# Patient Record
Sex: MALE | Race: WHITE | NOT HISPANIC OR LATINO | URBAN - METROPOLITAN AREA
[De-identification: names, ages, dates, MRNs, and addresses within clinical notes are randomized per-mention and may not be internally consistent; named-entity substitution may affect disease eponyms.]

---

## 2017-12-10 ENCOUNTER — INPATIENT (INPATIENT)
Facility: HOSPITAL | Age: 72
LOS: 2 days | Discharge: HOME | DRG: 694 | End: 2017-12-13
Attending: UROLOGY | Admitting: UROLOGY
Payer: MEDICARE

## 2017-12-10 VITALS
HEART RATE: 100 BPM | SYSTOLIC BLOOD PRESSURE: 146 MMHG | RESPIRATION RATE: 18 BRPM | OXYGEN SATURATION: 98 % | TEMPERATURE: 98 F | DIASTOLIC BLOOD PRESSURE: 93 MMHG

## 2017-12-10 DIAGNOSIS — I63.9 CEREBRAL INFARCTION, UNSPECIFIED: ICD-10-CM

## 2017-12-10 DIAGNOSIS — E11.9 TYPE 2 DIABETES MELLITUS WITHOUT COMPLICATIONS: ICD-10-CM

## 2017-12-10 DIAGNOSIS — E78.00 PURE HYPERCHOLESTEROLEMIA, UNSPECIFIED: ICD-10-CM

## 2017-12-10 DIAGNOSIS — N20.0 CALCULUS OF KIDNEY: ICD-10-CM

## 2017-12-10 DIAGNOSIS — I10 ESSENTIAL (PRIMARY) HYPERTENSION: ICD-10-CM

## 2017-12-10 DIAGNOSIS — Z90.49 ACQUIRED ABSENCE OF OTHER SPECIFIED PARTS OF DIGESTIVE TRACT: Chronic | ICD-10-CM

## 2017-12-10 LAB
ALBUMIN SERPL ELPH-MCNC: 4.2 G/DL — SIGNIFICANT CHANGE UP (ref 3.3–5)
ALP SERPL-CCNC: 53 U/L — SIGNIFICANT CHANGE UP (ref 40–120)
ALT FLD-CCNC: 13 U/L RC — SIGNIFICANT CHANGE UP (ref 10–45)
ANION GAP SERPL CALC-SCNC: 20 MMOL/L — HIGH (ref 5–17)
APPEARANCE UR: CLEAR — SIGNIFICANT CHANGE UP
AST SERPL-CCNC: 12 U/L — SIGNIFICANT CHANGE UP (ref 10–40)
BASOPHILS # BLD AUTO: 0 K/UL — SIGNIFICANT CHANGE UP (ref 0–0.2)
BASOPHILS NFR BLD AUTO: 0.2 % — SIGNIFICANT CHANGE UP (ref 0–2)
BILIRUB SERPL-MCNC: 0.4 MG/DL — SIGNIFICANT CHANGE UP (ref 0.2–1.2)
BILIRUB UR-MCNC: NEGATIVE — SIGNIFICANT CHANGE UP
BUN SERPL-MCNC: 35 MG/DL — HIGH (ref 7–23)
CALCIUM SERPL-MCNC: 9.4 MG/DL — SIGNIFICANT CHANGE UP (ref 8.4–10.5)
CHLORIDE SERPL-SCNC: 104 MMOL/L — SIGNIFICANT CHANGE UP (ref 96–108)
CO2 SERPL-SCNC: 19 MMOL/L — LOW (ref 22–31)
COLOR SPEC: YELLOW — SIGNIFICANT CHANGE UP
CREAT SERPL-MCNC: 2.54 MG/DL — HIGH (ref 0.5–1.3)
DIFF PNL FLD: ABNORMAL
EOSINOPHIL # BLD AUTO: 0.1 K/UL — SIGNIFICANT CHANGE UP (ref 0–0.5)
EOSINOPHIL NFR BLD AUTO: 0.5 % — SIGNIFICANT CHANGE UP (ref 0–6)
GAS PNL BLDV: SIGNIFICANT CHANGE UP
GLUCOSE SERPL-MCNC: 205 MG/DL — HIGH (ref 70–99)
GLUCOSE UR QL: 50
HCT VFR BLD CALC: 37.4 % — LOW (ref 39–50)
HGB BLD-MCNC: 12.5 G/DL — LOW (ref 13–17)
KETONES UR-MCNC: ABNORMAL
LEUKOCYTE ESTERASE UR-ACNC: NEGATIVE — SIGNIFICANT CHANGE UP
LYMPHOCYTES # BLD AUTO: 1.3 K/UL — SIGNIFICANT CHANGE UP (ref 1–3.3)
LYMPHOCYTES # BLD AUTO: 10.5 % — LOW (ref 13–44)
MCHC RBC-ENTMCNC: 31.3 PG — SIGNIFICANT CHANGE UP (ref 27–34)
MCHC RBC-ENTMCNC: 33.5 GM/DL — SIGNIFICANT CHANGE UP (ref 32–36)
MCV RBC AUTO: 93.3 FL — SIGNIFICANT CHANGE UP (ref 80–100)
MONOCYTES # BLD AUTO: 1.2 K/UL — HIGH (ref 0–0.9)
MONOCYTES NFR BLD AUTO: 9.7 % — SIGNIFICANT CHANGE UP (ref 2–14)
NEUTROPHILS # BLD AUTO: 9.6 K/UL — HIGH (ref 1.8–7.4)
NEUTROPHILS NFR BLD AUTO: 79.2 % — HIGH (ref 43–77)
NITRITE UR-MCNC: NEGATIVE — SIGNIFICANT CHANGE UP
PH UR: 5.5 — SIGNIFICANT CHANGE UP (ref 5–8)
PLATELET # BLD AUTO: 296 K/UL — SIGNIFICANT CHANGE UP (ref 150–400)
POTASSIUM SERPL-MCNC: 4 MMOL/L — SIGNIFICANT CHANGE UP (ref 3.5–5.3)
POTASSIUM SERPL-SCNC: 4 MMOL/L — SIGNIFICANT CHANGE UP (ref 3.5–5.3)
PROT SERPL-MCNC: 8.5 G/DL — HIGH (ref 6–8.3)
PROT UR-MCNC: 100 MG/DL
RBC # BLD: 4.01 M/UL — LOW (ref 4.2–5.8)
RBC # FLD: 12.1 % — SIGNIFICANT CHANGE UP (ref 10.3–14.5)
SODIUM SERPL-SCNC: 143 MMOL/L — SIGNIFICANT CHANGE UP (ref 135–145)
SP GR SPEC: 1.03 — HIGH (ref 1.01–1.02)
UROBILINOGEN FLD QL: NEGATIVE — SIGNIFICANT CHANGE UP
WBC # BLD: 12.2 K/UL — HIGH (ref 3.8–10.5)
WBC # FLD AUTO: 12.2 K/UL — HIGH (ref 3.8–10.5)

## 2017-12-10 PROCEDURE — 99285 EMERGENCY DEPT VISIT HI MDM: CPT

## 2017-12-10 PROCEDURE — 74176 CT ABD & PELVIS W/O CONTRAST: CPT | Mod: 26

## 2017-12-10 PROCEDURE — 71010: CPT | Mod: 26

## 2017-12-10 RX ORDER — SODIUM CHLORIDE 9 MG/ML
1000 INJECTION INTRAMUSCULAR; INTRAVENOUS; SUBCUTANEOUS ONCE
Qty: 0 | Refills: 0 | Status: COMPLETED | OUTPATIENT
Start: 2017-12-10 | End: 2017-12-10

## 2017-12-10 RX ORDER — TAMSULOSIN HYDROCHLORIDE 0.4 MG/1
0.4 CAPSULE ORAL DAILY
Qty: 0 | Refills: 0 | Status: DISCONTINUED | OUTPATIENT
Start: 2017-12-10 | End: 2017-12-12

## 2017-12-10 RX ORDER — AMLODIPINE BESYLATE 2.5 MG/1
5 TABLET ORAL ONCE
Qty: 0 | Refills: 0 | Status: COMPLETED | OUTPATIENT
Start: 2017-12-10 | End: 2017-12-10

## 2017-12-10 RX ORDER — ROSUVASTATIN CALCIUM 5 MG/1
0 TABLET ORAL
Qty: 0 | Refills: 0 | COMMUNITY

## 2017-12-10 RX ORDER — CEFTRIAXONE 500 MG/1
1 INJECTION, POWDER, FOR SOLUTION INTRAMUSCULAR; INTRAVENOUS ONCE
Qty: 0 | Refills: 0 | Status: COMPLETED | OUTPATIENT
Start: 2017-12-10 | End: 2017-12-10

## 2017-12-10 RX ORDER — ACETAMINOPHEN 500 MG
1000 TABLET ORAL ONCE
Qty: 0 | Refills: 0 | Status: COMPLETED | OUTPATIENT
Start: 2017-12-10 | End: 2017-12-10

## 2017-12-10 RX ORDER — SODIUM CHLORIDE 9 MG/ML
1000 INJECTION INTRAMUSCULAR; INTRAVENOUS; SUBCUTANEOUS
Qty: 0 | Refills: 0 | Status: DISCONTINUED | OUTPATIENT
Start: 2017-12-10 | End: 2017-12-12

## 2017-12-10 RX ORDER — AMLODIPINE BESYLATE 2.5 MG/1
0 TABLET ORAL
Qty: 0 | Refills: 0 | COMMUNITY

## 2017-12-10 RX ORDER — ATORVASTATIN CALCIUM 80 MG/1
80 TABLET, FILM COATED ORAL AT BEDTIME
Qty: 0 | Refills: 0 | Status: DISCONTINUED | OUTPATIENT
Start: 2017-12-10 | End: 2017-12-12

## 2017-12-10 RX ORDER — ONDANSETRON 8 MG/1
4 TABLET, FILM COATED ORAL ONCE
Qty: 0 | Refills: 0 | Status: COMPLETED | OUTPATIENT
Start: 2017-12-10 | End: 2017-12-10

## 2017-12-10 RX ORDER — LIRAGLUTIDE 6 MG/ML
0 INJECTION SUBCUTANEOUS
Qty: 0 | Refills: 0 | COMMUNITY

## 2017-12-10 RX ORDER — CEFTRIAXONE 500 MG/1
1 INJECTION, POWDER, FOR SOLUTION INTRAMUSCULAR; INTRAVENOUS EVERY 24 HOURS
Qty: 0 | Refills: 0 | Status: DISCONTINUED | OUTPATIENT
Start: 2017-12-11 | End: 2017-12-12

## 2017-12-10 RX ORDER — CARVEDILOL PHOSPHATE 80 MG/1
1 CAPSULE, EXTENDED RELEASE ORAL
Qty: 0 | Refills: 0 | COMMUNITY

## 2017-12-10 RX ORDER — CEFTRIAXONE 500 MG/1
INJECTION, POWDER, FOR SOLUTION INTRAMUSCULAR; INTRAVENOUS
Qty: 0 | Refills: 0 | Status: DISCONTINUED | OUTPATIENT
Start: 2017-12-10 | End: 2017-12-12

## 2017-12-10 RX ORDER — FENOFIBRATE,MICRONIZED 130 MG
0 CAPSULE ORAL
Qty: 0 | Refills: 0 | COMMUNITY

## 2017-12-10 RX ORDER — ONDANSETRON 8 MG/1
4 TABLET, FILM COATED ORAL ONCE
Qty: 0 | Refills: 0 | Status: DISCONTINUED | OUTPATIENT
Start: 2017-12-10 | End: 2017-12-12

## 2017-12-10 RX ORDER — LOSARTAN POTASSIUM 100 MG/1
50 TABLET, FILM COATED ORAL DAILY
Qty: 0 | Refills: 0 | Status: DISCONTINUED | OUTPATIENT
Start: 2017-12-10 | End: 2017-12-12

## 2017-12-10 RX ORDER — ASPIRIN/CALCIUM CARB/MAGNESIUM 324 MG
81 TABLET ORAL DAILY
Qty: 0 | Refills: 0 | Status: DISCONTINUED | OUTPATIENT
Start: 2017-12-10 | End: 2017-12-12

## 2017-12-10 RX ORDER — LOSARTAN POTASSIUM 100 MG/1
0 TABLET, FILM COATED ORAL
Qty: 0 | Refills: 0 | COMMUNITY

## 2017-12-10 RX ORDER — GLIMEPIRIDE 1 MG
0 TABLET ORAL
Qty: 0 | Refills: 0 | COMMUNITY

## 2017-12-10 RX ORDER — MORPHINE SULFATE 50 MG/1
1 CAPSULE, EXTENDED RELEASE ORAL ONCE
Qty: 0 | Refills: 0 | Status: DISCONTINUED | OUTPATIENT
Start: 2017-12-10 | End: 2017-12-10

## 2017-12-10 RX ORDER — HEPARIN SODIUM 5000 [USP'U]/ML
5000 INJECTION INTRAVENOUS; SUBCUTANEOUS EVERY 8 HOURS
Qty: 0 | Refills: 0 | Status: DISCONTINUED | OUTPATIENT
Start: 2017-12-10 | End: 2017-12-12

## 2017-12-10 RX ORDER — ASPIRIN/CALCIUM CARB/MAGNESIUM 324 MG
0 TABLET ORAL
Qty: 0 | Refills: 0 | COMMUNITY

## 2017-12-10 RX ORDER — METFORMIN HYDROCHLORIDE 850 MG/1
0 TABLET ORAL
Qty: 0 | Refills: 0 | COMMUNITY

## 2017-12-10 RX ORDER — CARVEDILOL PHOSPHATE 80 MG/1
0 CAPSULE, EXTENDED RELEASE ORAL
Qty: 0 | Refills: 0 | COMMUNITY

## 2017-12-10 RX ORDER — CARVEDILOL PHOSPHATE 80 MG/1
6.25 CAPSULE, EXTENDED RELEASE ORAL
Qty: 0 | Refills: 0 | Status: DISCONTINUED | OUTPATIENT
Start: 2017-12-10 | End: 2017-12-12

## 2017-12-10 RX ADMIN — SODIUM CHLORIDE 500 MILLILITER(S): 9 INJECTION INTRAMUSCULAR; INTRAVENOUS; SUBCUTANEOUS at 20:25

## 2017-12-10 RX ADMIN — TAMSULOSIN HYDROCHLORIDE 0.4 MILLIGRAM(S): 0.4 CAPSULE ORAL at 17:22

## 2017-12-10 RX ADMIN — MORPHINE SULFATE 1 MILLIGRAM(S): 50 CAPSULE, EXTENDED RELEASE ORAL at 17:44

## 2017-12-10 RX ADMIN — SODIUM CHLORIDE 75 MILLILITER(S): 9 INJECTION INTRAMUSCULAR; INTRAVENOUS; SUBCUTANEOUS at 23:21

## 2017-12-10 RX ADMIN — AMLODIPINE BESYLATE 5 MILLIGRAM(S): 2.5 TABLET ORAL at 22:29

## 2017-12-10 RX ADMIN — MORPHINE SULFATE 1 MILLIGRAM(S): 50 CAPSULE, EXTENDED RELEASE ORAL at 17:22

## 2017-12-10 RX ADMIN — Medication 1000 MILLIGRAM(S): at 15:12

## 2017-12-10 RX ADMIN — ATORVASTATIN CALCIUM 80 MILLIGRAM(S): 80 TABLET, FILM COATED ORAL at 23:33

## 2017-12-10 RX ADMIN — MORPHINE SULFATE 1 MILLIGRAM(S): 50 CAPSULE, EXTENDED RELEASE ORAL at 20:49

## 2017-12-10 RX ADMIN — Medication 400 MILLIGRAM(S): at 14:58

## 2017-12-10 RX ADMIN — MORPHINE SULFATE 1 MILLIGRAM(S): 50 CAPSULE, EXTENDED RELEASE ORAL at 20:25

## 2017-12-10 RX ADMIN — Medication 81 MILLIGRAM(S): at 22:28

## 2017-12-10 RX ADMIN — LOSARTAN POTASSIUM 50 MILLIGRAM(S): 100 TABLET, FILM COATED ORAL at 22:28

## 2017-12-10 RX ADMIN — HEPARIN SODIUM 5000 UNIT(S): 5000 INJECTION INTRAVENOUS; SUBCUTANEOUS at 23:33

## 2017-12-10 RX ADMIN — SODIUM CHLORIDE 1000 MILLILITER(S): 9 INJECTION INTRAMUSCULAR; INTRAVENOUS; SUBCUTANEOUS at 14:58

## 2017-12-10 RX ADMIN — ONDANSETRON 4 MILLIGRAM(S): 8 TABLET, FILM COATED ORAL at 14:58

## 2017-12-10 RX ADMIN — SODIUM CHLORIDE 1000 MILLILITER(S): 9 INJECTION INTRAMUSCULAR; INTRAVENOUS; SUBCUTANEOUS at 17:22

## 2017-12-10 RX ADMIN — CEFTRIAXONE 100 GRAM(S): 500 INJECTION, POWDER, FOR SOLUTION INTRAMUSCULAR; INTRAVENOUS at 22:29

## 2017-12-10 NOTE — H&P ADULT - PROBLEM SELECTOR PLAN 1
-NPO after midnight for possible surgical intervention  -IV fluid hydration  -Analgesia and antiemetics prn  -Flomax 0.4mg   -Strain urine  - F/u cx   -AM labs, trend cr

## 2017-12-10 NOTE — ED ADULT NURSE REASSESSMENT NOTE - NS ED NURSE REASSESS COMMENT FT1
Pt currently resting comfortably in bed waiting for Urology consult. VSS. Will continue to monitor and assess while offering support and reassurance.

## 2017-12-10 NOTE — H&P ADULT - NSHPPHYSICALEXAM_GEN_ALL_CORE
Gen: NAD  Pulm: No respiratory distress, no subcostal retractions  CV: RRR, no JVD  Abd: Soft, ND, suprapubic tenderness  Back: No CVAT  :  No discharge or blood at urethral meatus.  Testes descended bilaterally.  Testes and epididymis nontender bilaterally.    MSK: No edema present

## 2017-12-10 NOTE — ED PROVIDER NOTE - SHIFT CHANGE DETAILS
Attending MD Cool: 72M presents to the ED with abdominal pain/n/v/no BM for 4d.  Prior appy in childhood.  CT without obstruction, but impacted 4mm R renal stone, Cr 2.34, no prior.  Uro consult pending.  PO challenge.  If not tolerating po, pain control and IV hydration, emesis, admission.  However, will await uro input about impacted stone.

## 2017-12-10 NOTE — ED PROVIDER NOTE - MEDICAL DECISION MAKING DETAILS
72yoM with PMH of stroke, T2DM, renal stone, presenting with epigastric pain for 5 days, decreased PO intake and nausea.  - Check labs, CT abd/pelvis,   - IVNS, acetaminophen, maalox

## 2017-12-10 NOTE — H&P ADULT - HISTORY OF PRESENT ILLNESS
71y/o m with PMH of T2DM, CVA, HTN, HLD, renal stones 12 years ago with spontaneous passage presented to ED c/o abdominal pain and nausea x 4 days. Started having left flank pain and vomiting this morning. Pain radiates to LLQ, one episode of hematuria 3 days ago that has since resolved. He denies F/C, dysuria, urgency, frequency.

## 2017-12-10 NOTE — ED ADULT NURSE NOTE - OBJECTIVE STATEMENT
72 year old male presented to ED with c/o of abdominal pain x3 days. Pt has not had BM in 3 days, states pain is epigastric radiating to lower back. Pt denies CP, SOB, nausea/vomiting, numbness/tingling, fever, cough, chills, dizziness, headache. Pt a&ox3, lung sounds clear, heart rate regular, abdomen soft nontender nondistended to palp. +BS in all four quadrants. Skin intact. IV in left forearm 20G and patent. Pt currently resting in bed with family at bedside, side rails up for safety. Will continue to monitor and assess while offering support and reassurance.

## 2017-12-10 NOTE — ED PROVIDER NOTE - OBJECTIVE STATEMENT
Pt is a 71yo M with PMH of stroke, T2DM, renal stones, who presents with complaint of 5-6 days of abdominal pain.  Pt also endorses nausea and constipation for 4 days. At onset, pt also experienced L lumbar pain which has since resolved. He denies vomiting, melena, hematochezia. No sick contacts or recent Pt is a 71yo M with PMH of stroke, T2DM, renal stones, who presents with complaint of 5-6 days of abdominal pain.  Pt also endorses nausea and constipation for 4 days. He denies vomiting, melena, hematochezia.  At onset, pt also experienced L lumbar pain which has since resolved. Lumbar back pain was not provoked by any recent exercises or direct trauma. Pt states that pain was similar to previous renal stone, and pt had 1 day of hematuria without dysuria which has since resolved. He did not try any OTC medications to help treat pain.     No sick contacts or recent travel.

## 2017-12-10 NOTE — ED ADULT NURSE NOTE - CHPI ED SYMPTOMS NEG
no nausea/no dysuria/no burning urination/no chills/no diarrhea/no hematuria/no blood in stool/no abdominal distension/no fever/no vomiting

## 2017-12-10 NOTE — ED PROVIDER NOTE - ATTENDING CONTRIBUTION TO CARE
Patient presenting c/o abdominal pains, nausea, vomiting, decreased PO intake and no bowel movement or passing gas for last 4 days.  Prior appendectomy as child.  No sick contacts or fevers, reporting feels dehydrated.    On exam patient vital signs within normal limits, non toxic appearing, RRR S1/S2, lungs clear to ascultation bilaterally, abdomen soft but with no ascultated bowel sounds.    DDx ileus versus SBO vs LBO, plan for labs, CT A/P, IVF, pain control, possible surgical consultation

## 2017-12-10 NOTE — ED PROVIDER NOTE - PHYSICAL EXAMINATION
Gen: NAD, A&O x 3, lying in bed conversant  HEENT: PERRL, EOMI, mucous membranes dry, no oropharyngeal exudates  Neck: no cervical lyphadenopathy  Pulmonary: CTAB, no rhonci, wheezes or rales  Cardiac: regular rate and rhythm, normal S1S2, no r/m/g  GI:  soft, moderate tenderness to palpation at epigastric area, nondistended, no guarding or rebound tenderness  Extremities: warm, well perfused, no peripheral edema  Skin: skin intact, no skin tears or rashes or other lesions  Neuro: A&O x3, CN II-XII intact, 5/5 BUE and BLE strength, decreased sensation to light touch at L plantar surface

## 2017-12-10 NOTE — H&P ADULT - NSHPLABSRESULTS_GEN_ALL_CORE
12.5   12.2  )-----------( 296      ( 10 Dec 2017 14:38 )             37.4   12-10    143  |  104  |  35<H>  ----------------------------<  205<H>  4.0   |  19<L>  |  2.54<H>    Ca    9.4      10 Dec 2017 14:38    TPro  8.5<H>  /  Alb  4.2  /  TBili  0.4  /  DBili  x   /  AST  12  /  ALT  13  /  AlkPhos  53  12-10  Urinalysis Basic - ( 10 Dec 2017 15:06 )    Color: Yellow / Appearance: Clear / S.028 / pH: x  Gluc: x / Ketone: Trace  / Bili: Negative / Urobili: Negative   Blood: x / Protein: 100 mg/dL / Nitrite: Negative   Leuk Esterase: Negative / RBC: 3-5 /HPF / WBC 3-5 /HPF   Sq Epi: x / Non Sq Epi: OCC /HPF / Bacteria: Few /HPF    < from: CT Abdomen and Pelvis w/ Oral Cont (12.10.17 @ 16:14) >    IMPRESSION:  1.  Mild left hydronephrosis secondary to 4 mm stone impacted in the   proximal left ureter.  2.  Indeterminate cystic lesion arising from the tail of pancreas, which   would be better evaluated with nonurgent MRI.      < end of copied text >

## 2017-12-10 NOTE — ED PROVIDER NOTE - PROGRESS NOTE DETAILS
Discussed with pt findings of renal stone with mild hydronephrosis. Found to have SCr of ~2.5. Pt does not have urologist. Will d/w house urology team. Pt has persistent L sided abdominal pain with back radiation. Will provide morphine. Awaiting final recommendations from urology team. Attending MD Cool: Called urology, awaiting to speak to attending, will await recommendations Attending MD Cool: H&P noted, uro paged, no final recommendations as of yet, will await Pt to be admitted to urology team under Dr. Rae

## 2017-12-10 NOTE — ED PROVIDER NOTE - NS ED ROS FT
ROS  Gen:  no fevers, + chills, + weight loss 7 lbs over the last week pt attributes to not eating because feeling unwell  HEENT: no vision changes, no hearing loss, no pharygnitis, no oral lesions  Pulm: no cough, no SOB, no sputum production, no wheezing  Cardiac: no chest pain, no SOB   GI: + abdominal pain, + N, no V, no diarrhea, + constipation  : no dysuria, + one day of hematuria which resolved  Skin: no skin lesions  Neuro:  no headache, no focal weakness, + numbness at L plantar surface 2/2 T2DM

## 2017-12-10 NOTE — H&P ADULT - PMH
Cerebrovascular accident (CVA), unspecified mechanism    Diabetes mellitus    High cholesterol    HTN (hypertension)

## 2017-12-11 DIAGNOSIS — I10 ESSENTIAL (PRIMARY) HYPERTENSION: ICD-10-CM

## 2017-12-11 DIAGNOSIS — E11.9 TYPE 2 DIABETES MELLITUS WITHOUT COMPLICATIONS: ICD-10-CM

## 2017-12-11 LAB
ANION GAP SERPL CALC-SCNC: 15 MMOL/L — SIGNIFICANT CHANGE UP (ref 5–17)
APTT BLD: 29.3 SEC — SIGNIFICANT CHANGE UP (ref 27.5–37.4)
BLD GP AB SCN SERPL QL: NEGATIVE — SIGNIFICANT CHANGE UP
BUN SERPL-MCNC: 26 MG/DL — HIGH (ref 7–23)
CALCIUM SERPL-MCNC: 8.4 MG/DL — SIGNIFICANT CHANGE UP (ref 8.4–10.5)
CHLORIDE SERPL-SCNC: 105 MMOL/L — SIGNIFICANT CHANGE UP (ref 96–108)
CO2 SERPL-SCNC: 19 MMOL/L — LOW (ref 22–31)
CREAT SERPL-MCNC: 2.28 MG/DL — HIGH (ref 0.5–1.3)
CULTURE RESULTS: NO GROWTH — SIGNIFICANT CHANGE UP
GLUCOSE BLDC GLUCOMTR-MCNC: 146 MG/DL — HIGH (ref 70–99)
GLUCOSE BLDC GLUCOMTR-MCNC: 165 MG/DL — HIGH (ref 70–99)
GLUCOSE BLDC GLUCOMTR-MCNC: 165 MG/DL — HIGH (ref 70–99)
GLUCOSE BLDC GLUCOMTR-MCNC: 216 MG/DL — HIGH (ref 70–99)
GLUCOSE SERPL-MCNC: 155 MG/DL — HIGH (ref 70–99)
HCT VFR BLD CALC: 32 % — LOW (ref 39–50)
HGB BLD-MCNC: 10.4 G/DL — LOW (ref 13–17)
INR BLD: 1.05 RATIO — SIGNIFICANT CHANGE UP (ref 0.88–1.16)
MCHC RBC-ENTMCNC: 29.5 PG — SIGNIFICANT CHANGE UP (ref 27–34)
MCHC RBC-ENTMCNC: 32.5 GM/DL — SIGNIFICANT CHANGE UP (ref 32–36)
MCV RBC AUTO: 90.9 FL — SIGNIFICANT CHANGE UP (ref 80–100)
PLATELET # BLD AUTO: 299 K/UL — SIGNIFICANT CHANGE UP (ref 150–400)
POTASSIUM SERPL-MCNC: 3.8 MMOL/L — SIGNIFICANT CHANGE UP (ref 3.5–5.3)
POTASSIUM SERPL-SCNC: 3.8 MMOL/L — SIGNIFICANT CHANGE UP (ref 3.5–5.3)
PROTHROM AB SERPL-ACNC: 11.9 SEC — SIGNIFICANT CHANGE UP (ref 10–13.1)
RBC # BLD: 3.52 M/UL — LOW (ref 4.2–5.8)
RBC # FLD: 13.5 % — SIGNIFICANT CHANGE UP (ref 10.3–14.5)
RH IG SCN BLD-IMP: POSITIVE — SIGNIFICANT CHANGE UP
SODIUM SERPL-SCNC: 139 MMOL/L — SIGNIFICANT CHANGE UP (ref 135–145)
SPECIMEN SOURCE: SIGNIFICANT CHANGE UP
WBC # BLD: 10.28 K/UL — SIGNIFICANT CHANGE UP (ref 3.8–10.5)
WBC # FLD AUTO: 10.28 K/UL — SIGNIFICANT CHANGE UP (ref 3.8–10.5)

## 2017-12-11 RX ORDER — PANTOPRAZOLE SODIUM 20 MG/1
40 TABLET, DELAYED RELEASE ORAL ONCE
Qty: 0 | Refills: 0 | Status: DISCONTINUED | OUTPATIENT
Start: 2017-12-11 | End: 2017-12-12

## 2017-12-11 RX ORDER — PANTOPRAZOLE SODIUM 20 MG/1
40 TABLET, DELAYED RELEASE ORAL ONCE
Qty: 0 | Refills: 0 | Status: COMPLETED | OUTPATIENT
Start: 2017-12-11 | End: 2017-12-11

## 2017-12-11 RX ORDER — SODIUM CHLORIDE 9 MG/ML
1000 INJECTION INTRAMUSCULAR; INTRAVENOUS; SUBCUTANEOUS ONCE
Qty: 0 | Refills: 0 | Status: DISCONTINUED | OUTPATIENT
Start: 2017-12-11 | End: 2017-12-11

## 2017-12-11 RX ORDER — MORPHINE SULFATE 50 MG/1
2 CAPSULE, EXTENDED RELEASE ORAL ONCE
Qty: 0 | Refills: 0 | Status: DISCONTINUED | OUTPATIENT
Start: 2017-12-11 | End: 2017-12-11

## 2017-12-11 RX ORDER — DEXTROSE 50 % IN WATER 50 %
12.5 SYRINGE (ML) INTRAVENOUS ONCE
Qty: 0 | Refills: 0 | Status: DISCONTINUED | OUTPATIENT
Start: 2017-12-11 | End: 2017-12-12

## 2017-12-11 RX ORDER — INSULIN LISPRO 100/ML
VIAL (ML) SUBCUTANEOUS
Qty: 0 | Refills: 0 | Status: DISCONTINUED | OUTPATIENT
Start: 2017-12-11 | End: 2017-12-11

## 2017-12-11 RX ORDER — GLUCAGON INJECTION, SOLUTION 0.5 MG/.1ML
1 INJECTION, SOLUTION SUBCUTANEOUS ONCE
Qty: 0 | Refills: 0 | Status: DISCONTINUED | OUTPATIENT
Start: 2017-12-11 | End: 2017-12-12

## 2017-12-11 RX ORDER — SODIUM CHLORIDE 9 MG/ML
1000 INJECTION, SOLUTION INTRAVENOUS
Qty: 0 | Refills: 0 | Status: DISCONTINUED | OUTPATIENT
Start: 2017-12-11 | End: 2017-12-12

## 2017-12-11 RX ORDER — ACETAMINOPHEN 500 MG
1000 TABLET ORAL ONCE
Qty: 0 | Refills: 0 | Status: COMPLETED | OUTPATIENT
Start: 2017-12-11 | End: 2017-12-12

## 2017-12-11 RX ORDER — DEXTROSE 50 % IN WATER 50 %
25 SYRINGE (ML) INTRAVENOUS ONCE
Qty: 0 | Refills: 0 | Status: DISCONTINUED | OUTPATIENT
Start: 2017-12-11 | End: 2017-12-12

## 2017-12-11 RX ORDER — INSULIN LISPRO 100/ML
VIAL (ML) SUBCUTANEOUS EVERY 6 HOURS
Qty: 0 | Refills: 0 | Status: DISCONTINUED | OUTPATIENT
Start: 2017-12-11 | End: 2017-12-12

## 2017-12-11 RX ORDER — DEXTROSE 50 % IN WATER 50 %
1 SYRINGE (ML) INTRAVENOUS ONCE
Qty: 0 | Refills: 0 | Status: DISCONTINUED | OUTPATIENT
Start: 2017-12-11 | End: 2017-12-12

## 2017-12-11 RX ADMIN — HEPARIN SODIUM 5000 UNIT(S): 5000 INJECTION INTRAVENOUS; SUBCUTANEOUS at 22:09

## 2017-12-11 RX ADMIN — ATORVASTATIN CALCIUM 80 MILLIGRAM(S): 80 TABLET, FILM COATED ORAL at 22:09

## 2017-12-11 RX ADMIN — CARVEDILOL PHOSPHATE 6.25 MILLIGRAM(S): 80 CAPSULE, EXTENDED RELEASE ORAL at 18:29

## 2017-12-11 RX ADMIN — MORPHINE SULFATE 2 MILLIGRAM(S): 50 CAPSULE, EXTENDED RELEASE ORAL at 13:35

## 2017-12-11 RX ADMIN — HEPARIN SODIUM 5000 UNIT(S): 5000 INJECTION INTRAVENOUS; SUBCUTANEOUS at 06:08

## 2017-12-11 RX ADMIN — HEPARIN SODIUM 5000 UNIT(S): 5000 INJECTION INTRAVENOUS; SUBCUTANEOUS at 13:38

## 2017-12-11 RX ADMIN — CEFTRIAXONE 100 GRAM(S): 500 INJECTION, POWDER, FOR SOLUTION INTRAMUSCULAR; INTRAVENOUS at 22:09

## 2017-12-11 RX ADMIN — Medication 1: at 13:35

## 2017-12-11 RX ADMIN — PANTOPRAZOLE SODIUM 40 MILLIGRAM(S): 20 TABLET, DELAYED RELEASE ORAL at 08:10

## 2017-12-11 RX ADMIN — TAMSULOSIN HYDROCHLORIDE 0.4 MILLIGRAM(S): 0.4 CAPSULE ORAL at 13:34

## 2017-12-11 RX ADMIN — Medication 0.5 MILLIGRAM(S): at 01:21

## 2017-12-11 RX ADMIN — Medication 81 MILLIGRAM(S): at 13:34

## 2017-12-11 RX ADMIN — CARVEDILOL PHOSPHATE 6.25 MILLIGRAM(S): 80 CAPSULE, EXTENDED RELEASE ORAL at 06:08

## 2017-12-11 NOTE — PROGRESS NOTE ADULT - SUBJECTIVE AND OBJECTIVE BOX
UROLOGY DAILY PROGRESS NOTE:     Subjective: Patient seen and examined at bedside. No acute events overnight       Objective:  Vital signs  T(F): , Max: 98.9 (12-10-17 @ 23:26)  HR: 94 (12-11-17 @ 06:07)  BP: 172/82 (12-11-17 @ 06:07)  SpO2: 96% (12-11-17 @ 06:07)  Wt(kg): --    Output     I&O's Detail      Physical Exam:  Gen: NAD  Abd:soft NTND   Back: mild L CVAT  : voiding     Labs:  12-10  12.2  / 37.4  /2.54                           12.5   12.2  )-----------( 296      ( 10 Dec 2017 14:38 )             37.4     12-10    143  |  104  |  35<H>  ----------------------------<  205<H>  4.0   |  19<L>  |  2.54<H>    Ca    9.4      10 Dec 2017 14:38    TPro  8.5<H>  /  Alb  4.2  /  TBili  0.4  /  DBili  x   /  AST  12  /  ALT  13  /  AlkPhos  53  12-10          Urine Cx: UROLOGY DAILY PROGRESS NOTE:     Subjective: Patient seen and examined at bedside. No acute events overnight pain nausea remains      Objective:  Vital signs  T(F): , Max: 98.9 (12-10-17 @ 23:26)  HR: 94 (12-11-17 @ 06:07)  BP: 172/82 (12-11-17 @ 06:07)  SpO2: 96% (12-11-17 @ 06:07)  Wt(kg): --    Output     I&O's Detail      Physical Exam:  Gen: NAD  Abd:soft NTND   Back: mild L CVAT  : voiding     Labs:  12-10  12.2  / 37.4  /2.54                           12.5   12.2  )-----------( 296      ( 10 Dec 2017 14:38 )             37.4     12-10    143  |  104  |  35<H>  ----------------------------<  205<H>  4.0   |  19<L>  |  2.54<H>    Ca    9.4      10 Dec 2017 14:38    TPro  8.5<H>  /  Alb  4.2  /  TBili  0.4  /  DBili  x   /  AST  12  /  ALT  13  /  AlkPhos  53  12-10          Urine Cx:

## 2017-12-11 NOTE — PROGRESS NOTE ADULT - ASSESSMENT
L renal colic 4mm mid ureteral calculi   bladder calculi  IV antibiotics  f/u cx  strain urine   pain management   OR today L ureteral stent  DVT prophylaxis L renal colic 4mm mid ureteral calculi with ROMIE  bladder calculi  IV antibiotics  f/u cx  f/u labs  strain urine   pain management   OR today L ureteral stent  DVT prophylaxis

## 2017-12-12 ENCOUNTER — TRANSCRIPTION ENCOUNTER (OUTPATIENT)
Age: 72
End: 2017-12-12

## 2017-12-12 LAB
ANION GAP SERPL CALC-SCNC: 13 MMOL/L — SIGNIFICANT CHANGE UP (ref 5–17)
BUN SERPL-MCNC: 33 MG/DL — HIGH (ref 7–23)
CALCIUM SERPL-MCNC: 8.5 MG/DL — SIGNIFICANT CHANGE UP (ref 8.4–10.5)
CHLORIDE SERPL-SCNC: 107 MMOL/L — SIGNIFICANT CHANGE UP (ref 96–108)
CO2 SERPL-SCNC: 21 MMOL/L — LOW (ref 22–31)
CREAT SERPL-MCNC: 2.31 MG/DL — HIGH (ref 0.5–1.3)
GLUCOSE BLDC GLUCOMTR-MCNC: 133 MG/DL — HIGH (ref 70–99)
GLUCOSE BLDC GLUCOMTR-MCNC: 148 MG/DL — HIGH (ref 70–99)
GLUCOSE BLDC GLUCOMTR-MCNC: 159 MG/DL — HIGH (ref 70–99)
GLUCOSE BLDC GLUCOMTR-MCNC: 208 MG/DL — HIGH (ref 70–99)
GLUCOSE BLDC GLUCOMTR-MCNC: 218 MG/DL — HIGH (ref 70–99)
GLUCOSE SERPL-MCNC: 157 MG/DL — HIGH (ref 70–99)
HBA1C BLD-MCNC: 8.1 % — HIGH (ref 4–5.6)
POTASSIUM SERPL-MCNC: 4.2 MMOL/L — SIGNIFICANT CHANGE UP (ref 3.5–5.3)
POTASSIUM SERPL-SCNC: 4.2 MMOL/L — SIGNIFICANT CHANGE UP (ref 3.5–5.3)
SODIUM SERPL-SCNC: 141 MMOL/L — SIGNIFICANT CHANGE UP (ref 135–145)

## 2017-12-12 PROCEDURE — 52332 CYSTOSCOPY AND TREATMENT: CPT | Mod: LT

## 2017-12-12 RX ORDER — PANTOPRAZOLE SODIUM 20 MG/1
40 TABLET, DELAYED RELEASE ORAL ONCE
Qty: 0 | Refills: 0 | Status: COMPLETED | OUTPATIENT
Start: 2017-12-12 | End: 2017-12-12

## 2017-12-12 RX ORDER — ATORVASTATIN CALCIUM 80 MG/1
80 TABLET, FILM COATED ORAL AT BEDTIME
Qty: 0 | Refills: 0 | Status: DISCONTINUED | OUTPATIENT
Start: 2017-12-12 | End: 2017-12-13

## 2017-12-12 RX ORDER — ONDANSETRON 8 MG/1
4 TABLET, FILM COATED ORAL ONCE
Qty: 0 | Refills: 0 | Status: DISCONTINUED | OUTPATIENT
Start: 2017-12-12 | End: 2017-12-13

## 2017-12-12 RX ORDER — DEXTROSE 50 % IN WATER 50 %
25 SYRINGE (ML) INTRAVENOUS ONCE
Qty: 0 | Refills: 0 | Status: DISCONTINUED | OUTPATIENT
Start: 2017-12-12 | End: 2017-12-13

## 2017-12-12 RX ORDER — ASPIRIN/CALCIUM CARB/MAGNESIUM 324 MG
81 TABLET ORAL DAILY
Qty: 0 | Refills: 0 | Status: DISCONTINUED | OUTPATIENT
Start: 2017-12-12 | End: 2017-12-13

## 2017-12-12 RX ORDER — MORPHINE SULFATE 50 MG/1
2 CAPSULE, EXTENDED RELEASE ORAL ONCE
Qty: 0 | Refills: 0 | Status: DISCONTINUED | OUTPATIENT
Start: 2017-12-12 | End: 2017-12-12

## 2017-12-12 RX ORDER — INSULIN LISPRO 100/ML
VIAL (ML) SUBCUTANEOUS AT BEDTIME
Qty: 0 | Refills: 0 | Status: DISCONTINUED | OUTPATIENT
Start: 2017-12-12 | End: 2017-12-13

## 2017-12-12 RX ORDER — GLUCAGON INJECTION, SOLUTION 0.5 MG/.1ML
1 INJECTION, SOLUTION SUBCUTANEOUS ONCE
Qty: 0 | Refills: 0 | Status: DISCONTINUED | OUTPATIENT
Start: 2017-12-12 | End: 2017-12-13

## 2017-12-12 RX ORDER — ATROPA BELLADONNA AND OPIUM 16.2; 6 MG/1; MG/1
1 SUPPOSITORY RECTAL ONCE
Qty: 0 | Refills: 0 | Status: DISCONTINUED | OUTPATIENT
Start: 2017-12-12 | End: 2017-12-12

## 2017-12-12 RX ORDER — TAMSULOSIN HYDROCHLORIDE 0.4 MG/1
0.4 CAPSULE ORAL AT BEDTIME
Qty: 0 | Refills: 0 | Status: DISCONTINUED | OUTPATIENT
Start: 2017-12-12 | End: 2017-12-12

## 2017-12-12 RX ORDER — ONDANSETRON 8 MG/1
4 TABLET, FILM COATED ORAL ONCE
Qty: 0 | Refills: 0 | Status: COMPLETED | OUTPATIENT
Start: 2017-12-12 | End: 2017-12-12

## 2017-12-12 RX ORDER — INSULIN LISPRO 100/ML
VIAL (ML) SUBCUTANEOUS
Qty: 0 | Refills: 0 | Status: DISCONTINUED | OUTPATIENT
Start: 2017-12-12 | End: 2017-12-13

## 2017-12-12 RX ORDER — CEFTRIAXONE 500 MG/1
1 INJECTION, POWDER, FOR SOLUTION INTRAMUSCULAR; INTRAVENOUS EVERY 24 HOURS
Qty: 0 | Refills: 0 | Status: DISCONTINUED | OUTPATIENT
Start: 2017-12-12 | End: 2017-12-13

## 2017-12-12 RX ORDER — CARVEDILOL PHOSPHATE 80 MG/1
6.25 CAPSULE, EXTENDED RELEASE ORAL
Qty: 0 | Refills: 0 | Status: DISCONTINUED | OUTPATIENT
Start: 2017-12-12 | End: 2017-12-12

## 2017-12-12 RX ORDER — LOSARTAN POTASSIUM 100 MG/1
50 TABLET, FILM COATED ORAL DAILY
Qty: 0 | Refills: 0 | Status: DISCONTINUED | OUTPATIENT
Start: 2017-12-12 | End: 2017-12-12

## 2017-12-12 RX ORDER — SODIUM CHLORIDE 9 MG/ML
1000 INJECTION, SOLUTION INTRAVENOUS
Qty: 0 | Refills: 0 | Status: DISCONTINUED | OUTPATIENT
Start: 2017-12-12 | End: 2017-12-13

## 2017-12-12 RX ORDER — DEXTROSE 50 % IN WATER 50 %
12.5 SYRINGE (ML) INTRAVENOUS ONCE
Qty: 0 | Refills: 0 | Status: DISCONTINUED | OUTPATIENT
Start: 2017-12-12 | End: 2017-12-13

## 2017-12-12 RX ORDER — CARVEDILOL PHOSPHATE 80 MG/1
6.25 CAPSULE, EXTENDED RELEASE ORAL EVERY 12 HOURS
Qty: 0 | Refills: 0 | Status: DISCONTINUED | OUTPATIENT
Start: 2017-12-12 | End: 2017-12-13

## 2017-12-12 RX ORDER — TAMSULOSIN HYDROCHLORIDE 0.4 MG/1
0.4 CAPSULE ORAL DAILY
Qty: 0 | Refills: 0 | Status: DISCONTINUED | OUTPATIENT
Start: 2017-12-12 | End: 2017-12-12

## 2017-12-12 RX ORDER — TAMSULOSIN HYDROCHLORIDE 0.4 MG/1
0.4 CAPSULE ORAL AT BEDTIME
Qty: 0 | Refills: 0 | Status: DISCONTINUED | OUTPATIENT
Start: 2017-12-13 | End: 2017-12-13

## 2017-12-12 RX ORDER — DEXTROSE 50 % IN WATER 50 %
1 SYRINGE (ML) INTRAVENOUS ONCE
Qty: 0 | Refills: 0 | Status: DISCONTINUED | OUTPATIENT
Start: 2017-12-12 | End: 2017-12-13

## 2017-12-12 RX ORDER — HEPARIN SODIUM 5000 [USP'U]/ML
5000 INJECTION INTRAVENOUS; SUBCUTANEOUS EVERY 8 HOURS
Qty: 0 | Refills: 0 | Status: DISCONTINUED | OUTPATIENT
Start: 2017-12-12 | End: 2017-12-13

## 2017-12-12 RX ORDER — HYDROMORPHONE HYDROCHLORIDE 2 MG/ML
0.25 INJECTION INTRAMUSCULAR; INTRAVENOUS; SUBCUTANEOUS ONCE
Qty: 0 | Refills: 0 | Status: DISCONTINUED | OUTPATIENT
Start: 2017-12-12 | End: 2017-12-13

## 2017-12-12 RX ORDER — HYDRALAZINE HCL 50 MG
10 TABLET ORAL ONCE
Qty: 0 | Refills: 0 | Status: COMPLETED | OUTPATIENT
Start: 2017-12-12 | End: 2017-12-12

## 2017-12-12 RX ORDER — SODIUM CHLORIDE 9 MG/ML
1000 INJECTION INTRAMUSCULAR; INTRAVENOUS; SUBCUTANEOUS
Qty: 0 | Refills: 0 | Status: DISCONTINUED | OUTPATIENT
Start: 2017-12-12 | End: 2017-12-13

## 2017-12-12 RX ORDER — LOSARTAN POTASSIUM 100 MG/1
50 TABLET, FILM COATED ORAL DAILY
Qty: 0 | Refills: 0 | Status: DISCONTINUED | OUTPATIENT
Start: 2017-12-13 | End: 2017-12-13

## 2017-12-12 RX ADMIN — Medication 1000 MILLIGRAM(S): at 01:08

## 2017-12-12 RX ADMIN — Medication 1: at 12:36

## 2017-12-12 RX ADMIN — HEPARIN SODIUM 5000 UNIT(S): 5000 INJECTION INTRAVENOUS; SUBCUTANEOUS at 06:00

## 2017-12-12 RX ADMIN — TAMSULOSIN HYDROCHLORIDE 0.4 MILLIGRAM(S): 0.4 CAPSULE ORAL at 12:36

## 2017-12-12 RX ADMIN — ATORVASTATIN CALCIUM 80 MILLIGRAM(S): 80 TABLET, FILM COATED ORAL at 22:23

## 2017-12-12 RX ADMIN — Medication 81 MILLIGRAM(S): at 12:35

## 2017-12-12 RX ADMIN — ATROPA BELLADONNA AND OPIUM 1 SUPPOSITORY(S): 16.2; 6 SUPPOSITORY RECTAL at 16:16

## 2017-12-12 RX ADMIN — CARVEDILOL PHOSPHATE 6.25 MILLIGRAM(S): 80 CAPSULE, EXTENDED RELEASE ORAL at 06:00

## 2017-12-12 RX ADMIN — Medication 10 MILLIGRAM(S): at 14:15

## 2017-12-12 RX ADMIN — ATROPA BELLADONNA AND OPIUM 1 SUPPOSITORY(S): 16.2; 6 SUPPOSITORY RECTAL at 17:00

## 2017-12-12 RX ADMIN — MORPHINE SULFATE 2 MILLIGRAM(S): 50 CAPSULE, EXTENDED RELEASE ORAL at 07:54

## 2017-12-12 RX ADMIN — Medication 81 MILLIGRAM(S): at 17:56

## 2017-12-12 RX ADMIN — MORPHINE SULFATE 2 MILLIGRAM(S): 50 CAPSULE, EXTENDED RELEASE ORAL at 08:24

## 2017-12-12 RX ADMIN — Medication 2: at 00:53

## 2017-12-12 RX ADMIN — SODIUM CHLORIDE 75 MILLILITER(S): 9 INJECTION INTRAMUSCULAR; INTRAVENOUS; SUBCUTANEOUS at 16:39

## 2017-12-12 RX ADMIN — CARVEDILOL PHOSPHATE 6.25 MILLIGRAM(S): 80 CAPSULE, EXTENDED RELEASE ORAL at 17:56

## 2017-12-12 RX ADMIN — ONDANSETRON 4 MILLIGRAM(S): 8 TABLET, FILM COATED ORAL at 17:56

## 2017-12-12 RX ADMIN — PANTOPRAZOLE SODIUM 40 MILLIGRAM(S): 20 TABLET, DELAYED RELEASE ORAL at 17:56

## 2017-12-12 RX ADMIN — PANTOPRAZOLE SODIUM 40 MILLIGRAM(S): 20 TABLET, DELAYED RELEASE ORAL at 06:00

## 2017-12-12 RX ADMIN — LOSARTAN POTASSIUM 50 MILLIGRAM(S): 100 TABLET, FILM COATED ORAL at 06:00

## 2017-12-12 RX ADMIN — Medication 400 MILLIGRAM(S): at 00:38

## 2017-12-12 RX ADMIN — HEPARIN SODIUM 5000 UNIT(S): 5000 INJECTION INTRAVENOUS; SUBCUTANEOUS at 12:35

## 2017-12-12 RX ADMIN — CEFTRIAXONE 100 GRAM(S): 500 INJECTION, POWDER, FOR SOLUTION INTRAMUSCULAR; INTRAVENOUS at 17:56

## 2017-12-12 RX ADMIN — HEPARIN SODIUM 5000 UNIT(S): 5000 INJECTION INTRAVENOUS; SUBCUTANEOUS at 22:22

## 2017-12-12 NOTE — PROGRESS NOTE ADULT - SUBJECTIVE AND OBJECTIVE BOX
UROLOGY PA PROGRESS NOTE:     Subjective:  no acute events overnight. no complaints     Objective:  Vital signs  T(F): , Max: 99 (17 @ 16:59)  HR: 68 (17 @ 05:53)  BP: 178/89 (17 @ 05:53)  SpO2: 93% (17 @ 05:53)  Wt(kg): --    Output     void- 500cc        Physical Exam:  Gen: NAD  Abd: soft, NT/ND  : voiding     Labs:    x     / x     /2.28     10.28 / 32.0  /x                              10.4   10.28 )-----------( 299      ( 11 Dec 2017 09:15 )             32.0         139  |  105  |  26<H>  ----------------------------<  155<H>  3.8   |  19<L>  |  2.28<H>    Ca    8.4      11 Dec 2017 09:17    TPro  8.5<H>  /  Alb  4.2  /  TBili  0.4  /  DBili  x   /  AST  12  /  ALT  13  /  AlkPhos  53  12-10    PT/INR - ( 11 Dec 2017 09:15 )   PT: 11.9 sec;   INR: 1.05 ratio         PTT - ( 11 Dec 2017 09:15 )  PTT:29.3 sec  Urinalysis Basic - ( 10 Dec 2017 15:06 )    Color: Yellow / Appearance: Clear / S.028 / pH: x  Gluc: x / Ketone: Trace  / Bili: Negative / Urobili: Negative   Blood: x / Protein: 100 mg/dL / Nitrite: Negative   Leuk Esterase: Negative / RBC: 3-5 /HPF / WBC 3-5 /HPF   Sq Epi: x / Non Sq Epi: OCC /HPF / Bacteria: Few /HPF

## 2017-12-12 NOTE — PROGRESS NOTE ADULT - SUBJECTIVE AND OBJECTIVE BOX
The patient was seen and examined at bedside.  Denies complaints of chest pain, shortness of breath, nausea, acute pain.    T(C): 36.7 (12-12-17 @ 20:37), Max: 36.9 (12-11-17 @ 21:29)  HR: 71 (12-12-17 @ 20:37) (61 - 83)  BP: 142/77 (12-12-17 @ 20:37) (132/72 - 192/97)  RR: 17 (12-12-17 @ 20:37) (15 - 18)  SpO2: 95% (12-12-17 @ 20:37) (92% - 98%)  Wt(kg): --    Physical Exam:    General: NAD, A+Ox3  Abdomen: soft, non-tender, non-distended      12-11 @ 07:01  -  12-12 @ 07:00  --------------------------------------------------------  IN: 1920 mL / OUT: 1100 mL / NET: 820 mL    12-12 @ 07:01  -  12-12 @ 21:16  --------------------------------------------------------  IN: 225 mL / OUT: 330 mL / NET: -105 mL    teague- light red                          10.4   10.28 )-----------( 299      ( 11 Dec 2017 09:15 )             32.0       12-12    141  |  107  |  33<H>  ----------------------------<  157<H>  4.2   |  21<L>  |  2.31<H>    Ca    8.5      12 Dec 2017 07:11

## 2017-12-12 NOTE — PROGRESS NOTE ADULT - ASSESSMENT
72 year old male s/p 72 year old male s/p right ureteral stent placement    -monitor I's and O's  -analgesia prn  -OOB, ambulate, DVT prophylaxis

## 2017-12-12 NOTE — PROGRESS NOTE ADULT - ASSESSMENT
72M with L renal colic 4mm mid ureteral calculi with ROMIE  OR today for left stent   f/u culture  am BMP  pain control

## 2017-12-12 NOTE — BRIEF OPERATIVE NOTE - PROCEDURE
<<-----Click on this checkbox to enter Procedure Cystoscopic insertion of ureteral stent  12/12/2017    Active  CTABIB

## 2017-12-13 ENCOUNTER — TRANSCRIPTION ENCOUNTER (OUTPATIENT)
Age: 72
End: 2017-12-13

## 2017-12-13 VITALS
TEMPERATURE: 98 F | HEART RATE: 62 BPM | RESPIRATION RATE: 18 BRPM | SYSTOLIC BLOOD PRESSURE: 154 MMHG | OXYGEN SATURATION: 97 % | DIASTOLIC BLOOD PRESSURE: 66 MMHG

## 2017-12-13 LAB
ANION GAP SERPL CALC-SCNC: 16 MMOL/L — SIGNIFICANT CHANGE UP (ref 5–17)
BUN SERPL-MCNC: 30 MG/DL — HIGH (ref 7–23)
CALCIUM SERPL-MCNC: 8.4 MG/DL — SIGNIFICANT CHANGE UP (ref 8.4–10.5)
CHLORIDE SERPL-SCNC: 107 MMOL/L — SIGNIFICANT CHANGE UP (ref 96–108)
CO2 SERPL-SCNC: 19 MMOL/L — LOW (ref 22–31)
CREAT SERPL-MCNC: 1.84 MG/DL — HIGH (ref 0.5–1.3)
GLUCOSE BLDC GLUCOMTR-MCNC: 179 MG/DL — HIGH (ref 70–99)
GLUCOSE BLDC GLUCOMTR-MCNC: 200 MG/DL — HIGH (ref 70–99)
GLUCOSE BLDC GLUCOMTR-MCNC: 259 MG/DL — HIGH (ref 70–99)
GLUCOSE SERPL-MCNC: 184 MG/DL — HIGH (ref 70–99)
POTASSIUM SERPL-MCNC: 3.9 MMOL/L — SIGNIFICANT CHANGE UP (ref 3.5–5.3)
POTASSIUM SERPL-SCNC: 3.9 MMOL/L — SIGNIFICANT CHANGE UP (ref 3.5–5.3)
SODIUM SERPL-SCNC: 142 MMOL/L — SIGNIFICANT CHANGE UP (ref 135–145)

## 2017-12-13 PROCEDURE — 96374 THER/PROPH/DIAG INJ IV PUSH: CPT

## 2017-12-13 PROCEDURE — 85014 HEMATOCRIT: CPT

## 2017-12-13 PROCEDURE — 83605 ASSAY OF LACTIC ACID: CPT

## 2017-12-13 PROCEDURE — 81001 URINALYSIS AUTO W/SCOPE: CPT

## 2017-12-13 PROCEDURE — 99285 EMERGENCY DEPT VISIT HI MDM: CPT | Mod: 25

## 2017-12-13 PROCEDURE — 86901 BLOOD TYPING SEROLOGIC RH(D): CPT

## 2017-12-13 PROCEDURE — 85027 COMPLETE CBC AUTOMATED: CPT

## 2017-12-13 PROCEDURE — 82947 ASSAY GLUCOSE BLOOD QUANT: CPT

## 2017-12-13 PROCEDURE — 85610 PROTHROMBIN TIME: CPT

## 2017-12-13 PROCEDURE — 82962 GLUCOSE BLOOD TEST: CPT

## 2017-12-13 PROCEDURE — 82330 ASSAY OF CALCIUM: CPT

## 2017-12-13 PROCEDURE — 96376 TX/PRO/DX INJ SAME DRUG ADON: CPT

## 2017-12-13 PROCEDURE — 80053 COMPREHEN METABOLIC PANEL: CPT

## 2017-12-13 PROCEDURE — 71045 X-RAY EXAM CHEST 1 VIEW: CPT

## 2017-12-13 PROCEDURE — 86900 BLOOD TYPING SEROLOGIC ABO: CPT

## 2017-12-13 PROCEDURE — 85730 THROMBOPLASTIN TIME PARTIAL: CPT

## 2017-12-13 PROCEDURE — C1769: CPT

## 2017-12-13 PROCEDURE — 84295 ASSAY OF SERUM SODIUM: CPT

## 2017-12-13 PROCEDURE — 86850 RBC ANTIBODY SCREEN: CPT

## 2017-12-13 PROCEDURE — 82803 BLOOD GASES ANY COMBINATION: CPT

## 2017-12-13 PROCEDURE — 80048 BASIC METABOLIC PNL TOTAL CA: CPT

## 2017-12-13 PROCEDURE — 84132 ASSAY OF SERUM POTASSIUM: CPT

## 2017-12-13 PROCEDURE — 76000 FLUOROSCOPY <1 HR PHYS/QHP: CPT

## 2017-12-13 PROCEDURE — 87086 URINE CULTURE/COLONY COUNT: CPT

## 2017-12-13 PROCEDURE — 74176 CT ABD & PELVIS W/O CONTRAST: CPT

## 2017-12-13 PROCEDURE — C2617: CPT

## 2017-12-13 PROCEDURE — 96375 TX/PRO/DX INJ NEW DRUG ADDON: CPT

## 2017-12-13 PROCEDURE — 82435 ASSAY OF BLOOD CHLORIDE: CPT

## 2017-12-13 PROCEDURE — 83036 HEMOGLOBIN GLYCOSYLATED A1C: CPT

## 2017-12-13 PROCEDURE — 82565 ASSAY OF CREATININE: CPT

## 2017-12-13 RX ORDER — LABETALOL HCL 100 MG
10 TABLET ORAL ONCE
Qty: 0 | Refills: 0 | Status: COMPLETED | OUTPATIENT
Start: 2017-12-13 | End: 2017-12-13

## 2017-12-13 RX ORDER — TAMSULOSIN HYDROCHLORIDE 0.4 MG/1
1 CAPSULE ORAL
Qty: 14 | Refills: 0 | OUTPATIENT
Start: 2017-12-13 | End: 2017-12-26

## 2017-12-13 RX ORDER — CEPHALEXIN 500 MG
1 CAPSULE ORAL
Qty: 6 | Refills: 0 | OUTPATIENT
Start: 2017-12-13 | End: 2017-12-15

## 2017-12-13 RX ADMIN — Medication 10 MILLIGRAM(S): at 08:02

## 2017-12-13 RX ADMIN — CARVEDILOL PHOSPHATE 6.25 MILLIGRAM(S): 80 CAPSULE, EXTENDED RELEASE ORAL at 03:01

## 2017-12-13 RX ADMIN — Medication 1: at 17:08

## 2017-12-13 RX ADMIN — Medication 81 MILLIGRAM(S): at 12:02

## 2017-12-13 RX ADMIN — CEFTRIAXONE 100 GRAM(S): 500 INJECTION, POWDER, FOR SOLUTION INTRAMUSCULAR; INTRAVENOUS at 17:09

## 2017-12-13 RX ADMIN — LOSARTAN POTASSIUM 50 MILLIGRAM(S): 100 TABLET, FILM COATED ORAL at 02:59

## 2017-12-13 RX ADMIN — CARVEDILOL PHOSPHATE 6.25 MILLIGRAM(S): 80 CAPSULE, EXTENDED RELEASE ORAL at 17:09

## 2017-12-13 RX ADMIN — HEPARIN SODIUM 5000 UNIT(S): 5000 INJECTION INTRAVENOUS; SUBCUTANEOUS at 05:52

## 2017-12-13 RX ADMIN — SODIUM CHLORIDE 75 MILLILITER(S): 9 INJECTION INTRAMUSCULAR; INTRAVENOUS; SUBCUTANEOUS at 05:52

## 2017-12-13 RX ADMIN — Medication 3: at 12:02

## 2017-12-13 RX ADMIN — HEPARIN SODIUM 5000 UNIT(S): 5000 INJECTION INTRAVENOUS; SUBCUTANEOUS at 13:42

## 2017-12-13 RX ADMIN — Medication 1: at 08:09

## 2017-12-13 NOTE — PROGRESS NOTE ADULT - ATTENDING COMMENTS
Patient seen and examined.  Elliott d/c'd.  Trial of void  Repeat BMP this am.  D/C home with PO abx
OR today for cystoscopy, left ureteral stent, possible ureteroscopy, possible bladder stone removal.

## 2017-12-13 NOTE — DISCHARGE NOTE ADULT - INSTRUCTIONS
Resume regular diet call your dr if you have pain unrelieved by medication,  if you have fever greater than 100 degrees.    call if you have difficulty urinating or are unable to urinated    follow up with your md as instructed

## 2017-12-13 NOTE — DISCHARGE NOTE ADULT - PLAN OF CARE
Surgery -S/p 4 mm L mid stone, bladder stone s/p L stent   -Will be discharged on Keflex for 3 days   -Continue with Flomax   -Continue with pain medications as needed   -  to promote wound healing do not take a bath, continue to walk frequently, return to daily living activities slowly, follow up Dr. Rae 1 week.

## 2017-12-13 NOTE — DISCHARGE NOTE ADULT - PATIENT PORTAL LINK FT
“You can access the [facility] Patient Portal, offered by [facility], by registering with the following website: [email or web address]”

## 2017-12-13 NOTE — DISCHARGE NOTE ADULT - CARE PLAN
Principal Discharge DX:	Kidney stone on left side  Goal:	Surgery  Instructions for follow-up, activity and diet:	-S/p 4 mm L mid stone, bladder stone s/p L stent   -Will be discharged on Keflex for 3 days   -Continue with Flomax   -Continue with pain medications as needed   -  to promote wound healing do not take a bath, continue to walk frequently, return to daily living activities slowly, follow up Dr. Rae 1 week.

## 2017-12-13 NOTE — DISCHARGE NOTE ADULT - CONDITIONS AT DISCHARGE
pt A&O x's 4,   vitals stable  ambulating in room with walker and assist , tolerating diet, no c/o pain and voiding adequate urine  after teague dc'd    ivl dc'd  no s/s of infection or infiltration   skin intact

## 2017-12-13 NOTE — DISCHARGE NOTE ADULT - NS AS ACTIVITY OBS
Do not drive on Pain Medications/No Heavy lifting/straining/Do not make important decisions/Do not drive or operate machinery

## 2017-12-13 NOTE — PROGRESS NOTE ADULT - ASSESSMENT
71 YO M s/p 4mm L mid stone, bladder stone s/p L stent.   -F/U AM labs   -TOV teague, PVR   -Discharge home

## 2017-12-13 NOTE — DISCHARGE NOTE ADULT - MEDICATION SUMMARY - MEDICATIONS TO TAKE
I will START or STAY ON the medications listed below when I get home from the hospital:    Aspirin Enteric Coated 81 mg oral delayed release tablet  -- 1 tab(s) by mouth once a day  -- Indication: For Home medication    Percocet 5/325 oral tablet  -- 1 tab(s) by mouth every 4 hours, As Needed -for moderate pain  -for moderate pain MDD:6   -- Caution federal law prohibits the transfer of this drug to any person other  than the person for whom it was prescribed.  May cause drowsiness.  Alcohol may intensify this effect.  Use care when operating dangerous machinery.  This prescription cannot be refilled.  This product contains acetaminophen.  Do not use  with any other product containing acetaminophen to prevent possible liver damage.  Using more of this medication than prescribed may cause serious breathing problems.    -- Indication: For pain medication     losartan 50 mg oral tablet  -- 1 tab(s) by mouth once a day  -- Indication: For Home medication    tamsulosin 0.4 mg oral capsule  -- 1 cap(s) by mouth once a day (at bedtime)  -- Indication: For Calculus of kidney    LORazepam  -- 0.5 milligram(s) by mouth 2 times a day, As Needed  -- Indication: For Home medication    Victoza  -- Indication: For Diabetes mellitus    glimepiride 1 mg oral tablet  -- 3 tab(s) by mouth once a day (at bedtime)  -- Indication: For Diabetes mellitus    metFORMIN 500 mg oral tablet  -- 4 tab(s) by mouth once a day (at bedtime)  -- Indication: For Diabetes mellitus    Crestor 40 mg oral tablet  -- 1 tab(s) by mouth once a day (at bedtime)  -- Indication: For Home medication    fenofibrate 160 mg oral tablet  -- 1 tab(s) by mouth once a day  -- Indication: For Home medication    carvedilol 6.25 mg oral tablet  -- 1 tab(s) by mouth 2 times a day  -- Indication: For Home medication    amLODIPine 5 mg oral tablet  -- 1 tab(s) by mouth 2 times a day  -- Indication: For Home medication    cephalexin 500 mg oral tablet  -- 1 tab(s) by mouth 2 times a day   -- Finish all this medication unless otherwise directed by prescriber.    -- Indication: For antibiotics

## 2017-12-13 NOTE — DISCHARGE NOTE ADULT - HOSPITAL COURSE
73y/o m with PMH of T2DM, CVA, HTN, HLD, renal stones 12 years ago with spontaneous passage presented to ED c/o abdominal pain and nausea x 4 days. Started having left flank pain and vomiting this morning. Pain radiates to LLQ, one episode of hematuria 3 days ago that has since resolved. Patient s/p Cystoscopic insertion of ureteral stent  12/12/2017. Post op he did well, his vitals were stable, he tolerated diet, his pain was controlled, he ambulated  and his labs were stable. Patient's Elliott catheter was removed. Patient will be discharged on Keflex for 3 days and Flomax.  To promote wound healing do not take a bath, continue to walk frequently, return to daily living activities slowly, follow up Dr. Rae 1 week.

## 2017-12-13 NOTE — PROGRESS NOTE ADULT - SUBJECTIVE AND OBJECTIVE BOX
UROLOGY DAILY PROGRESS NOTE:     Subjective: Patient seen and examined at bedside. No overnight events.       Objective:  Vital signs  T(F): , Max: 98.5 (12-12-17 @ 18:36)  HR: 69 (12-13-17 @ 05:21)  BP: 181/91 (12-13-17 @ 05:21)  SpO2: 96% (12-13-17 @ 05:21)  Wt(kg): --    I&O's Summary    11 Dec 2017 07:01  -  12 Dec 2017 07:00  --------------------------------------------------------  IN: 1920 mL / OUT: 1100 mL / NET: 820 mL    12 Dec 2017 07:01  -  13 Dec 2017 06:32  --------------------------------------------------------  IN: 225 mL / OUT: 1280 mL / NET: -1055 mL        Gen: NAD  Pulm: No respiratory distress, no subcostal retractions  CV: RRR, no JVD  Abd: Soft, NT, ND  : Elliott clear urine     Labs:  12-12  x     / x     /2.31   12-11  x     / x     /2.28                           10.4   10.28 )-----------( 299      ( 11 Dec 2017 09:15 )             32.0     12-12    141  |  107  |  33<H>  ----------------------------<  157<H>  4.2   |  21<L>  |  2.31<H>    Ca    8.5      12 Dec 2017 07:11      PT/INR - ( 11 Dec 2017 09:15 )   PT: 11.9 sec;   INR: 1.05 ratio         PTT - ( 11 Dec 2017 09:15 )  PTT:29.3 sec

## 2017-12-14 LAB
CULTURE RESULTS: SIGNIFICANT CHANGE UP
SPECIMEN SOURCE: SIGNIFICANT CHANGE UP

## 2017-12-19 PROBLEM — Z00.00 ENCOUNTER FOR PREVENTIVE HEALTH EXAMINATION: Status: ACTIVE | Noted: 2017-12-19

## 2017-12-19 PROBLEM — E78.00 PURE HYPERCHOLESTEROLEMIA, UNSPECIFIED: Chronic | Status: ACTIVE | Noted: 2017-12-10

## 2017-12-19 PROBLEM — I10 ESSENTIAL (PRIMARY) HYPERTENSION: Chronic | Status: ACTIVE | Noted: 2017-12-10

## 2018-01-02 ENCOUNTER — APPOINTMENT (OUTPATIENT)
Dept: UROLOGY | Facility: CLINIC | Age: 73
End: 2018-01-02
Payer: MEDICARE

## 2018-01-02 VITALS
TEMPERATURE: 98.3 F | SYSTOLIC BLOOD PRESSURE: 111 MMHG | WEIGHT: 175 LBS | DIASTOLIC BLOOD PRESSURE: 71 MMHG | HEIGHT: 68 IN | HEART RATE: 76 BPM | RESPIRATION RATE: 16 BRPM | BODY MASS INDEX: 26.52 KG/M2

## 2018-01-02 DIAGNOSIS — Z83.3 FAMILY HISTORY OF DIABETES MELLITUS: ICD-10-CM

## 2018-01-02 DIAGNOSIS — Z87.891 PERSONAL HISTORY OF NICOTINE DEPENDENCE: ICD-10-CM

## 2018-01-02 DIAGNOSIS — Z86.39 PERSONAL HISTORY OF OTHER ENDOCRINE, NUTRITIONAL AND METABOLIC DISEASE: ICD-10-CM

## 2018-01-02 DIAGNOSIS — Z86.73 PERSONAL HISTORY OF TRANSIENT ISCHEMIC ATTACK (TIA), AND CEREBRAL INFARCTION W/OUT RESIDUAL DEFICITS: ICD-10-CM

## 2018-01-02 DIAGNOSIS — L02.91 CUTANEOUS ABSCESS, UNSPECIFIED: ICD-10-CM

## 2018-01-02 DIAGNOSIS — Z80.0 FAMILY HISTORY OF MALIGNANT NEOPLASM OF DIGESTIVE ORGANS: ICD-10-CM

## 2018-01-02 PROCEDURE — 99214 OFFICE O/P EST MOD 30 MIN: CPT

## 2018-01-02 RX ORDER — ASPIRIN 81 MG
81 TABLET, DELAYED RELEASE (ENTERIC COATED) ORAL
Refills: 0 | Status: ACTIVE | COMMUNITY

## 2018-01-02 RX ORDER — CARVEDILOL 6.25 MG/1
6.25 TABLET, FILM COATED ORAL
Refills: 0 | Status: ACTIVE | COMMUNITY

## 2018-01-02 RX ORDER — MULTIVIT WITH IRON,MINERALS
TABLET ORAL
Refills: 0 | Status: ACTIVE | COMMUNITY

## 2018-01-02 RX ORDER — ROSUVASTATIN CALCIUM 40 MG/1
40 TABLET, FILM COATED ORAL
Refills: 0 | Status: ACTIVE | COMMUNITY

## 2018-01-02 RX ORDER — LIRAGLUTIDE 6 MG/ML
INJECTION SUBCUTANEOUS
Refills: 0 | Status: ACTIVE | COMMUNITY

## 2018-01-02 RX ORDER — METFORMIN HYDROCHLORIDE 500 MG/1
500 TABLET, COATED ORAL
Refills: 0 | Status: ACTIVE | COMMUNITY

## 2018-01-02 RX ORDER — CEPHALEXIN 500 MG/1
500 CAPSULE ORAL
Refills: 0 | Status: COMPLETED | COMMUNITY
End: 2018-01-02

## 2018-01-24 ENCOUNTER — OUTPATIENT (OUTPATIENT)
Dept: OUTPATIENT SERVICES | Facility: HOSPITAL | Age: 73
LOS: 1 days | End: 2018-01-24
Payer: MEDICARE

## 2018-01-24 VITALS
DIASTOLIC BLOOD PRESSURE: 62 MMHG | HEIGHT: 67.75 IN | WEIGHT: 181 LBS | RESPIRATION RATE: 16 BRPM | SYSTOLIC BLOOD PRESSURE: 120 MMHG | HEART RATE: 65 BPM | TEMPERATURE: 97 F

## 2018-01-24 DIAGNOSIS — N21.0 CALCULUS IN BLADDER: ICD-10-CM

## 2018-01-24 DIAGNOSIS — I10 ESSENTIAL (PRIMARY) HYPERTENSION: ICD-10-CM

## 2018-01-24 DIAGNOSIS — N20.1 CALCULUS OF URETER: ICD-10-CM

## 2018-01-24 DIAGNOSIS — Z98.890 OTHER SPECIFIED POSTPROCEDURAL STATES: Chronic | ICD-10-CM

## 2018-01-24 DIAGNOSIS — Z90.49 ACQUIRED ABSENCE OF OTHER SPECIFIED PARTS OF DIGESTIVE TRACT: Chronic | ICD-10-CM

## 2018-01-24 DIAGNOSIS — R94.31 ABNORMAL ELECTROCARDIOGRAM [ECG] [EKG]: ICD-10-CM

## 2018-01-24 DIAGNOSIS — E11.9 TYPE 2 DIABETES MELLITUS WITHOUT COMPLICATIONS: ICD-10-CM

## 2018-01-24 DIAGNOSIS — R42 DIZZINESS AND GIDDINESS: ICD-10-CM

## 2018-01-24 LAB
APPEARANCE UR: SIGNIFICANT CHANGE UP
BACTERIA # UR AUTO: SIGNIFICANT CHANGE UP
BILIRUB UR-MCNC: NEGATIVE — SIGNIFICANT CHANGE UP
BLOOD UR QL VISUAL: HIGH
BUN SERPL-MCNC: 34 MG/DL — HIGH (ref 7–23)
CALCIUM SERPL-MCNC: 9 MG/DL — SIGNIFICANT CHANGE UP (ref 8.4–10.5)
CHLORIDE SERPL-SCNC: 109 MMOL/L — HIGH (ref 98–107)
CO2 SERPL-SCNC: 21 MMOL/L — LOW (ref 22–31)
COLOR SPEC: HIGH
CREAT SERPL-MCNC: 1.59 MG/DL — HIGH (ref 0.5–1.3)
GLUCOSE SERPL-MCNC: 207 MG/DL — HIGH (ref 70–99)
GLUCOSE UR-MCNC: 50 — HIGH
HBA1C BLD-MCNC: 7.4 % — HIGH (ref 4–5.6)
HCT VFR BLD CALC: 29.8 % — LOW (ref 39–50)
HGB BLD-MCNC: 9.6 G/DL — LOW (ref 13–17)
KETONES UR-MCNC: NEGATIVE — SIGNIFICANT CHANGE UP
LEUKOCYTE ESTERASE UR-ACNC: HIGH
MCHC RBC-ENTMCNC: 29.5 PG — SIGNIFICANT CHANGE UP (ref 27–34)
MCHC RBC-ENTMCNC: 32.2 % — SIGNIFICANT CHANGE UP (ref 32–36)
MCV RBC AUTO: 91.7 FL — SIGNIFICANT CHANGE UP (ref 80–100)
NITRITE UR-MCNC: NEGATIVE — SIGNIFICANT CHANGE UP
NRBC # FLD: 0 — SIGNIFICANT CHANGE UP
PH UR: 6 — SIGNIFICANT CHANGE UP (ref 4.6–8)
PLATELET # BLD AUTO: 321 K/UL — SIGNIFICANT CHANGE UP (ref 150–400)
PMV BLD: 11.3 FL — SIGNIFICANT CHANGE UP (ref 7–13)
POTASSIUM SERPL-MCNC: 3.5 MMOL/L — SIGNIFICANT CHANGE UP (ref 3.5–5.3)
POTASSIUM SERPL-SCNC: 3.5 MMOL/L — SIGNIFICANT CHANGE UP (ref 3.5–5.3)
PROT UR-MCNC: 150 MG/DL — HIGH
RBC # BLD: 3.25 M/UL — LOW (ref 4.2–5.8)
RBC # FLD: 13.9 % — SIGNIFICANT CHANGE UP (ref 10.3–14.5)
RBC CASTS # UR COMP ASSIST: >50 — HIGH (ref 0–?)
SODIUM SERPL-SCNC: 144 MMOL/L — SIGNIFICANT CHANGE UP (ref 135–145)
SP GR SPEC: 1.02 — SIGNIFICANT CHANGE UP (ref 1–1.04)
SQUAMOUS # UR AUTO: SIGNIFICANT CHANGE UP
UROBILINOGEN FLD QL: NORMAL MG/DL — SIGNIFICANT CHANGE UP
WBC # BLD: 5.01 K/UL — SIGNIFICANT CHANGE UP (ref 3.8–10.5)
WBC # FLD AUTO: 5.01 K/UL — SIGNIFICANT CHANGE UP (ref 3.8–10.5)
WBC CLUMPS #/AREA URNS HPF: PRESENT — HIGH (ref 0–?)
WBC UR QL: >50 — HIGH (ref 0–?)

## 2018-01-24 PROCEDURE — 93010 ELECTROCARDIOGRAM REPORT: CPT

## 2018-01-24 RX ORDER — GLIMEPIRIDE 1 MG
3 TABLET ORAL
Qty: 0 | Refills: 0 | COMMUNITY

## 2018-01-24 RX ORDER — LOSARTAN POTASSIUM 100 MG/1
1 TABLET, FILM COATED ORAL
Qty: 0 | Refills: 0 | COMMUNITY

## 2018-01-24 RX ORDER — FENOFIBRATE,MICRONIZED 130 MG
1 CAPSULE ORAL
Qty: 0 | Refills: 0 | COMMUNITY

## 2018-01-24 RX ORDER — ASPIRIN/CALCIUM CARB/MAGNESIUM 324 MG
1 TABLET ORAL
Qty: 0 | Refills: 0 | COMMUNITY

## 2018-01-24 RX ORDER — METFORMIN HYDROCHLORIDE 850 MG/1
4 TABLET ORAL
Qty: 0 | Refills: 0 | COMMUNITY

## 2018-01-24 RX ORDER — AMLODIPINE BESYLATE 2.5 MG/1
1 TABLET ORAL
Qty: 0 | Refills: 0 | COMMUNITY

## 2018-01-24 RX ORDER — CARVEDILOL PHOSPHATE 80 MG/1
1 CAPSULE, EXTENDED RELEASE ORAL
Qty: 0 | Refills: 0 | COMMUNITY

## 2018-01-24 RX ORDER — SODIUM CHLORIDE 9 MG/ML
3 INJECTION INTRAMUSCULAR; INTRAVENOUS; SUBCUTANEOUS EVERY 8 HOURS
Qty: 0 | Refills: 0 | Status: DISCONTINUED | OUTPATIENT
Start: 2018-01-26 | End: 2018-02-10

## 2018-01-24 RX ORDER — ROSUVASTATIN CALCIUM 5 MG/1
1 TABLET ORAL
Qty: 0 | Refills: 0 | COMMUNITY

## 2018-01-24 RX ORDER — LIRAGLUTIDE 6 MG/ML
0 INJECTION SUBCUTANEOUS
Qty: 0 | Refills: 0 | COMMUNITY

## 2018-01-24 RX ORDER — SODIUM CHLORIDE 9 MG/ML
1000 INJECTION, SOLUTION INTRAVENOUS
Qty: 0 | Refills: 0 | Status: DISCONTINUED | OUTPATIENT
Start: 2018-01-26 | End: 2018-02-10

## 2018-01-24 NOTE — H&P PST ADULT - ENDOCRINE COMMENTS
Type 2 DM on oral meds. Avg fasting BS 90-120mg/dl Type 2 DM on oral meds. Avg fasting BS 90-120mg/dl. FS today was 191mg/dl

## 2018-01-24 NOTE — H&P PST ADULT - HISTORY OF PRESENT ILLNESS
71 y/o male with PMH of HTN, HLD, Type 2 DM and CVA 2 years ago presents to for preoperative evaluation with dx of calculus of bladder and ureter. Pt reports 1 month ago he had left flank pain. s/p CT scan which showed left ureteral stone and a bladder stone. s/p Cystoscopy with placement of left urethral stent.  Scheduled for Cystoscopy, Left Ureteroscopy, Laser Lithotripsy, Stone Extraction, Left Ureteral Stent, Laser Litholapaxy on 1/16/2018. At present he reports hematuria, urgency, and mild flank pain. 71 y/o male with PMH of HTN, HLD, Type 2 DM and CVA 2 years ago presents to [facility] for preoperative evaluation with dx of calculus of bladder and ureter. H/o left flank pain 1 month ago. s/p CT scan of abdomen and pelvis which showed left ureteral stone and a bladder stone. s/p Cystoscopy with placement of left urethral stent.  Scheduled for Cystoscopy, Left Ureteroscopy, Laser Lithotripsy, Stone Extraction, Left Ureteral Stent, Laser Litholapaxy on 1/26/2018. At present he reports hematuria, urgency, and mild left flank pain.

## 2018-01-24 NOTE — H&P PST ADULT - NEUROLOGICAL COMMENTS
h/o CVA 2 years ago. Pt denies residuals post CVA. Does not follow up with Neuro h/o CVA 2 years ago. Pt denies residuals post CVA. Does not follow up with Neuro.

## 2018-01-24 NOTE — H&P PST ADULT - NEGATIVE CARDIOVASCULAR SYMPTOMS
no chest pain/no dyspnea on exertion/no paroxysmal nocturnal dyspnea/no peripheral edema/no palpitations

## 2018-01-24 NOTE — H&P PST ADULT - PSH
H/O cystoscopy  and left  stent placement  H/O myringotomy  right ear in 2017  History of appendectomy

## 2018-01-24 NOTE — H&P PST ADULT - PROBLEM SELECTOR PLAN 1
Scheduled for Cystoscopy, Left Ureteroscopy, Laser Lithotripsy, Stone Extraction, Left Ureteral Stent, Laser Litholapaxy on 1/26/2018.  Pre op instructions given, pt verbalized understanding   GI prophylaxis provided

## 2018-01-24 NOTE — H&P PST ADULT - NEGATIVE NEUROLOGICAL SYMPTOMS
no focal seizures/no paresthesias/no generalized seizures/no vertigo/no headache/no tremors/no difficulty walking/no loss of consciousness

## 2018-01-24 NOTE — H&P PST ADULT - PMH
Bladder calculus    BPH with obstruction/lower urinary tract symptoms    Cerebrovascular accident (CVA), unspecified mechanism  2 years ago  Diabetes mellitus  Type 2  High cholesterol    High triglycerides    HTN (hypertension)    Ureteral calculus, left

## 2018-01-24 NOTE — H&P PST ADULT - PROBLEM SELECTOR PLAN 3
Pt to hold the PM dose of Metformin and Glimepiride on 1/25/18  He will also hold Victoza on the morning of surgery   Accu check to be assessed on admission  OR booking notified of DM status

## 2018-01-24 NOTE — H&P PST ADULT - LYMPHATIC
anterior cervical L/supraclavicular L/posterior cervical R/posterior cervical L/anterior cervical R/supraclavicular R

## 2018-01-24 NOTE — H&P PST ADULT - NEGATIVE ENMT SYMPTOMS
no sinus symptoms/no nose bleeds/no dysphagia/no hearing difficulty/no vertigo/no nasal congestion/no gum bleeding/no ear pain/no tinnitus

## 2018-01-24 NOTE — H&P PST ADULT - PROBLEM SELECTOR PLAN 4
At the end of the PST visit, pt reported feeling lightheaded. BP assessed and 90/60. Stat fingerstick was performed and BS was 205mg/dl.   He was given 2 cups of water and BP reassessed and 100/70  Pt stated he felt better after a few minutes. Informed PST Anesthesiologist Dr. Sin of matter  Pt instructed to see his PCP today for evaluation and for preop clearance  Medical clearance note requested

## 2018-01-24 NOTE — H&P PST ADULT - VISION (WITH CORRECTIVE LENSES IF THE PATIENT USUALLY WEARS THEM):
wears corrective lenses/Normal vision: sees adequately in most situations; can see medication labels, newsprint

## 2018-01-25 PROBLEM — I63.9 CEREBRAL INFARCTION, UNSPECIFIED: Chronic | Status: ACTIVE | Noted: 2017-12-10

## 2018-01-25 PROBLEM — E11.9 TYPE 2 DIABETES MELLITUS WITHOUT COMPLICATIONS: Chronic | Status: ACTIVE | Noted: 2017-12-10

## 2018-01-25 LAB
BACTERIA UR CULT: SIGNIFICANT CHANGE UP
SPECIMEN SOURCE: SIGNIFICANT CHANGE UP

## 2018-01-26 ENCOUNTER — TRANSCRIPTION ENCOUNTER (OUTPATIENT)
Age: 73
End: 2018-01-26

## 2018-01-26 ENCOUNTER — OUTPATIENT (OUTPATIENT)
Dept: OUTPATIENT SERVICES | Facility: HOSPITAL | Age: 73
LOS: 1 days | Discharge: HOME | End: 2018-01-26
Payer: MEDICARE

## 2018-01-26 ENCOUNTER — APPOINTMENT (OUTPATIENT)
Dept: UROLOGY | Facility: HOSPITAL | Age: 73
End: 2018-01-26

## 2018-01-26 VITALS
RESPIRATION RATE: 18 BRPM | OXYGEN SATURATION: 99 % | DIASTOLIC BLOOD PRESSURE: 87 MMHG | SYSTOLIC BLOOD PRESSURE: 162 MMHG | HEART RATE: 83 BPM

## 2018-01-26 VITALS
TEMPERATURE: 98 F | HEIGHT: 67.75 IN | RESPIRATION RATE: 16 BRPM | HEART RATE: 69 BPM | WEIGHT: 181 LBS | OXYGEN SATURATION: 99 % | SYSTOLIC BLOOD PRESSURE: 133 MMHG | DIASTOLIC BLOOD PRESSURE: 75 MMHG

## 2018-01-26 DIAGNOSIS — N21.0 CALCULUS IN BLADDER: ICD-10-CM

## 2018-01-26 DIAGNOSIS — Z98.890 OTHER SPECIFIED POSTPROCEDURAL STATES: Chronic | ICD-10-CM

## 2018-01-26 DIAGNOSIS — Z90.49 ACQUIRED ABSENCE OF OTHER SPECIFIED PARTS OF DIGESTIVE TRACT: Chronic | ICD-10-CM

## 2018-01-26 LAB — GLUCOSE BLDC GLUCOMTR-MCNC: 160 MG/DL — HIGH (ref 70–99)

## 2018-01-26 PROCEDURE — 52332 CYSTOSCOPY AND TREATMENT: CPT | Mod: LT

## 2018-01-26 PROCEDURE — 52317 REMOVE BLADDER STONE: CPT

## 2018-01-26 PROCEDURE — 52351 CYSTOURETERO & OR PYELOSCOPE: CPT

## 2018-01-26 RX ORDER — LIDOCAINE HCL 20 MG/ML
20 VIAL (ML) INJECTION ONCE
Qty: 0 | Refills: 0 | Status: DISCONTINUED | OUTPATIENT
Start: 2018-01-26 | End: 2018-02-10

## 2018-01-26 NOTE — ASU DISCHARGE PLAN (ADULT/PEDIATRIC). - ITEMS TO FOLLOWUP WITH YOUR PHYSICIAN'S
Follow up with Dr. Rae next week on Thursday 2/1 for stent removal.  Remember your stent is a temporizing measure.  It is not permanent.  You must follow up with your Urologist for removal of your stent.  Call to confirm your appointment for next week  [phone] Follow up with Dr. Rae next week on Tuesday 1/30 for teague and stent removal.  Remember your stent is a temporizing measure.  It is not permanent.  You must follow up with your Urologist for removal of your stent.  Call to confirm your appointment for next week  [phone]

## 2018-01-26 NOTE — ASU DISCHARGE PLAN (ADULT/PEDIATRIC). - NURSING INSTRUCTIONS
pt unable to void post op and teague was placed with 500 cc residulal pt unable to void post op and teague was placed with 500 cc residulal. If at any time you notice that no urine has drained for more than one hour, make sure that you are drinking adequate liquids. If this persists despite increases in your liquid intake, call your urologist. Do not allow the catheter to restrict your activity. Moving about and walking are important.

## 2018-01-26 NOTE — ASU DISCHARGE PLAN (ADULT/PEDIATRIC). - MEDICATION SUMMARY - MEDICATIONS TO TAKE
I will START or STAY ON the medications listed below when I get home from the hospital:    Low Dose ASA 81 mg oral tablet  -- 1 tab(s) by mouth once a day  -- Indication: For home    oxycodone-acetaminophen 5 mg-325 mg/5 mL oral solution  -- every 4 hours  -- Indication: For home and post op pain    losartan 50 mg oral tablet  -- 1 tab(s) by mouth once a day  -- Indication: For home    tamsulosin 0.4 mg oral capsule  -- 1 cap(s) by mouth once a day  -- Indication: For home and stent colic    LORazepam 0.5 mg oral tablet  -- orally 2 times a day, As Needed  -- Indication: For home    Victoza 18 mg/3 mL subcutaneous solution  -- subcutaneous once a day  -- Indication: For home    glimepiride 1 mg oral tablet  -- 3  by mouth once a day (at bedtime)  -- Indication: For home    metFORMIN 500 mg oral tablet  -- 4 tab(s) by mouth with dinner  -- Indication: For home    rosuvastatin 40 mg oral tablet  -- 1 tab(s) by mouth once a day (at bedtime)  -- Indication: For home    fenofibrate 160 mg oral tablet  -- 1 tab(s) by mouth once a day  -- Indication: For home    carvedilol 6.25 mg oral tablet  -- 1 tab(s) by mouth 2 times a day  -- Indication: For home    amLODIPine 5 mg oral tablet  -- orally 2 times a day  -- Indication: For home    Vitamin D3  -- 1 tab(s) by mouth once a day  -- Indication: For home

## 2018-01-26 NOTE — ASU DISCHARGE PLAN (ADULT/PEDIATRIC). - YOU WERE IN THE HOSPITAL FOR:
L ureteral stone  and underwent cystoscopy L retrograde pyelogram L stent placement and removal of bladder stone

## 2018-01-26 NOTE — ASU DISCHARGE PLAN (ADULT/PEDIATRIC). - NOTIFY
Pain not relieved by Medications/Numbness, color, or temperature change to extremity/Persistent Nausea and Vomiting/Fever greater than 101/Swelling that continues/Bleeding that does not stop/Inability to Tolerate Liquids or Foods/Unable to Urinate/Increased Irritability or Sluggishness/Excessive Diarrhea

## 2018-01-26 NOTE — ASU DISCHARGE PLAN (ADULT/PEDIATRIC). - POST OP PHONE #
[phone] or [phone] pt. granted permission to leave message /and or speak with whoever answers the phone.

## 2018-01-31 LAB — STONE ANALYSIS-IMP: SIGNIFICANT CHANGE UP

## 2018-02-01 ENCOUNTER — INPATIENT (INPATIENT)
Facility: HOSPITAL | Age: 73
LOS: 1 days | Discharge: HOME | End: 2018-02-03
Attending: UROLOGY | Admitting: UROLOGY
Payer: MEDICARE

## 2018-02-01 ENCOUNTER — APPOINTMENT (OUTPATIENT)
Dept: UROLOGY | Facility: CLINIC | Age: 73
End: 2018-02-01

## 2018-02-01 VITALS
SYSTOLIC BLOOD PRESSURE: 118 MMHG | HEART RATE: 74 BPM | OXYGEN SATURATION: 99 % | TEMPERATURE: 99 F | DIASTOLIC BLOOD PRESSURE: 60 MMHG | RESPIRATION RATE: 18 BRPM

## 2018-02-01 DIAGNOSIS — Z90.49 ACQUIRED ABSENCE OF OTHER SPECIFIED PARTS OF DIGESTIVE TRACT: Chronic | ICD-10-CM

## 2018-02-01 DIAGNOSIS — Z98.890 OTHER SPECIFIED POSTPROCEDURAL STATES: Chronic | ICD-10-CM

## 2018-02-01 DIAGNOSIS — R50.9 FEVER, UNSPECIFIED: ICD-10-CM

## 2018-02-01 LAB
ALBUMIN SERPL ELPH-MCNC: 4 G/DL — SIGNIFICANT CHANGE UP (ref 3.3–5)
ALP SERPL-CCNC: 52 U/L — SIGNIFICANT CHANGE UP (ref 40–120)
ALT FLD-CCNC: 9 U/L — SIGNIFICANT CHANGE UP (ref 4–41)
APPEARANCE UR: SIGNIFICANT CHANGE UP
AST SERPL-CCNC: 15 U/L — SIGNIFICANT CHANGE UP (ref 4–40)
BASE EXCESS BLDV CALC-SCNC: 2.1 MMOL/L — SIGNIFICANT CHANGE UP
BASOPHILS # BLD AUTO: 0.05 K/UL — SIGNIFICANT CHANGE UP (ref 0–0.2)
BASOPHILS NFR BLD AUTO: 0.5 % — SIGNIFICANT CHANGE UP (ref 0–2)
BILIRUB SERPL-MCNC: 0.5 MG/DL — SIGNIFICANT CHANGE UP (ref 0.2–1.2)
BILIRUB UR-MCNC: NEGATIVE — SIGNIFICANT CHANGE UP
BLOOD GAS VENOUS - CREATININE: 1.71 MG/DL — HIGH (ref 0.5–1.3)
BLOOD UR QL VISUAL: HIGH
BUN SERPL-MCNC: 23 MG/DL — SIGNIFICANT CHANGE UP (ref 7–23)
CALCIUM SERPL-MCNC: 9 MG/DL — SIGNIFICANT CHANGE UP (ref 8.4–10.5)
CHLORIDE BLDV-SCNC: 106 MMOL/L — SIGNIFICANT CHANGE UP (ref 96–108)
CHLORIDE SERPL-SCNC: 101 MMOL/L — SIGNIFICANT CHANGE UP (ref 98–107)
CK MB BLD-MCNC: 1 NG/ML — SIGNIFICANT CHANGE UP (ref 1–6.6)
CK SERPL-CCNC: 27 U/L — LOW (ref 30–200)
CO2 SERPL-SCNC: 26 MMOL/L — SIGNIFICANT CHANGE UP (ref 22–31)
COLOR SPEC: YELLOW — SIGNIFICANT CHANGE UP
CREAT SERPL-MCNC: 1.72 MG/DL — HIGH (ref 0.5–1.3)
EOSINOPHIL # BLD AUTO: 0.08 K/UL — SIGNIFICANT CHANGE UP (ref 0–0.5)
EOSINOPHIL NFR BLD AUTO: 0.7 % — SIGNIFICANT CHANGE UP (ref 0–6)
GAS PNL BLDV: 140 MMOL/L — SIGNIFICANT CHANGE UP (ref 136–146)
GLUCOSE BLDC GLUCOMTR-MCNC: 93 MG/DL — SIGNIFICANT CHANGE UP (ref 70–99)
GLUCOSE BLDV-MCNC: 153 — HIGH (ref 70–99)
GLUCOSE SERPL-MCNC: 149 MG/DL — HIGH (ref 70–99)
GLUCOSE UR-MCNC: NEGATIVE — SIGNIFICANT CHANGE UP
HCO3 BLDV-SCNC: 25 MMOL/L — SIGNIFICANT CHANGE UP (ref 20–27)
HCT VFR BLD CALC: 33.3 % — LOW (ref 39–50)
HCT VFR BLDV CALC: 33.7 % — LOW (ref 39–51)
HGB BLD-MCNC: 10.5 G/DL — LOW (ref 13–17)
HGB BLDV-MCNC: 10.9 G/DL — LOW (ref 13–17)
HYALINE CASTS # UR AUTO: SIGNIFICANT CHANGE UP (ref 0–?)
IMM GRANULOCYTES # BLD AUTO: 0.05 # — SIGNIFICANT CHANGE UP
IMM GRANULOCYTES NFR BLD AUTO: 0.5 % — SIGNIFICANT CHANGE UP (ref 0–1.5)
KETONES UR-MCNC: NEGATIVE — SIGNIFICANT CHANGE UP
LACTATE BLDV-MCNC: 1.9 MMOL/L — SIGNIFICANT CHANGE UP (ref 0.5–2)
LEUKOCYTE ESTERASE UR-ACNC: HIGH
LYMPHOCYTES # BLD AUTO: 0.78 K/UL — LOW (ref 1–3.3)
LYMPHOCYTES # BLD AUTO: 7.3 % — LOW (ref 13–44)
MCHC RBC-ENTMCNC: 29.1 PG — SIGNIFICANT CHANGE UP (ref 27–34)
MCHC RBC-ENTMCNC: 31.5 % — LOW (ref 32–36)
MCV RBC AUTO: 92.2 FL — SIGNIFICANT CHANGE UP (ref 80–100)
MONOCYTES # BLD AUTO: 0.86 K/UL — SIGNIFICANT CHANGE UP (ref 0–0.9)
MONOCYTES NFR BLD AUTO: 8 % — SIGNIFICANT CHANGE UP (ref 2–14)
MUCOUS THREADS # UR AUTO: SIGNIFICANT CHANGE UP
NEUTROPHILS # BLD AUTO: 8.93 K/UL — HIGH (ref 1.8–7.4)
NEUTROPHILS NFR BLD AUTO: 83 % — HIGH (ref 43–77)
NITRITE UR-MCNC: NEGATIVE — SIGNIFICANT CHANGE UP
NRBC # FLD: 0 — SIGNIFICANT CHANGE UP
PCO2 BLDV: 42 MMHG — SIGNIFICANT CHANGE UP (ref 41–51)
PH BLDV: 7.42 PH — SIGNIFICANT CHANGE UP (ref 7.32–7.43)
PH UR: 6 — SIGNIFICANT CHANGE UP (ref 4.6–8)
PLATELET # BLD AUTO: 322 K/UL — SIGNIFICANT CHANGE UP (ref 150–400)
PMV BLD: 10.7 FL — SIGNIFICANT CHANGE UP (ref 7–13)
PO2 BLDV: 26 MMHG — LOW (ref 35–40)
POTASSIUM BLDV-SCNC: 4 MMOL/L — SIGNIFICANT CHANGE UP (ref 3.4–4.5)
POTASSIUM SERPL-MCNC: 4.4 MMOL/L — SIGNIFICANT CHANGE UP (ref 3.5–5.3)
POTASSIUM SERPL-SCNC: 4.4 MMOL/L — SIGNIFICANT CHANGE UP (ref 3.5–5.3)
PROT SERPL-MCNC: 7.8 G/DL — SIGNIFICANT CHANGE UP (ref 6–8.3)
PROT UR-MCNC: 300 MG/DL — SIGNIFICANT CHANGE UP
RBC # BLD: 3.61 M/UL — LOW (ref 4.2–5.8)
RBC # FLD: 14 % — SIGNIFICANT CHANGE UP (ref 10.3–14.5)
RBC CASTS # UR COMP ASSIST: >50 — HIGH (ref 0–?)
SAO2 % BLDV: 36.9 % — LOW (ref 60–85)
SODIUM SERPL-SCNC: 142 MMOL/L — SIGNIFICANT CHANGE UP (ref 135–145)
SP GR SPEC: 1.03 — SIGNIFICANT CHANGE UP (ref 1–1.04)
SQUAMOUS # UR AUTO: SIGNIFICANT CHANGE UP
STONE ANALYSIS-IMP: SIGNIFICANT CHANGE UP
TROPONIN T SERPL-MCNC: < 0.06 NG/ML — SIGNIFICANT CHANGE UP (ref 0–0.06)
UROBILINOGEN FLD QL: NORMAL MG/DL — SIGNIFICANT CHANGE UP
WBC # BLD: 10.75 K/UL — HIGH (ref 3.8–10.5)
WBC # FLD AUTO: 10.75 K/UL — HIGH (ref 3.8–10.5)
WBC UR QL: >50 — HIGH (ref 0–?)

## 2018-02-01 PROCEDURE — 74176 CT ABD & PELVIS W/O CONTRAST: CPT | Mod: 26

## 2018-02-01 PROCEDURE — 71045 X-RAY EXAM CHEST 1 VIEW: CPT | Mod: 26

## 2018-02-01 RX ORDER — DEXTROSE 50 % IN WATER 50 %
25 SYRINGE (ML) INTRAVENOUS ONCE
Qty: 0 | Refills: 0 | Status: DISCONTINUED | OUTPATIENT
Start: 2018-02-01 | End: 2018-02-03

## 2018-02-01 RX ORDER — LOSARTAN POTASSIUM 100 MG/1
50 TABLET, FILM COATED ORAL DAILY
Qty: 0 | Refills: 0 | Status: DISCONTINUED | OUTPATIENT
Start: 2018-02-01 | End: 2018-02-03

## 2018-02-01 RX ORDER — ACETAMINOPHEN 500 MG
650 TABLET ORAL EVERY 6 HOURS
Qty: 0 | Refills: 0 | Status: DISCONTINUED | OUTPATIENT
Start: 2018-02-01 | End: 2018-02-03

## 2018-02-01 RX ORDER — CARVEDILOL PHOSPHATE 80 MG/1
6.25 CAPSULE, EXTENDED RELEASE ORAL EVERY 12 HOURS
Qty: 0 | Refills: 0 | Status: DISCONTINUED | OUTPATIENT
Start: 2018-02-01 | End: 2018-02-03

## 2018-02-01 RX ORDER — ACETAMINOPHEN 500 MG
1000 TABLET ORAL ONCE
Qty: 0 | Refills: 0 | Status: COMPLETED | OUTPATIENT
Start: 2018-02-01 | End: 2018-02-01

## 2018-02-01 RX ORDER — SODIUM CHLORIDE 9 MG/ML
1000 INJECTION, SOLUTION INTRAVENOUS
Qty: 0 | Refills: 0 | Status: DISCONTINUED | OUTPATIENT
Start: 2018-02-01 | End: 2018-02-02

## 2018-02-01 RX ORDER — OXYCODONE AND ACETAMINOPHEN 5; 325 MG/1; MG/1
1 TABLET ORAL EVERY 4 HOURS
Qty: 0 | Refills: 0 | Status: DISCONTINUED | OUTPATIENT
Start: 2018-02-01 | End: 2018-02-03

## 2018-02-01 RX ORDER — DEXTROSE 50 % IN WATER 50 %
1 SYRINGE (ML) INTRAVENOUS ONCE
Qty: 0 | Refills: 0 | Status: DISCONTINUED | OUTPATIENT
Start: 2018-02-01 | End: 2018-02-03

## 2018-02-01 RX ORDER — ATORVASTATIN CALCIUM 80 MG/1
80 TABLET, FILM COATED ORAL AT BEDTIME
Qty: 0 | Refills: 0 | Status: DISCONTINUED | OUTPATIENT
Start: 2018-02-01 | End: 2018-02-03

## 2018-02-01 RX ORDER — INSULIN LISPRO 100/ML
VIAL (ML) SUBCUTANEOUS AT BEDTIME
Qty: 0 | Refills: 0 | Status: DISCONTINUED | OUTPATIENT
Start: 2018-02-01 | End: 2018-02-03

## 2018-02-01 RX ORDER — MORPHINE SULFATE 50 MG/1
4 CAPSULE, EXTENDED RELEASE ORAL ONCE
Qty: 0 | Refills: 0 | Status: DISCONTINUED | OUTPATIENT
Start: 2018-02-01 | End: 2018-02-01

## 2018-02-01 RX ORDER — HEPARIN SODIUM 5000 [USP'U]/ML
5000 INJECTION INTRAVENOUS; SUBCUTANEOUS EVERY 12 HOURS
Qty: 0 | Refills: 0 | Status: DISCONTINUED | OUTPATIENT
Start: 2018-02-01 | End: 2018-02-03

## 2018-02-01 RX ORDER — SODIUM CHLORIDE 9 MG/ML
1000 INJECTION INTRAMUSCULAR; INTRAVENOUS; SUBCUTANEOUS ONCE
Qty: 0 | Refills: 0 | Status: COMPLETED | OUTPATIENT
Start: 2018-02-01 | End: 2018-02-01

## 2018-02-01 RX ORDER — ASPIRIN/CALCIUM CARB/MAGNESIUM 324 MG
81 TABLET ORAL DAILY
Qty: 0 | Refills: 0 | Status: DISCONTINUED | OUTPATIENT
Start: 2018-02-01 | End: 2018-02-03

## 2018-02-01 RX ORDER — SODIUM CHLORIDE 9 MG/ML
1000 INJECTION, SOLUTION INTRAVENOUS
Qty: 0 | Refills: 0 | Status: DISCONTINUED | OUTPATIENT
Start: 2018-02-01 | End: 2018-02-03

## 2018-02-01 RX ORDER — AMLODIPINE BESYLATE 2.5 MG/1
5 TABLET ORAL
Qty: 0 | Refills: 0 | Status: DISCONTINUED | OUTPATIENT
Start: 2018-02-01 | End: 2018-02-03

## 2018-02-01 RX ORDER — OXYCODONE AND ACETAMINOPHEN 5; 325 MG/1; MG/1
2 TABLET ORAL EVERY 6 HOURS
Qty: 0 | Refills: 0 | Status: DISCONTINUED | OUTPATIENT
Start: 2018-02-01 | End: 2018-02-03

## 2018-02-01 RX ORDER — CEFTRIAXONE 500 MG/1
1 INJECTION, POWDER, FOR SOLUTION INTRAMUSCULAR; INTRAVENOUS EVERY 24 HOURS
Qty: 0 | Refills: 0 | Status: DISCONTINUED | OUTPATIENT
Start: 2018-02-01 | End: 2018-02-03

## 2018-02-01 RX ORDER — TAMSULOSIN HYDROCHLORIDE 0.4 MG/1
0.4 CAPSULE ORAL AT BEDTIME
Qty: 0 | Refills: 0 | Status: DISCONTINUED | OUTPATIENT
Start: 2018-02-01 | End: 2018-02-03

## 2018-02-01 RX ORDER — CEFTRIAXONE 500 MG/1
1 INJECTION, POWDER, FOR SOLUTION INTRAMUSCULAR; INTRAVENOUS ONCE
Qty: 0 | Refills: 0 | Status: COMPLETED | OUTPATIENT
Start: 2018-02-01 | End: 2018-02-01

## 2018-02-01 RX ORDER — DEXTROSE 50 % IN WATER 50 %
12.5 SYRINGE (ML) INTRAVENOUS ONCE
Qty: 0 | Refills: 0 | Status: DISCONTINUED | OUTPATIENT
Start: 2018-02-01 | End: 2018-02-03

## 2018-02-01 RX ORDER — FENOFIBRATE,MICRONIZED 130 MG
48 CAPSULE ORAL DAILY
Qty: 0 | Refills: 0 | Status: DISCONTINUED | OUTPATIENT
Start: 2018-02-01 | End: 2018-02-03

## 2018-02-01 RX ORDER — INSULIN LISPRO 100/ML
VIAL (ML) SUBCUTANEOUS
Qty: 0 | Refills: 0 | Status: DISCONTINUED | OUTPATIENT
Start: 2018-02-01 | End: 2018-02-03

## 2018-02-01 RX ORDER — GLUCAGON INJECTION, SOLUTION 0.5 MG/.1ML
1 INJECTION, SOLUTION SUBCUTANEOUS ONCE
Qty: 0 | Refills: 0 | Status: DISCONTINUED | OUTPATIENT
Start: 2018-02-01 | End: 2018-02-03

## 2018-02-01 RX ORDER — CHOLECALCIFEROL (VITAMIN D3) 125 MCG
400 CAPSULE ORAL DAILY
Qty: 0 | Refills: 0 | Status: DISCONTINUED | OUTPATIENT
Start: 2018-02-01 | End: 2018-02-03

## 2018-02-01 RX ADMIN — MORPHINE SULFATE 4 MILLIGRAM(S): 50 CAPSULE, EXTENDED RELEASE ORAL at 18:18

## 2018-02-01 RX ADMIN — SODIUM CHLORIDE 75 MILLILITER(S): 9 INJECTION, SOLUTION INTRAVENOUS at 21:51

## 2018-02-01 RX ADMIN — OXYCODONE AND ACETAMINOPHEN 2 TABLET(S): 5; 325 TABLET ORAL at 23:20

## 2018-02-01 RX ADMIN — MORPHINE SULFATE 4 MILLIGRAM(S): 50 CAPSULE, EXTENDED RELEASE ORAL at 18:32

## 2018-02-01 RX ADMIN — OXYCODONE AND ACETAMINOPHEN 2 TABLET(S): 5; 325 TABLET ORAL at 22:49

## 2018-02-01 RX ADMIN — ATORVASTATIN CALCIUM 80 MILLIGRAM(S): 80 TABLET, FILM COATED ORAL at 22:01

## 2018-02-01 RX ADMIN — Medication 400 MILLIGRAM(S): at 17:23

## 2018-02-01 RX ADMIN — TAMSULOSIN HYDROCHLORIDE 0.4 MILLIGRAM(S): 0.4 CAPSULE ORAL at 22:01

## 2018-02-01 RX ADMIN — Medication 1000 MILLIGRAM(S): at 20:21

## 2018-02-01 RX ADMIN — CEFTRIAXONE 100 GRAM(S): 500 INJECTION, POWDER, FOR SOLUTION INTRAMUSCULAR; INTRAVENOUS at 21:17

## 2018-02-01 RX ADMIN — SODIUM CHLORIDE 1000 MILLILITER(S): 9 INJECTION INTRAMUSCULAR; INTRAVENOUS; SUBCUTANEOUS at 17:23

## 2018-02-01 NOTE — H&P ADULT - ASSESSMENT
71yo M with PMHx of DM, HTN, HLD, BPH, Kidney stones POD #6 from cysto, Left URS, stone extraction from ureter and bladder, Left stent placement, now with Fever of 101.5, lower back pain most likely secondary to musculoskeletal in nature, not concerning for pyelonephritis and near syncope.

## 2018-02-01 NOTE — ED ADULT NURSE NOTE - ED STAT RN HANDOFF DETAILS
handoff report given to KELSEY Rascon, pt in NAD, awaiting transportation.  pts belongings on back of stretcher- fluids running through 20g IV in right AC, no infiltration noted

## 2018-02-01 NOTE — H&P ADULT - NSHPPHYSICALEXAM_GEN_ALL_CORE
Vital Signs Last 24 Hrs  T(C): 36.7 (01 Feb 2018 20:24), Max: 38.6 (01 Feb 2018 17:24)  T(F): 98.1 (01 Feb 2018 20:24), Max: 101.5 (01 Feb 2018 17:24)  HR: 63 (01 Feb 2018 20:24) (63 - 82)  BP: 141/82 (01 Feb 2018 20:24) (110/56 - 152/72)  BP(mean): --  RR: 18 (01 Feb 2018 20:24) (18 - 18)  SpO2: 97% (01 Feb 2018 20:24) (97% - 100%)    Gen: AAOx3,   Lungs: CTA b/l. No w/r/r  CV: S1, S2, RRR  Abd: soft, +BS, NT/ND. No CVAT b/l  : indwelling Elliott in place- draining yellow, clear urine. Nl circ phallus, b/l descended testes- NT. No palpable masses.  Ext: no edema b/l.

## 2018-02-01 NOTE — ED ADULT NURSE REASSESSMENT NOTE - NS ED NURSE REASSESS COMMENT FT1
Pt awake and alert x 3, pt with rectal temp 101.5 tylenol given for fever and co pain to right side of abdomen. Pt denies sob or chest pain at this time. awaiting xray and dispo.

## 2018-02-01 NOTE — H&P ADULT - PROBLEM SELECTOR PLAN 1
- F/U Blood and Urine Cx’s  - Pain control	  - Broad spectrum IV abx  - IV fluid hydration  - Discussed with Dr. Rae, Urology Attending

## 2018-02-01 NOTE — ED ADULT NURSE NOTE - CHIEF COMPLAINT QUOTE
Patient brought to ER from [facility] by EMS and was having a catheter taken out, He kind of slumped over in waiting area and denies LOC. . Pt noted with blood in catheter.

## 2018-02-01 NOTE — ED PROVIDER NOTE - OBJECTIVE STATEMENT
72M pmh CVA, HTN, DM2, HLD, renal stones with recent lithotripsy for left renal stone and placement of teague catheter.  Pt was at urologist office (Dr. Rae) today waiting for catheter removal when he felt dizzy, weak and seemed to "slump over" in his chair for a moment according to brother.  No apparent LOC.  Pt was given juice and crackers and improved.  FS here 180.  Pt states chills, nausea and low right back pain beginning yesterday.  Has not taken temperature.  No appetite.  Denies chest pain, shortness of breath, vomiting, diarrhea.  - Rosemarie Cox, DO

## 2018-02-01 NOTE — ED PROVIDER NOTE - ATTENDING CONTRIBUTION TO CARE
Dr. Hare:  I have personally performed a face to face bedside history and physical examination of this patient. I have discussed the history, examination, review of systems, assessment and plan of management with the resident. I have reviewed the electronic medical record and amended it to reflect my history, review of systems, physical exam, assessment and plan.    72M s/p recent lithotripsy and stent for kidney stone, was at urology office, and then "slumped over", near syncope episode.  Endorses chills Dr. Hare:  I have personally performed a face to face bedside history and physical examination of this patient. I have discussed the history, examination, review of systems, assessment and plan of management with the resident. I have reviewed the electronic medical record and amended it to reflect my history, review of systems, physical exam, assessment and plan.    72M s/p recent lithotripsy and stent for kidney stone, was at urology office, and then "slumped over", near syncope episode.  Endorses chills and nausea since last night, +malaise and fatigue.  Also with right lower back pain.  Denies cp, sob, vomiting, diarrhea.    Exam:  - seems fatigued  - rrr  - ctab  - abd soft ntnd, no cvat    A/P  - consider viral illness vs uti/pyelo, r/o cardiac etiology   - cbc, cmp, ua, urine culture  - CXR

## 2018-02-01 NOTE — ED PROVIDER NOTE - MEDICAL DECISION MAKING DETAILS
72M multiple medical problems with recent lithotripsy and teague catheter placed 6 days ago.  Febrile.  Will obtain labs, urine, cultures, give tylenol, fluids, and reassess.  - Rosemarie Cox, DO

## 2018-02-01 NOTE — H&P ADULT - NSHPLABSRESULTS_GEN_ALL_CORE
CBC Full  -  ( 2018 16:36 )  WBC Count : 10.75 K/uL  Hemoglobin : 10.5 g/dL  Hematocrit : 33.3 %  Platelet Count - Automated : 322 K/uL  Mean Cell Volume : 92.2 fL  Mean Cell Hemoglobin : 29.1 pg  Mean Cell Hemoglobin Concentration : 31.5 %  Auto Neutrophil # : 8.93 K/uL  Auto Lymphocyte # : 0.78 K/uL  Auto Monocyte # : 0.86 K/uL  Auto Eosinophil # : 0.08 K/uL  Auto Basophil # : 0.05 K/uL  Auto Neutrophil % : 83.0 %  Auto Lymphocyte % : 7.3 %  Auto Monocyte % : 8.0 %  Auto Eosinophil % : 0.7 %  Auto Basophil % : 0.5 %    2018 16:36    142    |  101    |  23     ----------------------------<  149    4.4     |  26     |  1.72     Ca    9.0        2018 16:36    Urinalysis Basic - ( 2018 18:04 )    Color: YELLOW / Appearance: HAZY / S.027 / pH: 6.0  Gluc: NEGATIVE / Ketone: NEGATIVE  / Bili: NEGATIVE / Urobili: NORMAL mg/dL   Blood: MODERATE / Protein: 300 mg/dL / Nitrite: NEGATIVE   Leuk Esterase: LARGE / RBC: >50 / WBC >50   Sq Epi: OCC / Non Sq Epi: x / Bacteria: x    18 Urine Cx: negative  18 Urine Cx: pending  18 Blood Cx: pending

## 2018-02-01 NOTE — H&P ADULT - PMH
Bladder calculus    BPH with obstruction/lower urinary tract symptoms    Cerebrovascular accident (CVA), unspecified mechanism  2 years ago  Diabetes mellitus  Type 2  High cholesterol    High triglycerides    HTN (hypertension)    Ureteral calculus, left
None known

## 2018-02-01 NOTE — ED ADULT TRIAGE NOTE - CHIEF COMPLAINT QUOTE
Patient brought to ER from [facility] Neurology by EMS and was having a catheter taken out, He kind of slumped over in waiting area and denies LOC. . Pt noted with blood in catheter.

## 2018-02-01 NOTE — H&P ADULT - HISTORY OF PRESENT ILLNESS
71yo M with hx Type II DM, BPH on Flomax, HTN, HLD, kidney stones presents to ED c/o questionable syncopal episode. He was waiting to be seen in Dr. Rae’s office for a Elliott catheter removal when he felt "dizzy" and slumped over in his chair as per his brother at bedside. Of note, he recently underwent a cystoscopy, left URS, stone extraction, left ureteral stent placement for noted 4mm Left ureteral stone in the proximal ureter and bladder stone removal on 1/26/18. Resolution of symptoms resolved shortly after the procedure. Currently he reports 1 day of chills, nausea, and right lower back pain. Right back pain is minimal, occasionally has hematuria but generally the urine is clear with minimal urinary symptoms.   CT scan today reveals interval decrease in left hydro, nonspecific left periureteral stranding, most likely 2/2 recent instrumentation. No abscess visualized. All previous Urine Cultures were negative.  In ED: vitals: Tm/c: 101.5, BP: 152/72, HR: 74-82, 02: 100%RA. Given 1L NS bolus, 1gm of Tylenol IVSS, Morphine 4mg, and Ceftriaxone 1gm.

## 2018-02-01 NOTE — ED ADULT NURSE NOTE - OBJECTIVE STATEMENT
pt awake and alert x 3 pt with rectal temp of 101.5 . Pts skin intact. pt arrives with indwelling cathera in place, teague placed to BSD.

## 2018-02-02 LAB
GLUCOSE BLDC GLUCOMTR-MCNC: 132 MG/DL — HIGH (ref 70–99)
GLUCOSE BLDC GLUCOMTR-MCNC: 153 MG/DL — HIGH (ref 70–99)
GLUCOSE BLDC GLUCOMTR-MCNC: 162 MG/DL — HIGH (ref 70–99)
GLUCOSE BLDC GLUCOMTR-MCNC: 194 MG/DL — HIGH (ref 70–99)
SPECIMEN SOURCE: SIGNIFICANT CHANGE UP
SPECIMEN SOURCE: SIGNIFICANT CHANGE UP

## 2018-02-02 RX ADMIN — LOSARTAN POTASSIUM 50 MILLIGRAM(S): 100 TABLET, FILM COATED ORAL at 06:37

## 2018-02-02 RX ADMIN — ATORVASTATIN CALCIUM 80 MILLIGRAM(S): 80 TABLET, FILM COATED ORAL at 21:26

## 2018-02-02 RX ADMIN — AMLODIPINE BESYLATE 5 MILLIGRAM(S): 2.5 TABLET ORAL at 06:37

## 2018-02-02 RX ADMIN — CARVEDILOL PHOSPHATE 6.25 MILLIGRAM(S): 80 CAPSULE, EXTENDED RELEASE ORAL at 17:40

## 2018-02-02 RX ADMIN — OXYCODONE AND ACETAMINOPHEN 2 TABLET(S): 5; 325 TABLET ORAL at 14:55

## 2018-02-02 RX ADMIN — Medication 1: at 17:40

## 2018-02-02 RX ADMIN — TAMSULOSIN HYDROCHLORIDE 0.4 MILLIGRAM(S): 0.4 CAPSULE ORAL at 21:26

## 2018-02-02 RX ADMIN — AMLODIPINE BESYLATE 5 MILLIGRAM(S): 2.5 TABLET ORAL at 17:40

## 2018-02-02 RX ADMIN — OXYCODONE AND ACETAMINOPHEN 2 TABLET(S): 5; 325 TABLET ORAL at 15:21

## 2018-02-02 RX ADMIN — CEFTRIAXONE 100 GRAM(S): 500 INJECTION, POWDER, FOR SOLUTION INTRAMUSCULAR; INTRAVENOUS at 21:26

## 2018-02-02 RX ADMIN — OXYCODONE AND ACETAMINOPHEN 2 TABLET(S): 5; 325 TABLET ORAL at 06:37

## 2018-02-02 RX ADMIN — HEPARIN SODIUM 5000 UNIT(S): 5000 INJECTION INTRAVENOUS; SUBCUTANEOUS at 17:40

## 2018-02-02 RX ADMIN — Medication 81 MILLIGRAM(S): at 11:40

## 2018-02-02 RX ADMIN — Medication 1: at 12:24

## 2018-02-02 RX ADMIN — Medication 400 UNIT(S): at 11:40

## 2018-02-02 RX ADMIN — Medication 48 MILLIGRAM(S): at 11:40

## 2018-02-02 RX ADMIN — HEPARIN SODIUM 5000 UNIT(S): 5000 INJECTION INTRAVENOUS; SUBCUTANEOUS at 06:38

## 2018-02-02 RX ADMIN — OXYCODONE AND ACETAMINOPHEN 2 TABLET(S): 5; 325 TABLET ORAL at 07:07

## 2018-02-02 RX ADMIN — CARVEDILOL PHOSPHATE 6.25 MILLIGRAM(S): 80 CAPSULE, EXTENDED RELEASE ORAL at 06:37

## 2018-02-02 NOTE — CHART NOTE - NSCHARTNOTEFT_GEN_A_CORE
Pt voided 100cc in urinal plus in toilet and on floor in bathroom, , not uncomfortable, will continue to monitor and repeat PVR.

## 2018-02-02 NOTE — PROGRESS NOTE ADULT - SUBJECTIVE AND OBJECTIVE BOX
Overnight events:  None, remained afebrile    Subjective:  Pt offers no complaints except for chronic back pain    Objective:    Vital signs  T(C): , Max: 38.6 (02-01-18 @ 17:24)  HR: 75 (02-02-18 @ 06:33)  BP: 158/75 (02-02-18 @ 06:33)  SpO2: 100% (02-02-18 @ 06:33)  Wt(kg): --    Output   Teague: 600        Gen: NAD  Abd: soft, nontender, no CVAT  : teague removed on rounds    Labs: none today                     Urine Cx: P  Blood Cx: P    Imaging:  CT Abdomen and Pelvis No Cont (02.01.18 @ 19:04) >  IMPRESSION:     Interval decrease in left hydronephrosis status post left nephroureteral   J stent placement. No abscess.    Nonspecific left periureteral stranding, can be secondary to recent   instrumentation.     Indeterminate 1.0 cm pancreatic tail cystic lesion. Further evaluation   nonemergent MRI is suggested.

## 2018-02-02 NOTE — PROGRESS NOTE ADULT - ASSESSMENT
73 yo M s/p L URS, stone extraction, stent placement, UR/teague placement on 1/26, admitted from office with near syncope, fever.  CE neg, CXR neg, CT stent in place, decreased hydro, no abscess.  Pt has remained afebrile since admission.

## 2018-02-02 NOTE — PROGRESS NOTE ADULT - PROBLEM SELECTOR PLAN 1
TOV, check PVR  Continue ceftriaxone, monitor VS  IVL once voids  F/U Bcx, Ucx  Hospitalist co-management  DVT prophy  OOB, ambulate

## 2018-02-03 ENCOUNTER — TRANSCRIPTION ENCOUNTER (OUTPATIENT)
Age: 73
End: 2018-02-03

## 2018-02-03 VITALS
SYSTOLIC BLOOD PRESSURE: 114 MMHG | RESPIRATION RATE: 17 BRPM | HEART RATE: 68 BPM | DIASTOLIC BLOOD PRESSURE: 48 MMHG | TEMPERATURE: 98 F | OXYGEN SATURATION: 96 %

## 2018-02-03 DIAGNOSIS — A41.9 SEPSIS, UNSPECIFIED ORGANISM: ICD-10-CM

## 2018-02-03 DIAGNOSIS — N39.0 URINARY TRACT INFECTION, SITE NOT SPECIFIED: ICD-10-CM

## 2018-02-03 DIAGNOSIS — I10 ESSENTIAL (PRIMARY) HYPERTENSION: ICD-10-CM

## 2018-02-03 DIAGNOSIS — D64.9 ANEMIA, UNSPECIFIED: ICD-10-CM

## 2018-02-03 DIAGNOSIS — E78.5 HYPERLIPIDEMIA, UNSPECIFIED: ICD-10-CM

## 2018-02-03 DIAGNOSIS — F41.1 GENERALIZED ANXIETY DISORDER: ICD-10-CM

## 2018-02-03 DIAGNOSIS — N18.9 CHRONIC KIDNEY DISEASE, UNSPECIFIED: ICD-10-CM

## 2018-02-03 DIAGNOSIS — E11.9 TYPE 2 DIABETES MELLITUS WITHOUT COMPLICATIONS: ICD-10-CM

## 2018-02-03 DIAGNOSIS — R55 SYNCOPE AND COLLAPSE: ICD-10-CM

## 2018-02-03 LAB
GLUCOSE BLDC GLUCOMTR-MCNC: 164 MG/DL — HIGH (ref 70–99)
GLUCOSE BLDC GLUCOMTR-MCNC: 177 MG/DL — HIGH (ref 70–99)

## 2018-02-03 PROCEDURE — 99231 SBSQ HOSP IP/OBS SF/LOW 25: CPT

## 2018-02-03 RX ORDER — ALBUTEROL 90 UG/1
2.5 AEROSOL, METERED ORAL ONCE
Qty: 0 | Refills: 0 | Status: COMPLETED | OUTPATIENT
Start: 2018-02-03 | End: 2018-02-03

## 2018-02-03 RX ORDER — LIDOCAINE HCL 20 MG/ML
20 VIAL (ML) INJECTION ONCE
Qty: 0 | Refills: 0 | Status: DISCONTINUED | OUTPATIENT
Start: 2018-02-03 | End: 2018-02-03

## 2018-02-03 RX ORDER — ONDANSETRON 8 MG/1
4 TABLET, FILM COATED ORAL EVERY 6 HOURS
Qty: 0 | Refills: 0 | Status: DISCONTINUED | OUTPATIENT
Start: 2018-02-03 | End: 2018-02-03

## 2018-02-03 RX ADMIN — ONDANSETRON 4 MILLIGRAM(S): 8 TABLET, FILM COATED ORAL at 00:21

## 2018-02-03 RX ADMIN — Medication 1: at 12:46

## 2018-02-03 RX ADMIN — Medication 1: at 08:54

## 2018-02-03 RX ADMIN — OXYCODONE AND ACETAMINOPHEN 2 TABLET(S): 5; 325 TABLET ORAL at 07:12

## 2018-02-03 RX ADMIN — OXYCODONE AND ACETAMINOPHEN 2 TABLET(S): 5; 325 TABLET ORAL at 06:42

## 2018-02-03 RX ADMIN — Medication 81 MILLIGRAM(S): at 11:52

## 2018-02-03 RX ADMIN — HEPARIN SODIUM 5000 UNIT(S): 5000 INJECTION INTRAVENOUS; SUBCUTANEOUS at 06:42

## 2018-02-03 RX ADMIN — Medication 400 UNIT(S): at 11:52

## 2018-02-03 RX ADMIN — Medication 48 MILLIGRAM(S): at 11:52

## 2018-02-03 RX ADMIN — AMLODIPINE BESYLATE 5 MILLIGRAM(S): 2.5 TABLET ORAL at 06:42

## 2018-02-03 RX ADMIN — ALBUTEROL 2.5 MILLIGRAM(S): 90 AEROSOL, METERED ORAL at 02:01

## 2018-02-03 RX ADMIN — CARVEDILOL PHOSPHATE 6.25 MILLIGRAM(S): 80 CAPSULE, EXTENDED RELEASE ORAL at 06:42

## 2018-02-03 RX ADMIN — LOSARTAN POTASSIUM 50 MILLIGRAM(S): 100 TABLET, FILM COATED ORAL at 11:54

## 2018-02-03 NOTE — PROVIDER CONTACT NOTE (OTHER) - ASSESSMENT
Pt. c/o chest congestion, no chest pain, no SOB, no labored breathing. When placed on 2LO2 NC, O2 increase to 92%. HR 75 bpm, /65, RR 16.

## 2018-02-03 NOTE — CONSULT NOTE ADULT - NSHPATTENDINGPLANDISCUSS_GEN_ALL_CORE
Dr HAMPTON, pt, Nsg, Uro PA calos, called Brother Dr HAMPTON, pt, Nsg, Uro Res dr Allen, called Brother

## 2018-02-03 NOTE — CONSULT NOTE ADULT - CONSULT REASON
medicine. outpt provider  Dr Angelica Concepcion called me yesterday and this am foe medicine consult on behalf of dr Rae Uro  syncope/near syncope in Dr Rae's office on day of adm , ptstates it was an anxiety attack, but possibly   sepsis 2/2 uti p uro proccedure ston e extraction  on abx, no recurrence , feels ok says he is being dc home today tho brother is not present. pt had lo grade temp 100 this am on iv abx, teague is out

## 2018-02-03 NOTE — DISCHARGE NOTE ADULT - NS AS ACTIVITY OBS
Showering allowed/Walking-Indoors allowed/Driving allowed/Walking-Outdoors allowed/No Heavy lifting/straining

## 2018-02-03 NOTE — DISCHARGE NOTE ADULT - HOSPITAL COURSE
73yo M s/p L URS, stone extraction, stent placement sent to ED from office (he was to have teague/stent removed) where he felt "dizzy" and slumped over in his chair as per his brother at bedside. Currently he reports 1 day of chills, nausea, and chronic right lower back pain. CT scan today revealed interval decrease in left hydro, nonspecific left periureteral stranding, most likely 2/2 recent instrumentation. No abscess visualized. All previous Urine Cultures were negative. In ED: vitals: Tm/c: 101.5, BP: 152/72, HR: 74-82, 02: 100%RA. Given 1L NS bolus, 1gm of Tylenol IVSS, Morphine 4mg Ceftriaxone 1gm and admitted for observation.  Pt remained afebrile and hemodynamically stable during his hospitalization, teague removed on 2/2 with acceptable PVR.  Bcx NGTD, UCx P, pending d/c on Augmentin to f/u with Dr. Rae. 73yo M s/p L URS, stone extraction, stent placement sent to ED from office (he was to have teague/stent removed) where he felt "dizzy" and slumped over in his chair as per his brother at bedside. Currently he reports 1 day of chills, nausea, and chronic right lower back pain. CT scan today revealed interval decrease in left hydro, nonspecific left periureteral stranding, most likely 2/2 recent instrumentation. No abscess visualized. All previous Urine Cultures were negative. In ED: vitals: Tm/c: 101.5, BP: 152/72, HR: 74-82, 02: 100%RA. Given 1L NS bolus, 1gm of Tylenol IVSS, Morphine 4mg Ceftriaxone 1gm and admitted for observation.  Pt remained afebrile and hemodynamically stable during his hospitalization, teague removed on 2/2 with acceptable PVR.  Bcx NGTD, UCx P, pending.  Seen by PMD, Dr. Sun, no further syncope workup needed.  Pt d/c on  d/c on Augmentin to f/u with Dr. Rae and Dr. Sun next week.

## 2018-02-03 NOTE — DISCHARGE NOTE ADULT - PATIENT PORTAL LINK FT
You can access the [facility], offered by [facility], by registering with the following website: [email or web address]

## 2018-02-03 NOTE — DISCHARGE NOTE ADULT - INSTRUCTIONS
Drink plenty of fluids Notify Dr Rae if you experience any increase in pain not relieved with pain medication, any fever >100.5 or shaking chills.  Any nausea or vomiting or diarrhea.  Drink plenty of fluids.

## 2018-02-03 NOTE — DISCHARGE NOTE ADULT - PLAN OF CARE
Good health Take antibiotic as prescribed until finished Continue current home medications and follow up with your primary care provider Call Dr. Rae's office next week to schedule a follow appointment for stent removal and further management

## 2018-02-03 NOTE — CONSULT NOTE ADULT - ASSESSMENT
had wbc elev c shift, syncope likely 2/2 sepsis-uti p stone extraction improving   asymptomatic s recurrence  Hx DM, HTN , HLD, can be fu outpt or can have echo in hospital if he remains in hospital  on iv abx, temp 100 this am  ANNALISE also possible had anxiety rel attack in DRs office  see meds  dc plan per urology, but had tem0p this am so probably should stay at least another day  see orders had wbc elev c shift, syncope likely 2/2 sepsis-uti p stone extraction improving   asymptomatic s recurrence  Hx DM, HTN , HLD, can be fu outpt or can have echo in hospital if he remains in hospital  on iv abx, temp 100 this am  ANNALISE also possible had anxiety rel attack in DRs office  see meds  dc plan per urology, but had tem0p this am so probably should stay at least another day  see orders   can be dc home and fu office

## 2018-02-03 NOTE — CONSULT NOTE ADULT - SUBJECTIVE AND OBJECTIVE BOX
Patient is a 72y old  Male who presents with a chief complaint of Fever, chills, Right lower back pain (03 Feb 2018 10:47)      HPI:from adm H+P:  71yo M with hx Type II DM, BPH on Flomax, HTN, HLD, kidney stones presents to ED c/o questionable syncopal episode. He was waiting to be seen in Dr. Rae’s office for a Teague catheter removal when he felt "dizzy" and slumped over in his chair as per his brother at bedside. Of note, he recently underwent a cystoscopy, left URS, stone extraction, left ureteral stent placement for noted 4mm Left ureteral stone in the proximal ureter and bladder stone removal on 1/26/18. Resolution of symptoms resolved shortly after the procedure. Currently he reports 1 day of chills, nausea, and right lower back pain. Right back pain is minimal, occasionally has hematuria but generally the urine is clear with minimal urinary symptoms.   CT scan today reveals interval decrease in left hydro, nonspecific left periureteral stranding, most likely 2/2 recent instrumentation. No abscess visualized. All previous Urine Cultures were negative.  In ED: vitals: Tm/c: 101.5, BP: 152/72, HR: 74-82, 02: 100%RA. Given 1L NS bolus, 1gm of Tylenol IVSS, Morphine 4mg, and Ceftriaxone 1gm. (01 Feb 2018 20:39)        PAST MEDICAL & SURGICAL HISTORY:  Ureteral calculus, left  BPH with obstruction/lower urinary tract symptoms  Bladder calculus  High triglycerides  Cerebrovascular accident (CVA), unspecified mechanism: 2 years ago  HTN (hypertension)  High cholesterol  Diabetes mellitus: Type 2  H/O myringotomy: right ear in 2017  H/O cystoscopy: and left  stent placement  History of appendectomy    also hx ANNALISE, EPS 2/2 mult psych meds      Medications:  acetaminophen   Tablet 650 milliGRAM(s) Oral every 6 hours PRN  acetaminophen   Tablet. 650 milliGRAM(s) Oral every 6 hours PRN  amLODIPine   Tablet 5 milliGRAM(s) Oral two times a day  aspirin enteric coated 81 milliGRAM(s) Oral daily  atorvastatin 80 milliGRAM(s) Oral at bedtime  carvedilol 6.25 milliGRAM(s) Oral every 12 hours  cefTRIAXone   IVPB 1 Gram(s) IV Intermittent every 24 hours  cholecalciferol 400 Unit(s) Oral daily  dextrose 5%. 1000 milliLiter(s) IV Continuous <Continuous>  dextrose 50% Injectable 12.5 Gram(s) IV Push once  dextrose 50% Injectable 25 Gram(s) IV Push once  dextrose 50% Injectable 25 Gram(s) IV Push once  dextrose Gel 1 Dose(s) Oral once PRN  fenofibrate Tablet 48 milliGRAM(s) Oral daily  glucagon  Injectable 1 milliGRAM(s) IntraMuscular once PRN  heparin  Injectable 5000 Unit(s) SubCutaneous every 12 hours  insulin lispro (HumaLOG) corrective regimen sliding scale   SubCutaneous three times a day before meals  insulin lispro (HumaLOG) corrective regimen sliding scale   SubCutaneous at bedtime  lidocaine 2% Jelly 20 milliLiter(s) IntraUrethral once  LORazepam     Tablet 0.5 milliGRAM(s) Oral two times a day PRN  losartan 50 milliGRAM(s) Oral daily  ondansetron Injectable 4 milliGRAM(s) IV Push every 6 hours PRN  oxyCODONE    5 mG/acetaminophen 325 mG 1 Tablet(s) Oral every 4 hours PRN  oxyCODONE    5 mG/acetaminophen 325 mG 2 Tablet(s) Oral every 6 hours PRN  tamsulosin 0.4 milliGRAM(s) Oral at bedtime        FAMILY HISTORY:  Family history of hypertension        Social History  non smoker , no etoh, lives c brother, not  no kids    REVIEW OF SYSTEMS      General:feels ok, no co, nad, denies problem	    Skin/Breast:  	  Ophthalmologic:no ch v/h  	  ENMT:	h ok, om nl, eating lunch, no duysphagia    Respiratory and Thorax:no cough no sp no sob  	  Cardiovascular:	no cp palp, no cardiac Hx other than HTN, HLd    Gastrointestinal:	no nvcd, had bm    Genitourinary:	no fdi, teague is outy, urine ok no    Musculoskeletal:	no pain    Neurological:	no lat co ros -    Psychiatric:	aware of stutter, no co now    Hematology/Lymphatics:	    Endocrine:	no poly udd    Allergic/Immunologic:	    Vital Signs Last 24 Hrs  T(C): 36.9 (03 Feb 2018 10:35), Max: 37.8 (03 Feb 2018 06:25)  T(F): 98.5 (03 Feb 2018 10:35), Max: 100.1 (03 Feb 2018 06:25)  HR: 68 (03 Feb 2018 10:35) (64 - 97)  BP: 114/48 (03 Feb 2018 10:35) (114/48 - 147/69)  BP(mean): --  RR: 17 (03 Feb 2018 10:35) (16 - 18)  SpO2: 96% (03 Feb 2018 10:35) (92% - 100%)    Physical Exam:   H&N:ncat, lyudmila cwnl, om hy, no cb no tm   vssa now, temp 100 this am  CV:rrr no m  Pulm:ctab no rrw  GI:bs+ soft nt nog  :teague out  Extrem:no cce  Skin:cdi  Vasc:h/m- no vv  Neuro:Speech stuters               Affect:calm coop               Memory:ok, unsure of any prior cardi kyle (no prior Hx)               Judgment: ok now               Orientation:x3               Cognition:intact, but relies on brother               Sensory:gr nl               Motor:nfatmae , in bed               Gait:say he has ambulated s problem               CN:gr nl  Psych:calm now, nad Hx ANNALISE  Other:    Labs:  CBC Full  -  ( 01 Feb 2018 16:36 )  WBC Count : 10.75 K/uL  Hemoglobin : 10.5 g/dL  Hematocrit : 33.3 %  Platelet Count - Automated : 322 K/uL  Mean Cell Volume : 92.2 fL  Mean Cell Hemoglobin : 29.1 pg  Mean Cell Hemoglobin Concentration : 31.5 %  Auto Neutrophil # : 8.93 K/uL  Auto Lymphocyte # : 0.78 K/uL  Auto Monocyte # : 0.86 K/uL  Auto Eosinophil # : 0.08 K/uL  Auto Basophil # : 0.05 K/uL  Auto Neutrophil % : 83.0 %  Auto Lymphocyte % : 7.3 %  Auto Monocyte % : 8.0 %  Auto Eosinophil % : 0.7 %  Auto Basophil % : 0.5 %      02-01    142  |  101  |  23  ----------------------------<  149<H>  4.4   |  26  |  1.72<H>    Ca    9.0      01 Feb 2018 16:36    TPro  7.8  /  Alb  4.0  /  TBili  0.5  /  DBili  x   /  AST  15  /  ALT  9   /  AlkPhos  52  02-01      LIVER FUNCTIONS - ( 01 Feb 2018 16:36 )  Alb: 4.0 g/dL / Pro: 7.8 g/dL / ALK PHOS: 52 u/L / ALT: 9 u/L / AST: 15 u/L / GGT: x                 HEALTH ISSUES - PROBLEM Dx:  Fever: Fever

## 2018-02-03 NOTE — DISCHARGE NOTE ADULT - MEDICATION SUMMARY - MEDICATIONS TO TAKE
I will START or STAY ON the medications listed below when I get home from the hospital:    Low Dose ASA 81 mg oral tablet  -- 1 tab(s) by mouth once a day  -- Indication: For Home med    oxycodone-acetaminophen 5 mg-325 mg/5 mL oral solution  -- every 4 hours as needed for pain  -- Indication: For Home med    losartan 50 mg oral tablet  -- 1 tab(s) by mouth once a day  -- Indication: For Home med    tamsulosin 0.4 mg oral capsule  -- 1 cap(s) by mouth once a day  -- Indication: For Home med    LORazepam 0.5 mg oral tablet  -- orally 2 times a day, As Needed  -- Indication: For Home med    Victoza 18 mg/3 mL subcutaneous solution  -- subcutaneous once a day  -- Indication: For Home med    glimepiride 1 mg oral tablet  -- 3  by mouth once a day (at bedtime)  -- Indication: For Home med    metFORMIN 500 mg oral tablet  -- 4 tab(s) by mouth with dinner  -- Indication: For Home med    rosuvastatin 40 mg oral tablet  -- 1 tab(s) by mouth once a day (at bedtime)  -- Indication: For Home med    fenofibrate 160 mg oral tablet  -- 1 tab(s) by mouth once a day  -- Indication: For Home med    carvedilol 6.25 mg oral tablet  -- 1 tab(s) by mouth 2 times a day  -- Indication: For Home med    amLODIPine 5 mg oral tablet  -- orally 2 times a day  -- Indication: For Home med    amoxicillin-clavulanate 875 mg-125 mg oral tablet  -- 875 milligram(s) by mouth every 12 hours   -- Finish all this medication unless otherwise directed by prescriber.  Take with food or milk.    -- Indication: For Antibiotic    Vitamin D3  -- 1 tab(s) by mouth once a day  -- Indication: For Home med

## 2018-02-03 NOTE — PROGRESS NOTE ADULT - SUBJECTIVE AND OBJECTIVE BOX
Overnight events:  None, remained afebrile.  Yesterday, pat reports emesis x1.      Subjective:  Pt offers no complaints except for chronic back pain.  His congestion has resolved.  No respiratory complaints    Objective:    Vital Signs Last 24 Hrs  T(C): 37.8 (03 Feb 2018 06:25), Max: 37.8 (03 Feb 2018 06:25)  T(F): 100.1 (03 Feb 2018 06:25), Max: 100.1 (03 Feb 2018 06:25)  HR: 97 (03 Feb 2018 06:25) (64 - 97)  BP: 147/69 (03 Feb 2018 06:25) (114/65 - 147/69)  BP(mean): --  RR: 16 (03 Feb 2018 06:25) (16 - 18)  SpO2: 94% (03 Feb 2018 06:25) (92% - 100%)-    I&O's Detail    02 Feb 2018 07:01  -  03 Feb 2018 07:00  --------------------------------------------------------  IN:  Total IN: 0 mL    OUT:    Voided: 526 mL  Total OUT: 526 mL    Total NET: -526 mL      03 Feb 2018 07:01  -  03 Feb 2018 08:59  --------------------------------------------------------  IN:  Total IN: 0 mL    OUT:    Voided: 100 mL  Total OUT: 100 mL    Total NET: -100 mL      Gen: NAD  Abd: soft, nontender, no CVAT  : pt with high PVR no rounds >315      Labs: none today                     Urine Cx: P  Blood Cx: P  Culture - Blood (02.01.18 @ 17:55)    Culture - Blood:   NO ORGANISMS ISOLATED  NO ORGANISMS ISOLATED AT 24 HOURS    Specimen Source: BLOOD        Imaging:  CT Abdomen and Pelvis No Cont (02.01.18 @ 19:04) >  IMPRESSION:     Interval decrease in left hydronephrosis status post left nephroureteral   J stent placement. No abscess.    Nonspecific left periureteral stranding, can be secondary to recent   instrumentation.     Indeterminate 1.0 cm pancreatic tail cystic lesion. Further evaluation   nonemergent MRI is suggested.

## 2018-02-03 NOTE — DISCHARGE NOTE ADULT - SECONDARY DIAGNOSIS.
BPH with obstruction/lower urinary tract symptoms High cholesterol HTN (hypertension) Ureteral calculus, left

## 2018-02-03 NOTE — PROGRESS NOTE ADULT - PROBLEM SELECTOR PLAN 1
Encourage double voiding, check PVR  Continue ceftriaxone, monitor VS  F/u cultures  F/u medicine regarding syncopal workup  F/U Bcx, Ucx  Hospitalist co-management  DVT prophy  OOB, ambulate

## 2018-02-03 NOTE — CONSULT NOTE ADULT - PROBLEM SELECTOR RECOMMENDATION 3
possibly 2/2 sepsis, or anxiety as pt states, or DM  (lo bld sugar?) no cardiac Hx other than HTN,HLD  should have echo, can be done inpt or out, need not delay dc as ho Hx arrythmia nor cad

## 2018-02-03 NOTE — PROGRESS NOTE ADULT - ASSESSMENT
71 yo M s/p L URS, stone extraction, stent placement, UR/teague placement on 1/26, admitted from office with near syncope, fever.  CE neg, CXR neg, CT stent in place, decreased hydro, no abscess.  Pt has remained afebrile since admission.

## 2018-02-04 LAB
-  AMPICILLIN: SIGNIFICANT CHANGE UP
-  CIPROFLOXACIN: SIGNIFICANT CHANGE UP
-  NITROFURANTOIN: SIGNIFICANT CHANGE UP
-  TETRACYCLINE: SIGNIFICANT CHANGE UP
-  VANCOMYCIN: SIGNIFICANT CHANGE UP
BACTERIA UR CULT: SIGNIFICANT CHANGE UP
METHOD TYPE: SIGNIFICANT CHANGE UP
ORGANISM # SPEC MICROSCOPIC CNT: SIGNIFICANT CHANGE UP
ORGANISM # SPEC MICROSCOPIC CNT: SIGNIFICANT CHANGE UP
SPECIMEN SOURCE: SIGNIFICANT CHANGE UP

## 2018-02-06 LAB
BACTERIA BLD CULT: SIGNIFICANT CHANGE UP
BACTERIA BLD CULT: SIGNIFICANT CHANGE UP

## 2018-02-14 ENCOUNTER — APPOINTMENT (OUTPATIENT)
Dept: UROLOGY | Facility: CLINIC | Age: 73
End: 2018-02-14
Payer: MEDICARE

## 2018-02-14 PROCEDURE — 99213 OFFICE O/P EST LOW 20 MIN: CPT

## 2018-02-18 ENCOUNTER — INPATIENT (INPATIENT)
Facility: HOSPITAL | Age: 73
LOS: 1 days | Discharge: HOME | DRG: 816 | End: 2018-02-20
Attending: INTERNAL MEDICINE | Admitting: HOSPITALIST
Payer: MEDICARE

## 2018-02-18 VITALS — DIASTOLIC BLOOD PRESSURE: 79 MMHG | HEART RATE: 72 BPM | SYSTOLIC BLOOD PRESSURE: 128 MMHG | RESPIRATION RATE: 16 BRPM

## 2018-02-18 DIAGNOSIS — E11.9 TYPE 2 DIABETES MELLITUS WITHOUT COMPLICATIONS: ICD-10-CM

## 2018-02-18 DIAGNOSIS — Z98.890 OTHER SPECIFIED POSTPROCEDURAL STATES: Chronic | ICD-10-CM

## 2018-02-18 DIAGNOSIS — I10 ESSENTIAL (PRIMARY) HYPERTENSION: ICD-10-CM

## 2018-02-18 DIAGNOSIS — N39.0 URINARY TRACT INFECTION, SITE NOT SPECIFIED: ICD-10-CM

## 2018-02-18 DIAGNOSIS — D72.829 ELEVATED WHITE BLOOD CELL COUNT, UNSPECIFIED: ICD-10-CM

## 2018-02-18 DIAGNOSIS — Z29.9 ENCOUNTER FOR PROPHYLACTIC MEASURES, UNSPECIFIED: ICD-10-CM

## 2018-02-18 DIAGNOSIS — E78.00 PURE HYPERCHOLESTEROLEMIA, UNSPECIFIED: ICD-10-CM

## 2018-02-18 DIAGNOSIS — N13.30 UNSPECIFIED HYDRONEPHROSIS: ICD-10-CM

## 2018-02-18 DIAGNOSIS — Z90.49 ACQUIRED ABSENCE OF OTHER SPECIFIED PARTS OF DIGESTIVE TRACT: Chronic | ICD-10-CM

## 2018-02-18 LAB
ALBUMIN SERPL ELPH-MCNC: 3.6 G/DL — SIGNIFICANT CHANGE UP (ref 3.3–5)
ALP SERPL-CCNC: 55 U/L — SIGNIFICANT CHANGE UP (ref 40–120)
ALT FLD-CCNC: 11 U/L RC — SIGNIFICANT CHANGE UP (ref 10–45)
ANION GAP SERPL CALC-SCNC: 16 MMOL/L — SIGNIFICANT CHANGE UP (ref 5–17)
APPEARANCE UR: ABNORMAL
AST SERPL-CCNC: 15 U/L — SIGNIFICANT CHANGE UP (ref 10–40)
BASOPHILS # BLD AUTO: 0 K/UL — SIGNIFICANT CHANGE UP (ref 0–0.2)
BASOPHILS NFR BLD AUTO: 0.4 % — SIGNIFICANT CHANGE UP (ref 0–2)
BILIRUB SERPL-MCNC: 0.4 MG/DL — SIGNIFICANT CHANGE UP (ref 0.2–1.2)
BILIRUB UR-MCNC: NEGATIVE — SIGNIFICANT CHANGE UP
BUN SERPL-MCNC: 27 MG/DL — HIGH (ref 7–23)
CALCIUM SERPL-MCNC: 9.2 MG/DL — SIGNIFICANT CHANGE UP (ref 8.4–10.5)
CHLORIDE SERPL-SCNC: 102 MMOL/L — SIGNIFICANT CHANGE UP (ref 96–108)
CO2 SERPL-SCNC: 23 MMOL/L — SIGNIFICANT CHANGE UP (ref 22–31)
COLOR SPEC: YELLOW — SIGNIFICANT CHANGE UP
CREAT SERPL-MCNC: 1.83 MG/DL — HIGH (ref 0.5–1.3)
DIFF PNL FLD: ABNORMAL
EOSINOPHIL # BLD AUTO: 0 K/UL — SIGNIFICANT CHANGE UP (ref 0–0.5)
EOSINOPHIL NFR BLD AUTO: 0.3 % — SIGNIFICANT CHANGE UP (ref 0–6)
EPI CELLS # UR: SIGNIFICANT CHANGE UP /HPF
GAS PNL BLDV: SIGNIFICANT CHANGE UP
GLUCOSE BLDC GLUCOMTR-MCNC: 148 MG/DL — HIGH (ref 70–99)
GLUCOSE SERPL-MCNC: 180 MG/DL — HIGH (ref 70–99)
GLUCOSE UR QL: NEGATIVE — SIGNIFICANT CHANGE UP
HCT VFR BLD CALC: 32.9 % — LOW (ref 39–50)
HGB BLD-MCNC: 10.9 G/DL — LOW (ref 13–17)
KETONES UR-MCNC: NEGATIVE — SIGNIFICANT CHANGE UP
LEUKOCYTE ESTERASE UR-ACNC: ABNORMAL
LYMPHOCYTES # BLD AUTO: 1.5 K/UL — SIGNIFICANT CHANGE UP (ref 1–3.3)
LYMPHOCYTES # BLD AUTO: 13.5 % — SIGNIFICANT CHANGE UP (ref 13–44)
MAGNESIUM SERPL-MCNC: 2.2 MG/DL — SIGNIFICANT CHANGE UP (ref 1.6–2.6)
MCHC RBC-ENTMCNC: 30.9 PG — SIGNIFICANT CHANGE UP (ref 27–34)
MCHC RBC-ENTMCNC: 33.3 GM/DL — SIGNIFICANT CHANGE UP (ref 32–36)
MCV RBC AUTO: 92.9 FL — SIGNIFICANT CHANGE UP (ref 80–100)
MONOCYTES # BLD AUTO: 0.9 K/UL — SIGNIFICANT CHANGE UP (ref 0–0.9)
MONOCYTES NFR BLD AUTO: 8.2 % — SIGNIFICANT CHANGE UP (ref 2–14)
NEUTROPHILS # BLD AUTO: 8.4 K/UL — HIGH (ref 1.8–7.4)
NEUTROPHILS NFR BLD AUTO: 77.5 % — HIGH (ref 43–77)
NITRITE UR-MCNC: NEGATIVE — SIGNIFICANT CHANGE UP
PH UR: 6 — SIGNIFICANT CHANGE UP (ref 5–8)
PHOSPHATE SERPL-MCNC: 3 MG/DL — SIGNIFICANT CHANGE UP (ref 2.5–4.5)
PLATELET # BLD AUTO: 382 K/UL — SIGNIFICANT CHANGE UP (ref 150–400)
POTASSIUM SERPL-MCNC: 4.1 MMOL/L — SIGNIFICANT CHANGE UP (ref 3.5–5.3)
POTASSIUM SERPL-SCNC: 4.1 MMOL/L — SIGNIFICANT CHANGE UP (ref 3.5–5.3)
PROT SERPL-MCNC: 8.3 G/DL — SIGNIFICANT CHANGE UP (ref 6–8.3)
PROT UR-MCNC: 300 MG/DL
RAPID RVP RESULT: SIGNIFICANT CHANGE UP
RBC # BLD: 3.54 M/UL — LOW (ref 4.2–5.8)
RBC # FLD: 12.8 % — SIGNIFICANT CHANGE UP (ref 10.3–14.5)
RBC CASTS # UR COMP ASSIST: >50 /HPF (ref 0–2)
SODIUM SERPL-SCNC: 141 MMOL/L — SIGNIFICANT CHANGE UP (ref 135–145)
SP GR SPEC: 1.02 — SIGNIFICANT CHANGE UP (ref 1.01–1.02)
UROBILINOGEN FLD QL: NEGATIVE — SIGNIFICANT CHANGE UP
WBC # BLD: 10.9 K/UL — HIGH (ref 3.8–10.5)
WBC # FLD AUTO: 10.9 K/UL — HIGH (ref 3.8–10.5)
WBC UR QL: >50 /HPF (ref 0–5)

## 2018-02-18 PROCEDURE — 99223 1ST HOSP IP/OBS HIGH 75: CPT | Mod: GC

## 2018-02-18 PROCEDURE — 93010 ELECTROCARDIOGRAM REPORT: CPT

## 2018-02-18 PROCEDURE — 71045 X-RAY EXAM CHEST 1 VIEW: CPT | Mod: 26

## 2018-02-18 PROCEDURE — 74018 RADEX ABDOMEN 1 VIEW: CPT | Mod: 26,59

## 2018-02-18 PROCEDURE — 99285 EMERGENCY DEPT VISIT HI MDM: CPT | Mod: 25

## 2018-02-18 RX ORDER — DEXTROSE 50 % IN WATER 50 %
1 SYRINGE (ML) INTRAVENOUS ONCE
Qty: 0 | Refills: 0 | Status: DISCONTINUED | OUTPATIENT
Start: 2018-02-18 | End: 2018-02-20

## 2018-02-18 RX ORDER — AMLODIPINE BESYLATE 2.5 MG/1
0 TABLET ORAL
Qty: 0 | Refills: 0 | COMMUNITY

## 2018-02-18 RX ORDER — TAMSULOSIN HYDROCHLORIDE 0.4 MG/1
0.4 CAPSULE ORAL AT BEDTIME
Qty: 0 | Refills: 0 | Status: DISCONTINUED | OUTPATIENT
Start: 2018-02-18 | End: 2018-02-20

## 2018-02-18 RX ORDER — AMLODIPINE BESYLATE 2.5 MG/1
5 TABLET ORAL
Qty: 0 | Refills: 0 | Status: DISCONTINUED | OUTPATIENT
Start: 2018-02-18 | End: 2018-02-18

## 2018-02-18 RX ORDER — METFORMIN HYDROCHLORIDE 850 MG/1
4 TABLET ORAL
Qty: 0 | Refills: 0 | COMMUNITY

## 2018-02-18 RX ORDER — AMPICILLIN SODIUM AND SULBACTAM SODIUM 250; 125 MG/ML; MG/ML
3 INJECTION, POWDER, FOR SUSPENSION INTRAMUSCULAR; INTRAVENOUS EVERY 8 HOURS
Qty: 0 | Refills: 0 | Status: DISCONTINUED | OUTPATIENT
Start: 2018-02-19 | End: 2018-02-20

## 2018-02-18 RX ORDER — DEXTROSE 50 % IN WATER 50 %
12.5 SYRINGE (ML) INTRAVENOUS ONCE
Qty: 0 | Refills: 0 | Status: DISCONTINUED | OUTPATIENT
Start: 2018-02-18 | End: 2018-02-20

## 2018-02-18 RX ORDER — DEXTROSE 50 % IN WATER 50 %
25 SYRINGE (ML) INTRAVENOUS ONCE
Qty: 0 | Refills: 0 | Status: DISCONTINUED | OUTPATIENT
Start: 2018-02-18 | End: 2018-02-20

## 2018-02-18 RX ORDER — SODIUM CHLORIDE 9 MG/ML
500 INJECTION INTRAMUSCULAR; INTRAVENOUS; SUBCUTANEOUS ONCE
Qty: 0 | Refills: 0 | Status: COMPLETED | OUTPATIENT
Start: 2018-02-18 | End: 2018-02-18

## 2018-02-18 RX ORDER — CARVEDILOL PHOSPHATE 80 MG/1
6.25 CAPSULE, EXTENDED RELEASE ORAL EVERY 12 HOURS
Qty: 0 | Refills: 0 | Status: DISCONTINUED | OUTPATIENT
Start: 2018-02-18 | End: 2018-02-20

## 2018-02-18 RX ORDER — SODIUM CHLORIDE 9 MG/ML
1000 INJECTION, SOLUTION INTRAVENOUS
Qty: 0 | Refills: 0 | Status: DISCONTINUED | OUTPATIENT
Start: 2018-02-18 | End: 2018-02-19

## 2018-02-18 RX ORDER — LOSARTAN POTASSIUM 100 MG/1
50 TABLET, FILM COATED ORAL DAILY
Qty: 0 | Refills: 0 | Status: DISCONTINUED | OUTPATIENT
Start: 2018-02-18 | End: 2018-02-18

## 2018-02-18 RX ORDER — AMPICILLIN TRIHYDRATE 250 MG
3 CAPSULE ORAL EVERY 8 HOURS
Qty: 0 | Refills: 0 | Status: DISCONTINUED | OUTPATIENT
Start: 2018-02-18 | End: 2018-02-18

## 2018-02-18 RX ORDER — AMPICILLIN SODIUM AND SULBACTAM SODIUM 250; 125 MG/ML; MG/ML
3 INJECTION, POWDER, FOR SUSPENSION INTRAMUSCULAR; INTRAVENOUS ONCE
Qty: 0 | Refills: 0 | Status: COMPLETED | OUTPATIENT
Start: 2018-02-18 | End: 2018-02-18

## 2018-02-18 RX ORDER — AMPICILLIN SODIUM AND SULBACTAM SODIUM 250; 125 MG/ML; MG/ML
3 INJECTION, POWDER, FOR SUSPENSION INTRAMUSCULAR; INTRAVENOUS ONCE
Qty: 0 | Refills: 0 | Status: DISCONTINUED | OUTPATIENT
Start: 2018-02-18 | End: 2018-02-18

## 2018-02-18 RX ORDER — ATORVASTATIN CALCIUM 80 MG/1
80 TABLET, FILM COATED ORAL AT BEDTIME
Qty: 0 | Refills: 0 | Status: DISCONTINUED | OUTPATIENT
Start: 2018-02-18 | End: 2018-02-20

## 2018-02-18 RX ORDER — AMLODIPINE BESYLATE 2.5 MG/1
5 TABLET ORAL
Qty: 0 | Refills: 0 | Status: DISCONTINUED | OUTPATIENT
Start: 2018-02-18 | End: 2018-02-20

## 2018-02-18 RX ORDER — CEFTRIAXONE 500 MG/1
1 INJECTION, POWDER, FOR SOLUTION INTRAMUSCULAR; INTRAVENOUS ONCE
Qty: 0 | Refills: 0 | Status: COMPLETED | OUTPATIENT
Start: 2018-02-18 | End: 2018-02-18

## 2018-02-18 RX ORDER — INSULIN LISPRO 100/ML
VIAL (ML) SUBCUTANEOUS
Qty: 0 | Refills: 0 | Status: DISCONTINUED | OUTPATIENT
Start: 2018-02-18 | End: 2018-02-20

## 2018-02-18 RX ORDER — AMPICILLIN SODIUM AND SULBACTAM SODIUM 250; 125 MG/ML; MG/ML
INJECTION, POWDER, FOR SUSPENSION INTRAMUSCULAR; INTRAVENOUS
Qty: 0 | Refills: 0 | Status: DISCONTINUED | OUTPATIENT
Start: 2018-02-18 | End: 2018-02-20

## 2018-02-18 RX ORDER — GLUCAGON INJECTION, SOLUTION 0.5 MG/.1ML
1 INJECTION, SOLUTION SUBCUTANEOUS ONCE
Qty: 0 | Refills: 0 | Status: DISCONTINUED | OUTPATIENT
Start: 2018-02-18 | End: 2018-02-20

## 2018-02-18 RX ORDER — HEPARIN SODIUM 5000 [USP'U]/ML
5000 INJECTION INTRAVENOUS; SUBCUTANEOUS EVERY 8 HOURS
Qty: 0 | Refills: 0 | Status: DISCONTINUED | OUTPATIENT
Start: 2018-02-18 | End: 2018-02-20

## 2018-02-18 RX ORDER — LOSARTAN POTASSIUM 100 MG/1
50 TABLET, FILM COATED ORAL DAILY
Qty: 0 | Refills: 0 | Status: DISCONTINUED | OUTPATIENT
Start: 2018-02-18 | End: 2018-02-20

## 2018-02-18 RX ORDER — ASPIRIN/CALCIUM CARB/MAGNESIUM 324 MG
81 TABLET ORAL DAILY
Qty: 0 | Refills: 0 | Status: DISCONTINUED | OUTPATIENT
Start: 2018-02-18 | End: 2018-02-20

## 2018-02-18 RX ORDER — SODIUM CHLORIDE 9 MG/ML
1000 INJECTION INTRAMUSCULAR; INTRAVENOUS; SUBCUTANEOUS ONCE
Qty: 0 | Refills: 0 | Status: COMPLETED | OUTPATIENT
Start: 2018-02-18 | End: 2018-02-18

## 2018-02-18 RX ORDER — FENOFIBRATE,MICRONIZED 130 MG
145 CAPSULE ORAL DAILY
Qty: 0 | Refills: 0 | Status: DISCONTINUED | OUTPATIENT
Start: 2018-02-18 | End: 2018-02-20

## 2018-02-18 RX ORDER — CARVEDILOL PHOSPHATE 80 MG/1
6.25 CAPSULE, EXTENDED RELEASE ORAL EVERY 12 HOURS
Qty: 0 | Refills: 0 | Status: DISCONTINUED | OUTPATIENT
Start: 2018-02-18 | End: 2018-02-18

## 2018-02-18 RX ORDER — LIRAGLUTIDE 6 MG/ML
0 INJECTION SUBCUTANEOUS
Qty: 0 | Refills: 0 | COMMUNITY

## 2018-02-18 RX ORDER — INSULIN LISPRO 100/ML
VIAL (ML) SUBCUTANEOUS AT BEDTIME
Qty: 0 | Refills: 0 | Status: DISCONTINUED | OUTPATIENT
Start: 2018-02-18 | End: 2018-02-20

## 2018-02-18 RX ORDER — CEFTRIAXONE 500 MG/1
1 INJECTION, POWDER, FOR SOLUTION INTRAMUSCULAR; INTRAVENOUS ONCE
Qty: 0 | Refills: 0 | Status: DISCONTINUED | OUTPATIENT
Start: 2018-02-18 | End: 2018-02-18

## 2018-02-18 RX ADMIN — TAMSULOSIN HYDROCHLORIDE 0.4 MILLIGRAM(S): 0.4 CAPSULE ORAL at 22:51

## 2018-02-18 RX ADMIN — SODIUM CHLORIDE 1000 MILLILITER(S): 9 INJECTION INTRAMUSCULAR; INTRAVENOUS; SUBCUTANEOUS at 11:57

## 2018-02-18 RX ADMIN — ATORVASTATIN CALCIUM 80 MILLIGRAM(S): 80 TABLET, FILM COATED ORAL at 22:51

## 2018-02-18 RX ADMIN — CARVEDILOL PHOSPHATE 6.25 MILLIGRAM(S): 80 CAPSULE, EXTENDED RELEASE ORAL at 18:51

## 2018-02-18 RX ADMIN — LOSARTAN POTASSIUM 50 MILLIGRAM(S): 100 TABLET, FILM COATED ORAL at 18:32

## 2018-02-18 RX ADMIN — AMLODIPINE BESYLATE 5 MILLIGRAM(S): 2.5 TABLET ORAL at 18:32

## 2018-02-18 RX ADMIN — HEPARIN SODIUM 5000 UNIT(S): 5000 INJECTION INTRAVENOUS; SUBCUTANEOUS at 22:51

## 2018-02-18 RX ADMIN — CEFTRIAXONE 100 GRAM(S): 500 INJECTION, POWDER, FOR SOLUTION INTRAMUSCULAR; INTRAVENOUS at 15:38

## 2018-02-18 RX ADMIN — AMPICILLIN SODIUM AND SULBACTAM SODIUM 200 GRAM(S): 250; 125 INJECTION, POWDER, FOR SUSPENSION INTRAMUSCULAR; INTRAVENOUS at 20:20

## 2018-02-18 RX ADMIN — SODIUM CHLORIDE 500 MILLILITER(S): 9 INJECTION INTRAMUSCULAR; INTRAVENOUS; SUBCUTANEOUS at 13:48

## 2018-02-18 NOTE — ED ADULT NURSE NOTE - NS ED NURSE REPORT GIVEN TO FT
Report given to [address] [place] [address] nurse Ysabel RN. Understands pmh, medications given and plan of care for patient. Patient in stable condition, vital signs updated, has no complaints at this time and has been updated on care plan. Explained to patient that it is change of shift and new nurse is taking over, pt verbalized understanding.

## 2018-02-18 NOTE — H&P ADULT - ATTENDING COMMENTS
72M with h/o DM, HTN, HLD and hydronephrosis who presents with c/o chills, poor appetite, headache, malaise and URI symptoms. Also c/o back pain which he states has resolved. Of note pt is  s/p  left ureteral / nephrostomy stent placement (1/26/18) w/cystoscopy, left retrograde pyelogram with stent placement and bladder stone removal. Also recently treated for enterococcus UTI with removal of ureteral stone. He denies dysuria, frequency, hematuria, incontinence.  On exam, pt is NAD, AAO X 3,  flat affect, anicteric, warm to touch. Abd is soft, NT, ND.No CVA tenderness.  Labs show mild leukocytosis, UA is suggestive of UTI, Cr at baseline. RVP negative. Pt seen by Urology; reccs appreciated.  Assessment : Viral syndrome vs UTI  Plan : c/w Unasyn for now, f/up cultures, check KUB for stent placement per Urology,c/w amlodpine, losartan, coreg and other home medications, PT evaluation.

## 2018-02-18 NOTE — ED PROVIDER NOTE - DR. NAME

## 2018-02-18 NOTE — PATIENT PROFILE ADULT. - VISION (WITH CORRECTIVE LENSES IF THE PATIENT USUALLY WEARS THEM):
wears corrective lenses/Normal vision: sees adequately in most situations; can see medication labels, newsprint Partially impaired: cannot see medication labels or newsprint, but can see obstacles in path, and the surrounding layout; can count fingers at arm's length/wears corrective lenses

## 2018-02-18 NOTE — CONSULT NOTE ADULT - ATTENDING COMMENTS
I agree with the above history, physical, and plan which I have reviewed and edited where appropriate

## 2018-02-18 NOTE — ED PROVIDER NOTE - OBJECTIVE STATEMENT
73 y/o M pt with PMHx of DM, kidney stones, stent (DEC 2017), HTN, HLD c/o generalized weakness, decreased eating x3 days. Also notes pain to the right side. Pt took oxycodone last night and today. dizziness and nausea. States that pt had kidney stones removed in January by Dr. Adams. Pt was DC'd 2 weeks ago and was improving since the discharge. But then felt the weakness started 2 days ago. States that he's having difficulty walking and getting out of bed due to the weakness. Notes throbbing headache, nausea and chills. Endorses normal BM. Denies vomiting, CP, SOB, LOC, hx of MI/cancer or any other complaints. Current medications: Crestor, amlodipine, Losartan 73 y/o M pt with PMHx of DM, kidney stones, stent (DEC 2017), HTN, HLD c/o generalized weakness, decreased eating x3 days. Also notes pain to the right side. Pt took oxycodone last night and today. nausea. States that pt had kidney stones removed in January by Dr. Adams. Pt was DC'd 2 weeks ago and was improving since the discharge. But then felt the weakness started 2 days ago. States that he's having difficulty walking and getting out of bed due to the weakness. Notes mild throbbing headachem b/l, nausea and chills. Endorses normal BM. Denies vertigo, vomiting, CP, SOB, LOC, hx of MI/cancer or any other complaints. Current medications: Crestor, amlodipine, Losartan 73 y/o M pt with PMHx of DM, kidney stones, stent (DEC 2017), HTN, HLD c/o generalized weakness, decreased eating x3 days. Also notes pain to the right side. Pt took oxycodone last night and today. nausea. States that pt had kidney stones removed in January by Dr. Adams. Pt was DC'd 2 weeks ago and was improving since the discharge. But then felt the weakness started 2 days ago. States that he's having difficulty walking and getting out of bed due to the weakness. Notes mild throbbing headache b/l, nausea and chills. Endorses normal BM. Denies vertigo, vomiting, CP, SOB, LOC, hx of MI/cancer or any other complaints. Current medications: Crestor, amlodipine, Losartan

## 2018-02-18 NOTE — H&P ADULT - PROBLEM SELECTOR PLAN 2
- Patient with history of hydronephrosis and is now s/p stent placement on January 26th; on February 1st CT scan demonstrates decreased hydronephrosis compared to January  - Will check renal u/s

## 2018-02-18 NOTE — ED ADULT NURSE NOTE - OBJECTIVE STATEMENT
73 y/o male BIBA for multiple complaints. Pt states he had a kidney stone removed in January and since he was feeling better until three days ago. C/o weakness, decreased PO intake, right sided abd pain, urinary freq and burning and nausea for the past three days. Denies chest pain, sob, ha, v/d, f/c,, hematuria. A&Ox4, vss, skin warm dry and intact, MAEx4, lungs CTA, abd soft nondistended. Pt resting comfortably with VSS, no complaints at this time. Patient's bed in the lowest position, explained plan of care to patient and family members. Will continue to reassess.

## 2018-02-18 NOTE — H&P ADULT - PROBLEM SELECTOR PLAN 1
- Patient with diffuse symptoms suggestive of viral etiology  - No current symptoms  - Given 1 dose of Unasyn in ED per urology  - Due to extensive urinary symptoms will check renal u/s  - Will monitor off of ABx at this point - Patient with diffuse symptoms suggestive of viral etiology  - No current symptoms  - Given 1 dose of Unasyn in ED per urology  - Due to extensive urinary symptoms will check renal u/s  - Will monitor off of ABx at this point  - If imaging is benign and patient afebrile overnight will likely d/c tomorrow AM

## 2018-02-18 NOTE — CONSULT NOTE ADULT - ASSESSMENT
71YO male with PMHx of DM, kidney stones, stent (DEC 2017), HTN, HLD, anxiety c/o generalized weakness and right sided lower back pain for the past 3 days.   -F/U Urine and blood culture   -Follow up with Dr. Rae as O/P   -c/w pain management    - 73YO male with PMHx of DM, kidney stones, stent (DEC 2017), HTN, HLD, anxiety c/o generalized weakness and right sided lower back pain for the past 3 days.   -F/U Urine and blood culture    -c/w pain management   -F/U KUB to confirm stent placement   -Follow up outpatient for stent removal with Dr. Rae.

## 2018-02-18 NOTE — ED PROVIDER NOTE - MEDICAL DECISION MAKING DETAILS
73 y/o M pt with PMHx of DM, HLD, HTN, kidney stones presents to the ED for generalized weakness x3 days. 71 y/o M pt with PMHx of DM, CAD, HTN, HLD, kidney stones s/p j-stent of right ureter presents to the ED for generalized weakness and decreased appetite worsening past few days. Pt denies vertigo, no episodes of syncope. Reports intermittent chills but no fever. Notes nausea but no vomiting. Concern for underlying infectious etiology, Additionally endorses mild dry cough. Will obtain labs including VBG with lactate urinalysis urine culture RVP, CXR and reassess. Will obtain rectal temperature 71 y/o M pt with PMHx of DM, HTN, HLD, kidney stones s/p j-stent of right ureter presents to the ED for generalized weakness and decreased appetite worsening past few days. Pt denies vertigo, no episodes of syncope. Reports intermittent chills but no fever. Notes nausea but no vomiting. Concern for underlying infectious etiology, Additionally endorses mild dry cough and mild headache, no neck stiffness, supple low suspicion of meningitits. Will obtain labs including VBG with lactate urinalysis urine culture RVP, CXR and reassess. Will obtain rectal temperature

## 2018-02-18 NOTE — H&P ADULT - HISTORY OF PRESENT ILLNESS
Patient is a 72 yoM with MHx of diabetes (non-insulin depended), hyperlipidemia, and high blood pressure, and hydronephrosis who recently had a left ureteral / nephrostomy stent placed (January 26th; extended urological procedure involving cystoscopy, left retrograde pyelogram with stent placement, and a bladder stone removal), and was subsequently admitted at [facility] for an enterococcus UTI with removal of a ureteral stone.  He presents with an approximately 2 day history of chills, loss of appetite, headache, back pain, weakness, and mild runny nose with sneezing.  This happened suddenly, with no single symptom occurring first.  He denies any dysuria, incontinence, increase or decrease in urinary frequency, or hematuria.  He also denies coughing, chest pain, V/D, although he did sometimes feel nauseous.  Since discharge from [facility] on February 3rd, patient has had no changes in his daily routine, medications, or general health that he is aware of.  Patient lives at home with his brother.        At time of interview patient states that his symptoms have resolved, and that he feels back to his normal self.

## 2018-02-18 NOTE — ED PROVIDER NOTE - PROGRESS NOTE DETAILS
Urology consulted for positive UA in the setting of ureter stent. Not recommending any additional imaging at this time. Urology consulted for positive UA in the setting of ureter stent. Not recommending any additional imaging at this time.  Urology states outpatient urine culture 4 days ago negative, however, symptom onset after specimen collected.

## 2018-02-18 NOTE — H&P ADULT - ASSESSMENT
Patient is a 72 yoM with extensive genitourinary history who p/w a two day history of subjective weakness, headache, loss of appetite, and right lower back pain and was found to have a leukocytosis; at this time patient has no complaints and feels back to his baseline.  Patient does have an extensive urogenital history, and so will proceed with ultrasound to r/o hydronephrosis behind recently placed ureteral stent.

## 2018-02-18 NOTE — H&P ADULT - NSHPPHYSICALEXAM_GEN_ALL_CORE
Constitutional: NAD, laying in bed comfortably  Eyes: EOMI, PERRLA  ENMT: MMM dry, dentition intact, no sores or ulcerations noted  Neck: ROM intact  Back: No bruising ecchymosis or gross deformity noted.  No CVA tenderness.  No lower back tenderness  Respiratory: CTA bilaterally with good inspiratory effort  Cardiovascular: RRR, no appreciated murmur  Gastrointestinal: Abdomen is soft, non-tender, non-distended; no suprapubic tenderness noted  Extremities: Weakly palpable pulses, no peripheral edema noted  Neurological: No focal deficits noted  Skin: Intact to gross observation  Psychiatric: Flat affect with little eye ctonact

## 2018-02-19 ENCOUNTER — TRANSCRIPTION ENCOUNTER (OUTPATIENT)
Age: 73
End: 2018-02-19

## 2018-02-19 DIAGNOSIS — A41.9 SEPSIS, UNSPECIFIED ORGANISM: ICD-10-CM

## 2018-02-19 DIAGNOSIS — N40.1 BENIGN PROSTATIC HYPERPLASIA WITH LOWER URINARY TRACT SYMPTOMS: ICD-10-CM

## 2018-02-19 DIAGNOSIS — E78.5 HYPERLIPIDEMIA, UNSPECIFIED: ICD-10-CM

## 2018-02-19 DIAGNOSIS — N21.0 CALCULUS IN BLADDER: ICD-10-CM

## 2018-02-19 DIAGNOSIS — F41.1 GENERALIZED ANXIETY DISORDER: ICD-10-CM

## 2018-02-19 DIAGNOSIS — I63.9 CEREBRAL INFARCTION, UNSPECIFIED: ICD-10-CM

## 2018-02-19 DIAGNOSIS — N39.0 URINARY TRACT INFECTION, SITE NOT SPECIFIED: ICD-10-CM

## 2018-02-19 LAB
GLUCOSE BLDC GLUCOMTR-MCNC: 119 MG/DL — HIGH (ref 70–99)
GLUCOSE BLDC GLUCOMTR-MCNC: 145 MG/DL — HIGH (ref 70–99)
GLUCOSE BLDC GLUCOMTR-MCNC: 171 MG/DL — HIGH (ref 70–99)
GLUCOSE BLDC GLUCOMTR-MCNC: 226 MG/DL — HIGH (ref 70–99)

## 2018-02-19 RX ORDER — ONDANSETRON 8 MG/1
4 TABLET, FILM COATED ORAL ONCE
Qty: 0 | Refills: 0 | Status: COMPLETED | OUTPATIENT
Start: 2018-02-19 | End: 2018-02-19

## 2018-02-19 RX ADMIN — AMLODIPINE BESYLATE 5 MILLIGRAM(S): 2.5 TABLET ORAL at 17:32

## 2018-02-19 RX ADMIN — ATORVASTATIN CALCIUM 80 MILLIGRAM(S): 80 TABLET, FILM COATED ORAL at 22:16

## 2018-02-19 RX ADMIN — CARVEDILOL PHOSPHATE 6.25 MILLIGRAM(S): 80 CAPSULE, EXTENDED RELEASE ORAL at 17:32

## 2018-02-19 RX ADMIN — HEPARIN SODIUM 5000 UNIT(S): 5000 INJECTION INTRAVENOUS; SUBCUTANEOUS at 05:04

## 2018-02-19 RX ADMIN — AMPICILLIN SODIUM AND SULBACTAM SODIUM 200 GRAM(S): 250; 125 INJECTION, POWDER, FOR SUSPENSION INTRAMUSCULAR; INTRAVENOUS at 14:11

## 2018-02-19 RX ADMIN — TAMSULOSIN HYDROCHLORIDE 0.4 MILLIGRAM(S): 0.4 CAPSULE ORAL at 22:16

## 2018-02-19 RX ADMIN — HEPARIN SODIUM 5000 UNIT(S): 5000 INJECTION INTRAVENOUS; SUBCUTANEOUS at 22:16

## 2018-02-19 RX ADMIN — LOSARTAN POTASSIUM 50 MILLIGRAM(S): 100 TABLET, FILM COATED ORAL at 05:04

## 2018-02-19 RX ADMIN — ONDANSETRON 4 MILLIGRAM(S): 8 TABLET, FILM COATED ORAL at 02:13

## 2018-02-19 RX ADMIN — Medication 145 MILLIGRAM(S): at 11:58

## 2018-02-19 RX ADMIN — AMLODIPINE BESYLATE 5 MILLIGRAM(S): 2.5 TABLET ORAL at 05:04

## 2018-02-19 RX ADMIN — Medication 2: at 12:01

## 2018-02-19 RX ADMIN — HEPARIN SODIUM 5000 UNIT(S): 5000 INJECTION INTRAVENOUS; SUBCUTANEOUS at 14:16

## 2018-02-19 RX ADMIN — AMPICILLIN SODIUM AND SULBACTAM SODIUM 200 GRAM(S): 250; 125 INJECTION, POWDER, FOR SUSPENSION INTRAMUSCULAR; INTRAVENOUS at 05:04

## 2018-02-19 RX ADMIN — CARVEDILOL PHOSPHATE 6.25 MILLIGRAM(S): 80 CAPSULE, EXTENDED RELEASE ORAL at 05:04

## 2018-02-19 RX ADMIN — AMPICILLIN SODIUM AND SULBACTAM SODIUM 200 GRAM(S): 250; 125 INJECTION, POWDER, FOR SUSPENSION INTRAMUSCULAR; INTRAVENOUS at 22:16

## 2018-02-19 RX ADMIN — Medication 81 MILLIGRAM(S): at 11:58

## 2018-02-19 NOTE — DISCHARGE NOTE ADULT - ADDITIONAL INSTRUCTIONS
Follow-up with Dr. Rae on March 7th at 10:20 regarding your urinary stent and infection.    Follow-up with Dr. Sun on February 22nd at 3:00 pm.

## 2018-02-19 NOTE — DISCHARGE NOTE ADULT - VISION (WITH CORRECTIVE LENSES IF THE PATIENT USUALLY WEARS THEM):
wears corrective lenses/Partially impaired: cannot see medication labels or newsprint, but can see obstacles in path, and the surrounding layout; can count fingers at arm's length

## 2018-02-19 NOTE — DISCHARGE NOTE ADULT - NS AS DC FOLLOWUP STROKE INST
Smoking Cessation/Influenza vaccination (VIS Pub Date: August 19, 2014)/Stroke (includes: TIA/SAH/ICH/Ischemic Stroke) Stroke (includes: TIA/SAH/ICH/Ischemic Stroke)

## 2018-02-19 NOTE — DISCHARGE NOTE ADULT - CARE PROVIDERS DIRECT ADDRESSES
[email or web address]

## 2018-02-19 NOTE — PROGRESS NOTE ADULT - SUBJECTIVE AND OBJECTIVE BOX
Subjective:  Patient complains of loss of appetite and "generalized sickness" but is unable to specificy further. Denies fever, hematuria, or dysuria. KUB demonstrates stent is in place with good curl.     Objectives:  T(C): 36.9 (18 @ 03:21), Max: 36.9 (18 @ 03:21)  HR: 73 (18 @ 03:21) (73 - 73)  BP: 151/74 (18 @ 03:21) (151/74 - 151/74)  RR: 18 (18 @ 03:21) (18 - 18)  SpO2: 96% (18 @ 03:21) (96% - 96%)  Wt(kg): --     @ 07:01  -   @ 07:00  --------------------------------------------------------  IN:    Oral Fluid: 360 mL  Total IN: 360 mL    OUT:  Total OUT: 0 mL    Total NET: 360 mL          Physcial Exam  GENERAL: NAD, well-developed  ABDOMEN: Soft, Nontender, Nondistended;   GENITOURINARY: No flank pain      LABS:                        10.9   10.9  )-----------( 382      ( 2018 11:45 )             32.9         141  |  102  |  27<H>  ----------------------------<  180<H>  4.1   |  23  |  1.83<H>    Ca    9.2      2018 11:45  Phos  3.0       Mg     2.2         TPro  8.3  /  Alb  3.6  /  TBili  0.4  /  DBili  x   /  AST  15  /  ALT  11  /  AlkPhos  55      CAPILLARY BLOOD GLUCOSE      POCT Blood Glucose.: 145 mg/dL (2018 07:51)      CAPILLARY BLOOD GLUCOSE      POCT Blood Glucose.: 145 mg/dL (2018 07:51)    Urinalysis Basic - ( 2018 13:53 )    Color: Yellow / Appearance: SL Turbid / S.021 / pH: x  Gluc: x / Ketone: Negative  / Bili: Negative / Urobili: Negative   Blood: x / Protein: 300 mg/dL / Nitrite: Negative   Leuk Esterase: Large / RBC: >50 /HPF / WBC >50 /HPF   Sq Epi: x / Non Sq Epi: OCC /HPF / Bacteria: x

## 2018-02-19 NOTE — CONSULT NOTE ADULT - SUBJECTIVE AND OBJECTIVE BOX
71YO male with PMHx of DM, kidney stones, stent (DEC 2017), HTN, HLD, anxiety c/o generalized weakness and right sided lower back pain for the past 3 days. Patient reports his symptoms have been intermittent and controlled by pain medications. Patient recently had a cystoscopy, L retrograde pyelogram, Left URS, left Stent placement, laser litholapaxy and bladder stone removal on 2018. Patient was recently admitted to [facility] for due to dizziness. On recene tCT scan: interval decrease in left hydro, nonspecific left periureteral stranding. No abscess visualized. Patient was discharged on Augmentin. Patient denies fevers, chills, n/v/d/ or dysuria, frequency or urgency.     PAST MEDICAL & SURGICAL HISTORY:  Ureteral calculus, left  BPH with obstruction/lower urinary tract symptoms  Bladder calculus  High triglycerides  Cerebrovascular accident (CVA), unspecified mechanism: 2 years ago  HTN (hypertension)  High cholesterol  Diabetes mellitus: Type 2  H/O myringotomy: right ear in 2017  H/O cystoscopy: and left  stent placement  History of appendectomy    FAMILY HISTORY:  Family history of hypertension    SOCIAL HISTORY:   Tobacco hx:  MEDICATIONS  (STANDING):    MEDICATIONS  (PRN):    Allergies    No Known Allergies    Intolerances        REVIEW OF SYSTEMS: Pertinent positives and negatives as stated in HPI, otherwise negative    Vital signs  T(C): 37.6 (18 @ 14:34), Max: 37.9 (18 @ 11:47)  HR: 74 (18 @ 14:34)  BP: 168/77 (18 @ 14:34)  SpO2: 97% (18 @ 14:34)  Wt(kg): --    Output      Physical Exam  Gen: NAD  Pulm: No respiratory distress, no subcostal retractions  CV: RRR, no JVD  Abd: Soft, NT, ND  Back: negative bilateral flank pain   :No teague, urine clear   MSK: No edema present    LABS:         @ 11:45    WBC 10.9  / Hct 32.9  / SCr 1.83         141  |  102  |  27<H>  ----------------------------<  180<H>  4.1   |  23  |  1.83<H>    Ca    9.2      2018 11:45  Phos  3.0       Mg     2.2         TPro  8.3  /  Alb  3.6  /  TBili  0.4  /  DBili  x   /  AST  15  /  ALT  11  /  AlkPhos  55        Urinalysis Basic - ( 2018 13:53 )    Color: Yellow / Appearance: SL Turbid / S.021 / pH: x  Gluc: x / Ketone: Negative  / Bili: Negative / Urobili: Negative   Blood: x / Protein: 300 mg/dL / Nitrite: Negative   Leuk Esterase: Large / RBC: >50 /HPF / WBC >50 /HPF   Sq Epi: x / Non Sq Epi: OCC /HPF / Bacteria: x        Urine Cx: Negative (18)   Blood Cx: No growth (2/3/18)     Radiology:    CT AP:   KIDNEYS/URETERS: Interval decrease in left hydronephrosis status post   left nephroureteral J stent placement. Nonspecific left periureteral   stranding to the level of the left UVJ. Tiny left upper pole calculus.   Few droplets of air within the left upper pole calyces, related to recent   instrumentation. No fluid collection or abscess.    No right hydronephrosis. Small bilateral cysts, including a right lower   pole hyperdense cysts, likely hemorrhagic/proteinaceous.    BLADDER: Interval removal of a chapincito stone calculus.   REPRODUCTIVE ORGANS: Mildly enlarged prostate gland.    BOWEL: Colonic diverticulosis without acute inflammation. No bowel   obstruction. Appendix is normal.  PERITONEUM: No ascites.  VESSELS:  Atheromatous calcifications.  RETROPERITONEUM: No lymphadenopathy.    ABDOMINAL WALL: Tiny fat-containing umbilical hernia.  BONES: Multilevel spondylitic changes.    IMPRESSION:     Interval decrease in left hydronephrosis status post left nephroureteral   J stent placement. No abscess.    Nonspecific left periureteral stranding, can be secondary to recent   instrumentation.     Indeterminate 1.0 cm pancreatic tail cystic lesion. Further evaluation   nonemergent MRI is suggested.
Patient is a 72y old  Male who presents with a chief complaint of Loss of appetite, back pain (18 Feb 2018 17:17)      HPI:from adm H+P:  Patient is a 72 yoM with MHx of diabetes (non-insulin depended), hyperlipidemia, and high blood pressure, and hydronephrosis who recently had a left ureteral / nephrostomy stent placed (January 26th; extended urological procedure involving cystoscopy, left retrograde pyelogram with stent placement, and a bladder stone removal), and was subsequently admitted at [facility] for an enterococcus UTI with removal of a ureteral stone.  He presents with an approximately 2 day history of chills, loss of appetite, headache, back pain, weakness, and mild runny nose with sneezing.  This happened suddenly, with no single symptom occurring first.  He denies any dysuria, incontinence, increase or decrease in urinary frequency, or hematuria.  He also denies coughing, chest pain, V/D, although he did sometimes feel nauseous.  Since discharge from [facility] on February 3rd, patient has had no changes in his daily routine, medications, or general health that he is aware of.  Patient lives at home with his brother.        At time of interview patient states that his symptoms have resolved, and that he feels back to his normal self. (18 Feb 2018 17:17)        PAST MEDICAL & SURGICAL HISTORY:  Ureteral calculus, left  BPH with obstruction/lower urinary tract symptoms  Bladder calculus  High triglycerides  Cerebrovascular accident (CVA), unspecified mechanism: 2 years ago  HTN (hypertension)  High cholesterol  Diabetes mellitus: Type 2  H/O myringotomy: right ear in 2017  H/O cystoscopy: and left  stent placement  History of appendectomy      Medications:  amLODIPine   Tablet 5 milliGRAM(s) Oral two times a day  ampicillin/sulbactam  IVPB      ampicillin/sulbactam  IVPB 3 Gram(s) IV Intermittent every 8 hours  aspirin  chewable 81 milliGRAM(s) Oral daily  atorvastatin 80 milliGRAM(s) Oral at bedtime  carvedilol 6.25 milliGRAM(s) Oral every 12 hours  dextrose 5%. 1000 milliLiter(s) IV Continuous <Continuous>  dextrose 50% Injectable 12.5 Gram(s) IV Push once  dextrose 50% Injectable 25 Gram(s) IV Push once  dextrose 50% Injectable 25 Gram(s) IV Push once  dextrose Gel 1 Dose(s) Oral once PRN  fenofibrate Tablet 145 milliGRAM(s) Oral daily  glucagon  Injectable 1 milliGRAM(s) IntraMuscular once PRN  heparin  Injectable 5000 Unit(s) SubCutaneous every 8 hours  insulin lispro (HumaLOG) corrective regimen sliding scale   SubCutaneous three times a day before meals  insulin lispro (HumaLOG) corrective regimen sliding scale   SubCutaneous at bedtime  LORazepam     Tablet 0.5 milliGRAM(s) Oral two times a day PRN  losartan 50 milliGRAM(s) Oral daily  tamsulosin 0.4 milliGRAM(s) Oral at bedtime        FAMILY HISTORY:  Family history of hypertension (Father)  brother A+W,       Social History  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, care giver  non smoker no etoh    REVIEW OF SYSTEMS      General:no co now , feels better, wants to go home but hasnt been oob 	    Skin/Breast:  	  Ophthalmologic:no ch v  	  ENMT:no ch, ate    Respiratory and Thorax:no sob no cough no sp  	  Cardiovascular:	no cp palp    Gastrointestinal:	no nvcd    Genitourinary:	no fdi    Musculoskeletal:	no pain    Neurological:	no co    Psychiatric:not anxious, nad    Hematology/Lymphatics:	    Endocrine:	no polyuud    Allergic/Immunologic:	  AOSN     Vital Signs Last 24 Hrs  T(C): 36.9 (19 Feb 2018 03:21), Max: 37.9 (18 Feb 2018 11:47)  T(F): 98.5 (19 Feb 2018 03:21), Max: 100.2 (18 Feb 2018 11:47)  HR: 73 (19 Feb 2018 03:21) (72 - 82)  BP: 151/74 (19 Feb 2018 03:21) (128/79 - 175/79)  BP(mean): --  RR: 18 (19 Feb 2018 03:21) (16 - 19)  SpO2: 96% (19 Feb 2018 03:21) (96% - 100%)    Physical Exam: nad vssa , a+O converses says he feels better  H&N: nc at  CV: rrr  Pulm:ctab norrw  GI:soft nt BS+  :  Extrem:no cce  Skin:  Vasc:  Neuro:Speech clear          Affect: approp               Memory:OK               Judgment: OK now               Orientation:x3              Cognition:int               Sensory:gr nl               Motor: nfatmae ,but hasnt been oob               Gait:needs to ambulate  Psych:calm , approp  Other:    Labs:  CBC Full  -  ( 18 Feb 2018 11:45 )  WBC Count : 10.9 K/uL  Hemoglobin : 10.9 g/dL  Hematocrit : 32.9 %  Platelet Count - Automated : 382 K/uL  Mean Cell Volume : 92.9 fl  Mean Cell Hemoglobin : 30.9 pg  Mean Cell Hemoglobin Concentration : 33.3 gm/dL  Auto Neutrophil # : 8.4 K/uL  Auto Lymphocyte # : 1.5 K/uL  Auto Monocyte # : 0.9 K/uL  Auto Eosinophil # : 0.0 K/uL  Auto Basophil # : 0.0 K/uL  Auto Neutrophil % : 77.5 %  Auto Lymphocyte % : 13.5 %  Auto Monocyte % : 8.2 %  Auto Eosinophil % : 0.3 %  Auto Basophil % : 0.4 %      02-18    141  |  102  |  27<H>  ----------------------------<  180<H>  4.1   |  23  |  1.83<H>    Ca    9.2      18 Feb 2018 11:45  Phos  3.0     02-18  Mg     2.2     02-18    TPro  8.3  /  Alb  3.6  /  TBili  0.4  /  DBili  x   /  AST  15  /  ALT  11  /  AlkPhos  55  02-18      LIVER FUNCTIONS - ( 18 Feb 2018 11:45 )  Alb: 3.6 g/dL / Pro: 8.3 g/dL / ALK PHOS: 55 U/L / ALT: 11 U/L RC / AST: 15 U/L / GGT: x                 HEALTH ISSUES - PROBLEM Dx:  Need for prophylactic measure: Need for prophylactic measure  HTN (hypertension): HTN (hypertension)  High cholesterol: High cholesterol  Diabetes mellitus: Diabetes mellitus  Hydronephrosis: Hydronephrosis  Leukocytosis: Leukocytosis

## 2018-02-19 NOTE — DISCHARGE NOTE ADULT - MEDICATION SUMMARY - MEDICATIONS TO TAKE
I will START or STAY ON the medications listed below when I get home from the hospital:    Rolling Walker  -- Rolling Walker due to unsteady gait (ICD-10 code R26.89); patient will need for life  -- Indication: For Unsteady Gait    Glucometer  -- 1 Glucometer for Diabetes Mellitus Type 2 (ICD Code E11.9)  -- Indication: For Diabetes mellitus    Glucose Testing Strips  -- Glucose testing strips for Type 2 Diabetes Mellitus (ICD-10 code E11.9)  -- Indication: For Diabetes mellitus    Glucose testing Lancets  -- For Type 2 Diabetes Mellitus (ICD-10 Code E11.9)  -- Indication: For Diabetes mellitus    oxyCODONE-acetaminophen 5 mg-325 mg oral tablet  -- 1 tab(s) by mouth every 4 hours, As Needed  -- Indication: For Pain    Low Dose ASA 81 mg oral tablet  -- 1 tab(s) by mouth once a day  -- Indication: For Cerebrovascular accident (CVA), unspecified mechanism    losartan 50 mg oral tablet  -- 1 tab(s) by mouth once a day  -- Indication: For Hypertension    tamsulosin 0.4 mg oral capsule  -- 1 cap(s) by mouth once a day  -- Indication: For Urinary Retention    LORazepam 0.5 mg oral tablet  -- 1 tab(s) by mouth 2 times a day, As Needed  -- Indication: For Anxiety    metFORMIN 500 mg oral tablet, extended release  -- 4 tab(s) by mouth once a day with dinner  -- Indication: For Diabetes mellitus    Victoza 18 mg/3 mL subcutaneous solution  -- 1.8 milligram(s) subcutaneous once a day  -- Indication: For Diabetes mellitus    glimepiride 1 mg oral tablet  -- 3  by mouth once a day (at bedtime)  -- Indication: For Diabetes mellitus    rosuvastatin 40 mg oral tablet  -- 1 tab(s) by mouth once a day (at bedtime)  -- Indication: For Hyperlipidemia    fenofibrate 160 mg oral tablet  -- 1 tab(s) by mouth once a day  -- Indication: For Hyperlipidemia    carvedilol 6.25 mg oral tablet  -- 1 tab(s) by mouth 2 times a day  -- Indication: For Hypertension    amLODIPine 5 mg oral tablet  -- 1 tab(s) by mouth 2 times a day  -- Indication: For Hypertension    amoxicillin-clavulanate 500 mg-125 mg oral tablet  -- 1 tab(s) by mouth 2 times a day   -- Finish all this medication unless otherwise directed by prescriber.  Take with food or milk.    -- Indication: For Urinary tract infection    Spectravite Senior oral tablet  -- 1 tab(s) by mouth once a day  -- Indication: For Health Maintenance    Vitamin D3  -- 1 tab(s) by mouth once a day  -- Indication: For Health Maintenance

## 2018-02-19 NOTE — PROGRESS NOTE ADULT - ASSESSMENT
71YO male with PMHx of DM, kidney stones, stent (DEC 2017), HTN, HLD, anxiety c/o generalized weakness and right sided lower back pain for the past 3 days.   -No further urological needs; follow up outpatient for stent removal with Dr. Rae once patient is discharged at [phone].  -F/U Urine and blood culture; antibiotic treatment per culture.  -c/w pain management, patient to follow up with PCP after for evaluation.  -Follow up outpatient for stent removal with Dr. Rae once discharged.

## 2018-02-19 NOTE — DISCHARGE NOTE ADULT - PATIENT PORTAL LINK FT
You can access the [facility] Patient Portal, offered by [facility], by registering with the following website: [email or web address]

## 2018-02-19 NOTE — DISCHARGE NOTE ADULT - HOSPITAL COURSE
Patient is a 72 yoM with extensive genitourinary history who p/w a two day history of subjective weakness, headache, loss of appetite, and right lower back pain and was found to have a leukocytosis with low-grade fevers.  Due to extensive urological history patient was admitted for a rule out of a urinary tract infection.  UA was clear.  KUB revealed no stent dislodgement.  Due to the leukocytosis and extensive history patient was admitted; his symptoms resolved.  He was seen by physical therapy who recommended __________.  He was sent home on a 7 day course of Amoxicillin and advised to closely follow up with his urologist. Patient is a 72 yoM with extensive genitourinary history who p/w a two day history of subjective weakness, headache, loss of appetite, and right lower back pain and was found to have a leukocytosis with low-grade fevers.  Due to extensive urological history patient was admitted for a rule out of a urinary tract infection.  UA was clear.  KUB revealed no stent dislodgement.  Due to the leukocytosis and extensive history patient was admitted; his symptoms resolved.  He was seen by physical therapy who recommended a rolling walker at discharge.  He was sent home on a 7 day course of Amoxicillin and advised to closely follow up with his urologist. Patient is a 72 yoM with extensive genitourinary history who p/w a two day history of subjective weakness, headache, loss of appetite, and right lower back pain and was found to have a leukocytosis with low-grade fevers.  Due to extensive urological history patient was admitted for a rule out of a urinary tract infection.  UA was clear.  KUB revealed no stent dislodgement.  Due to the leukocytosis and extensive history patient was admitted; his symptoms resolved.  He was seen by physical therapy who recommended a rolling walker at discharge as well as home physical therapy.  He was sent home on a 10 day course of Amoxicillin and advised to closely follow up with his urologist.  An appointment was made with both his primary care physician and his urologist for outpatient follow-up.

## 2018-02-19 NOTE — DISCHARGE NOTE ADULT - OTHER SIGNIFICANT FINDINGS
XAM:  XR KUB 1 VIEW                            PROCEDURE DATE:  02/18/2018      INTERPRETATION:  EXAMINATION: XR KUB 1 VIEW    CLINICAL INDICATION: ureteral stent placement    TECHNIQUE: Single portable view of the abdomen was obtained.    COMPARISON: CT 2/1/2018.    FINDINGS:    Left ureteral stent is noted. No radiopaque calculi are identified along   the course of the stent. There are multiple pelvic phleboliths. There is   nonobstructive nonspecific bowel gas pattern. There are degenerative   changes of the visualized spine and both hip joints.

## 2018-02-19 NOTE — CONSULT NOTE ADULT - MINUTES
Health Maintenance Summary     Topic Due On Due Status Completed On Postpone Until Reason    PAP SMEAR - CERVICAL CANCER SCREENING Mar 22, 2015 Postponed Mar 22, 2012 Oct 13, 2017 Patient Refused    MAMMOGRAM - BREAST CANCER SCREENING Aug 1, 2018 Not Due Aug 1, 2017      Colorectal Cancer Screening - Colonoscopy Jul 7, 2022 Not Due Jul 7, 2017      Immunization - TDAP Pregnancy  Hidden       IMMUNIZATION - DTaP/Tdap/Td Oct 28, 2020 Not Due Oct 28, 2010      Immunization-Influenza Sep 1, 2017 Not Due Dec 26, 2014            Patient is up to date, no discussion needed.       45

## 2018-02-19 NOTE — DISCHARGE NOTE ADULT - CARE PLAN
Principal Discharge DX:	Leukocytosis  Goal:	Resolution of symptoms  Assessment and plan of treatment:	You were admitted to the hospital with leukocytosis, which is an increase in your white blood cell count.  Because of your significant urological history, you were evaluated for potential bacterial urinary tract infections.  The urologist saw you, and did not believe that you have a urinary tract infection; however, due to your extensive history you were started on antibiotics.  Please take these antibiotics exactly as prescribed.  Please follow-up with the urologist within one week of discharge.  The number is [phone]. Principal Discharge DX:	Leukocytosis  Goal:	Resolution of symptoms  Assessment and plan of treatment:	You were admitted to the hospital with leukocytosis, which is an increase in your white blood cell count.  Because of your significant urological history, you were evaluated for potential bacterial urinary tract infections.  The urologist saw you, and did not believe that you have a urinary tract infection; however, due to your extensive history you were started on antibiotics.  Please take these antibiotics exactly as prescribed.  You will be discharged with 10 days of Augmentin.  An appointment to follow-up with Dr. Rae has been made for you on March 7th at 10:20 at [facility].  Please call [phone] and reschedule if this does not work for you.  Please also follow-up with Dr. Sun to ensure that your urinary tract infection resolves.  An appointment has been made for you on Thursday, February 22nd, at 3:00 pm.  If you cannot make this appointment, please call Dr. Sun's office immediately upon discharge.  Secondary Diagnosis:	Unsteady gait  Assessment and plan of treatment:	You were diagnosed with an unsteady gait while in the hospital, and the physical therapist evaluated you.  For your safety, please use the rolling walker when you ambulate.  Please continue to follow-up with your primary care physician to help ensure that you can ambulate safely.  Please participate in home physical therapy upon discharge.

## 2018-02-19 NOTE — DISCHARGE NOTE ADULT - CARE PROVIDER_API CALL
Troy Rae), Urology  [address] [place] [address]  Phone: [phone]  Fax: [phone] Troy Rae), Urology  [address] [address]  Phone: [phone]  Fax: [phone]    Dallas Sun), Family Medicine  [address] [place] [address]  Phone: [phone]  Fax: [phone]

## 2018-02-19 NOTE — CONSULT NOTE ADULT - ASSESSMENT
sepsis c elev wbc c shift and temp with ams on adm 2/2 uti, post stone removal at [facility] 2 wks ago, carlie watts seen Dr Rae outpt but did not fu c me in office p dc, was adm last adm for near syncope in Dr Rae's office  Vasovasohail. kyle neg  started abx in ED already improved,, See URO rec fu outpt dr Rae, can switch to PO abx  neeeds PT eval

## 2018-02-19 NOTE — PROGRESS NOTE ADULT - ASSESSMENT
Patient is a 72 yoM with extensive genitourinary history who p/w a two day history of subjective weakness, headache, loss of appetite, and right lower back pain and was found to have a leukocytosis; at this time patient has no complaints and feels back to his baseline.  Patient does have an extensive urogenital history, and so will proceed with ultrasound to r/o hydronephrosis behind recently placed ureteral stent. Patient is a 72 yoM with extensive genitourinary history who p/w a two day history of subjective weakness, headache, loss of appetite, and right lower back pain and was found to have a leukocytosis; at this time patient has no complaints and feels back to his baseline.  Patient does have an extensive urogenital history, per urology no need for further imaging.

## 2018-02-19 NOTE — DISCHARGE NOTE ADULT - PLAN OF CARE
Resolution of symptoms You were admitted to the hospital with leukocytosis, which is an increase in your white blood cell count.  Because of your significant urological history, you were evaluated for potential bacterial urinary tract infections.  The urologist saw you, and did not believe that you have a urinary tract infection; however, due to your extensive history you were started on antibiotics.  Please take these antibiotics exactly as prescribed.  Please follow-up with the urologist within one week of discharge.  The number is [phone]. You were admitted to the hospital with leukocytosis, which is an increase in your white blood cell count.  Because of your significant urological history, you were evaluated for potential bacterial urinary tract infections.  The urologist saw you, and did not believe that you have a urinary tract infection; however, due to your extensive history you were started on antibiotics.  Please take these antibiotics exactly as prescribed.  You will be discharged with 10 days of Augmentin.  An appointment to follow-up with Dr. Rae has been made for you on March 7th at 10:20 at [facility].  Please call [phone] and reschedule if this does not work for you.  Please also follow-up with Dr. Sun to ensure that your urinary tract infection resolves.  An appointment has been made for you on Thursday, February 22nd, at 3:00 pm.  If you cannot make this appointment, please call Dr. Sun's office immediately upon discharge. You were diagnosed with an unsteady gait while in the hospital, and the physical therapist evaluated you.  For your safety, please use the rolling walker when you ambulate.  Please continue to follow-up with your primary care physician to help ensure that you can ambulate safely.  Please participate in home physical therapy upon discharge.

## 2018-02-19 NOTE — PROGRESS NOTE ADULT - PROBLEM SELECTOR PLAN 1
- Patient with diffuse symptoms suggestive of viral etiology  - No current symptoms  - Started on Unasyn  - Due to extensive urinary symptoms will check renal u/s  - If imaging is benign will likely d/c today - Patient with diffuse symptoms suggestive of viral etiology  - No current symptoms  - Started on Unasyn  - Due to extensive urinary symptoms will check renal u/s  - If imaging is benign will likely d/c today on PO Augmentin to complete 7 day course.   Will follow up urine culture to ensure appropriate antibiotics - Patient with diffuse symptoms suggestive of viral etiology  - No current symptoms  - Started on Unasyn  - per urology - no further imaging  - If imaging is benign will likely d/c today on PO Augmentin to complete 7 day course.   Will follow up urine culture to ensure appropriate antibiotics

## 2018-02-19 NOTE — PROGRESS NOTE ADULT - SUBJECTIVE AND OBJECTIVE BOX
Patient is a 72y old  Male who presents with a chief complaint of Loss of appetite, back pain (2018 17:17)      SUBJECTIVE / OVERNIGHT EVENTS:  No events overnight.  Patient states he feels much improved this AM; his weakness is improved because he ate for the first time in two days.  He feels back to baseline.      MEDICATIONS  (STANDING):  amLODIPine   Tablet 5 milliGRAM(s) Oral two times a day  ampicillin/sulbactam  IVPB      ampicillin/sulbactam  IVPB 3 Gram(s) IV Intermittent every 8 hours  aspirin  chewable 81 milliGRAM(s) Oral daily  atorvastatin 80 milliGRAM(s) Oral at bedtime  carvedilol 6.25 milliGRAM(s) Oral every 12 hours  dextrose 5%. 1000 milliLiter(s) (50 mL/Hr) IV Continuous <Continuous>  dextrose 50% Injectable 12.5 Gram(s) IV Push once  dextrose 50% Injectable 25 Gram(s) IV Push once  dextrose 50% Injectable 25 Gram(s) IV Push once  fenofibrate Tablet 145 milliGRAM(s) Oral daily  heparin  Injectable 5000 Unit(s) SubCutaneous every 8 hours  insulin lispro (HumaLOG) corrective regimen sliding scale   SubCutaneous three times a day before meals  insulin lispro (HumaLOG) corrective regimen sliding scale   SubCutaneous at bedtime  losartan 50 milliGRAM(s) Oral daily  tamsulosin 0.4 milliGRAM(s) Oral at bedtime    MEDICATIONS  (PRN):  dextrose Gel 1 Dose(s) Oral once PRN Blood Glucose LESS THAN 70 milliGRAM(s)/deciliter  glucagon  Injectable 1 milliGRAM(s) IntraMuscular once PRN Glucose LESS THAN 70 milligrams/deciliter  LORazepam     Tablet 0.5 milliGRAM(s) Oral two times a day PRN Anxiety      Vital Signs Last 24 Hrs  T(C): 36.9 (2018 03:21), Max: 37.9 (2018 11:47)  T(F): 98.5 (2018 03:21), Max: 100.2 (2018 11:47)  HR: 73 (2018 03:21) (72 - 82)  BP: 151/74 (2018 03:21) (128/79 - 175/79)  BP(mean): --  RR: 18 (2018 03:21) (16 - 19)  SpO2: 96% (2018 03:21) (96% - 100%)      CAPILLARY BLOOD GLUCOSE  POCT Blood Glucose.: 145 mg/dL (2018 07:51)  POCT Blood Glucose.: 148 mg/dL (2018 21:46)      I&O's Summary    2018 07:01  -  2018 07:00  --------------------------------------------------------  IN: 360 mL / OUT: 0 mL / NET: 360 mL      PHYSICAL EXAM:  Constitutional: NAD, laying in bed comfortably  Eyes: EOMI, PERRLA  ENMT: MMM moist  Neck: ROM intact  Respiratory: CTA bilaterally with good inspiratory effort  Cardiovascular: RRR, no appreciated murmur  Gastrointestinal: Abdomen is soft, non-tender, non-distended; no suprapubic tenderness noted  Extremities: Weakly palpable pulses, no peripheral edema noted  Neurological: No focal deficits noted  Skin: Intact to gross observation  Psychiatric: Flat affect with little eye ctonact      LABS:                        10.9   10.9  )-----------( 382      ( 2018 11:45 )             32.9     -18    141  |  102  |  27<H>  ----------------------------<  180<H>  4.1   |  23  |  1.83<H>    Ca    9.2      2018 11:45  Phos  3.0     -  Mg     2.2         TPro  8.3  /  Alb  3.6  /  TBili  0.4  /  DBili  x   /  AST  15  /  ALT  11  /  AlkPhos  55  -18    LIVER FUNCTIONS - ( 2018 11:45 )  Alb: 3.6 g/dL / Pro: 8.3 g/dL / ALK PHOS: 55 U/L / ALT: 11 U/L RC / AST: 15 U/L / GGT: x             Urinalysis Basic - ( 2018 13:53 )    Color: Yellow / Appearance: SL Turbid / S.021 / pH: x  Gluc: x / Ketone: Negative  / Bili: Negative / Urobili: Negative   Blood: x / Protein: 300 mg/dL / Nitrite: Negative   Leuk Esterase: Large / RBC: >50 /HPF / WBC >50 /HPF   Sq Epi: x / Non Sq Epi: OCC /HPF / Bacteria: x      RADIOLOGY & ADDITIONAL TESTS:  Imaging Personally Reviewed: YES    Consultant(s) Notes Reviewed: YES    Care Discussed with Consultants/Other Providers: YES      Marcell Olmos MD PGY-1  Department of Medicine  [phone]

## 2018-02-20 VITALS — DIASTOLIC BLOOD PRESSURE: 77 MMHG | HEART RATE: 60 BPM | SYSTOLIC BLOOD PRESSURE: 152 MMHG

## 2018-02-20 LAB
-  AMPICILLIN: SIGNIFICANT CHANGE UP
-  CIPROFLOXACIN: SIGNIFICANT CHANGE UP
-  NITROFURANTOIN: SIGNIFICANT CHANGE UP
-  TETRACYCLINE: SIGNIFICANT CHANGE UP
-  VANCOMYCIN: SIGNIFICANT CHANGE UP
CULTURE RESULTS: SIGNIFICANT CHANGE UP
GLUCOSE BLDC GLUCOMTR-MCNC: 158 MG/DL — HIGH (ref 70–99)
GLUCOSE BLDC GLUCOMTR-MCNC: 175 MG/DL — HIGH (ref 70–99)
GLUCOSE BLDC GLUCOMTR-MCNC: 242 MG/DL — HIGH (ref 70–99)
METHOD TYPE: SIGNIFICANT CHANGE UP
ORGANISM # SPEC MICROSCOPIC CNT: SIGNIFICANT CHANGE UP
ORGANISM # SPEC MICROSCOPIC CNT: SIGNIFICANT CHANGE UP
SPECIMEN SOURCE: SIGNIFICANT CHANGE UP

## 2018-02-20 PROCEDURE — 87633 RESP VIRUS 12-25 TARGETS: CPT

## 2018-02-20 PROCEDURE — 87040 BLOOD CULTURE FOR BACTERIA: CPT

## 2018-02-20 PROCEDURE — 87086 URINE CULTURE/COLONY COUNT: CPT

## 2018-02-20 PROCEDURE — 93005 ELECTROCARDIOGRAM TRACING: CPT

## 2018-02-20 PROCEDURE — 87798 DETECT AGENT NOS DNA AMP: CPT

## 2018-02-20 PROCEDURE — 96374 THER/PROPH/DIAG INJ IV PUSH: CPT

## 2018-02-20 PROCEDURE — 81001 URINALYSIS AUTO W/SCOPE: CPT

## 2018-02-20 PROCEDURE — 85027 COMPLETE CBC AUTOMATED: CPT

## 2018-02-20 PROCEDURE — 83605 ASSAY OF LACTIC ACID: CPT

## 2018-02-20 PROCEDURE — 80053 COMPREHEN METABOLIC PANEL: CPT

## 2018-02-20 PROCEDURE — 82962 GLUCOSE BLOOD TEST: CPT

## 2018-02-20 PROCEDURE — 84295 ASSAY OF SERUM SODIUM: CPT

## 2018-02-20 PROCEDURE — 82947 ASSAY GLUCOSE BLOOD QUANT: CPT

## 2018-02-20 PROCEDURE — 99239 HOSP IP/OBS DSCHRG MGMT >30: CPT

## 2018-02-20 PROCEDURE — 85014 HEMATOCRIT: CPT

## 2018-02-20 PROCEDURE — 84132 ASSAY OF SERUM POTASSIUM: CPT

## 2018-02-20 PROCEDURE — 82435 ASSAY OF BLOOD CHLORIDE: CPT

## 2018-02-20 PROCEDURE — 97162 PT EVAL MOD COMPLEX 30 MIN: CPT

## 2018-02-20 PROCEDURE — 87581 M.PNEUMON DNA AMP PROBE: CPT

## 2018-02-20 PROCEDURE — 87486 CHLMYD PNEUM DNA AMP PROBE: CPT

## 2018-02-20 PROCEDURE — 82803 BLOOD GASES ANY COMBINATION: CPT

## 2018-02-20 PROCEDURE — 87186 SC STD MICRODIL/AGAR DIL: CPT

## 2018-02-20 PROCEDURE — 74018 RADEX ABDOMEN 1 VIEW: CPT

## 2018-02-20 PROCEDURE — 83735 ASSAY OF MAGNESIUM: CPT

## 2018-02-20 PROCEDURE — 84100 ASSAY OF PHOSPHORUS: CPT

## 2018-02-20 PROCEDURE — 82330 ASSAY OF CALCIUM: CPT

## 2018-02-20 PROCEDURE — 99285 EMERGENCY DEPT VISIT HI MDM: CPT | Mod: 25

## 2018-02-20 PROCEDURE — 71045 X-RAY EXAM CHEST 1 VIEW: CPT

## 2018-02-20 RX ADMIN — AMPICILLIN SODIUM AND SULBACTAM SODIUM 200 GRAM(S): 250; 125 INJECTION, POWDER, FOR SUSPENSION INTRAMUSCULAR; INTRAVENOUS at 06:15

## 2018-02-20 RX ADMIN — CARVEDILOL PHOSPHATE 6.25 MILLIGRAM(S): 80 CAPSULE, EXTENDED RELEASE ORAL at 17:12

## 2018-02-20 RX ADMIN — LOSARTAN POTASSIUM 50 MILLIGRAM(S): 100 TABLET, FILM COATED ORAL at 06:15

## 2018-02-20 RX ADMIN — AMPICILLIN SODIUM AND SULBACTAM SODIUM 200 GRAM(S): 250; 125 INJECTION, POWDER, FOR SUSPENSION INTRAMUSCULAR; INTRAVENOUS at 13:11

## 2018-02-20 RX ADMIN — CARVEDILOL PHOSPHATE 6.25 MILLIGRAM(S): 80 CAPSULE, EXTENDED RELEASE ORAL at 06:15

## 2018-02-20 RX ADMIN — HEPARIN SODIUM 5000 UNIT(S): 5000 INJECTION INTRAVENOUS; SUBCUTANEOUS at 06:15

## 2018-02-20 RX ADMIN — Medication 2: at 12:13

## 2018-02-20 RX ADMIN — Medication 145 MILLIGRAM(S): at 11:43

## 2018-02-20 RX ADMIN — HEPARIN SODIUM 5000 UNIT(S): 5000 INJECTION INTRAVENOUS; SUBCUTANEOUS at 13:12

## 2018-02-20 RX ADMIN — Medication 81 MILLIGRAM(S): at 11:43

## 2018-02-20 RX ADMIN — Medication 1: at 17:11

## 2018-02-20 RX ADMIN — Medication 1: at 08:21

## 2018-02-20 RX ADMIN — AMLODIPINE BESYLATE 5 MILLIGRAM(S): 2.5 TABLET ORAL at 17:12

## 2018-02-20 RX ADMIN — AMLODIPINE BESYLATE 5 MILLIGRAM(S): 2.5 TABLET ORAL at 06:15

## 2018-02-20 NOTE — PROGRESS NOTE ADULT - PROBLEM SELECTOR PLAN 2
- Patient with history of hydronephrosis and is now s/p stent placement on January 26th; on February 1st CT scan demonstrates decreased hydronephrosis compared to January  - Per urology, no further imaging as it will not

## 2018-02-20 NOTE — PROGRESS NOTE ADULT - ASSESSMENT
Patient is a 72 yoM with extensive genitourinary history who p/w a two day history of subjective weakness, headache, loss of appetite, and right lower back pain and was found to have a leukocytosis; at this time patient has no complaints and feels back to his baseline.  Patient does have an extensive urogenital history, per urology no need for further imaging.

## 2018-02-20 NOTE — PROGRESS NOTE ADULT - ASSESSMENT
can change to Po Abx, dc home and fu Dr Rae uro outpt for removal of stent  see PT evaL  U office re HTN DM etc

## 2018-02-20 NOTE — PROGRESS NOTE ADULT - PROBLEM SELECTOR PLAN 1
- Patient with diffuse symptoms suggestive of viral etiology  - No current symptoms  - Started on Unasyn  - per urology - no further imaging  - Will likely discharge patient on Amoxicillin until stent is removed

## 2018-02-20 NOTE — PROGRESS NOTE ADULT - SUBJECTIVE AND OBJECTIVE BOX
HPI:from adm:  Patient is a 72 yoM with MHx of diabetes (non-insulin depended), hyperlipidemia, and high blood pressure, and hydronephrosis who recently had a left ureteral / nephrostomy stent placed (January 26th; extended urological procedure involving cystoscopy, left retrograde pyelogram with stent placement, and a bladder stone removal), and was subsequently admitted at [facility] for an enterococcus UTI with removal of a ureteral stone.  He presents with an approximately 2 day history of chills, loss of appetite, headache, back pain, weakness, and mild runny nose with sneezing.  This happened suddenly, with no single symptom occurring first.  He denies any dysuria, incontinence, increase or decrease in urinary frequency, or hematuria.  He also denies coughing, chest pain, V/D, although he did sometimes feel nauseous.  Since discharge from [facility] on February 3rd, patient has had no changes in his daily routine, medications, or general health that he is aware of.  Patient lives at home with his brother.        At time of interview patient states that his symptoms have resolved, and that he feels back to his normal self. (18 Feb 2018 17:17)      Interval Events  no change from yesterday am, pt is ok  no laBS, STILL ON IV ABX    MEDICATIONS  (STANDING):  amLODIPine   Tablet 5 milliGRAM(s) Oral two times a day  ampicillin/sulbactam  IVPB      ampicillin/sulbactam  IVPB 3 Gram(s) IV Intermittent every 8 hours  aspirin  chewable 81 milliGRAM(s) Oral daily  atorvastatin 80 milliGRAM(s) Oral at bedtime  carvedilol 6.25 milliGRAM(s) Oral every 12 hours  dextrose 50% Injectable 12.5 Gram(s) IV Push once  dextrose 50% Injectable 25 Gram(s) IV Push once  dextrose 50% Injectable 25 Gram(s) IV Push once  fenofibrate Tablet 145 milliGRAM(s) Oral daily  heparin  Injectable 5000 Unit(s) SubCutaneous every 8 hours  insulin lispro (HumaLOG) corrective regimen sliding scale   SubCutaneous three times a day before meals  insulin lispro (HumaLOG) corrective regimen sliding scale   SubCutaneous at bedtime  losartan 50 milliGRAM(s) Oral daily  tamsulosin 0.4 milliGRAM(s) Oral at bedtime    MEDICATIONS  (PRN):  dextrose Gel 1 Dose(s) Oral once PRN Blood Glucose LESS THAN 70 milliGRAM(s)/deciliter  glucagon  Injectable 1 milliGRAM(s) IntraMuscular once PRN Glucose LESS THAN 70 milligrams/deciliter  LORazepam     Tablet 0.5 milliGRAM(s) Oral two times a day PRN Anxiety      Patient is a 72y old  Male who presents with a chief complaint of Loss of appetite, back pain (19 Feb 2018 10:46)      REVIEW OF SYSTEMS    General:nadf vssa , no co ate , had bm wants to go home  Skin/Breast:  Ophthalmologic:no ch v  ENMT:	no ch h, ate ok  Respiratory and Thorax:no sob no cough no sp ,   Cardiovascular:no cp palp  Gastrointestinal:no nvcd  Genitourinary:no fdi nio heme  Musculoskeletal:	no pain  Neurological:	no ch/stutters  Psychiatric:calm no co	  Hematology/Lymphatics:	  Endocrine:	no polyudd  Allergic/Immunologic:  AOSN	y      Vital Signs Last 24 Hrs  T(C): 36.4 (20 Feb 2018 05:02), Max: 37.1 (19 Feb 2018 21:20)  T(F): 97.6 (20 Feb 2018 05:02), Max: 98.7 (19 Feb 2018 21:20)  HR: 64 (20 Feb 2018 08:57) (52 - 64)  BP: 128/68 (20 Feb 2018 08:57) (125/70 - 145/79)  BP(mean): --  RR: 18 (20 Feb 2018 08:57) (17 - 18)  SpO2: 96% (20 Feb 2018 08:57) (96% - 98%)    PHYSICAL EXAM:    Constitutional:vssa nad  H+N ncat  Eyes:lyudmila cwnl  Neck:no cb no tm  Breasts:  Back:  Respiratory:ctab norrw  Cardiovascular:rrr  Gastrointestinal:soft nt  Genitourinary:  Rectal:  Extremities:no cce  Vascular:  Neurological:A+o, no ch  Skin:  Lymph Nodes:  Musculoskeletal:  Psychiatric:usoh, calm    LABS  CBC Full  -  ( 18 Feb 2018 11:45 )  WBC Count : 10.9 K/uL  Hemoglobin : 10.9 g/dL  Hematocrit : 32.9 %  Platelet Count - Automated : 382 K/uL  Mean Cell Volume : 92.9 fl  Mean Cell Hemoglobin : 30.9 pg  Mean Cell Hemoglobin Concentration : 33.3 gm/dL  Auto Neutrophil # : 8.4 K/uL  Auto Lymphocyte # : 1.5 K/uL  Auto Monocyte # : 0.9 K/uL  Auto Eosinophil # : 0.0 K/uL  Auto Basophil # : 0.0 K/uL  Auto Neutrophil % : 77.5 %  Auto Lymphocyte % : 13.5 %  Auto Monocyte % : 8.2 %  Auto Eosinophil % : 0.3 %  Auto Basophil % : 0.4 %      02-18    141  |  102  |  27<H>  ----------------------------<  180<H>  4.1   |  23  |  1.83<H>    Ca    9.2      18 Feb 2018 11:45  Phos  3.0     02-18  Mg     2.2     02-18    TPro  8.3  /  Alb  3.6  /  TBili  0.4  /  DBili  x   /  AST  15  /  ALT  11  /  AlkPhos  55  02-18          Imaging:    Xray:    Echo:    CT:    MRI:    Tele:    Orders:    ALONZO Snu [phone]

## 2018-02-20 NOTE — DIETITIAN INITIAL EVALUATION ADULT. - NS AS NUTRI INTERV MEALS SNACK
Recommend diet change to Consistent Carbohydrate DASH. Reviewed alternate menu options and menu ordering procedure. Provide food preferences within therapeutic diet when requested.

## 2018-02-20 NOTE — PHYSICAL THERAPY INITIAL EVALUATION ADULT - DISCHARGE DISPOSITION, PT EVAL
home with assist as need pt reprot brothvinita Medina can assist as need ; Home PT to increase functional; moibility , strength, balance, endurance, fall prevention education continued/home w/ assist/home w/ home PT

## 2018-02-20 NOTE — PROGRESS NOTE ADULT - SUBJECTIVE AND OBJECTIVE BOX
Patient is a 72y old  Male who presents with a chief complaint of Loss of appetite, back pain (2018 10:46)      SUBJECTIVE / OVERNIGHT EVENTS:  No events overnight.  Patient reports he slept well last night and feels back to his baseline.      MEDICATIONS  (STANDING):  amLODIPine   Tablet 5 milliGRAM(s) Oral two times a day  ampicillin/sulbactam  IVPB      ampicillin/sulbactam  IVPB 3 Gram(s) IV Intermittent every 8 hours  aspirin  chewable 81 milliGRAM(s) Oral daily  atorvastatin 80 milliGRAM(s) Oral at bedtime  carvedilol 6.25 milliGRAM(s) Oral every 12 hours  dextrose 50% Injectable 12.5 Gram(s) IV Push once  dextrose 50% Injectable 25 Gram(s) IV Push once  dextrose 50% Injectable 25 Gram(s) IV Push once  fenofibrate Tablet 145 milliGRAM(s) Oral daily  heparin  Injectable 5000 Unit(s) SubCutaneous every 8 hours  insulin lispro (HumaLOG) corrective regimen sliding scale   SubCutaneous three times a day before meals  insulin lispro (HumaLOG) corrective regimen sliding scale   SubCutaneous at bedtime  losartan 50 milliGRAM(s) Oral daily  tamsulosin 0.4 milliGRAM(s) Oral at bedtime    MEDICATIONS  (PRN):  dextrose Gel 1 Dose(s) Oral once PRN Blood Glucose LESS THAN 70 milliGRAM(s)/deciliter  glucagon  Injectable 1 milliGRAM(s) IntraMuscular once PRN Glucose LESS THAN 70 milligrams/deciliter  LORazepam     Tablet 0.5 milliGRAM(s) Oral two times a day PRN Anxiety      Vital Signs Last 24 Hrs  T(C): 36.7 (2018 11:58), Max: 37.1 (2018 21:20)  T(F): 98 (2018 11:58), Max: 98.7 (2018 21:20)  HR: 53 (2018 11:58) (53 - 64)  BP: 145/83 (2018 11:58) (125/70 - 145/83)  BP(mean): --  RR: 18 (2018 11:58) (17 - 18)  SpO2: 96% (2018 11:58) (96% - 97%)      CAPILLARY BLOOD GLUCOSE  POCT Blood Glucose.: 242 mg/dL (2018 11:55)  POCT Blood Glucose.: 158 mg/dL (2018 08:08)  POCT Blood Glucose.: 171 mg/dL (2018 21:45)  POCT Blood Glucose.: 119 mg/dL (2018 16:39)      I&O's Summary    2018 07:01  -  2018 07:00  --------------------------------------------------------  IN: 940 mL / OUT: 0 mL / NET: 940 mL    2018 07:01  -  2018 13:14  --------------------------------------------------------  IN: 240 mL / OUT: 0 mL / NET: 240 mL      PHYSICAL EXAM:  Constitutional: NAD, laying in bed comfortably  Eyes: EOMI, PERRLA  ENMT: MMM moist  Neck: ROM intact  Respiratory: CTA bilaterally with good inspiratory effort  Cardiovascular: RRR, no appreciated murmur  Gastrointestinal: Abdomen is soft, non-tender, non-distended; no suprapubic tenderness noted  Extremities: Weakly palpable pulses, no peripheral edema noted  Neurological: No focal deficits noted  Skin: Intact to gross observation  Psychiatric: Flat affect with little eye contact      Urinalysis Basic - ( 2018 13:53 )    Color: Yellow / Appearance: SL Turbid / S.021 / pH: x  Gluc: x / Ketone: Negative  / Bili: Negative / Urobili: Negative   Blood: x / Protein: 300 mg/dL / Nitrite: Negative   Leuk Esterase: Large / RBC: >50 /HPF / WBC >50 /HPF   Sq Epi: x / Non Sq Epi: OCC /HPF / Bacteria: x      RADIOLOGY & ADDITIONAL TESTS:  Imaging Personally Reviewed: YES    Consultant(s) Notes Reviewed: YES    Care Discussed with Consultants/Other Providers: YES      Marcell Olmos MD PGY-1  Department of Medicine  [phone]

## 2018-02-20 NOTE — PHYSICAL THERAPY INITIAL EVALUATION ADULT - PRECAUTIONS/LIMITATIONS, REHAB EVAL
72 yoM with MHx of diabetes (non-insulin depended), hyperlipidemia, and high blood pressure, and hydronephrosis who recently had a left ureteral / nephrostomy stent placed (January 26th; extended urological procedure involving cystoscopy, left retrograde pyelogram with stent placement, and a bladder stone removal), and was subsequently admitted at [facility] for an enterococcus UTI with removal of a ureteral stone.  He presents with an approximately 2 day history of chills, loss of appetite, headache, back pain, weakness, and mild runny nose with sneezing.  This happened suddenly, with no single symptom occurring first.  He denies any dysuria, incontinence, increase or decrease in urinary frequency, or hematuria.  He also denies coughing, chest pain, V/D, although he did sometimes feel nauseous.  Since discharge from [facility] on February 3rd, patient has had no changes in his daily routine, medications, or general health that he is aware of 72 yoM with MHx of diabetes (non-insulin depended), hyperlipidemia, and high blood pressure, and hydronephrosis who recently had a left ureteral / nephrostomy stent placed (January 26th; extended urological procedure involving cystoscopy, left retrograde pyelogram with stent placement, and a bladder stone removal), and was subsequently admitted at [facility] for an enterococcus UTI with removal of a ureteral stone.  He presents with an approximately 2 day history of chills, loss of appetite, headache, back pain, weakness, and mild runny nose with sneezing.  This happened suddenly, with no single symptom occurring first.  He denies any dysuria, incontinence, increase or decrease in urinary frequency, or hematuria.  He also denies coughing, chest pain, V/D, although he did sometimes feel nauseous.  Since discharge from [facility] on February 3rd, patient has had no changes in his daily routine, medications, or general health that he is aware of/fall precautions

## 2018-02-20 NOTE — DIETITIAN INITIAL EVALUATION ADULT. - ENERGY NEEDS
Height: 67 inches, Weight: 169 pounds  BMI: 26 kg/m2 IBW: 148 pounds (+/-10%), %IBW: 114%  Pertinent Info: Per chart, 71 y/o male with PMH of T2DM, HLD, HTN, recent admission for hydronephrosis s/p stent placement (1/26-2/3/2018) admitted from home with UTI and sepsis, pending discharge today. No edema, no pressure ulcers noted at this time.

## 2018-02-20 NOTE — DIETITIAN INITIAL EVALUATION ADULT. - ORAL INTAKE PTA
Pt reports decreased appetite since 2-3 days PTA 2/2 not feeling well. Pt lives with brother at home, reports usual appetite and po intakes were good. Usual breakfast - 2 eggs, whole grain cereal or English muffin and orange juice; Lunch - soups, salads, roll; Dinner - frozen meals - lasagna with chicken, vegetables/salads; Pt occasionally drinks regular soda and snacks in sweets/fair

## 2018-02-20 NOTE — PHYSICAL THERAPY INITIAL EVALUATION ADULT - IMPAIRMENTS CONTRIBUTING TO GAIT DEVIATIONS, PT EVAL
decrease endurance ; pt intermittent min unsteady w/o device mostly cg of 1 ; amb with rolling walker steady gait w/supervsision/decreased strength/impaired postural control/impaired balance

## 2018-02-20 NOTE — PHYSICAL THERAPY INITIAL EVALUATION ADULT - BRACE/ORTHOTICS
pt amb with rolling walker steady gait 75 ft supervision pt amb with rolling walker steady gait 100 ft supervision steady

## 2018-02-20 NOTE — CHART NOTE - NSCHARTNOTEFT_GEN_A_CORE
Spoke to urology about patient; per urology, patient needs ABx coverage for 7-10 days only; does not need further ABx coverage after this despite stent and recurrent cultures

## 2018-02-20 NOTE — PHYSICAL THERAPY INITIAL EVALUATION ADULT - GAIT DEVIATIONS NOTED, PT EVAL
decreased weight-shifting ability/increased time in double stance/decreased yuki/decreased stride length/decreased step length

## 2018-02-20 NOTE — PROGRESS NOTE ADULT - ATTENDING COMMENTS
D/c planning with 10 days augmentin per urology.  Follow up with Dr. Sun and Dr. Clement as outpatient. D.c planning 40 minutes.

## 2018-02-20 NOTE — DIETITIAN INITIAL EVALUATION ADULT. - PROBLEM SELECTOR PLAN 1
- Patient with diffuse symptoms suggestive of viral etiology  - No current symptoms  - Given 1 dose of Unasyn in ED per urology  - Due to extensive urinary symptoms will check renal u/s  - Will monitor off of ABx at this point  - If imaging is benign and patient afebrile overnight will likely d/c tomorrow AM

## 2018-02-20 NOTE — DIETITIAN INITIAL EVALUATION ADULT. - ADHERENCE
Pt with T2DM, avoids consuming excessive concentrated sweets, but admits to drinking soda and other sweets, does not monitor meal portion sizes/fair

## 2018-02-20 NOTE — PHYSICAL THERAPY INITIAL EVALUATION ADULT - TRANSFER SAFETY CONCERNS NOTED: SIT/STAND, REHAB EVAL
decreased weight-shifting ability/note in BR pt able to stand steady at toilet + urinate and sink when wash hands fair balance/decreased balance during turns/losing balance/decreased step length

## 2018-02-20 NOTE — DIETITIAN INITIAL EVALUATION ADULT. - OTHER INFO
Nutrition consult received for HgbA1C 7.4%. Pt reports appetite back up to baseline with good po intakes of meals, 100% noted per flowsheet. Pt denies chewing/swallowing difficulties. No GI distress, +BM today. NKFA. PTA takes vitamin D and multivitamin. Pt with T2DM, on metformin and Victoza at home, reports checking fingersticks 2 x day with usual range around 85-145mg/dL.

## 2018-02-20 NOTE — PHYSICAL THERAPY INITIAL EVALUATION ADULT - PERTINENT HX OF CURRENT PROBLEM, REHAB EVAL
XRAY KIDNEY/UReter/Bladder : Left ureteral stent is noted. No radiopaque calculi are identified along the course of the stent. There are multiple pelvic phleboliths. There is nonobstructive nonspecific bowel gas pattern. There are degenerative changes of the visualized spine and both hip joints.

## 2018-02-20 NOTE — PHYSICAL THERAPY INITIAL EVALUATION ADULT - IMPAIRED TRANSFERS: SIT/STAND, REHAB EVAL
decreased strength/min unsteady w/o device need cg of 1 ; sit-stand at rolling walker supervision steady/impaired postural control/impaired balance

## 2018-02-20 NOTE — PHYSICAL THERAPY INITIAL EVALUATION ADULT - ADDITIONAL COMMENTS
Lives at home with his brother Adam Lives at home in apt no steps to enter with his brother Adam ; Adam is designated as pt caregiver [phone]; PT called pt brother at pt request at end of session and left message for return call , PT name and dept number and plan , await call back

## 2018-02-20 NOTE — DIETITIAN INITIAL EVALUATION ADULT. - PHYSICAL APPEARANCE
Problem: Breastfeeding (Adult,Obstetrics,Pediatric)  Goal: Signs and Symptoms of Listed Potential Problems Will be Absent, Minimized or Managed (Breastfeeding)  Signs and symptoms of listed potential problems will be absent, minimized or managed by discharge/transition of care (reference Breastfeeding (Adult,Obstetrics,Pediatric) CPG).   Outcome: Ongoing (interventions implemented as appropriate)  Mother will breastfeed 8 or more times in 24 hours and on cue.  She will do lots of skin to skin before feedings and between feedings.  She will monitor baby for signs of an adequate feeding. She will follow up with pediatrician as instructed.   She will [facility] for any needs or concerns.       well nourished

## 2018-02-22 ENCOUNTER — APPOINTMENT (OUTPATIENT)
Dept: UROLOGY | Facility: CLINIC | Age: 73
End: 2018-02-22
Payer: MEDICARE

## 2018-02-22 ENCOUNTER — OUTPATIENT (OUTPATIENT)
Dept: OUTPATIENT SERVICES | Facility: HOSPITAL | Age: 73
LOS: 1 days | End: 2018-02-22
Payer: MEDICARE

## 2018-02-22 VITALS
HEART RATE: 83 BPM | SYSTOLIC BLOOD PRESSURE: 144 MMHG | RESPIRATION RATE: 16 BRPM | DIASTOLIC BLOOD PRESSURE: 85 MMHG | TEMPERATURE: 98.1 F

## 2018-02-22 DIAGNOSIS — R35.0 FREQUENCY OF MICTURITION: ICD-10-CM

## 2018-02-22 DIAGNOSIS — Z90.49 ACQUIRED ABSENCE OF OTHER SPECIFIED PARTS OF DIGESTIVE TRACT: Chronic | ICD-10-CM

## 2018-02-22 DIAGNOSIS — Z98.890 OTHER SPECIFIED POSTPROCEDURAL STATES: Chronic | ICD-10-CM

## 2018-02-22 DIAGNOSIS — E11.9 TYPE 2 DIABETES MELLITUS W/OUT COMPLICATIONS: ICD-10-CM

## 2018-02-22 DIAGNOSIS — R39.9 UNSPECIFIED SYMPTOMS AND SIGNS INVOLVING THE GENITOURINARY SYSTEM: ICD-10-CM

## 2018-02-22 PROCEDURE — 52310 CYSTOSCOPY AND TREATMENT: CPT

## 2018-02-23 DIAGNOSIS — N20.1 CALCULUS OF URETER: ICD-10-CM

## 2018-02-23 DIAGNOSIS — N21.0 CALCULUS IN BLADDER: ICD-10-CM

## 2018-02-23 DIAGNOSIS — E11.9 TYPE 2 DIABETES MELLITUS WITHOUT COMPLICATIONS: ICD-10-CM

## 2018-02-23 LAB
CULTURE RESULTS: SIGNIFICANT CHANGE UP
CULTURE RESULTS: SIGNIFICANT CHANGE UP
SPECIMEN SOURCE: SIGNIFICANT CHANGE UP
SPECIMEN SOURCE: SIGNIFICANT CHANGE UP

## 2018-02-25 LAB
APPEARANCE: ABNORMAL
APPEARANCE: ABNORMAL
BACTERIA UR CULT: NORMAL
BACTERIA UR CULT: NORMAL
BACTERIA: ABNORMAL
BACTERIA: NEGATIVE
BILIRUBIN URINE: NEGATIVE
BILIRUBIN URINE: NEGATIVE
BLOOD URINE: ABNORMAL
BLOOD URINE: ABNORMAL
COLOR: ABNORMAL
COLOR: YELLOW
CORE LAB FLUID CYTOLOGY: NORMAL
GLUCOSE QUALITATIVE U: NEGATIVE MG/DL
GLUCOSE QUALITATIVE U: NEGATIVE MG/DL
HYALINE CASTS: 0 /LPF
HYALINE CASTS: 0 /LPF
KETONES URINE: NEGATIVE
KETONES URINE: NEGATIVE
LEUKOCYTE ESTERASE URINE: ABNORMAL
LEUKOCYTE ESTERASE URINE: ABNORMAL
MICROSCOPIC-UA: NORMAL
MICROSCOPIC-UA: NORMAL
NITRITE URINE: NEGATIVE
NITRITE URINE: NEGATIVE
PH URINE: 5
PH URINE: 5.5
PROTEIN URINE: 100 MG/DL
PROTEIN URINE: 300 MG/DL
RED BLOOD CELLS URINE: 307 /HPF
RED BLOOD CELLS URINE: >720 /HPF
SPECIFIC GRAVITY URINE: 1.02
SPECIFIC GRAVITY URINE: 1.02
SQUAMOUS EPITHELIAL CELLS: 1 /HPF
SQUAMOUS EPITHELIAL CELLS: 6 /HPF
URINE COMMENTS: NORMAL
UROBILINOGEN URINE: NEGATIVE MG/DL
UROBILINOGEN URINE: NEGATIVE MG/DL
WHITE BLOOD CELLS URINE: 53 /HPF
WHITE BLOOD CELLS URINE: 58 /HPF

## 2018-03-01 ENCOUNTER — APPOINTMENT (OUTPATIENT)
Dept: UROLOGY | Facility: CLINIC | Age: 73
End: 2018-03-01

## 2018-03-05 NOTE — ASU PATIENT PROFILE, ADULT - VISION (WITH CORRECTIVE LENSES IF THE PATIENT USUALLY WEARS THEM):
NAVIGATE SEE Email Communication  For Supported Employment & Education    NAVIGATE Enrollee: Johnny Moraes (2000)     MRN: [id number]  Date of Email: 3/5/2018  Contacted: Johnny    Discussed:   Texted Johnny to remind him of [employer] tour tomorrow at 1.    Anais Quiroz   wears corrective lenses/Normal vision: sees adequately in most situations; can see medication labels, newsprint

## 2018-03-07 ENCOUNTER — APPOINTMENT (OUTPATIENT)
Dept: UROLOGY | Facility: CLINIC | Age: 73
End: 2018-03-07

## 2018-03-15 ENCOUNTER — APPOINTMENT (OUTPATIENT)
Dept: UROLOGY | Facility: CLINIC | Age: 73
End: 2018-03-15
Payer: MEDICARE

## 2018-03-15 PROCEDURE — 99213 OFFICE O/P EST LOW 20 MIN: CPT

## 2018-04-20 NOTE — ED PROVIDER NOTE - PR
4/20/2018       RE: Evangelista Gipson  [address] [place] [address]     Dear Colleague,    Thank you for referring your patient, Evangelista Gipson, to the [facility] ENDOCRINOLOGY at [facility]. Please see a copy of my visit note below.    Endocrinology Clinic Visit 4/20/2018    NAME:  Evangelista Gipson  PCP:  Jordan Tapia  MRN:  [id number]  Reason for Consult:  Type 2 Diabetes  Requesting Provider:  Referred Self    Chief Complaint     Chief Complaint   Patient presents with     RECHECK     DIABETES        History of Present Illness     Evangelista Gipson is a 59 year old male who is seen in clinic for diabetes management.     Dx 23 years ago.   Pills first, then insulin started. Stopped all pills then for lack of efficacy.   He has had significant CVD complications:   CAD, s/p MI, iCMP, LVAD 01/2015  CVA, PVD  CKD stage 3  Being considered for heart transplant.     Patient is now motivated to get Bg under control. Admits to poor lifestyle habits.     Most recent A1c checked was   Lab Results   Component Value Date    A1C 11.7 03/05/2018     Associated Signs/Symptoms  Hypoglycemia: no. Hyperglycemia: increased thirst.Neuropathy: none. Vascular Symtpoms: none. Angina/CHF: none. Ulcers: No. Amputations: No    Current treatment strategy: Levemir 50 units Qhs, Humalog SS (not sure, but likely 2/50 >150). No fixed or meal dose.     Blood Glucose Monitoring: did not bring meter    Diet: drinks a lot of diet mountain dew a day  No breakfast or lunch  Dinner: main meal  Evening snack: multiple evening snacks    Exercise: None    Weight:   Wt Readings from Last 4 Encounters:   04/20/18 120.1 kg (264 lb 11.2 oz)   03/05/18 119.7 kg (264 lb)   01/19/18 121.9 kg (268 lb 11.2 oz)   12/15/17 123.2 kg (271 lb 9.6 oz)     Problem List     Patient Active Problem List   Diagnosis     Implantable cardioverter-defibrillator - Biventricular [employer]- DEPENDENT     Ischemic  cardiomyopathy     Coronary atherosclerosis     Type II diabetes mellitus (H)     Primary hypercoagulable state (H)     Hyperlipidemia     Solitary pulmonary nodule     Obstruction of carotid artery     Paroxysmal ventricular tachycardia (H)     Brain TIA     Cryptococcosis (H)     Organ transplant candidate     Depressive disorder     Essential hypertension     Elevated uric acid in blood     Encounter for long-term (current) use of antibiotics     Encounter for long-term current use of medication     Elevated liver enzymes     CHF (congestive heart failure) (H)     LVAD (left ventricular assist device) present (H)     Hypokalemia     Long-term (current) use of anticoagulants [Z79.01]     Systolic heart failure (H)     STEPHANIE (obstructive sleep apnea)     Complex sleep apnea syndrome        Medications     Current Outpatient Prescriptions   Medication     allopurinol (ZYLOPRIM) 100 MG tablet     amoxicillin (AMOXIL) 500 MG capsule     aspirin 81 MG tablet     atorvastatin (LIPITOR) 80 MG tablet     bumetanide (BUMEX) 1 MG tablet     docusate sodium (COLACE) 100 MG tablet     Elastic Bandages & Supports (MEDICAL COMPRESSION STOCKINGS) MISC     fluconazole (DIFLUCAN) 100 MG tablet     HUMALOG KWIKPEN 100 UNIT/ML soln     insulin detemir (LEVEMIR) 100 UNIT/ML injection     liraglutide (VICTOZA) 18 MG/3ML soln     lisinopril (PRINIVIL/ZESTRIL) 10 MG tablet     Magnesium 400 MG CAPS     metoprolol (TOPROL-XL) 25 MG 24 hr tablet     mexiletine (MEXITIL) 150 MG capsule     multivitamin, therapeutic with minerals (THERA-VIT-M) TABS     nitroglycerin (NITROSTAT) 0.4 MG SL tablet     order for DME     RANEXA 500 MG 12 hr tablet     sertraline (ZOLOFT) 50 MG tablet     spironolactone (ALDACTONE) 25 MG tablet     warfarin (COUMADIN) 1 MG tablet     enoxaparin (LOVENOX) 120 MG/0.8ML injection     [DISCONTINUED] fluconazole (DIFLUCAN) 200 MG tablet     [DISCONTINUED] insulin detemir (LEVEMIR) 100 UNIT/ML injection     No current  facility-administered medications for this visit.         Allergies     Allergies   Allergen Reactions     Blood-Group Specific Substance Other (See Comments) and Unknown     Patient has a non-specific antibody. Blood Product orders may be delayed.  Draw one red top and two purple top tubes for ALL Type and Screen/ Type and Crossmatch orders.  Patient has a non-specific antibody. Blood Product orders may be delayed.  Draw one red top and two purple top tubes for ALL Type and Screen/ Type and Crossmatch orders.       Medical / Surgical History     Past Medical History:   Diagnosis Date     IMANI (acute kidney injury) (H)      Anemia      Cryptococcosis (H) 5/27/2015     Diabetes mellitus, type 2 (H)      Factor V deficiency (H)      ICD (implantable cardiac defibrillator) in place     [place] [id number]     LVAD (left ventricular assist device) present (H) 1/29/2016     MI (myocardial infarction)     stentsx2     Organ transplant candidate 5/27/2015     Pleural effusion      Pneumonia      S/P ablation of ventricular arrhythmia      Sleep apnea      TIA (transient ischaemic attack)      VT (ventricular tachycardia) (H)      Past Surgical History:   Procedure Laterality Date     AICD placement  12/2014     Heart ablation for VTach  12/2014    x 3     INSERT VENTRICULAR ASSIST DEVICE LEFT (HEARTMATE II) N/A 1/29/2016    Procedure: INSERT VENTRICULAR ASSIST DEVICE LEFT (HEARTMATE II);  Surgeon: Art Naidu MD;  Location: [facility] OR     NASAL/SINUS POLYPECTOMY  1980       Social History     Social History     Social History     Marital status:      Spouse name: N/A     Number of children: N/A     Years of education: N/A     Occupational History     Not on file.     Social History Main Topics     Smoking status: Never Smoker     Smokeless tobacco: Never Used     Alcohol use No     Drug use: No      Comment: Marijuana 40 years ago     Sexual activity: Not on file     Other Topics Concern     Not on file     Social  "History Narrative    Evangelista has been on medical disability since his heart issues started in 12/2014. 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 He lives with his wife Jessica in [place]. They have a morelos dog at home.        Family History     Family History   Problem Relation Age of Onset     Coronary Artery Disease Mother      CABG ~ 2000; starting to have dementia     Hypertension Father      Takens atenolol and an aspirin, may have PVD      DIABETES Maternal Aunt      Thyroid Disease No family hx of        ROS     Constitutional: no fevers, chills, night sweats. No weight loss or fatigue. Good appetite  Eyes: no vision changes, no eye redness, no diplopia  Ears, Nose, mouth, throat: no hearing changes, no tinnitus, no rhinorrhea, no nasal congestion  Cardiovascular: no chest pain, no orthopnea or PND, no edema, no palpitations  Respiratory: no dyspnea, no cough, no sputum, no wheezing  Gastrointestinal: no nausea, no vomiting, no abdominal pain, no diarrhea, no constipation  Genitourinary: no dysuria, no frequency, no urgency, no nocturia  Musculoskeletal: no joint pains, no back pain, no cramps, no fractures  Skin: no rash, no itching, no dryness, no ulcers, no hair loss  Neurological: no headache, no weakness, no numbness, no dizziness, no tremors  Psychiatric: no anxiety, no sadness  Hematologic/lymphatic: no easy bruising, no bleeding, no palor    Physical Exam   BP 99/73  Pulse 98  Ht 1.753 m (5' 9\")  Wt 120.1 kg (264 lb 11.2 oz)  BMI 39.09 kg/m2     General: Comfortable, no obvious distress, normal body habitus  Eyes: Sclera anicteric, moist conjunctiva  HENT: Atraumatic, oropharynx clear, moist mucous membranes with no mucosal ulcerations  Neck: Trachea midline, supple. Thyroid: Thyroid is normal in size and texture  CV: Regular rhythm, normal rate. No murmurs " auscultated  Resp: Clear to auscultation bilaterally, good effort  Abdomen:  Soft, non tender, non distended. Bowel sounds heard. No organomegaly.  Skin: No rashes, lesions, or subcutaneous nodules.   Psych: Alert and oriented x 3. Appropriate affect, good insight  Extremities: No peripheral edema  Musculoskeletal: Appropriate muscle bulk and strength  Lymphatic: No cervical lymphadenopathy  Neuro: Moves all four extremities. No focal deficits on limited exam. Gait normal.       Labs/Imaging and Outside Records     Pertinent Labs were reviewed and updated in EPIC.  Summary of recent findings:   Lab Results   Component Value Date    A1C 11.7 03/05/2018    A1C 11.5 11/15/2017    A1C 10.8 04/06/2017    A1C 6.5 02/02/2016    A1C 10.3 01/28/2016       TSH   Date Value Ref Range Status   09/20/2017 2.53 0.40 - 4.00 mU/L Final   09/19/2016 6.73 (H) 0.40 - 4.00 mU/L Final   04/08/2016 10.55 (H) 0.40 - 4.00 mU/L Final   01/12/2016 8.27 (H) 0.40 - 4.00 mU/L Final   08/05/2015 8.66 (H) 0.40 - 4.00 mU/L Final     T4 Total   Date Value Ref Range Status   01/12/2016 8.0 4.5 - 13.9 ug/dL Final     T4 Free   Date Value Ref Range Status   09/19/2016 1.21 0.76 - 1.46 ng/dL Final   04/08/2016 0.90 0.76 - 1.46 ng/dL Final   01/12/2016 0.95 0.76 - 1.46 ng/dL Final   08/05/2015 1.15 0.76 - 1.46 ng/dL Final   06/15/2015 1.03 0.76 - 1.46 ng/dL Final       Creatinine   Date Value Ref Range Status   03/05/2018 1.55 (H) 0.66 - 1.25 mg/dL Final       Recent Labs   Lab Test  03/05/18   1339  04/06/17   0821   06/15/15   0949   CHOL  172  151   < >  126   HDL  35*  45   < >  35*   LDL  78  80   < >  67   TRIG  292*  133   < >  122   CHOLHDLRATIO   --    --    --   3.6    < > = values in this interval not displayed.     Impression / Plan     1. Diabetes Mellitus: Type 2  Associated with morbid obesity  Multiple CVD cmplications  Current glycemic control can be considered poor.  It is lifestyle related    I discussed dietary concepts today with  the patient, including: consistent meals and carb counting.     I also discussed exercise recommendations with the patient, advising for a goal of moderate physical activity 30 minutes 5 days a week. Specific examples were discussed such as brisk walking.    We could consider fixed premeal Humalog dosing + correction, But prior to this, we will attempt to add a GLP-1 agonist.       Patient Instructions   Victoza Start Instructions:  Start Victoza 0.6 mg subcutaneously daily for 1 week.   If tolerated, please increase to 1.2 mg subcutaneously daily for the next week.   If tolerated, please increase to 1.8 mg subcutaneously daily thereafter.  Possible side effects include nausea, vomiting, constipation or diarrhea.       Duy Macdonald MD  Endocrinology, Diabetes and Metabolism  [facility]      2. Diabetes Complications: With nephropathy, cardiovascular disease, peripheral vascular disease and cerebrovascular disease.     3. HTN: Blood pressure is controlled. Currently is on pharmacotherapy for this.     4.Dyslipidemia: Per the new ACC/RADHA/NHLBI guidelines, statins are recommended for individuals with diabetes aged 40-75 with LDL  without ASCVD, and for any individual with ASCVD. Currently the patient is on a statin.     5. Smoking Status: Patient Pt is smoke free..       Follow up: 6 weeks.      Duy Macdonald MD  Endocrinology, Diabetes and Metabolism  [facility]     154

## 2018-07-26 PROBLEM — Z86.73 HISTORY OF STROKE: Status: RESOLVED | Noted: 2018-01-02 | Resolved: 2018-07-26

## 2018-08-08 ENCOUNTER — EMERGENCY (EMERGENCY)
Facility: HOSPITAL | Age: 73
LOS: 1 days | Discharge: HOME | End: 2018-08-08
Attending: EMERGENCY MEDICINE
Payer: MEDICARE

## 2018-08-08 VITALS
OXYGEN SATURATION: 96 % | SYSTOLIC BLOOD PRESSURE: 153 MMHG | DIASTOLIC BLOOD PRESSURE: 79 MMHG | RESPIRATION RATE: 16 BRPM | HEART RATE: 70 BPM

## 2018-08-08 VITALS
RESPIRATION RATE: 16 BRPM | HEART RATE: 80 BPM | SYSTOLIC BLOOD PRESSURE: 121 MMHG | TEMPERATURE: 98 F | DIASTOLIC BLOOD PRESSURE: 76 MMHG | OXYGEN SATURATION: 94 %

## 2018-08-08 DIAGNOSIS — Z90.49 ACQUIRED ABSENCE OF OTHER SPECIFIED PARTS OF DIGESTIVE TRACT: Chronic | ICD-10-CM

## 2018-08-08 DIAGNOSIS — Z98.890 OTHER SPECIFIED POSTPROCEDURAL STATES: Chronic | ICD-10-CM

## 2018-08-08 PROBLEM — N20.1 CALCULUS OF URETER: Chronic | Status: ACTIVE | Noted: 2018-01-24

## 2018-08-08 PROBLEM — N21.0 CALCULUS IN BLADDER: Chronic | Status: ACTIVE | Noted: 2018-01-24

## 2018-08-08 PROBLEM — E78.1 PURE HYPERGLYCERIDEMIA: Chronic | Status: ACTIVE | Noted: 2018-01-24

## 2018-08-08 PROBLEM — N40.1 BENIGN PROSTATIC HYPERPLASIA WITH LOWER URINARY TRACT SYMPTOMS: Chronic | Status: ACTIVE | Noted: 2018-01-24

## 2018-08-08 LAB
ALBUMIN SERPL ELPH-MCNC: 4.2 G/DL — SIGNIFICANT CHANGE UP (ref 3.3–5)
ALP SERPL-CCNC: 54 U/L — SIGNIFICANT CHANGE UP (ref 40–120)
ALT FLD-CCNC: 38 U/L — SIGNIFICANT CHANGE UP (ref 10–45)
ANION GAP SERPL CALC-SCNC: 15 MMOL/L — SIGNIFICANT CHANGE UP (ref 5–17)
ANION GAP SERPL CALC-SCNC: 18 MMOL/L — HIGH (ref 5–17)
APPEARANCE UR: CLEAR — SIGNIFICANT CHANGE UP
APTT BLD: 29.2 SEC — SIGNIFICANT CHANGE UP (ref 27.5–37.4)
AST SERPL-CCNC: 43 U/L — HIGH (ref 10–40)
BACTERIA # UR AUTO: ABNORMAL /HPF
BASE EXCESS BLDV CALC-SCNC: -4.4 MMOL/L — LOW (ref -2–2)
BASE EXCESS BLDV CALC-SCNC: -5.8 MMOL/L — LOW (ref -2–2)
BASOPHILS # BLD AUTO: 0 K/UL — SIGNIFICANT CHANGE UP (ref 0–0.2)
BASOPHILS NFR BLD AUTO: 0.6 % — SIGNIFICANT CHANGE UP (ref 0–2)
BILIRUB SERPL-MCNC: 0.5 MG/DL — SIGNIFICANT CHANGE UP (ref 0.2–1.2)
BILIRUB UR-MCNC: NEGATIVE — SIGNIFICANT CHANGE UP
BUN SERPL-MCNC: 31 MG/DL — HIGH (ref 7–23)
BUN SERPL-MCNC: 32 MG/DL — HIGH (ref 7–23)
CA-I SERPL-SCNC: 1.2 MMOL/L — SIGNIFICANT CHANGE UP (ref 1.12–1.3)
CA-I SERPL-SCNC: 1.23 MMOL/L — SIGNIFICANT CHANGE UP (ref 1.12–1.3)
CALCIUM SERPL-MCNC: 8.5 MG/DL — SIGNIFICANT CHANGE UP (ref 8.4–10.5)
CALCIUM SERPL-MCNC: 9.4 MG/DL — SIGNIFICANT CHANGE UP (ref 8.4–10.5)
CHLORIDE BLDV-SCNC: 114 MMOL/L — HIGH (ref 96–108)
CHLORIDE BLDV-SCNC: 115 MMOL/L — HIGH (ref 96–108)
CHLORIDE SERPL-SCNC: 104 MMOL/L — SIGNIFICANT CHANGE UP (ref 96–108)
CHLORIDE SERPL-SCNC: 111 MMOL/L — HIGH (ref 96–108)
CK MB CFR SERPL CALC: 2.4 NG/ML — SIGNIFICANT CHANGE UP (ref 0–6.7)
CO2 BLDV-SCNC: 21 MMOL/L — LOW (ref 22–30)
CO2 BLDV-SCNC: 21 MMOL/L — LOW (ref 22–30)
CO2 SERPL-SCNC: 18 MMOL/L — LOW (ref 22–31)
CO2 SERPL-SCNC: 19 MMOL/L — LOW (ref 22–31)
COLOR SPEC: YELLOW — SIGNIFICANT CHANGE UP
CREAT SERPL-MCNC: 1.5 MG/DL — HIGH (ref 0.5–1.3)
CREAT SERPL-MCNC: 1.6 MG/DL — HIGH (ref 0.5–1.3)
DIFF PNL FLD: ABNORMAL
EOSINOPHIL # BLD AUTO: 0.1 K/UL — SIGNIFICANT CHANGE UP (ref 0–0.5)
EOSINOPHIL NFR BLD AUTO: 1.6 % — SIGNIFICANT CHANGE UP (ref 0–6)
EPI CELLS # UR: SIGNIFICANT CHANGE UP /HPF
GAS PNL BLDV: 142 MMOL/L — SIGNIFICANT CHANGE UP (ref 136–145)
GAS PNL BLDV: 144 MMOL/L — SIGNIFICANT CHANGE UP (ref 136–145)
GAS PNL BLDV: SIGNIFICANT CHANGE UP
GLUCOSE BLDV-MCNC: 208 MG/DL — HIGH (ref 70–99)
GLUCOSE BLDV-MCNC: 224 MG/DL — HIGH (ref 70–99)
GLUCOSE SERPL-MCNC: 240 MG/DL — HIGH (ref 70–99)
GLUCOSE SERPL-MCNC: 345 MG/DL — HIGH (ref 70–99)
GLUCOSE UR QL: >1000 MG/DL
HCO3 BLDV-SCNC: 20 MMOL/L — LOW (ref 21–29)
HCO3 BLDV-SCNC: 20 MMOL/L — LOW (ref 21–29)
HCT VFR BLD CALC: 32.2 % — LOW (ref 39–50)
HCT VFR BLDA CALC: 33 % — LOW (ref 39–50)
HCT VFR BLDA CALC: 33 % — LOW (ref 39–50)
HGB BLD CALC-MCNC: 10.6 G/DL — LOW (ref 13–17)
HGB BLD CALC-MCNC: 10.8 G/DL — LOW (ref 13–17)
HGB BLD-MCNC: 11.1 G/DL — LOW (ref 13–17)
HYALINE CASTS # UR AUTO: ABNORMAL
INR BLD: 0.98 RATIO — SIGNIFICANT CHANGE UP (ref 0.88–1.16)
KETONES UR-MCNC: NEGATIVE — SIGNIFICANT CHANGE UP
LACTATE BLDV-MCNC: 2 MMOL/L — SIGNIFICANT CHANGE UP (ref 0.7–2)
LACTATE BLDV-MCNC: 2.6 MMOL/L — HIGH (ref 0.7–2)
LEUKOCYTE ESTERASE UR-ACNC: NEGATIVE — SIGNIFICANT CHANGE UP
LYMPHOCYTES # BLD AUTO: 1.1 K/UL — SIGNIFICANT CHANGE UP (ref 1–3.3)
LYMPHOCYTES # BLD AUTO: 17.2 % — SIGNIFICANT CHANGE UP (ref 13–44)
MAGNESIUM SERPL-MCNC: 2.1 MG/DL — SIGNIFICANT CHANGE UP (ref 1.6–2.6)
MCHC RBC-ENTMCNC: 30.2 PG — SIGNIFICANT CHANGE UP (ref 27–34)
MCHC RBC-ENTMCNC: 34.3 GM/DL — SIGNIFICANT CHANGE UP (ref 32–36)
MCV RBC AUTO: 88 FL — SIGNIFICANT CHANGE UP (ref 80–100)
MONOCYTES # BLD AUTO: 0.5 K/UL — SIGNIFICANT CHANGE UP (ref 0–0.9)
MONOCYTES NFR BLD AUTO: 8.3 % — SIGNIFICANT CHANGE UP (ref 2–14)
NEUTROPHILS # BLD AUTO: 4.5 K/UL — SIGNIFICANT CHANGE UP (ref 1.8–7.4)
NEUTROPHILS NFR BLD AUTO: 72.2 % — SIGNIFICANT CHANGE UP (ref 43–77)
NITRITE UR-MCNC: NEGATIVE — SIGNIFICANT CHANGE UP
NT-PROBNP SERPL-SCNC: 32 PG/ML — SIGNIFICANT CHANGE UP (ref 0–300)
PCO2 BLDV: 37 MMHG — SIGNIFICANT CHANGE UP (ref 35–50)
PCO2 BLDV: 41 MMHG — SIGNIFICANT CHANGE UP (ref 35–50)
PH BLDV: 7.3 — LOW (ref 7.35–7.45)
PH BLDV: 7.36 — SIGNIFICANT CHANGE UP (ref 7.35–7.45)
PH UR: 6 — SIGNIFICANT CHANGE UP (ref 5–8)
PHOSPHATE SERPL-MCNC: 3.4 MG/DL — SIGNIFICANT CHANGE UP (ref 2.5–4.5)
PLATELET # BLD AUTO: 252 K/UL — SIGNIFICANT CHANGE UP (ref 150–400)
PO2 BLDV: 40 MMHG — SIGNIFICANT CHANGE UP (ref 25–45)
PO2 BLDV: 42 MMHG — SIGNIFICANT CHANGE UP (ref 25–45)
POTASSIUM BLDV-SCNC: 3.4 MMOL/L — LOW (ref 3.5–5.3)
POTASSIUM BLDV-SCNC: 3.5 MMOL/L — SIGNIFICANT CHANGE UP (ref 3.5–5.3)
POTASSIUM SERPL-MCNC: 3.6 MMOL/L — SIGNIFICANT CHANGE UP (ref 3.5–5.3)
POTASSIUM SERPL-MCNC: 4.2 MMOL/L — SIGNIFICANT CHANGE UP (ref 3.5–5.3)
POTASSIUM SERPL-SCNC: 3.6 MMOL/L — SIGNIFICANT CHANGE UP (ref 3.5–5.3)
POTASSIUM SERPL-SCNC: 4.2 MMOL/L — SIGNIFICANT CHANGE UP (ref 3.5–5.3)
PROT SERPL-MCNC: 8 G/DL — SIGNIFICANT CHANGE UP (ref 6–8.3)
PROT UR-MCNC: SIGNIFICANT CHANGE UP
PROTHROM AB SERPL-ACNC: 10.7 SEC — SIGNIFICANT CHANGE UP (ref 9.8–12.7)
RBC # BLD: 3.66 M/UL — LOW (ref 4.2–5.8)
RBC # FLD: 12.9 % — SIGNIFICANT CHANGE UP (ref 10.3–14.5)
RBC CASTS # UR COMP ASSIST: ABNORMAL /HPF (ref 0–2)
SAO2 % BLDV: 57 % — LOW (ref 67–88)
SAO2 % BLDV: 66 % — LOW (ref 67–88)
SODIUM SERPL-SCNC: 141 MMOL/L — SIGNIFICANT CHANGE UP (ref 135–145)
SODIUM SERPL-SCNC: 144 MMOL/L — SIGNIFICANT CHANGE UP (ref 135–145)
SP GR SPEC: 1.02 — SIGNIFICANT CHANGE UP (ref 1.01–1.02)
TROPONIN T, HIGH SENSITIVITY RESULT: 22 NG/L — SIGNIFICANT CHANGE UP (ref 0–51)
TROPONIN T, HIGH SENSITIVITY RESULT: 27 NG/L — SIGNIFICANT CHANGE UP (ref 0–51)
UROBILINOGEN FLD QL: NEGATIVE — SIGNIFICANT CHANGE UP
WBC # BLD: 6.2 K/UL — SIGNIFICANT CHANGE UP (ref 3.8–10.5)
WBC # FLD AUTO: 6.2 K/UL — SIGNIFICANT CHANGE UP (ref 3.8–10.5)
WBC UR QL: SIGNIFICANT CHANGE UP /HPF (ref 0–5)

## 2018-08-08 PROCEDURE — 84100 ASSAY OF PHOSPHORUS: CPT

## 2018-08-08 PROCEDURE — 96360 HYDRATION IV INFUSION INIT: CPT

## 2018-08-08 PROCEDURE — 84132 ASSAY OF SERUM POTASSIUM: CPT

## 2018-08-08 PROCEDURE — 80053 COMPREHEN METABOLIC PANEL: CPT

## 2018-08-08 PROCEDURE — 85730 THROMBOPLASTIN TIME PARTIAL: CPT

## 2018-08-08 PROCEDURE — 82962 GLUCOSE BLOOD TEST: CPT

## 2018-08-08 PROCEDURE — 81001 URINALYSIS AUTO W/SCOPE: CPT

## 2018-08-08 PROCEDURE — 82330 ASSAY OF CALCIUM: CPT

## 2018-08-08 PROCEDURE — 85027 COMPLETE CBC AUTOMATED: CPT

## 2018-08-08 PROCEDURE — 82947 ASSAY GLUCOSE BLOOD QUANT: CPT

## 2018-08-08 PROCEDURE — 83735 ASSAY OF MAGNESIUM: CPT

## 2018-08-08 PROCEDURE — 85014 HEMATOCRIT: CPT

## 2018-08-08 PROCEDURE — 82803 BLOOD GASES ANY COMBINATION: CPT

## 2018-08-08 PROCEDURE — 99284 EMERGENCY DEPT VISIT MOD MDM: CPT | Mod: 25

## 2018-08-08 PROCEDURE — 93010 ELECTROCARDIOGRAM REPORT: CPT

## 2018-08-08 PROCEDURE — 71046 X-RAY EXAM CHEST 2 VIEWS: CPT | Mod: 26

## 2018-08-08 PROCEDURE — 84295 ASSAY OF SERUM SODIUM: CPT

## 2018-08-08 PROCEDURE — 82553 CREATINE MB FRACTION: CPT

## 2018-08-08 PROCEDURE — 80048 BASIC METABOLIC PNL TOTAL CA: CPT

## 2018-08-08 PROCEDURE — 82435 ASSAY OF BLOOD CHLORIDE: CPT

## 2018-08-08 PROCEDURE — 93005 ELECTROCARDIOGRAM TRACING: CPT

## 2018-08-08 PROCEDURE — 71046 X-RAY EXAM CHEST 2 VIEWS: CPT

## 2018-08-08 PROCEDURE — 85610 PROTHROMBIN TIME: CPT

## 2018-08-08 PROCEDURE — 83605 ASSAY OF LACTIC ACID: CPT

## 2018-08-08 PROCEDURE — 99285 EMERGENCY DEPT VISIT HI MDM: CPT | Mod: 25

## 2018-08-08 PROCEDURE — 84484 ASSAY OF TROPONIN QUANT: CPT

## 2018-08-08 PROCEDURE — 83880 ASSAY OF NATRIURETIC PEPTIDE: CPT

## 2018-08-08 RX ORDER — SODIUM CHLORIDE 9 MG/ML
1000 INJECTION INTRAMUSCULAR; INTRAVENOUS; SUBCUTANEOUS ONCE
Qty: 0 | Refills: 0 | Status: COMPLETED | OUTPATIENT
Start: 2018-08-08 | End: 2018-08-08

## 2018-08-08 RX ADMIN — SODIUM CHLORIDE 1000 MILLILITER(S): 9 INJECTION INTRAMUSCULAR; INTRAVENOUS; SUBCUTANEOUS at 17:30

## 2018-08-08 RX ADMIN — SODIUM CHLORIDE 1000 MILLILITER(S): 9 INJECTION INTRAMUSCULAR; INTRAVENOUS; SUBCUTANEOUS at 19:54

## 2018-08-08 RX ADMIN — SODIUM CHLORIDE 1000 MILLILITER(S): 9 INJECTION INTRAMUSCULAR; INTRAVENOUS; SUBCUTANEOUS at 19:00

## 2018-08-08 NOTE — ED PROVIDER NOTE - PROGRESS NOTE DETAILS
attending Farhana: discussed with PMD Corinne. Plan to discuss again after 2nd trop. If no significant change in trop and lactate clears will likely dc with follow-up in office tomorrow. lvm on Dr. Boyd cell. printed results for family. lactate cleared, trop downtrending. - Fransisca Camacho PA-C discussed lab findings with patient and family at bedside - Fransisca Camacho PA-C

## 2018-08-08 NOTE — ED ADULT NURSE NOTE - OBJECTIVE STATEMENT
73 y/o male hx of HTN, HLD and DM not on insulin who presents for "feeling faint" while at [employer]. the patient reports feeling inusual state of health, [place] [employer] then [place] [employer]. while sitting he states he got diaphoretic and felt he may pass out. no chest pain or SOB. has had similar sensation before and reports it was when he had an "anxiety attack". reports he has no current complaints or symptoms and is feeling back to himself. unsure of recent cardiac testing.

## 2018-08-08 NOTE — ED PROVIDER NOTE - CARE PLAN
Principal Discharge DX:	Dehydration  Assessment and plan of treatment:	Follow up with Dr. Sun tomorrow  Bring a copy of your test results with you to your appointment  Continue your current medication regimen  Return to the Emergency Room if you experience new or worsening symptoms  stay hydrated

## 2018-08-08 NOTE — ED PROVIDER NOTE - OBJECTIVE STATEMENT
73 y/o male hx of HTN, HLD and DM not on insulin who presents for "feeling faint" while at [employer]. the patient reports feeling inusual state of health, [place] [employer] [place] [employer]. while sitting he states he got diaphoretic and felt he may pass out. no chest pain or SOB. has had similar sensation before and reports it was when he had an "anxiety attack". reports he has no current complaints or symptoms and is feeling back to himself. unsure of recent cardiac testing.    pmd- aiyana buenrostro

## 2018-08-08 NOTE — ED ADULT NURSE NOTE - NSIMPLEMENTINTERV_GEN_ALL_ED
Implemented All Universal Safety Interventions:  [place] to call system. Call bell, personal items and telephone within reach. Instruct patient to call for assistance. Room bathroom lighting operational. Non-slip footwear when patient is off stretcher. Physically safe environment: no spills, clutter or unnecessary equipment. Stretcher in lowest position, wheels locked, appropriate side rails in place.

## 2018-08-08 NOTE — ED PROVIDER NOTE - PLAN OF CARE
Follow up with Dr. Sun tomorrow  Bring a copy of your test results with you to your appointment  Continue your current medication regimen  Return to the Emergency Room if you experience new or worsening symptoms  stay hydrated

## 2018-08-08 NOTE — ED PROVIDER NOTE - ATTENDING CONTRIBUTION TO CARE
attending Farhana: 72yM h/o HTN, HLD and DM not on insulin presents after episode of near syncope while at [employer]. Reports [place] [employer] [place] [employer] while sitting felt sweaty and faint. No LOC. Reports similar episodes in the past. Denies chest pain or LOC. Will obtain finger stick, ekg, place on tele, labs including troponin, cxr, discuss with PMD and reassess

## 2018-09-25 ENCOUNTER — APPOINTMENT (OUTPATIENT)
Dept: UROLOGY | Facility: CLINIC | Age: 73
End: 2018-09-25
Payer: MEDICARE

## 2018-09-25 ENCOUNTER — OUTPATIENT (OUTPATIENT)
Dept: OUTPATIENT SERVICES | Facility: HOSPITAL | Age: 73
LOS: 1 days | End: 2018-09-25
Payer: MEDICARE

## 2018-09-25 VITALS
HEART RATE: 58 BPM | SYSTOLIC BLOOD PRESSURE: 136 MMHG | BODY MASS INDEX: 26.52 KG/M2 | HEIGHT: 68 IN | TEMPERATURE: 97.6 F | DIASTOLIC BLOOD PRESSURE: 73 MMHG | WEIGHT: 175 LBS

## 2018-09-25 DIAGNOSIS — R35.0 FREQUENCY OF MICTURITION: ICD-10-CM

## 2018-09-25 DIAGNOSIS — Z98.890 OTHER SPECIFIED POSTPROCEDURAL STATES: Chronic | ICD-10-CM

## 2018-09-25 DIAGNOSIS — Z90.49 ACQUIRED ABSENCE OF OTHER SPECIFIED PARTS OF DIGESTIVE TRACT: Chronic | ICD-10-CM

## 2018-09-25 PROCEDURE — 99213 OFFICE O/P EST LOW 20 MIN: CPT | Mod: 25

## 2018-09-25 PROCEDURE — 76775 US EXAM ABDO BACK WALL LIM: CPT | Mod: 26

## 2018-09-25 PROCEDURE — 76775 US EXAM ABDO BACK WALL LIM: CPT

## 2018-10-02 DIAGNOSIS — N21.0 CALCULUS IN BLADDER: ICD-10-CM

## 2018-10-02 DIAGNOSIS — N40.1 BENIGN PROSTATIC HYPERPLASIA WITH LOWER URINARY TRACT SYMPTOMS: ICD-10-CM

## 2018-10-02 DIAGNOSIS — N20.1 CALCULUS OF URETER: ICD-10-CM

## 2019-01-19 ENCOUNTER — INPATIENT (INPATIENT)
Facility: HOSPITAL | Age: 74
LOS: 1 days | Discharge: HOME | DRG: 694 | End: 2019-01-21
Attending: INTERNAL MEDICINE | Admitting: INTERNAL MEDICINE
Payer: MEDICARE

## 2019-01-19 VITALS
RESPIRATION RATE: 18 BRPM | WEIGHT: 175.05 LBS | OXYGEN SATURATION: 96 % | HEART RATE: 69 BPM | HEIGHT: 68 IN | DIASTOLIC BLOOD PRESSURE: 117 MMHG | SYSTOLIC BLOOD PRESSURE: 232 MMHG

## 2019-01-19 DIAGNOSIS — Z98.890 OTHER SPECIFIED POSTPROCEDURAL STATES: Chronic | ICD-10-CM

## 2019-01-19 DIAGNOSIS — Z90.49 ACQUIRED ABSENCE OF OTHER SPECIFIED PARTS OF DIGESTIVE TRACT: Chronic | ICD-10-CM

## 2019-01-19 DIAGNOSIS — N20.0 CALCULUS OF KIDNEY: ICD-10-CM

## 2019-01-19 LAB
ALBUMIN SERPL ELPH-MCNC: 4.1 G/DL — SIGNIFICANT CHANGE UP (ref 3.3–5)
ALP SERPL-CCNC: 58 U/L — SIGNIFICANT CHANGE UP (ref 40–120)
ALT FLD-CCNC: 28 U/L — SIGNIFICANT CHANGE UP (ref 10–45)
ANION GAP SERPL CALC-SCNC: 17 MMOL/L — SIGNIFICANT CHANGE UP (ref 5–17)
APPEARANCE UR: CLEAR — SIGNIFICANT CHANGE UP
AST SERPL-CCNC: 20 U/L — SIGNIFICANT CHANGE UP (ref 10–40)
BASOPHILS # BLD AUTO: 0.1 K/UL — SIGNIFICANT CHANGE UP (ref 0–0.2)
BASOPHILS NFR BLD AUTO: 0.5 % — SIGNIFICANT CHANGE UP (ref 0–2)
BILIRUB SERPL-MCNC: 0.3 MG/DL — SIGNIFICANT CHANGE UP (ref 0.2–1.2)
BILIRUB UR-MCNC: NEGATIVE — SIGNIFICANT CHANGE UP
BUN SERPL-MCNC: 30 MG/DL — HIGH (ref 7–23)
CALCIUM SERPL-MCNC: 9.1 MG/DL — SIGNIFICANT CHANGE UP (ref 8.4–10.5)
CHLORIDE SERPL-SCNC: 108 MMOL/L — SIGNIFICANT CHANGE UP (ref 96–108)
CO2 SERPL-SCNC: 20 MMOL/L — LOW (ref 22–31)
COLOR SPEC: YELLOW — SIGNIFICANT CHANGE UP
CREAT SERPL-MCNC: 1.51 MG/DL — HIGH (ref 0.5–1.3)
DIFF PNL FLD: ABNORMAL
EOSINOPHIL # BLD AUTO: 0.1 K/UL — SIGNIFICANT CHANGE UP (ref 0–0.5)
EOSINOPHIL NFR BLD AUTO: 0.6 % — SIGNIFICANT CHANGE UP (ref 0–6)
EPI CELLS # UR: SIGNIFICANT CHANGE UP
GLUCOSE BLDC GLUCOMTR-MCNC: 114 MG/DL — HIGH (ref 70–99)
GLUCOSE SERPL-MCNC: 185 MG/DL — HIGH (ref 70–99)
GLUCOSE UR QL: NEGATIVE — SIGNIFICANT CHANGE UP
HCT VFR BLD CALC: 34.7 % — LOW (ref 39–50)
HGB BLD-MCNC: 11.6 G/DL — LOW (ref 13–17)
KETONES UR-MCNC: NEGATIVE — SIGNIFICANT CHANGE UP
LEUKOCYTE ESTERASE UR-ACNC: NEGATIVE — SIGNIFICANT CHANGE UP
LYMPHOCYTES # BLD AUTO: 1.3 K/UL — SIGNIFICANT CHANGE UP (ref 1–3.3)
LYMPHOCYTES # BLD AUTO: 10.4 % — LOW (ref 13–44)
MCHC RBC-ENTMCNC: 29.9 PG — SIGNIFICANT CHANGE UP (ref 27–34)
MCHC RBC-ENTMCNC: 33.3 GM/DL — SIGNIFICANT CHANGE UP (ref 32–36)
MCV RBC AUTO: 89.7 FL — SIGNIFICANT CHANGE UP (ref 80–100)
MONOCYTES # BLD AUTO: 0.9 K/UL — SIGNIFICANT CHANGE UP (ref 0–0.9)
MONOCYTES NFR BLD AUTO: 7.4 % — SIGNIFICANT CHANGE UP (ref 2–14)
NEUTROPHILS # BLD AUTO: 10 K/UL — HIGH (ref 1.8–7.4)
NEUTROPHILS NFR BLD AUTO: 81.2 % — HIGH (ref 43–77)
NITRITE UR-MCNC: NEGATIVE — SIGNIFICANT CHANGE UP
PH UR: 5.5 — SIGNIFICANT CHANGE UP (ref 5–8)
PLATELET # BLD AUTO: 214 K/UL — SIGNIFICANT CHANGE UP (ref 150–400)
POTASSIUM SERPL-MCNC: 3.9 MMOL/L — SIGNIFICANT CHANGE UP (ref 3.5–5.3)
POTASSIUM SERPL-SCNC: 3.9 MMOL/L — SIGNIFICANT CHANGE UP (ref 3.5–5.3)
PROT SERPL-MCNC: 7.5 G/DL — SIGNIFICANT CHANGE UP (ref 6–8.3)
PROT UR-MCNC: NEGATIVE — SIGNIFICANT CHANGE UP
RBC # BLD: 3.87 M/UL — LOW (ref 4.2–5.8)
RBC # FLD: 12.7 % — SIGNIFICANT CHANGE UP (ref 10.3–14.5)
RBC CASTS # UR COMP ASSIST: >50 /HPF — SIGNIFICANT CHANGE UP (ref 0–4)
SODIUM SERPL-SCNC: 145 MMOL/L — SIGNIFICANT CHANGE UP (ref 135–145)
SP GR SPEC: 1.02 — SIGNIFICANT CHANGE UP (ref 1.01–1.02)
UROBILINOGEN FLD QL: 0.2 — SIGNIFICANT CHANGE UP
WBC # BLD: 12.4 K/UL — HIGH (ref 3.8–10.5)
WBC # FLD AUTO: 12.4 K/UL — HIGH (ref 3.8–10.5)
WBC UR QL: 0.2 /HPF — SIGNIFICANT CHANGE UP (ref 0–5)

## 2019-01-19 PROCEDURE — 99285 EMERGENCY DEPT VISIT HI MDM: CPT | Mod: GC,25

## 2019-01-19 PROCEDURE — 74176 CT ABD & PELVIS W/O CONTRAST: CPT | Mod: 26

## 2019-01-19 PROCEDURE — 93010 ELECTROCARDIOGRAM REPORT: CPT

## 2019-01-19 RX ORDER — DEXTROSE 50 % IN WATER 50 %
25 SYRINGE (ML) INTRAVENOUS ONCE
Qty: 0 | Refills: 0 | Status: DISCONTINUED | OUTPATIENT
Start: 2019-01-19 | End: 2019-01-21

## 2019-01-19 RX ORDER — FENOFIBRATE,MICRONIZED 130 MG
145 CAPSULE ORAL DAILY
Qty: 0 | Refills: 0 | Status: DISCONTINUED | OUTPATIENT
Start: 2019-01-19 | End: 2019-01-21

## 2019-01-19 RX ORDER — MORPHINE SULFATE 50 MG/1
4 CAPSULE, EXTENDED RELEASE ORAL ONCE
Qty: 0 | Refills: 0 | Status: DISCONTINUED | OUTPATIENT
Start: 2019-01-19 | End: 2019-01-19

## 2019-01-19 RX ORDER — SODIUM CHLORIDE 9 MG/ML
1000 INJECTION, SOLUTION INTRAVENOUS
Qty: 0 | Refills: 0 | Status: DISCONTINUED | OUTPATIENT
Start: 2019-01-19 | End: 2019-01-21

## 2019-01-19 RX ORDER — INSULIN LISPRO 100/ML
VIAL (ML) SUBCUTANEOUS AT BEDTIME
Qty: 0 | Refills: 0 | Status: DISCONTINUED | OUTPATIENT
Start: 2019-01-19 | End: 2019-01-21

## 2019-01-19 RX ORDER — LOSARTAN POTASSIUM 100 MG/1
50 TABLET, FILM COATED ORAL DAILY
Qty: 0 | Refills: 0 | Status: DISCONTINUED | OUTPATIENT
Start: 2019-01-19 | End: 2019-01-21

## 2019-01-19 RX ORDER — DEXTROSE 50 % IN WATER 50 %
15 SYRINGE (ML) INTRAVENOUS ONCE
Qty: 0 | Refills: 0 | Status: DISCONTINUED | OUTPATIENT
Start: 2019-01-19 | End: 2019-01-21

## 2019-01-19 RX ORDER — ONDANSETRON 8 MG/1
1 TABLET, FILM COATED ORAL
Qty: 9 | Refills: 0 | OUTPATIENT
Start: 2019-01-19 | End: 2019-01-21

## 2019-01-19 RX ORDER — CARVEDILOL PHOSPHATE 80 MG/1
6.25 CAPSULE, EXTENDED RELEASE ORAL ONCE
Qty: 0 | Refills: 0 | Status: COMPLETED | OUTPATIENT
Start: 2019-01-19 | End: 2019-01-19

## 2019-01-19 RX ORDER — AMLODIPINE BESYLATE 2.5 MG/1
5 TABLET ORAL DAILY
Qty: 0 | Refills: 0 | Status: DISCONTINUED | OUTPATIENT
Start: 2019-01-19 | End: 2019-01-21

## 2019-01-19 RX ORDER — ASPIRIN/CALCIUM CARB/MAGNESIUM 324 MG
81 TABLET ORAL DAILY
Qty: 0 | Refills: 0 | Status: DISCONTINUED | OUTPATIENT
Start: 2019-01-19 | End: 2019-01-21

## 2019-01-19 RX ORDER — DEXTROSE 50 % IN WATER 50 %
12.5 SYRINGE (ML) INTRAVENOUS ONCE
Qty: 0 | Refills: 0 | Status: DISCONTINUED | OUTPATIENT
Start: 2019-01-19 | End: 2019-01-21

## 2019-01-19 RX ORDER — LOSARTAN POTASSIUM 100 MG/1
50 TABLET, FILM COATED ORAL ONCE
Qty: 0 | Refills: 0 | Status: COMPLETED | OUTPATIENT
Start: 2019-01-19 | End: 2019-01-19

## 2019-01-19 RX ORDER — GLUCAGON INJECTION, SOLUTION 0.5 MG/.1ML
1 INJECTION, SOLUTION SUBCUTANEOUS ONCE
Qty: 0 | Refills: 0 | Status: DISCONTINUED | OUTPATIENT
Start: 2019-01-19 | End: 2019-01-21

## 2019-01-19 RX ORDER — ACETAMINOPHEN 500 MG
1000 TABLET ORAL ONCE
Qty: 0 | Refills: 0 | Status: COMPLETED | OUTPATIENT
Start: 2019-01-19 | End: 2019-01-19

## 2019-01-19 RX ORDER — OXYCODONE HYDROCHLORIDE 5 MG/1
5 TABLET ORAL ONCE
Qty: 0 | Refills: 0 | Status: DISCONTINUED | OUTPATIENT
Start: 2019-01-19 | End: 2019-01-19

## 2019-01-19 RX ORDER — SODIUM CHLORIDE 9 MG/ML
1000 INJECTION INTRAMUSCULAR; INTRAVENOUS; SUBCUTANEOUS ONCE
Qty: 0 | Refills: 0 | Status: COMPLETED | OUTPATIENT
Start: 2019-01-19 | End: 2019-01-19

## 2019-01-19 RX ORDER — ATORVASTATIN CALCIUM 80 MG/1
40 TABLET, FILM COATED ORAL AT BEDTIME
Qty: 0 | Refills: 0 | Status: DISCONTINUED | OUTPATIENT
Start: 2019-01-19 | End: 2019-01-21

## 2019-01-19 RX ORDER — INSULIN LISPRO 100/ML
VIAL (ML) SUBCUTANEOUS
Qty: 0 | Refills: 0 | Status: DISCONTINUED | OUTPATIENT
Start: 2019-01-19 | End: 2019-01-21

## 2019-01-19 RX ORDER — SODIUM CHLORIDE 9 MG/ML
1000 INJECTION INTRAMUSCULAR; INTRAVENOUS; SUBCUTANEOUS
Qty: 0 | Refills: 0 | Status: DISCONTINUED | OUTPATIENT
Start: 2019-01-19 | End: 2019-01-21

## 2019-01-19 RX ORDER — OXYCODONE AND ACETAMINOPHEN 5; 325 MG/1; MG/1
2 TABLET ORAL EVERY 6 HOURS
Qty: 0 | Refills: 0 | Status: DISCONTINUED | OUTPATIENT
Start: 2019-01-19 | End: 2019-01-21

## 2019-01-19 RX ORDER — ONDANSETRON 8 MG/1
4 TABLET, FILM COATED ORAL ONCE
Qty: 0 | Refills: 0 | Status: COMPLETED | OUTPATIENT
Start: 2019-01-19 | End: 2019-01-19

## 2019-01-19 RX ORDER — CARVEDILOL PHOSPHATE 80 MG/1
6.25 CAPSULE, EXTENDED RELEASE ORAL EVERY 12 HOURS
Qty: 0 | Refills: 0 | Status: DISCONTINUED | OUTPATIENT
Start: 2019-01-19 | End: 2019-01-21

## 2019-01-19 RX ORDER — HEPARIN SODIUM 5000 [USP'U]/ML
5000 INJECTION INTRAVENOUS; SUBCUTANEOUS EVERY 12 HOURS
Qty: 0 | Refills: 0 | Status: DISCONTINUED | OUTPATIENT
Start: 2019-01-19 | End: 2019-01-21

## 2019-01-19 RX ORDER — TAMSULOSIN HYDROCHLORIDE 0.4 MG/1
0.4 CAPSULE ORAL AT BEDTIME
Qty: 0 | Refills: 0 | Status: DISCONTINUED | OUTPATIENT
Start: 2019-01-19 | End: 2019-01-21

## 2019-01-19 RX ORDER — ZOLPIDEM TARTRATE 10 MG/1
5 TABLET ORAL AT BEDTIME
Qty: 0 | Refills: 0 | Status: DISCONTINUED | OUTPATIENT
Start: 2019-01-19 | End: 2019-01-21

## 2019-01-19 RX ORDER — OXYCODONE AND ACETAMINOPHEN 5; 325 MG/1; MG/1
1 TABLET ORAL EVERY 8 HOURS
Qty: 0 | Refills: 0 | Status: DISCONTINUED | OUTPATIENT
Start: 2019-01-19 | End: 2019-01-21

## 2019-01-19 RX ADMIN — ONDANSETRON 4 MILLIGRAM(S): 8 TABLET, FILM COATED ORAL at 07:42

## 2019-01-19 RX ADMIN — OXYCODONE HYDROCHLORIDE 5 MILLIGRAM(S): 5 TABLET ORAL at 11:50

## 2019-01-19 RX ADMIN — LOSARTAN POTASSIUM 50 MILLIGRAM(S): 100 TABLET, FILM COATED ORAL at 17:00

## 2019-01-19 RX ADMIN — SODIUM CHLORIDE 2000 MILLILITER(S): 9 INJECTION INTRAMUSCULAR; INTRAVENOUS; SUBCUTANEOUS at 07:41

## 2019-01-19 RX ADMIN — Medication 400 MILLIGRAM(S): at 09:33

## 2019-01-19 RX ADMIN — MORPHINE SULFATE 4 MILLIGRAM(S): 50 CAPSULE, EXTENDED RELEASE ORAL at 08:22

## 2019-01-19 RX ADMIN — MORPHINE SULFATE 4 MILLIGRAM(S): 50 CAPSULE, EXTENDED RELEASE ORAL at 07:42

## 2019-01-19 RX ADMIN — CARVEDILOL PHOSPHATE 6.25 MILLIGRAM(S): 80 CAPSULE, EXTENDED RELEASE ORAL at 17:00

## 2019-01-19 NOTE — ED ADULT NURSE REASSESSMENT NOTE - NS ED NURSE REASSESS COMMENT FT1
Pt resting comfortably in stretcher, unlabored, spontaneous respirations, NAD. Brother at bedside. CT results reviewed by MD Guillen, awaiting urology consult.

## 2019-01-19 NOTE — H&P ADULT - NSHPREVIEWOFSYSTEMS_GEN_ALL_CORE
REVIEW OF SYSTEMS:  GEN: no fever,    no chills  RESP: no SOB,   no cough  CVS: no chest pain,   no palpitations  GI:  abdominal pain,   no nausea,   no vomiting,   no constipation,   no diarrhea  : no dysuria,   no frequency  NEURO: no headache,   no dizziness  PSYCH: no depression,   not anxious  Derm : no rash

## 2019-01-19 NOTE — H&P ADULT - ASSESSMENT
73 year old male    admitted  with flank pain,    with punctate 2mm right ureteral calculus with forniceal rupture./  pain now controlled with analgesics     no need for emergent intervention , per  urology   Cr 1.5 , Urinalysis with no signs of UTI.   continue flomax/  Hydration   follow up with urologist Dr. Rae for eventual stone removal     h/o  htn,   hld, cva, bph.  kidney stones  pt  with elevated  sbp  on admission above  200  meds  per  card  dvt ppx     from: CT Abdomen and Pelvis No Cont (01.19.19 @ 09:03) >  IMPRESSION: A punctate 2 mm proximal right ureteral calculus with mild   hydronephrosis. Right perinephric fluid compatible with forniceal rupture.  Subcentimeter bladder calculi  < end of copied text >

## 2019-01-19 NOTE — H&P ADULT - NSHPLABSRESULTS_GEN_ALL_CORE
LABS:                        11.6   12.4  )-----------( 214      ( 2019 07:49 )             34.7         145  |  108  |  30<H>  ----------------------------<  185<H>  3.9   |  20<L>  |  1.51<H>    Ca    9.1      2019 07:49    TPro  7.5  /  Alb  4.1  /  TBili  0.3  /  DBili  x   /  AST  20  /  ALT  28  /  AlkPhos  58            Urinalysis Basic - ( 2019 09:58 )    Color: Yellow / Appearance: Clear / S.016 / pH: x  Gluc: x / Ketone: Negative  / Bili: Negative / Urobili: 0.2   Blood: x / Protein: Negative / Nitrite: Negative   Leuk Esterase: Negative / RBC: >50 /hpf / WBC 0.2 /HPF   Sq Epi: x / Non Sq Epi: FEW / Bacteria: x

## 2019-01-19 NOTE — ED ADULT NURSE REASSESSMENT NOTE - NS ED NURSE REASSESS COMMENT FT1
Pt resting comfortably in stretcher, unlabored, spontaneous respirations, NAD. Brother at bedside. Oxycodone administered for pain. Awaiting urology consult at this time.

## 2019-01-19 NOTE — ED ADULT NURSE NOTE - NSIMPLEMENTINTERV_GEN_ALL_ED
Implemented All Fall Risk Interventions:  [place] to call system. Call bell, personal items and telephone within reach. Instruct patient to call for assistance. Room bathroom lighting operational. Non-slip footwear when patient is off stretcher. Physically safe environment: no spills, clutter or unnecessary equipment. Stretcher in lowest position, wheels locked, appropriate side rails in place. Provide visual cue, wrist band, yellow gown, etc. Monitor gait and stability. Monitor for mental status changes and reorient to person, place, and time. Review medications for side effects contributing to fall risk. Reinforce activity limits and safety measures with patient and family.

## 2019-01-19 NOTE — H&P ADULT - HISTORY OF PRESENT ILLNESS
72 y/o M pt with PMHx of DM, kidney stones, stent (DEC 2017), HTN, HLD     presenting with Rt flank pain radiating to his bladder, similar to his kidney stone in the past.  Pt woke up this morning from pain, in the rt. flank, radiating to the bladder. Voided this morning but no blood in urine. Per EMS, pt is a poor historian, found to have HTN (210 SBP).  Pt denies any chest pain, SOB or vomiting, diarrhea or constipation. Pt endorsed to nausea. No radiation of sharp pain in the back.  HTN med - Losartan 50mg once daily, Carvedilol 6.25mg BID

## 2019-01-19 NOTE — PATIENT PROFILE ADULT - ABILITY TO HEAR (WITH HEARING AID OR HEARING APPLIANCE IF NORMALLY USED):
R ear hearing poor - has stent placed as per pt/Mildly to Moderately Impaired: difficulty hearing in some environments or speaker may need to increase volume or speak distinctly

## 2019-01-19 NOTE — ED ADULT NURSE REASSESSMENT NOTE - NS ED NURSE REASSESS COMMENT FT1
Report received from KELSEY Nash. Patient appears to be in no acute distress, patient denies SOB, chest pain, N/V/D, headaches, blurry vision. Patient awaiting bed on medicine floor.

## 2019-01-19 NOTE — ED PROVIDER NOTE - CARDIAC, MLM
I have reviewed discharge instructions with the patient. The patient verbalized understanding. Patient armband removed and shredded. VSS.  Patient verbalized understanding of how to properly take prescribed medications
Normal rate, regular rhythm.  Heart sounds S1, S2.  No murmurs, rubs or gallops.

## 2019-01-19 NOTE — ED PROVIDER NOTE - OBJECTIVE STATEMENT
74 y/o M pt with PMHx of DM, kidney stones, stent (DEC 2017), HTN, HLD presenting with Rt flank pain radiating to his bladder, similar to his kidney stone in the past. Pt woke up this morning from pain, in the rt. flank, radiating to the bladder. Voided this morning but no blood in urine. Per EMS, pt is a poor historian, found to have HTN (210 SBP). Pt denies any chest pain, SOB or vomiting, diarrhea or constipation. Pt endorsed to nausea. No radiation of sharp pain in the back.   HTN med - Losartan 50mg once daily, Carvedilol 6.25mg BID

## 2019-01-19 NOTE — CONSULT NOTE ADULT - SUBJECTIVE AND OBJECTIVE BOX
CHIEF COMPLAINT:Patient is a 73y old  Male who presents with a chief complaint of     HPI:  72 y/o M pt with PMHx of DM, kidney stones, stent (DEC 2017), HTN, HLD presenting with Rt flank pain radiating to his bladder, similar to his kidney stone in the past. Pt woke up this morning from pain, in the rt. flank, radiating to the bladder. Voided this morning but no blood in urine. Per EMS, pt is a poor historian, found to have HTN (210 SBP). Pt denies any chest pain, SOB or vomiting, diarrhea or constipation. Pt endorsed to nausea. No radiation of sharp pain in the back.   HTN med - Losartan 50mg once daily, Carvedilol 6.25mg BID    PAST MEDICAL & SURGICAL HISTORY:  Ureteral calculus, left  BPH with obstruction/lower urinary tract symptoms  Bladder calculus  High triglycerides  Cerebrovascular accident (CVA), unspecified mechanism: 2 years ago  HTN (hypertension)  High cholesterol  Diabetes mellitus: Type 2  H/O myringotomy: right ear in 2017  H/O cystoscopy: and left  stent placement  History of appendectomy      MEDICATIONS  (STANDING):    MEDICATIONS  (PRN):      FAMILY HISTORY:  Family history of hypertension      SOCIAL HISTORY:    [ ] Non-smoker  [ ] Smoker  [ ] Alcohol    Allergies    No Known Allergies    Intolerances    	    REVIEW OF SYSTEMS:  CONSTITUTIONAL: No fever, weight loss, or fatigue  EYES: No eye pain, visual disturbances, or discharge  ENT:  No difficulty hearing, tinnitus, vertigo; No sinus or throat pain  NECK: No pain or stiffness  RESPIRATORY: No cough, wheezing, chills or hemoptysis; No Shortness of Breath  CARDIOVASCULAR: No chest pain, palpitations, passing out, dizziness, or leg swelling, + back pain  GASTROINTESTINAL: No abdominal or epigastric pain. No nausea, vomiting, or hematemesis; No diarrhea or constipation. No melena or hematochezia.  GENITOURINARY: No dysuria, frequency, hematuria, or incontinence  NEUROLOGICAL: No headaches, memory loss, loss of strength, numbness, or tremors  SKIN: No itching, burning, rashes, or lesions   LYMPH Nodes: No enlarged glands  ENDOCRINE: No heat or cold intolerance; No hair loss  MUSCULOSKELETAL: No joint pain or swelling; No muscle, back, or extremity pain  PSYCHIATRIC: No depression, anxiety, mood swings, or difficulty sleeping  HEME/LYMPH: No easy bruising, or bleeding gums  ALLERGY AND IMMUNOLOGIC: No hives or eczema	    [ ] All others negative	  [ ] Unable to obtain    PHYSICAL EXAM:  T(C): 36.7 (01-19-19 @ 16:40), Max: 36.7 (01-19-19 @ 16:40)  HR: 62 (01-19-19 @ 17:40) (62 - 69)  BP: 211/96 (01-19-19 @ 17:40) (175/85 - 234/109)  RR: 17 (01-19-19 @ 17:40) (17 - 18)  SpO2: 95% (01-19-19 @ 16:40) (94% - 96%)  Wt(kg): --  I&O's Summary      Appearance: Normal	  HEENT:   Normal oral mucosa, PERRL, EOMI	  Lymphatic: No lymphadenopathy  Cardiovascular: Normal S1 S2, No JVD, +murmurs, No edema  Respiratory: Lungs clear to auscultation	  Psychiatry: A & O x 3, Mood & affect appropriate  Gastrointestinal:  Soft, Non-tender, + BS	  Skin: No rashes, No ecchymoses, No cyanosis	  Neurologic: Non-focal  Extremities: Normal range of motion, No clubbing, cyanosis or edema  Vascular: Peripheral pulses palpable 2+ bilaterally    TELEMETRY: 	    ECG:  	  RADIOLOGY:  OTHER: 	  	  LABS:	 	    CARDIAC MARKERS:                              11.6   12.4  )-----------( 214      ( 19 Jan 2019 07:49 )             34.7     01-19    145  |  108  |  30<H>  ----------------------------<  185<H>  3.9   |  20<L>  |  1.51<H>    Ca    9.1      19 Jan 2019 07:49    TPro  7.5  /  Alb  4.1  /  TBili  0.3  /  DBili  x   /  AST  20  /  ALT  28  /  AlkPhos  58  01-19    proBNP:   Lipid Profile:   HgA1c:   TSH:       PREVIOUS DIAGNOSTIC TESTING:    < from: 12 Lead ECG (01.19.19 @ 07:23) >  Diagnosis Line NORMAL SINUS RHYTHM  RIGHT BUNDLE BRANCH BLOCK  LEFT ANTERIOR FASCICULAR BLOCK  *** BIFASCICULAR BLOCK ***  MINIMAL VOLTAGE CRITERIA FOR LVH, MAY BE NORMAL VARIANT  ABNORMAL ECG    < from: CT Abdomen and Pelvis No Cont (01.19.19 @ 09:03) >  IMPRESSION: A punctate 2 mm proximal right ureteral calculus with mild   hydronephrosis. Right perinephric fluid compatible with forniceal rupture.    Subcentimeter bladder calculi.    < from: Xray Chest 2 Views PA/Lat (08.08.18 @ 17:32) >  IMPRESSION: Clear lungs.
HPI:  Patient is a 73y Male with pmhx of kidney stones who presented with R flank pain that pain this morning at 4 am, awakening him from sleep. Pain was severe radiating to groin. Pain accompanied by chills and nausea. Pain now alleviated in emergency department after receiving percocet. Patient denies fever, dysuria, hematuria, urinary frequency.   PAST MEDICAL & SURGICAL HISTORY:  Ureteral calculus, left  BPH with obstruction/lower urinary tract symptoms  Bladder calculus  High triglycerides  Cerebrovascular accident (CVA), unspecified mechanism: 2 years ago  HTN (hypertension)  High cholesterol  Diabetes mellitus: Type 2  H/O myringotomy: right ear in 2017  H/O cystoscopy: and left  stent placement  History of appendectomy    FAMILY HISTORY:  Family history of hypertension (Father)    SOCIAL HISTORY:   Tobacco hx:    MEDICATIONS  (STANDING):    MEDICATIONS  (PRN):    Allergies    No Known Allergies    Intolerances        REVIEW OF SYSTEMS: Pertinent positives and negatives as stated in HPI, otherwise negative    Vital signs  T(C): --  HR: 62 (19 @ 11:52)  BP: 175/85 (19 @ 11:52)  SpO2: 96% (19 @ 11:52)  Wt(kg): --    Output    UOP    Physical Exam  Gen: NAD  Pulm: No respiratory distress, no subcostal retractions  CV: RRR, no JVD  Abd: Soft, NT, ND, no CVA tenderness   : voiding   MSK: No edema present    LABS:           @ 07:49    WBC 12.4  / Hct 34.7  / SCr 1.51         145  |  108  |  30<H>  ----------------------------<  185<H>  3.9   |  20<L>  |  1.51<H>    Ca    9.1      2019 07:49    TPro  7.5  /  Alb  4.1  /  TBili  0.3  /  DBili  x   /  AST  20  /  ALT  28  /  AlkPhos  58        Urinalysis Basic - ( 2019 09:58 )    Color: Yellow / Appearance: Clear / S.016 / pH: x  Gluc: x / Ketone: Negative  / Bili: Negative / Urobili: 0.2   Blood: x / Protein: Negative / Nitrite: Negative   Leuk Esterase: Negative / RBC: >50 /hpf / WBC 0.2 /HPF   Sq Epi: x / Non Sq Epi: FEW / Bacteria: x        Urine Cx:   Blood Cx:    RADIOLOGY:  < from: CT Abdomen and Pelvis No Cont (19 @ 09:03) >  INTERPRETATION:  CLINICAL INFORMATION: Right flank pain.         COMPARISON: CT abdomen pelvis 2018    PROCEDURE:   CT of the Abdomen and Pelvis was performed without intravenous contrast   in the prone position.  Intravenous contrast: None.  Oral contrast: None.  Sagittal and coronal reformats were performed.    FINDINGS:    LOWER CHEST: Hyperdense liver which may be secondary to medication.    LIVER: Within normal limits.  BILE DUCTS: Normal caliber.  GALLBLADDER: Within normal limits.  SPLEEN: Within normal limits.  PANCREAS: An unchanged, indeterminate cystic lesion within the tail of   the pancreas measuring 1.3 cm. No main pancreatic duct dilatation.  ADRENALS: Within normal limits.  KIDNEYS/URETERS: Moderate right perinephric fluid. Mild right   hydronephrosis secondary to a punctate 2 mm proximal ureteral calculus.   Punctate intrarenal calculi. Subcentimeter hypodense right renal lesion.   Left renal cysts. A subcentimeter hyperdense lesion within the right   upper pole that is unchanged. No hydronephrosis.    BLADDER: Within the dependent portions of the bladder, there are 2 renal   calculi measuring 8 and 6 mm.  REPRODUCTIVEORGANS: Prostate is enlarged.    BOWEL: No bowel obstruction. Appendix is within normal limits.   Diverticulosis without evidence of diverticulitis.  PERITONEUM: No ascites.  VESSELS:  Atheromatous disease.  RETROPERITONEUM: No lymphadenopathy.    ABDOMINAL WALL: Within normal limits.  BONES: Degenerative changes.    IMPRESSION: A punctate 2 mm proximal right ureteral calculus with mild   hydronephrosis. Right perinephric fluid compatible with forniceal rupture.    Subcentimeter bladder calculi.    < end of copied text >

## 2019-01-19 NOTE — ED ADULT NURSE NOTE - OBJECTIVE STATEMENT
74 y/o male PMH HTN and diabetes presents to the ED reporting back pain and abdominal pain. Pt reports abdominal pain radiating to R lower back & nausea. On exam, AOx3, speaking in complete sentences. Abdomen soft, tender in all 4 quadrants, non-distended, hypoactive bowel sounds in all 4 quadrants. Pt reports back pain R lower back. Lung sounds CTA, NAD, O2 sat 94% RA. Pt denies any falls or trauma. Pt denies any CP, SOB, V/D, urinary symptoms and fever/chills. Heplock placed, labs sent. MD at bedside for evaluation. Awaiting imaging at this time. 74 y/o male PMH HTN and diabetes presents to the ED reporting back pain and abdominal pain. Pt reports abdominal pain radiating to R lower back & nausea. On exam, AOx3, speaking in complete sentences. Abdomen soft, tender in all 4 quadrants, non-distended, hypoactive bowel sounds in all 4 quadrants. Lung sounds CTA, NAD, O2 sat 94% RA. Pt denies any falls or trauma. Pt denies any CP, SOB, V/D, urinary symptoms and fever/chills. Heplock placed, labs sent. MD at bedside for evaluation. Awaiting imaging at this time. 74 y/o male PMH HTN and diabetes presents to the ED reporting back pain and abdominal pain. Pt reports abdominal pain radiating to R lower back & nausea. On exam, AOx3, speaking in complete sentences. Abdomen soft, tender in all 4 quadrants, non-distended, hypoactive bowel sounds in all 4 quadrants. Lung sounds CTA, NAD, O2 sat 94% RA. Pt denies any falls or trauma. Pt denies any CP, SOB, V/D, urinary symptoms and fever/chills. Pt came in hypertensive, /110 & reports pain 8/10. Heplock placed, labs sent. MD at bedside for evaluation. Awaiting imaging at this time.

## 2019-01-19 NOTE — ED PROVIDER NOTE - MEDICAL DECISION MAKING DETAILS
73M with R flank pain hx of kidney stones and stented last year endorsing to similar pain, found to have high BP, likely 2/2 pain. No chest pain, no SOB, no radiating sharp pain. Will control pain, CT to r/o stone, will do bedside US and IVF, antiemetics, basic labs, Urine/ cx and reassess. Will consider giving his home Antihypertensives.

## 2019-01-19 NOTE — CONSULT NOTE ADULT - ASSESSMENT
pt with known hx of cad, htn, with renal colic and sig htn with systolic 220.  will adjust bp meds  hold ace/arb  urology  observe hr closely on beta blocker with bradycardia and bifascicular block  dvt prophylaxis  echo
A/P 73 year old male with punctate 2mm right ureteral calculus with forniceal rupture. pain now controlled with analgesics no need for emergent intervention  Cr 1.5 , Urinalysis with no signs of UTI.   continue analgesics  continue flomax  Hydration  Will need to follow up with urologist Dr. Rae for eventual stone removal     Above Discussed with Dr. Hoenig

## 2019-01-19 NOTE — ED PROVIDER NOTE - DIAGNOSIS COUNSELING, MDM
[[email or web address]] : Here for annual physical  conducted a detailed discussion... I had a detailed discussion with the patient and/or guardian regarding the historical points, exam findings, and any diagnostic results supporting the discharge/admit diagnosis.

## 2019-01-19 NOTE — H&P ADULT - NSHPPHYSICALEXAM_GEN_ALL_CORE
PHYSICAL EXAMINATION:  Vital Signs Last 24 Hrs  T(C): 36.5 (19 Jan 2019 21:37), Max: 37.5 (19 Jan 2019 19:27)  T(F): 97.7 (19 Jan 2019 21:37), Max: 99.5 (19 Jan 2019 19:27)  HR: 53 (19 Jan 2019 21:37) (53 - 69)  BP: 170/82 (19 Jan 2019 21:37) (170/82 - 234/109)  BP(mean): --  RR: 18 (19 Jan 2019 21:37) (17 - 18)  SpO2: 95% (19 Jan 2019 21:37) (94% - 96%)  CAPILLARY BLOOD GLUCOSE      POCT Blood Glucose.: 114 mg/dL (19 Jan 2019 21:40)        GENERAL: NAD, well-groomed,  HEAD:  atraumatic, normocephalic  EYES: sclera anicteric  ENMT: mucous membranes moist  NECK: supple, No JVD  CHEST/LUNG: clear to auscultation bilaterally;    no      rales   ,   no rhonchi,   HEART: normal S1, S2  ABDOMEN: BS+, soft, ND, NT   EXTREMITIES:    no    edema    b/l LEs  NEURO: awake, ,     moves all extremities  SKIN: no     rash

## 2019-01-19 NOTE — ED ADULT NURSE REASSESSMENT NOTE - NS ED NURSE REASSESS COMMENT FT1
Dispo paperwork printed. Pt hypertensive, MD Aggarwal aware, home BP medications administered, will continue to monitor. Awaiting dispo at this time.

## 2019-01-19 NOTE — ED PROVIDER NOTE - PROGRESS NOTE DETAILS
Discussed with the family about the plan - family likes to take him home and f/u with his urologist in 2 days. Pain is better according to the patient. BP is high, will observe until BP is below 200.

## 2019-01-19 NOTE — ED PROVIDER NOTE - ATTENDING CONTRIBUTION TO CARE
****ATTENDING**** 72yo male hx HTN, DM, CVA, Kidney stone BIB EMS for R flank pain. Pt states pain woke him up from his sleep last night, feels like his kidney stones. + nausea, no vomiting. +urination w good flow. No chest pain, palp, sob, diarrhea. No f/c.   On exam, Patient is awake,alert,oriented x 3. Patient is well appearing and in no acute distress. Patient's chest is clear to ausculation, +s1s2. Abdomen is soft nd/nt +BS. Pt points to R flank. Extremity with no swelling or calf tenderness. 2+ DP B/L. Nml sensation.  check labs, UA and CT A/P. Pain control.  Pt noted to be hypertensive, states he has not taken his meds today, will follow, possibly due to pain.

## 2019-01-20 LAB
ANION GAP SERPL CALC-SCNC: 14 MMOL/L — SIGNIFICANT CHANGE UP (ref 5–17)
BUN SERPL-MCNC: 28 MG/DL — HIGH (ref 7–23)
CALCIUM SERPL-MCNC: 8.5 MG/DL — SIGNIFICANT CHANGE UP (ref 8.4–10.5)
CHLORIDE SERPL-SCNC: 111 MMOL/L — HIGH (ref 96–108)
CO2 SERPL-SCNC: 20 MMOL/L — LOW (ref 22–31)
CREAT SERPL-MCNC: 1.64 MG/DL — HIGH (ref 0.5–1.3)
CULTURE RESULTS: NO GROWTH — SIGNIFICANT CHANGE UP
GLUCOSE BLDC GLUCOMTR-MCNC: 131 MG/DL — HIGH (ref 70–99)
GLUCOSE BLDC GLUCOMTR-MCNC: 134 MG/DL — HIGH (ref 70–99)
GLUCOSE BLDC GLUCOMTR-MCNC: 149 MG/DL — HIGH (ref 70–99)
GLUCOSE BLDC GLUCOMTR-MCNC: 195 MG/DL — HIGH (ref 70–99)
GLUCOSE BLDC GLUCOMTR-MCNC: 208 MG/DL — HIGH (ref 70–99)
GLUCOSE SERPL-MCNC: 113 MG/DL — HIGH (ref 70–99)
HBA1C BLD-MCNC: 6.9 % — HIGH (ref 4–5.6)
HCT VFR BLD CALC: 32.4 % — LOW (ref 39–50)
HGB BLD-MCNC: 10.1 G/DL — LOW (ref 13–17)
MCHC RBC-ENTMCNC: 28.7 PG — SIGNIFICANT CHANGE UP (ref 27–34)
MCHC RBC-ENTMCNC: 31.2 GM/DL — LOW (ref 32–36)
MCV RBC AUTO: 92 FL — SIGNIFICANT CHANGE UP (ref 80–100)
PLATELET # BLD AUTO: 230 K/UL — SIGNIFICANT CHANGE UP (ref 150–400)
POTASSIUM SERPL-MCNC: 3.7 MMOL/L — SIGNIFICANT CHANGE UP (ref 3.5–5.3)
POTASSIUM SERPL-SCNC: 3.7 MMOL/L — SIGNIFICANT CHANGE UP (ref 3.5–5.3)
RBC # BLD: 3.52 M/UL — LOW (ref 4.2–5.8)
RBC # FLD: 14.1 % — SIGNIFICANT CHANGE UP (ref 10.3–14.5)
SODIUM SERPL-SCNC: 145 MMOL/L — SIGNIFICANT CHANGE UP (ref 135–145)
SPECIMEN SOURCE: SIGNIFICANT CHANGE UP
WBC # BLD: 7.11 K/UL — SIGNIFICANT CHANGE UP (ref 3.8–10.5)
WBC # FLD AUTO: 7.11 K/UL — SIGNIFICANT CHANGE UP (ref 3.8–10.5)

## 2019-01-20 PROCEDURE — 99232 SBSQ HOSP IP/OBS MODERATE 35: CPT

## 2019-01-20 RX ORDER — HYDRALAZINE HCL 50 MG
5 TABLET ORAL ONCE
Qty: 0 | Refills: 0 | Status: COMPLETED | OUTPATIENT
Start: 2019-01-20 | End: 2019-01-20

## 2019-01-20 RX ORDER — AMLODIPINE BESYLATE 2.5 MG/1
5 TABLET ORAL ONCE
Qty: 0 | Refills: 0 | Status: COMPLETED | OUTPATIENT
Start: 2019-01-20 | End: 2019-01-20

## 2019-01-20 RX ADMIN — AMLODIPINE BESYLATE 5 MILLIGRAM(S): 2.5 TABLET ORAL at 22:46

## 2019-01-20 RX ADMIN — Medication 5 MILLIGRAM(S): at 21:34

## 2019-01-20 RX ADMIN — ZOLPIDEM TARTRATE 5 MILLIGRAM(S): 10 TABLET ORAL at 21:29

## 2019-01-20 RX ADMIN — CARVEDILOL PHOSPHATE 6.25 MILLIGRAM(S): 80 CAPSULE, EXTENDED RELEASE ORAL at 17:18

## 2019-01-20 RX ADMIN — HEPARIN SODIUM 5000 UNIT(S): 5000 INJECTION INTRAVENOUS; SUBCUTANEOUS at 17:18

## 2019-01-20 RX ADMIN — AMLODIPINE BESYLATE 5 MILLIGRAM(S): 2.5 TABLET ORAL at 05:06

## 2019-01-20 RX ADMIN — Medication 5 MILLIGRAM(S): at 22:34

## 2019-01-20 RX ADMIN — HEPARIN SODIUM 5000 UNIT(S): 5000 INJECTION INTRAVENOUS; SUBCUTANEOUS at 05:06

## 2019-01-20 RX ADMIN — OXYCODONE AND ACETAMINOPHEN 2 TABLET(S): 5; 325 TABLET ORAL at 13:32

## 2019-01-20 RX ADMIN — CARVEDILOL PHOSPHATE 6.25 MILLIGRAM(S): 80 CAPSULE, EXTENDED RELEASE ORAL at 05:06

## 2019-01-20 RX ADMIN — OXYCODONE AND ACETAMINOPHEN 2 TABLET(S): 5; 325 TABLET ORAL at 14:02

## 2019-01-20 RX ADMIN — Medication 81 MILLIGRAM(S): at 12:56

## 2019-01-20 RX ADMIN — ATORVASTATIN CALCIUM 40 MILLIGRAM(S): 80 TABLET, FILM COATED ORAL at 21:29

## 2019-01-20 RX ADMIN — SODIUM CHLORIDE 50 MILLILITER(S): 9 INJECTION INTRAMUSCULAR; INTRAVENOUS; SUBCUTANEOUS at 00:50

## 2019-01-20 RX ADMIN — SODIUM CHLORIDE 50 MILLILITER(S): 9 INJECTION INTRAMUSCULAR; INTRAVENOUS; SUBCUTANEOUS at 20:15

## 2019-01-20 RX ADMIN — Medication 1: at 12:58

## 2019-01-20 RX ADMIN — Medication 0.5 MILLIGRAM(S): at 22:26

## 2019-01-20 RX ADMIN — TAMSULOSIN HYDROCHLORIDE 0.4 MILLIGRAM(S): 0.4 CAPSULE ORAL at 21:29

## 2019-01-20 RX ADMIN — LOSARTAN POTASSIUM 50 MILLIGRAM(S): 100 TABLET, FILM COATED ORAL at 05:06

## 2019-01-20 RX ADMIN — Medication 145 MILLIGRAM(S): at 12:56

## 2019-01-20 NOTE — PROGRESS NOTE ADULT - ASSESSMENT
73 year old male    admitted  with flank pain,    with punctate 2mm right ureteral calculus with forniceal rupture./  pain now controlled with analgesics     no need for emergent intervention , per  urology   Cr 1.5 , Urinalysis with no signs of UTI.   continue flomax/  Hydration   follow up with urologist Dr. Rae for eventual stone removal     h/o  htn,   hld, cva, bph.  kidney stones  pt  with elevated  sbp  on admission above  200  meds  per  card/ follow bp curve  dm 2/ on fs   on percocet prn/  dvt ppx     from: CT Abdomen and Pelvis No Cont (01.19.19 @ 09:03) >  IMPRESSION: A punctate 2 mm proximal right ureteral calculus with mild   hydronephrosis. Right perinephric fluid compatible with forniceal rupture.  Subcentimeter bladder calculi  < end of copied text >

## 2019-01-20 NOTE — PROGRESS NOTE ADULT - SUBJECTIVE AND OBJECTIVE BOX
resting/ no flank pain    REVIEW OF SYSTEMS:  GEN: no fever,    no chills  RESP: no SOB,   no cough  CVS: no chest pain,   no palpitations  GI: no abdominal pain,   no nausea,   no vomiting,   no constipation,   no diarrhea  : no dysuria,   no frequency  NEURO: no headache,   no dizziness  PSYCH: no depression,   not anxious  Derm : no rash    MEDICATIONS  (STANDING):  amLODIPine   Tablet 5 milliGRAM(s) Oral daily  aspirin enteric coated 81 milliGRAM(s) Oral daily  atorvastatin 40 milliGRAM(s) Oral at bedtime  carvedilol 6.25 milliGRAM(s) Oral every 12 hours  dextrose 5%. 1000 milliLiter(s) (50 mL/Hr) IV Continuous <Continuous>  dextrose 50% Injectable 12.5 Gram(s) IV Push once  dextrose 50% Injectable 25 Gram(s) IV Push once  dextrose 50% Injectable 25 Gram(s) IV Push once  fenofibrate Tablet 145 milliGRAM(s) Oral daily  heparin  Injectable 5000 Unit(s) SubCutaneous every 12 hours  insulin lispro (HumaLOG) corrective regimen sliding scale   SubCutaneous three times a day before meals  insulin lispro (HumaLOG) corrective regimen sliding scale   SubCutaneous at bedtime  losartan 50 milliGRAM(s) Oral daily  sodium chloride 0.9%. 1000 milliLiter(s) (50 mL/Hr) IV Continuous <Continuous>  tamsulosin 0.4 milliGRAM(s) Oral at bedtime  zolpidem 5 milliGRAM(s) Oral at bedtime    MEDICATIONS  (PRN):  dextrose 40% Gel 15 Gram(s) Oral once PRN Blood Glucose LESS THAN 70 milliGRAM(s)/deciliter  glucagon  Injectable 1 milliGRAM(s) IntraMuscular once PRN Glucose LESS THAN 70 milligrams/deciliter  LORazepam     Tablet 0.5 milliGRAM(s) Oral two times a day PRN Anxiety  oxyCODONE    5 mG/acetaminophen 325 mG 1 Tablet(s) Oral every 8 hours PRN Moderate Pain (4 - 6)  oxyCODONE    5 mG/acetaminophen 325 mG 2 Tablet(s) Oral every 6 hours PRN Severe Pain (7 - 10)      Vital Signs Last 24 Hrs  T(C): 36.5 (2019 21:37), Max: 37.5 (2019 19:27)  T(F): 97.7 (2019 21:37), Max: 99.5 (2019 19:27)  HR: 53 (2019 21:37) (53 - 69)  BP: 170/82 (2019 21:37) (170/82 - 234/109)  BP(mean): --  RR: 18 (2019 21:37) (17 - 18)  SpO2: 95% (2019 21:37) (94% - 96%)  CAPILLARY BLOOD GLUCOSE      POCT Blood Glucose.: 114 mg/dL (2019 21:40)    I&O's Summary      PHYSICAL EXAM:  HEAD:  Atraumatic, Normocephalic  NECK: Supple, No   JVD  CHEST/LUNG:   no     rales,     no,    rhonchi  HEART: Regular rate and rhythm;         murmur  ABDOMEN: Soft, Nontender, ;   EXTREMITIES:   no     edema  NEUROLOGY:  alert    LABS:                        11.6   12.4  )-----------( 214      ( 2019 07:49 )             34.7     01-    145  |  108  |  30<H>  ----------------------------<  185<H>  3.9   |  20<L>  |  1.51<H>    Ca    9.1      2019 07:49    TPro  7.5  /  Alb  4.1  /  TBili  0.3  /  DBili  x   /  AST  20  /  ALT  28  /  AlkPhos  58  -19          Urinalysis Basic - ( 2019 09:58 )    Color: Yellow / Appearance: Clear / S.016 / pH: x  Gluc: x / Ketone: Negative  / Bili: Negative / Urobili: 0.2   Blood: x / Protein: Negative / Nitrite: Negative   Leuk Esterase: Negative / RBC: >50 /hpf / WBC 0.2 /HPF   Sq Epi: x / Non Sq Epi: FEW / Bacteria: x                      Consultant(s) Notes Reviewed:      Care Discussed with Consultants/Other Providers:

## 2019-01-20 NOTE — CHART NOTE - NSCHARTNOTEFT_GEN_A_CORE
CC: /98    HPI:    Notifed by RN that BP noted to be 218/98 on routine vitals. Patient is asymptomatic, NAD, A&O x3. Patient seen and assessed at bedside by me. He denied headache, dizziness, chest pain, shortness of breath, nausea, vomiting, abdominal pain, numbness, or tingling. He reports that he was seen outpatient and his blood pressure was "normal." He currently endorses feeling nervous.     Vital Signs Last 24 Hrs  T(C): 36.5 (20 Jan 2019 21:13), Max: 37 (20 Jan 2019 13:04)  T(F): 97.7 (20 Jan 2019 21:13), Max: 98.6 (20 Jan 2019 13:04)  HR: 63 (20 Jan 2019 21:13) (53 - 80)  BP: 218/98 (20 Jan 2019 21:13) (169/73 - 218/98)  RR: 18 (20 Jan 2019 21:13) (18 - 18)  SpO2: 94% (20 Jan 2019 21:13) (94% - 95%)      Labs:                          10.1   7.11  )-----------( 230      ( 20 Jan 2019 08:24 )             32.4     01-20    145  |  111<H>  |  28<H>  ----------------------------<  113<H>  3.7   |  20<L>  |  1.64<H>    Ca    8.5      20 Jan 2019 06:10    TPro  7.5  /  Alb  4.1  /  TBili  0.3  /  DBili  x   /  AST  20  /  ALT  28  /  AlkPhos  58  01-19                Radiology:        Physical Exam:  General: WN/WD, NAD, nontoxic appearing  Neurology: A&Ox3, nonfocal, SANCHEZ x 4  Head:  Normocephalic, atraumatic  Respiratory: CTA B/L  CV: RRR, S1S2, no murmur  Abdominal: Soft, NT, ND no palpable mass  MSK: No edema, + peripheral pulses, FROM all 4 extremity    Assessment & Plan:  HPI:  72 y/o M pt with PMHx of DM, kidney stones, stent (DEC 2017), HTN, HLD     presenting with Rt flank pain radiating to his bladder, similar to his kidney stone in the past.  Pt woke up this morning from pain, in the rt. flank, radiating to the bladder. Voided this morning but no blood in urine. Per EMS, pt is a poor historian, found to have HTN (210 SBP).  Pt denies any chest pain, SOB or vomiting, diarrhea or constipation. Pt endorsed to nausea. No radiation of sharp pain in the back.  HTN med - Losartan 50mg once daily, Carvedilol 6.25mg BID (19 Jan 2019 23:33)    Hypertensive urgency  >Hydralazine 5mg IVP x1  >  >Continue to monitor and observe patient  >Will endorse to primary team in AM      Mau Mcclelland PA-C  Dept of Medicine CC: /98    HPI:  Notified by RN that BP noted to be 218/98 on routine vitals. Patient is asymptomatic, NAD, A&O x3. Patient seen and assessed at bedside by me. He denied headache, dizziness, chest pain, shortness of breath, nausea, vomiting, abdominal pain, flank pain, numbness, or tingling. He reports that he was seen outpatient and his blood pressure was "normal." He currently endorses feeling nervous; he stated takes a medication prescribed by Dr. Sun as outpatient for anxiety but does not remember the name. During the interview, patient noted to be sitting in bed comfortably and watching TV.       ROS:  CONSTITUTIONAL:  No fever, chills, rigors  CARDIOVASCULAR:  No chest pain or palpitations  RESPIRATORY:   No SOB, cough, or wheezing  GASTROINTESTINAL:  No abd pain, N/V/D  NEUROLOGIC:  No HA, visual disturbances      PAST MEDICAL & SURGICAL HISTORY:  Ureteral calculus, left  BPH with obstruction/lower urinary tract symptoms  Bladder calculus  High triglycerides  Cerebrovascular accident (CVA), unspecified mechanism: 2 years ago  HTN (hypertension)  High cholesterol  Diabetes mellitus: Type 2  H/O myringotomy: right ear in 2017  H/O cystoscopy: and left  stent placement  History of appendectomy        Vital Signs Last 24 Hrs  T(C): 36.5 (20 Jan 2019 21:13), Max: 37 (20 Jan 2019 13:04)  T(F): 97.7 (20 Jan 2019 21:13), Max: 98.6 (20 Jan 2019 13:04)  HR: 63 (20 Jan 2019 21:13) (53 - 80)  BP: 218/98 (20 Jan 2019 21:13) (169/73 - 218/98)  RR: 18 (20 Jan 2019 21:13) (18 - 18)  SpO2: 94% (20 Jan 2019 21:13) (94% - 95%)        Physical Exam:  General: WN/WD, nontoxic appearing  Mental status: Awake, alert, and in no apparent distress. Oriented to person, place and time. Language function is normal. Recent memory and concentration were normal.   Neurology: A&Ox3, nonfocal, SANCHEZ x 4  Head:  Normocephalic, atraumatic  Cranial Nerves: PERRL. EOMI. Facial sensation was intact to light touch. There was no facial asymmetry. The palate was upgoing symmetrically and tongue was midline. Shoulder shrug was full bilaterally.  Respiratory: CTA B/L  CV: RRR, S1S2, no murmur  Abdominal: Soft, NT, ND no palpable mass  Motor exam: Bulk and tone were normal. Strength was 5/5 in all four extremities.  MSK: No edema, + peripheral pulses, FROM all 4 extremity        Labs:                        10.1   7.11  )-----------( 230      ( 20 Jan 2019 08:24 )             32.4     01-20    145  |  111<H>  |  28<H>  ----------------------------<  113<H>  3.7   |  20<L>  |  1.64<H>    Ca    8.5      20 Jan 2019 06:10    TPro  7.5  /  Alb  4.1  /  TBili  0.3  /  DBili  x   /  AST  20  /  ALT  28  /  AlkPhos  58  01-19              Radiology:  < from: CT Abdomen and Pelvis No Cont (01.19.19 @ 09:03) >  IMPRESSION: A punctate 2 mm proximal right ureteral calculus with mild   hydronephrosis. Right perinephric fluid compatible with forniceal rupture.  Subcentimeter bladder calculi.  < end of copied text >          Assessment & Plan:  HPI:  72 y/o M pt with PMHx of DM, kidney stones, stent (DEC 2017), HTN, HLD     presenting with Rt flank pain radiating to his bladder, similar to his kidney stone in the past.  Pt woke up this morning from pain, in the rt. flank, radiating to the bladder. Voided this morning but no blood in urine. Per EMS, pt is a poor historian, found to have HTN (210 SBP).  Pt denies any chest pain, SOB or vomiting, diarrhea or constipation. Pt endorsed to nausea. No radiation of sharp pain in the back.  HTN med - Losartan 50mg once daily, Carvedilol 6.25mg BID (19 Jan 2019 23:33)    Hypertensive urgency  >Patient is asymptomatic  >Hydralazine 5mg IVP x1 STAT  >Continue with Losartan and Norvasc, consider up-titration if patient remains hypertensive  >Closely monitor renal function given renal lithiasis and patient on ARB  >Consider discontinuation of IV fluids  >Continue with Percocet for pain control  >Ativan as ordered for anxiety  >Will continue to closely monitor patient/vitals  >Will endorse to primary team in AM      Mau Mcclelland PA-C  Dept of Medicine  [id number] CC: /98    HPI:  Notified by RN that BP noted to be 218/98 on routine vitals. Patient is asymptomatic, NAD, A&O x3. Patient seen and assessed at bedside by me. He denied headache, dizziness, chest pain, shortness of breath, nausea, vomiting, abdominal pain, flank pain, numbness, or tingling. He reports that he was seen outpatient and his blood pressure was "normal." He currently endorses feeling nervous; he stated takes a medication prescribed by Dr. Sun as outpatient for anxiety but does not remember the name. During the interview, patient noted to be sitting in bed comfortably and watching TV.       ROS:  CONSTITUTIONAL:  No fever, chills, rigors  CARDIOVASCULAR:  No chest pain or palpitations  RESPIRATORY:   No SOB, cough, or wheezing  GASTROINTESTINAL:  No abd pain, N/V/D  NEUROLOGIC:  No HA, visual disturbances      PAST MEDICAL & SURGICAL HISTORY:  Ureteral calculus, left  BPH with obstruction/lower urinary tract symptoms  Bladder calculus  High triglycerides  Cerebrovascular accident (CVA), unspecified mechanism: 2 years ago  HTN (hypertension)  High cholesterol  Diabetes mellitus: Type 2  H/O myringotomy: right ear in 2017  H/O cystoscopy: and left  stent placement  History of appendectomy        Vital Signs Last 24 Hrs  T(C): 36.5 (20 Jan 2019 21:13), Max: 37 (20 Jan 2019 13:04)  T(F): 97.7 (20 Jan 2019 21:13), Max: 98.6 (20 Jan 2019 13:04)  HR: 63 (20 Jan 2019 21:13) (53 - 80)  BP: 218/98 (20 Jan 2019 21:13) (169/73 - 218/98)  RR: 18 (20 Jan 2019 21:13) (18 - 18)  SpO2: 94% (20 Jan 2019 21:13) (94% - 95%)        Physical Exam:  General: WN/WD, nontoxic appearing  Mental status: Awake, alert, and in no apparent distress. Oriented to person, place and time. Language function is normal. Recent memory and concentration were normal.   Neurology: A&Ox3, nonfocal, SANCHEZ x 4  Head:  Normocephalic, atraumatic  Cranial Nerves: PERRL. EOMI. Facial sensation was intact to light touch. There was no facial asymmetry. The palate was upgoing symmetrically and tongue was midline. Shoulder shrug was full bilaterally.  Respiratory: CTA B/L  CV: RRR, S1S2, no murmur  Abdominal: Soft, NT, ND no palpable mass  Motor exam: Bulk and tone were normal. Strength was 5/5 in all four extremities.  MSK: No edema, + peripheral pulses, FROM all 4 extremity        Labs:                        10.1   7.11  )-----------( 230      ( 20 Jan 2019 08:24 )             32.4     01-20    145  |  111<H>  |  28<H>  ----------------------------<  113<H>  3.7   |  20<L>  |  1.64<H>    Ca    8.5      20 Jan 2019 06:10    TPro  7.5  /  Alb  4.1  /  TBili  0.3  /  DBili  x   /  AST  20  /  ALT  28  /  AlkPhos  58  01-19              Radiology:  < from: CT Abdomen and Pelvis No Cont (01.19.19 @ 09:03) >  IMPRESSION: A punctate 2 mm proximal right ureteral calculus with mild   hydronephrosis. Right perinephric fluid compatible with forniceal rupture.  Subcentimeter bladder calculi.  < end of copied text >          Assessment & Plan:  HPI:  74 y/o M pt with PMHx of DM, kidney stones, stent (DEC 2017), HTN, HLD     presenting with Rt flank pain radiating to his bladder, similar to his kidney stone in the past.  Pt woke up this morning from pain, in the rt. flank, radiating to the bladder. Voided this morning but no blood in urine. Per EMS, pt is a poor historian, found to have HTN (210 SBP).  Pt denies any chest pain, SOB or vomiting, diarrhea or constipation. Pt endorsed to nausea. No radiation of sharp pain in the back.  HTN med - Losartan 50mg once daily, Carvedilol 6.25mg BID (19 Jan 2019 23:33)    Hypertensive urgency  >Patient is asymptomatic  >Hydralazine 5mg IVP x1 STAT  >Continue with Losartan and Norvasc, consider up-titration if patient remains hypertensive  >Closely monitor renal function given renal lithiasis and patient on ARB  >Consider discontinuation of IV fluids  >Continue with Percocet for pain control  >Ativan as ordered for anxiety  >Cardiology is following patient  >Will continue to closely monitor patient/vitals  >Will endorse to primary team in AM      Mau Mcclelland PA-C  Dept of Medicine  [id number] CC: /98    HPI:  Notified by RN that BP noted to be 218/98 on routine vitals. Patient is asymptomatic, NAD, A&O x3. Patient seen and assessed at bedside by me. He denied headache, dizziness, chest pain, shortness of breath, nausea, vomiting, abdominal pain, flank pain, numbness, or tingling. He reports that he was seen outpatient and his blood pressure was "normal." He currently endorses feeling nervous; he stated takes a medication prescribed by Dr. Sun as outpatient for anxiety but does not remember the name. During the interview, patient noted to be sitting in bed comfortably and watching TV. Upon review of patient's chart, noted elevated BPs upon admission (-230s).       ROS:  CONSTITUTIONAL:  No fever, chills, rigors  CARDIOVASCULAR:  No chest pain or palpitations  RESPIRATORY:   No SOB, cough, or wheezing  GASTROINTESTINAL:  No abd pain, N/V/D  NEUROLOGIC:  No HA, visual disturbances      PAST MEDICAL & SURGICAL HISTORY:  Ureteral calculus, left  BPH with obstruction/lower urinary tract symptoms  Bladder calculus  High triglycerides  Cerebrovascular accident (CVA), unspecified mechanism: 2 years ago  HTN (hypertension)  High cholesterol  Diabetes mellitus: Type 2  H/O myringotomy: right ear in 2017  H/O cystoscopy: and left  stent placement  History of appendectomy        Vital Signs Last 24 Hrs  T(C): 36.5 (20 Jan 2019 21:13), Max: 37 (20 Jan 2019 13:04)  T(F): 97.7 (20 Jan 2019 21:13), Max: 98.6 (20 Jan 2019 13:04)  HR: 63 (20 Jan 2019 21:13) (53 - 80)  BP: 218/98 (20 Jan 2019 21:13) (169/73 - 218/98)  RR: 18 (20 Jan 2019 21:13) (18 - 18)  SpO2: 94% (20 Jan 2019 21:13) (94% - 95%)        Physical Exam:  General: WN/WD, nontoxic appearing  Mental status: Awake, alert, and in no apparent distress. Oriented to person, place and time. Language function is normal. Recent memory and concentration were normal.   Neurology: A&Ox3, nonfocal, SANCHEZ x 4  Head:  Normocephalic, atraumatic  Cranial Nerves: PERRL. EOMI. Facial sensation was intact to light touch. There was no facial asymmetry. The palate was upgoing symmetrically and tongue was midline. Shoulder shrug was full bilaterally.  Respiratory: CTA B/L  CV: RRR, S1S2, no murmur  Abdominal: Soft, NT, ND no palpable mass  Motor exam: Bulk and tone were normal. Strength was 5/5 in all four extremities.  MSK: No edema, + peripheral pulses, FROM all 4 extremity        Labs:                        10.1   7.11  )-----------( 230      ( 20 Jan 2019 08:24 )             32.4     01-20    145  |  111<H>  |  28<H>  ----------------------------<  113<H>  3.7   |  20<L>  |  1.64<H>    Ca    8.5      20 Jan 2019 06:10    TPro  7.5  /  Alb  4.1  /  TBili  0.3  /  DBili  x   /  AST  20  /  ALT  28  /  AlkPhos  58  01-19              Radiology:  < from: CT Abdomen and Pelvis No Cont (01.19.19 @ 09:03) >  IMPRESSION: A punctate 2 mm proximal right ureteral calculus with mild   hydronephrosis. Right perinephric fluid compatible with forniceal rupture.  Subcentimeter bladder calculi.  < end of copied text >          Assessment & Plan:  HPI:  72 y/o M pt with PMHx of DM, kidney stones, stent (DEC 2017), HTN, HLD     presenting with Rt flank pain radiating to his bladder, similar to his kidney stone in the past.  Pt woke up this morning from pain, in the rt. flank, radiating to the bladder. Voided this morning but no blood in urine. Per EMS, pt is a poor historian, found to have HTN (210 SBP).  Pt denies any chest pain, SOB or vomiting, diarrhea or constipation. Pt endorsed to nausea. No radiation of sharp pain in the back.  HTN med - Losartan 50mg once daily, Carvedilol 6.25mg BID (19 Jan 2019 23:33)    Hypertensive urgency  >Patient is asymptomatic  >Hydralazine 5mg IVP x1 STAT  >Continue with Losartan and Norvasc, consider up-titration if patient remains hypertensive  >Closely monitor renal function given renal lithiasis and patient on ARB  >Consider discontinuation of IV fluids  >Continue with Percocet for pain control  >Ativan as ordered for anxiety  >Cardiology is following patient  >Will continue to closely monitor patient/vitals  >Will endorse to primary team in AM      Mau Mcclelland PA-C  Dept of Medicine  [id number] CC: /98    HPI:  Notified by RN that BP noted to be 218/98 on routine vitals. Patient is asymptomatic, NAD, A&O x3. Patient seen and assessed at bedside by me. He denied headache, dizziness, chest pain, shortness of breath, nausea, vomiting, abdominal pain, flank pain, numbness, or tingling. He reports that he was seen outpatient and his blood pressure was "normal." He currently endorses feeling nervous; he stated takes a medication prescribed by Dr. Sun as outpatient for anxiety but does not remember the name. During the interview, patient noted to be sitting in bed comfortably and watching TV. Upon review of patient's chart, noted elevated BPs upon admission (-230s).       ROS:  CONSTITUTIONAL:  No fever, chills, rigors  CARDIOVASCULAR:  No chest pain or palpitations  RESPIRATORY:   No SOB, cough, or wheezing  GASTROINTESTINAL:  No abd pain, N/V/D  NEUROLOGIC:  No HA, visual disturbances      PAST MEDICAL & SURGICAL HISTORY:  Ureteral calculus, left  BPH with obstruction/lower urinary tract symptoms  Bladder calculus  High triglycerides  Cerebrovascular accident (CVA), unspecified mechanism: 2 years ago  HTN (hypertension)  High cholesterol  Diabetes mellitus: Type 2  H/O myringotomy: right ear in 2017  H/O cystoscopy: and left  stent placement  History of appendectomy        Vital Signs Last 24 Hrs  T(C): 36.5 (20 Jan 2019 21:13), Max: 37 (20 Jan 2019 13:04)  T(F): 97.7 (20 Jan 2019 21:13), Max: 98.6 (20 Jan 2019 13:04)  HR: 63 (20 Jan 2019 21:13) (53 - 80)  BP: 218/98 (20 Jan 2019 21:13) (169/73 - 218/98)  RR: 18 (20 Jan 2019 21:13) (18 - 18)  SpO2: 94% (20 Jan 2019 21:13) (94% - 95%)        Physical Exam:  General: WN/WD, nontoxic appearing  Mental status: Awake, alert, and in no apparent distress. Oriented to person, place and time. Language function is normal. Recent memory and concentration were normal.   Neurology: A&Ox3, nonfocal, SANCHEZ x 4  Head:  Normocephalic, atraumatic  Cranial Nerves: PERRL. EOMI. Facial sensation was intact to light touch. There was no facial asymmetry. The palate was upgoing symmetrically and tongue was midline. Shoulder shrug was full bilaterally.  Respiratory: CTA B/L  CV: RRR, S1S2, no murmur  Abdominal: Soft, NT, ND no palpable mass  Motor exam: Bulk and tone were normal. Strength was 5/5 in all four extremities.  MSK: No edema, + peripheral pulses, FROM all 4 extremity        Labs:                        10.1   7.11  )-----------( 230      ( 20 Jan 2019 08:24 )             32.4     01-20    145  |  111<H>  |  28<H>  ----------------------------<  113<H>  3.7   |  20<L>  |  1.64<H>    Ca    8.5      20 Jan 2019 06:10    TPro  7.5  /  Alb  4.1  /  TBili  0.3  /  DBili  x   /  AST  20  /  ALT  28  /  AlkPhos  58  01-19              Radiology:  < from: CT Abdomen and Pelvis No Cont (01.19.19 @ 09:03) >  IMPRESSION: A punctate 2 mm proximal right ureteral calculus with mild   hydronephrosis. Right perinephric fluid compatible with forniceal rupture.  Subcentimeter bladder calculi.  < end of copied text >          Assessment & Plan:  HPI:  72 y/o M pt with PMHx of DM, kidney stones, stent (DEC 2017), HTN, HLD     presenting with Rt flank pain radiating to his bladder, similar to his kidney stone in the past.  Pt woke up this morning from pain, in the rt. flank, radiating to the bladder. Voided this morning but no blood in urine. Per EMS, pt is a poor historian, found to have HTN (210 SBP).  Pt denies any chest pain, SOB or vomiting, diarrhea or constipation. Pt endorsed to nausea. No radiation of sharp pain in the back.  HTN med - Losartan 50mg once daily, Carvedilol 6.25mg BID (19 Jan 2019 23:33)    Hypertensive urgency  >Patient is asymptomatic, no focal neurological deficits on exam  >Hydralazine 5mg IVP x1 STAT  >Continue with Losartan and Norvasc, consider up-titration if patient remains hypertensive  >Closely monitor renal function given renal lithiasis and patient on ARB  >Consider discontinuation of IV fluids  >Continue with Percocet for pain control  >Ativan as ordered for anxiety  >Cardiology is following patient  >Will continue to closely monitor patient/vitals  >Will endorse to primary team in AM      Mau Mcclelland PA-C  Dept of Medicine  [id number]

## 2019-01-20 NOTE — PROVIDER CONTACT NOTE (OTHER) - ACTION/TREATMENT ORDERED:
no further treatments ordered at this time. will continue to monitor. NP wants to discuss with Dr. Mckinney.

## 2019-01-21 ENCOUNTER — RESULT REVIEW (OUTPATIENT)
Age: 74
End: 2019-01-21

## 2019-01-21 ENCOUNTER — TRANSCRIPTION ENCOUNTER (OUTPATIENT)
Age: 74
End: 2019-01-21

## 2019-01-21 VITALS
OXYGEN SATURATION: 100 % | DIASTOLIC BLOOD PRESSURE: 83 MMHG | TEMPERATURE: 98 F | RESPIRATION RATE: 18 BRPM | SYSTOLIC BLOOD PRESSURE: 156 MMHG | HEART RATE: 101 BPM

## 2019-01-21 LAB
ANION GAP SERPL CALC-SCNC: 13 MMOL/L — SIGNIFICANT CHANGE UP (ref 5–17)
BUN SERPL-MCNC: 23 MG/DL — SIGNIFICANT CHANGE UP (ref 7–23)
CALCIUM SERPL-MCNC: 9.2 MG/DL — SIGNIFICANT CHANGE UP (ref 8.4–10.5)
CHLORIDE SERPL-SCNC: 109 MMOL/L — HIGH (ref 96–108)
CO2 SERPL-SCNC: 22 MMOL/L — SIGNIFICANT CHANGE UP (ref 22–31)
CREAT SERPL-MCNC: 1.27 MG/DL — SIGNIFICANT CHANGE UP (ref 0.5–1.3)
GLUCOSE BLDC GLUCOMTR-MCNC: 143 MG/DL — HIGH (ref 70–99)
GLUCOSE BLDC GLUCOMTR-MCNC: 148 MG/DL — HIGH (ref 70–99)
GLUCOSE BLDC GLUCOMTR-MCNC: 225 MG/DL — HIGH (ref 70–99)
GLUCOSE SERPL-MCNC: 152 MG/DL — HIGH (ref 70–99)
HCT VFR BLD CALC: 36 % — LOW (ref 39–50)
HGB BLD-MCNC: 11.7 G/DL — LOW (ref 13–17)
MCHC RBC-ENTMCNC: 29.3 PG — SIGNIFICANT CHANGE UP (ref 27–34)
MCHC RBC-ENTMCNC: 32.5 GM/DL — SIGNIFICANT CHANGE UP (ref 32–36)
MCV RBC AUTO: 90.2 FL — SIGNIFICANT CHANGE UP (ref 80–100)
PLATELET # BLD AUTO: 239 K/UL — SIGNIFICANT CHANGE UP (ref 150–400)
POTASSIUM SERPL-MCNC: 3.6 MMOL/L — SIGNIFICANT CHANGE UP (ref 3.5–5.3)
POTASSIUM SERPL-SCNC: 3.6 MMOL/L — SIGNIFICANT CHANGE UP (ref 3.5–5.3)
RBC # BLD: 3.99 M/UL — LOW (ref 4.2–5.8)
RBC # FLD: 13.9 % — SIGNIFICANT CHANGE UP (ref 10.3–14.5)
SODIUM SERPL-SCNC: 144 MMOL/L — SIGNIFICANT CHANGE UP (ref 135–145)
WBC # BLD: 8.61 K/UL — SIGNIFICANT CHANGE UP (ref 3.8–10.5)
WBC # FLD AUTO: 8.61 K/UL — SIGNIFICANT CHANGE UP (ref 3.8–10.5)

## 2019-01-21 PROCEDURE — 83036 HEMOGLOBIN GLYCOSYLATED A1C: CPT

## 2019-01-21 PROCEDURE — 85027 COMPLETE CBC AUTOMATED: CPT

## 2019-01-21 PROCEDURE — 96375 TX/PRO/DX INJ NEW DRUG ADDON: CPT

## 2019-01-21 PROCEDURE — 99285 EMERGENCY DEPT VISIT HI MDM: CPT | Mod: 25

## 2019-01-21 PROCEDURE — 82365 CALCULUS SPECTROSCOPY: CPT

## 2019-01-21 PROCEDURE — 80053 COMPREHEN METABOLIC PANEL: CPT

## 2019-01-21 PROCEDURE — 96374 THER/PROPH/DIAG INJ IV PUSH: CPT

## 2019-01-21 PROCEDURE — 74176 CT ABD & PELVIS W/O CONTRAST: CPT

## 2019-01-21 PROCEDURE — 88300 SURGICAL PATH GROSS: CPT

## 2019-01-21 PROCEDURE — 87086 URINE CULTURE/COLONY COUNT: CPT

## 2019-01-21 PROCEDURE — 88300 SURGICAL PATH GROSS: CPT | Mod: 26

## 2019-01-21 PROCEDURE — 80048 BASIC METABOLIC PNL TOTAL CA: CPT

## 2019-01-21 PROCEDURE — 93005 ELECTROCARDIOGRAM TRACING: CPT

## 2019-01-21 PROCEDURE — 82962 GLUCOSE BLOOD TEST: CPT

## 2019-01-21 PROCEDURE — 81001 URINALYSIS AUTO W/SCOPE: CPT

## 2019-01-21 RX ORDER — AMLODIPINE BESYLATE 2.5 MG/1
3 TABLET ORAL
Qty: 90 | Refills: 0
Start: 2019-01-21 | End: 2019-02-19

## 2019-01-21 RX ORDER — LOSARTAN POTASSIUM 100 MG/1
1 TABLET, FILM COATED ORAL
Qty: 0 | Refills: 0 | COMMUNITY

## 2019-01-21 RX ORDER — AMLODIPINE BESYLATE 2.5 MG/1
1 TABLET ORAL
Qty: 0 | Refills: 0 | COMMUNITY

## 2019-01-21 RX ORDER — AMLODIPINE BESYLATE 2.5 MG/1
5 TABLET ORAL ONCE
Qty: 0 | Refills: 0 | Status: DISCONTINUED | OUTPATIENT
Start: 2019-01-21 | End: 2019-01-21

## 2019-01-21 RX ORDER — SODIUM CHLORIDE 0.65 %
1 AEROSOL, SPRAY (ML) NASAL THREE TIMES A DAY
Qty: 0 | Refills: 0 | Status: DISCONTINUED | OUTPATIENT
Start: 2019-01-21 | End: 2019-01-21

## 2019-01-21 RX ORDER — HYDRALAZINE HCL 50 MG
10 TABLET ORAL ONCE
Qty: 0 | Refills: 0 | Status: COMPLETED | OUTPATIENT
Start: 2019-01-21 | End: 2019-01-21

## 2019-01-21 RX ORDER — LOSARTAN POTASSIUM 100 MG/1
1 TABLET, FILM COATED ORAL
Qty: 30 | Refills: 0
Start: 2019-01-21 | End: 2019-02-19

## 2019-01-21 RX ORDER — AMLODIPINE BESYLATE 2.5 MG/1
7.5 TABLET ORAL DAILY
Qty: 0 | Refills: 0 | Status: DISCONTINUED | OUTPATIENT
Start: 2019-01-21 | End: 2019-01-21

## 2019-01-21 RX ORDER — SODIUM CHLORIDE 0.65 %
1 AEROSOL, SPRAY (ML) NASAL
Qty: 1 | Refills: 0
Start: 2019-01-21 | End: 2019-02-19

## 2019-01-21 RX ORDER — LOSARTAN POTASSIUM 100 MG/1
100 TABLET, FILM COATED ORAL DAILY
Qty: 0 | Refills: 0 | Status: DISCONTINUED | OUTPATIENT
Start: 2019-01-21 | End: 2019-01-21

## 2019-01-21 RX ORDER — AMLODIPINE BESYLATE 2.5 MG/1
5 TABLET ORAL DAILY
Qty: 0 | Refills: 0 | Status: DISCONTINUED | OUTPATIENT
Start: 2019-01-21 | End: 2019-01-21

## 2019-01-21 RX ADMIN — Medication 0.5 MILLIGRAM(S): at 12:08

## 2019-01-21 RX ADMIN — AMLODIPINE BESYLATE 5 MILLIGRAM(S): 2.5 TABLET ORAL at 05:01

## 2019-01-21 RX ADMIN — Medication 81 MILLIGRAM(S): at 12:09

## 2019-01-21 RX ADMIN — LOSARTAN POTASSIUM 50 MILLIGRAM(S): 100 TABLET, FILM COATED ORAL at 05:01

## 2019-01-21 RX ADMIN — AMLODIPINE BESYLATE 5 MILLIGRAM(S): 2.5 TABLET ORAL at 10:08

## 2019-01-21 RX ADMIN — Medication 2: at 12:09

## 2019-01-21 RX ADMIN — LOSARTAN POTASSIUM 100 MILLIGRAM(S): 100 TABLET, FILM COATED ORAL at 10:11

## 2019-01-21 RX ADMIN — Medication 1 SPRAY(S): at 05:05

## 2019-01-21 RX ADMIN — CARVEDILOL PHOSPHATE 6.25 MILLIGRAM(S): 80 CAPSULE, EXTENDED RELEASE ORAL at 17:12

## 2019-01-21 RX ADMIN — Medication 145 MILLIGRAM(S): at 12:09

## 2019-01-21 RX ADMIN — Medication 10 MILLIGRAM(S): at 00:39

## 2019-01-21 RX ADMIN — CARVEDILOL PHOSPHATE 6.25 MILLIGRAM(S): 80 CAPSULE, EXTENDED RELEASE ORAL at 05:01

## 2019-01-21 RX ADMIN — AMLODIPINE BESYLATE 7.5 MILLIGRAM(S): 2.5 TABLET ORAL at 12:13

## 2019-01-21 RX ADMIN — HEPARIN SODIUM 5000 UNIT(S): 5000 INJECTION INTRAVENOUS; SUBCUTANEOUS at 05:03

## 2019-01-21 RX ADMIN — HEPARIN SODIUM 5000 UNIT(S): 5000 INJECTION INTRAVENOUS; SUBCUTANEOUS at 17:12

## 2019-01-21 NOTE — DISCHARGE NOTE ADULT - HOSPITAL COURSE
This is a 73 year old male with PMH DM, kidney stones, stent (DEC 2017), HTN, HLD presenting on 1/19  with Rt flank pain radiating to his bladder, similar to his kidney stone in the past. Voided in am with no blood in urine. As per EMS, pt is a poor historian, found to have HTN (210 SBP). Denies any chest pain, SOB or vomiting, diarrhea or constipation. Pt endorsed to nausea. No radiation of sharp pain in the back. Admitted  with flank pain with punctate 2mm right ureteral calculus with forniceal rupture on imaging and pain subsided with analgesics     no need for emergent intervention , per  urology   Cr 1.5 , Urinalysis with no signs of UTI.   continue flomax/  Hydration   follow up with urologist Dr. Rae for eventual stone removal     h/o  htn,   hld, cva, bph.  kidney stones  pt  with elevated  sbp  on admission above  200  bp meds  adjusted  dm 2/ on fs   on percocet prn/  dvt ppx     from: CT Abdomen and Pelvis No Cont (01.19.19 @ 09:03) >  IMPRESSION: A punctate 2 mm proximal right ureteral calculus with mild   hydronephrosis. Right perinephric fluid compatible with forniceal rupture.  Subcentimeter bladder calculi This is a 73 year old male with PMH DM, kidney stones, stent (DEC 2017), HTN, HLD presenting on 1/19  with Rt flank pain radiating to his bladder, similar to his kidney stone in the past. Voided in am with no blood in urine.  Found to have HTN (210 SBP). No chest pain, SOB or vomiting, diarrhea or constipation upon arrival. A punctate 2 mm proximal right ureteral calculus with mild hydronephrosis. Right perinephric fluid compatible with forniceal rupture. Subcentimeter bladder calculi on CT abd / pelvis  and pain subsided with analgesics. Per  urology no immediate need for intervention, Cr 1.5, Urinalysis with no signs of UTI, continue with flomax /  Hydration and can follow up with urologist Dr. Rae for eventual stone removal As above pt. has hx.  htn,   hld, cva, bph.  kidney stones; pt  with elevated  sbp  on admission above  200 and bp meds  adjusted. For dm 2/ on fs ac and hs. On percocet prn /  dvt ppx. Cr improved, BP improved an pt. for discharge on 1/21. This is a 73 year old male with PMH DM, kidney stones, stent (DEC 2017), HTN, HLD presenting on 1/19  with Rt flank pain radiating to his bladder, similar to his kidney stone in the past. Voided in am with no blood in urine.  Found to have HTN (210 SBP). No chest pain, SOB or vomiting, diarrhea or constipation upon arrival. A punctate 2 mm proximal right ureteral calculus with mild hydronephrosis. Right perinephric fluid compatible with forniceal rupture. Subcentimeter bladder calculi on CT abd / pelvis  and pain subsided with analgesics. Per  urology no immediate need for intervention, Urinalysis with no signs of UTI, continue with flomax /  Hydration and can follow up with urologist Dr. Rae for eventual stone removal As above pt. has hx.  htn,   hld, cva, bph.  kidney stones; pt  with elevated  sbp  on admission above  200 and bp meds  adjusted. For dm 2/ on fs ac and hs. On percocet prn /  dvt ppx. Cr improved (1.27), BP improved an pt. for discharge on 1/21. This is a 73 year old male with PMH DM, kidney stones, stent (DEC 2017), HTN, HLD presenting on 1/19  with Rt flank pain radiating to his bladder, similar to his kidney stone in the past. Voided in am with no blood in urine.  Found to have HTN (210 SBP). No chest pain, SOB or vomiting, diarrhea or constipation upon arrival. A punctate 2 mm proximal right ureteral calculus with mild hydronephrosis. Right perinephric fluid compatible with forniceal rupture. Subcentimeter bladder calculi on CT abd / pelvis  and pain subsided with analgesics. Per  urology no immediate need for intervention, Urinalysis with no signs of UTI, continue with flomax /  Hydration and can follow up with urologist Dr. Rae for eventual stone removal As above pt. has hx.  htn,   hld, cva, bph.  kidney stones; pt  with elevated  sbp  on admission above  200 and bp meds  adjusted with prescriptions changed for discharge. For dm 2/ on fs ac and hs - c/w home meds. On percocet prn /  dvt ppx. Cr improved (1.27), BP improved and pt. discharged on 1/21.

## 2019-01-21 NOTE — DISCHARGE NOTE ADULT - PLAN OF CARE
s/p CT abd / pelvis  w/  + renal stone  / iv hydration / pain meds and seen and followed by urology Tae your medications as directed   Follow up with urology - Dr. Rae vital signs trended Take your medications as prescribed   Follow up as an out-pt Fs selina and hs Follow up as an out-pt vital signs trended / BP meds adjusted Follow up as an out-pt  Home meds

## 2019-01-21 NOTE — DISCHARGE NOTE ADULT - MEDICATION SUMMARY - MEDICATIONS TO STOP TAKING
I will STOP taking the medications listed below when I get home from the hospital:    amoxicillin-clavulanate 500 mg-125 mg oral tablet  -- 1 tab(s) by mouth 2 times a day   -- Finish all this medication unless otherwise directed by prescriber.  Take with food or milk.

## 2019-01-21 NOTE — PROGRESS NOTE ADULT - ASSESSMENT
73 year old male    admitted  with flank pain,    with punctate 2mm right ureteral calculus with forniceal rupture./  pain now controlled with analgesics     no need for emergent intervention , per  urology   Cr 1.5 , Urinalysis with no signs of UTI.   continue flomax/  Hydration   follow up with urologist Dr. Rae for eventual stone removal     h/o  htn,   hld, cva, bph.  kidney stones  pt  with elevated  sbp  on admission above  200  bp meds  adjusted  dm 2/ on fs   on percocet prn/  dvt ppx     from: CT Abdomen and Pelvis No Cont (01.19.19 @ 09:03) >  IMPRESSION: A punctate 2 mm proximal right ureteral calculus with mild   hydronephrosis. Right perinephric fluid compatible with forniceal rupture.  Subcentimeter bladder calculi  < end of copied text > 73 year old male    admitted  with flank pain,    with punctate 2mm right ureteral calculus with forniceal rupture./  pain now controlled with analgesics     no need for emergent intervention , per  urology   Cr 1.5 , Urinalysis with no signs of UTI.   continue flomax/  Hydration   follow up with urologist Dr. Rae for eventual stone removal     h/o  htn,   hld, cva, bph.  kidney stones  pt  with elevated  sbp  on admission above  200  bp meds  adjusted, now  sbp  in 160  dm 2/ on fs   on percocet prn/  dvt ppx  pt  wishes  to leave today.  he  will f/p  dr buenrostro,in 2  days,   for  bp med  adjustment     from: CT Abdomen and Pelvis No Cont (01.19.19 @ 09:03) >  IMPRESSION: A punctate 2 mm proximal right ureteral calculus with mild   hydronephrosis. Right perinephric fluid compatible with forniceal rupture.  Subcentimeter bladder calculi  < end of copied text >

## 2019-01-21 NOTE — DISCHARGE NOTE ADULT - ADDITIONAL INSTRUCTIONS
Take your medications as directed   Followup as an out-pt with urologist Dr. Rae for eventual stone removal   Follow up with your PMD   Call day after discharge to make an appointment   Bring your discharge instructions and your medication list to all followup appointments Take your medications as directed   Followup as an out-pt with urologist Dr. Rae for eventual stone removal    Follow up with your PMD  - plan to call for an appointment day after discharge    Bring your discharge instructions and your medication list to all followup appointments

## 2019-01-21 NOTE — PROGRESS NOTE ADULT - SUBJECTIVE AND OBJECTIVE BOX
no compalints    REVIEW OF SYSTEMS:  GEN: no fever,    no chills  RESP: no SOB,   no cough  CVS: no chest pain,   no palpitations  GI: no abdominal pain,   no nausea,   no vomiting,   no constipation,   no diarrhea  : no dysuria,   no frequency  NEURO: no headache,   no dizziness  PSYCH: no depression,   not anxious  Derm : no rash    MEDICATIONS  (STANDING):  amLODIPine   Tablet 5 milliGRAM(s) Oral daily  aspirin enteric coated 81 milliGRAM(s) Oral daily  atorvastatin 40 milliGRAM(s) Oral at bedtime  carvedilol 6.25 milliGRAM(s) Oral every 12 hours  dextrose 5%. 1000 milliLiter(s) (50 mL/Hr) IV Continuous <Continuous>  dextrose 50% Injectable 12.5 Gram(s) IV Push once  dextrose 50% Injectable 25 Gram(s) IV Push once  dextrose 50% Injectable 25 Gram(s) IV Push once  fenofibrate Tablet 145 milliGRAM(s) Oral daily  heparin  Injectable 5000 Unit(s) SubCutaneous every 12 hours  insulin lispro (HumaLOG) corrective regimen sliding scale   SubCutaneous three times a day before meals  insulin lispro (HumaLOG) corrective regimen sliding scale   SubCutaneous at bedtime  losartan 50 milliGRAM(s) Oral daily  sodium chloride 0.9%. 1000 milliLiter(s) (50 mL/Hr) IV Continuous <Continuous>  tamsulosin 0.4 milliGRAM(s) Oral at bedtime  zolpidem 5 milliGRAM(s) Oral at bedtime    MEDICATIONS  (PRN):  dextrose 40% Gel 15 Gram(s) Oral once PRN Blood Glucose LESS THAN 70 milliGRAM(s)/deciliter  glucagon  Injectable 1 milliGRAM(s) IntraMuscular once PRN Glucose LESS THAN 70 milligrams/deciliter  LORazepam     Tablet 0.5 milliGRAM(s) Oral two times a day PRN Anxiety  oxyCODONE    5 mG/acetaminophen 325 mG 1 Tablet(s) Oral every 8 hours PRN Moderate Pain (4 - 6)  oxyCODONE    5 mG/acetaminophen 325 mG 2 Tablet(s) Oral every 6 hours PRN Severe Pain (7 - 10)  sodium chloride 0.65% Nasal 1 Spray(s) Both Nostrils three times a day PRN Nasal Congestion      Vital Signs Last 24 Hrs  T(C): 36.7 (2019 04:48), Max: 37 (2019 13:04)  T(F): 98 (2019 04:48), Max: 98.6 (2019 13:04)  HR: 68 (2019 04:48) (56 - 80)  BP: 164/91 (2019 06:14) (164/91 - 223/99)  BP(mean): --  RR: 18 (2019 04:48) (18 - 18)  SpO2: 96% (2019 04:48) (94% - 96%)  CAPILLARY BLOOD GLUCOSE      POCT Blood Glucose.: 148 mg/dL (2019 07:47)  POCT Blood Glucose.: 131 mg/dL (2019 21:55)  POCT Blood Glucose.: 134 mg/dL (2019 16:46)  POCT Blood Glucose.: 195 mg/dL (2019 12:57)  POCT Blood Glucose.: 208 mg/dL (2019 11:46)    I&O's Summary    2019 07:01  -  2019 07:00  --------------------------------------------------------  IN: 1440 mL / OUT: 1700 mL / NET: -260 mL        PHYSICAL EXAM:  HEAD:  Atraumatic, Normocephalic  NECK: Supple, No   JVD  CHEST/LUNG:   no     rales,     no,    rhonchi  HEART: Regular rate and rhythm;         murmur  ABDOMEN: Soft, Nontender, ;   EXTREMITIES:   no     edema  NEUROLOGY:  alert    LABS:                        11.7   8.61  )-----------( 239      ( 2019 07:56 )             36.0         144  |  109<H>  |  23  ----------------------------<  152<H>  3.6   |  22  |  1.27    Ca    9.2      2019 05:23            Urinalysis Basic - ( 2019 09:58 )    Color: Yellow / Appearance: Clear / S.016 / pH: x  Gluc: x / Ketone: Negative  / Bili: Negative / Urobili: 0.2   Blood: x / Protein: Negative / Nitrite: Negative   Leuk Esterase: Negative / RBC: >50 /hpf / WBC 0.2 /HPF   Sq Epi: x / Non Sq Epi: FEW / Bacteria: x            Hemoglobin A1C, Whole Blood: 6.9 % ( @ 08:24)            Consultant(s) Notes Reviewed:      Care Discussed with Consultants/Other Providers: no compalints/  bp has  been decreasing   pt wishaes  to  leave  today    REVIEW OF SYSTEMS:  GEN: no fever,    no chills  RESP: no SOB,   no cough  CVS: no chest pain,   no palpitations  GI: no abdominal pain,   no nausea,   no vomiting,   no constipation,   no diarrhea  : no dysuria,   no frequency  NEURO: no headache,   no dizziness  PSYCH: no depression,   not anxious  Derm : no rash    MEDICATIONS  (STANDING):  amLODIPine   Tablet 5 milliGRAM(s) Oral daily  aspirin enteric coated 81 milliGRAM(s) Oral daily  atorvastatin 40 milliGRAM(s) Oral at bedtime  carvedilol 6.25 milliGRAM(s) Oral every 12 hours  dextrose 5%. 1000 milliLiter(s) (50 mL/Hr) IV Continuous <Continuous>  dextrose 50% Injectable 12.5 Gram(s) IV Push once  dextrose 50% Injectable 25 Gram(s) IV Push once  dextrose 50% Injectable 25 Gram(s) IV Push once  fenofibrate Tablet 145 milliGRAM(s) Oral daily  heparin  Injectable 5000 Unit(s) SubCutaneous every 12 hours  insulin lispro (HumaLOG) corrective regimen sliding scale   SubCutaneous three times a day before meals  insulin lispro (HumaLOG) corrective regimen sliding scale   SubCutaneous at bedtime  losartan 50 milliGRAM(s) Oral daily  sodium chloride 0.9%. 1000 milliLiter(s) (50 mL/Hr) IV Continuous <Continuous>  tamsulosin 0.4 milliGRAM(s) Oral at bedtime  zolpidem 5 milliGRAM(s) Oral at bedtime    MEDICATIONS  (PRN):  dextrose 40% Gel 15 Gram(s) Oral once PRN Blood Glucose LESS THAN 70 milliGRAM(s)/deciliter  glucagon  Injectable 1 milliGRAM(s) IntraMuscular once PRN Glucose LESS THAN 70 milligrams/deciliter  LORazepam     Tablet 0.5 milliGRAM(s) Oral two times a day PRN Anxiety  oxyCODONE    5 mG/acetaminophen 325 mG 1 Tablet(s) Oral every 8 hours PRN Moderate Pain (4 - 6)  oxyCODONE    5 mG/acetaminophen 325 mG 2 Tablet(s) Oral every 6 hours PRN Severe Pain (7 - 10)  sodium chloride 0.65% Nasal 1 Spray(s) Both Nostrils three times a day PRN Nasal Congestion      Vital Signs Last 24 Hrs  T(C): 36.7 (2019 04:48), Max: 37 (2019 13:04)  T(F): 98 (2019 04:48), Max: 98.6 (2019 13:04)  HR: 68 (2019 04:48) (56 - 80)  BP: 164/91 (2019 06:14) (164/91 - 223/99)  BP(mean): --  RR: 18 (2019 04:48) (18 - 18)  SpO2: 96% (2019 04:48) (94% - 96%)  CAPILLARY BLOOD GLUCOSE      POCT Blood Glucose.: 148 mg/dL (2019 07:47)  POCT Blood Glucose.: 131 mg/dL (2019 21:55)  POCT Blood Glucose.: 134 mg/dL (2019 16:46)  POCT Blood Glucose.: 195 mg/dL (2019 12:57)  POCT Blood Glucose.: 208 mg/dL (2019 11:46)    I&O's Summary    2019 07:01  -  2019 07:00  --------------------------------------------------------  IN: 1440 mL / OUT: 1700 mL / NET: -260 mL        PHYSICAL EXAM:  HEAD:  Atraumatic, Normocephalic  NECK: Supple, No   JVD  CHEST/LUNG:   no     rales,     no,    rhonchi  HEART: Regular rate and rhythm;         murmur  ABDOMEN: Soft, Nontender, ;   EXTREMITIES:   no     edema  NEUROLOGY:  alert    LABS:                        11.7   8.61  )-----------( 239      ( 2019 07:56 )             36.0     -    144  |  109<H>  |  23  ----------------------------<  152<H>  3.6   |  22  |  1.27    Ca    9.2      2019 05:23            Urinalysis Basic - ( 2019 09:58 )    Color: Yellow / Appearance: Clear / S.016 / pH: x  Gluc: x / Ketone: Negative  / Bili: Negative / Urobili: 0.2   Blood: x / Protein: Negative / Nitrite: Negative   Leuk Esterase: Negative / RBC: >50 /hpf / WBC 0.2 /HPF   Sq Epi: x / Non Sq Epi: FEW / Bacteria: x            Hemoglobin A1C, Whole Blood: 6.9 % ( @ 08:24)            Consultant(s) Notes Reviewed:      Care Discussed with Consultants/Other Providers:

## 2019-01-21 NOTE — DISCHARGE NOTE ADULT - CARE PLAN
Principal Discharge DX:	Kidney stone  Secondary Diagnosis:	Hypertensive urgency  Secondary Diagnosis:	Diabetes Principal Discharge DX:	Kidney stone  Goal:	s/p CT abd / pelvis  w/  + renal stone  / iv hydration / pain meds and seen and followed by urology  Assessment and plan of treatment:	Tae your medications as directed   Follow up with urology - Dr. Rae  Secondary Diagnosis:	Hypertensive urgency  Goal:	vital signs trended  Assessment and plan of treatment:	Take your medications as prescribed   Follow up as an out-pt  Secondary Diagnosis:	Diabetes  Goal:	Fs ac and hs  Assessment and plan of treatment:	Follow up as an out-pt Principal Discharge DX:	Kidney stone  Goal:	s/p CT abd / pelvis  w/  + renal stone  / iv hydration / pain meds and seen and followed by urology  Assessment and plan of treatment:	Tae your medications as directed   Follow up with urology - Dr. Rae  Secondary Diagnosis:	Hypertensive urgency  Goal:	vital signs trended / BP meds adjusted  Assessment and plan of treatment:	Take your medications as prescribed   Follow up as an out-pt  Secondary Diagnosis:	Diabetes  Goal:	Fs ac and hs  Assessment and plan of treatment:	Follow up as an out-pt  Home meds

## 2019-01-21 NOTE — DISCHARGE NOTE ADULT - PATIENT PORTAL LINK FT
You can access the [facility] Patient Portal, offered by [facility], by registering with the following website: [email or web address]

## 2019-01-21 NOTE — DISCHARGE NOTE ADULT - CARE PROVIDER_API CALL
Dallas Sun), Family Medicine  [address] [place] [address]  Phone: [phone]  Fax: [phone]    Troy Rae), Urology  [address] [place] [address]  Phone: [phone]  Fax: [phone]

## 2019-01-21 NOTE — DISCHARGE NOTE ADULT - MEDICATION SUMMARY - MEDICATIONS TO TAKE
I will START or STAY ON the medications listed below when I get home from the hospital:    Low Dose ASA 81 mg oral tablet  -- 1 tab(s) by mouth once a day  -- Indication: For Heart     losartan 100 mg oral tablet  -- 1 tab(s) by mouth once a day  -- Indication: For Heart     tamsulosin 0.4 mg oral capsule  -- 1 cap(s) by mouth once a day  -- Indication: For BPH    LORazepam 0.5 mg oral tablet  -- 1 tab(s) by mouth 2 times a day, As Needed  -- Indication: For mood  /anxiety     metFORMIN 500 mg oral tablet, extended release  -- 4 tab(s) by mouth once a day with dinner  -- Indication: For DMt2    Victoza 18 mg/3 mL subcutaneous solution  -- 1.8 milligram(s) subcutaneous once a day  -- Indication: For DMt2    glimepiride 1 mg oral tablet  -- 3  by mouth once a day (at bedtime)  -- Indication: For Dmt2    rosuvastatin 40 mg oral tablet  -- 1 tab(s) by mouth once a day (at bedtime)  -- Indication: For Cholesterol     fenofibrate 160 mg oral tablet  -- 1 tab(s) by mouth once a day  -- Indication: For Cholesterol     carvedilol 6.25 mg oral tablet  -- 1 tab(s) by mouth 2 times a day  -- Indication: For Heart     amLODIPine 2.5 mg oral tablet  -- 3 tab(s) by mouth once a day  -- Indication: For Heart     sodium chloride 0.65% nasal spray  -- 1 spray(s) into nose 3 times a day MDD:1 month supply  -- Indication: For nasal spray    Spectravite Senior oral tablet  -- 1 tab(s) by mouth once a day  -- Indication: For supplement     Vitamin D3  -- 1 tab(s) by mouth once a day  -- Indication: For supplement

## 2019-01-21 NOTE — DISCHARGE NOTE ADULT - MEDICATION SUMMARY - MEDICATIONS TO CHANGE
I will SWITCH the dose or number of times a day I take the medications listed below when I get home from the hospital:    losartan 50 mg oral tablet  -- 1 tab(s) by mouth once a day    amLODIPine 5 mg oral tablet  -- 1 tab(s) by mouth 2 times a day

## 2019-01-26 LAB — COMPN STONE: SIGNIFICANT CHANGE UP

## 2019-01-28 LAB — SURGICAL PATHOLOGY STUDY: SIGNIFICANT CHANGE UP

## 2019-03-22 NOTE — DISCHARGE NOTE ADULT - CARE PLAN
[de-identified] :  Sparkle first presented to [facility] [place]ology for management of anemia at 15 years of age\par Sparkle was diagnosed  with colitis 2 years ago after presenting bleeding per rectum, abdominal pain, and weight loss to an outside institution. Family unhappy with care so transferred to [facility]. \par Saw Dr Moore of GI to establish care in Feb 2019 - colonoscopy revealed active colitis and proctitis. \par Now on infliximab - every 2 weeks for which she has received 3 infusions \par \par Bloody stools improving- last had small amount in stool 5 days ago, was previously occurring with every bowel motion. \par Has lost 10-15 lbs over past 4 months. .\par Diet is somewhat limited at present as she has been advised to avoid dairy, flour and red meat by her GI team. She currently eats fish, chicken, lentils and beans\par \par Of note, her family travelled with Sparkle to the [place] for 3 months to try and seek care for her there. Since returning Sparkle remains significantly fatigued and as such has not been in school. It has been advised for Sparkle to return to school for the full year in September 2019.  Principal Discharge DX:	Fever  Goal:	Good health  Assessment and plan of treatment:	Take antibiotic as prescribed until finished  Secondary Diagnosis:	BPH with obstruction/lower urinary tract symptoms  Assessment and plan of treatment:	Continue current home medications and follow up with your primary care provider  Secondary Diagnosis:	High cholesterol  Assessment and plan of treatment:	Continue current home medications and follow up with your primary care provider  Secondary Diagnosis:	HTN (hypertension)  Assessment and plan of treatment:	Continue current home medications and follow up with your primary care provider  Secondary Diagnosis:	Ureteral calculus, left  Assessment and plan of treatment:	Call Dr. Rae's office next week to schedule a follow appointment for stent removal and further management

## 2019-09-09 NOTE — ED ADULT NURSE NOTE - NS ED NOTE ABUSE SUSPICION NEGLECT YN
Continue your current medications and treatment.    Have th xrays and the labs done and call for results.    Follow up in the ofice in 6 months.     No

## 2019-09-23 ENCOUNTER — FORM ENCOUNTER (OUTPATIENT)
Age: 74
End: 2019-09-23

## 2019-09-24 ENCOUNTER — OUTPATIENT (OUTPATIENT)
Dept: OUTPATIENT SERVICES | Facility: HOSPITAL | Age: 74
LOS: 1 days | End: 2019-09-24
Payer: MEDICARE

## 2019-09-24 ENCOUNTER — APPOINTMENT (OUTPATIENT)
Dept: ULTRASOUND IMAGING | Facility: IMAGING CENTER | Age: 74
End: 2019-09-24
Payer: MEDICARE

## 2019-09-24 ENCOUNTER — APPOINTMENT (OUTPATIENT)
Dept: UROLOGY | Facility: CLINIC | Age: 74
End: 2019-09-24
Payer: MEDICARE

## 2019-09-24 VITALS
HEIGHT: 68 IN | BODY MASS INDEX: 26.83 KG/M2 | HEART RATE: 60 BPM | DIASTOLIC BLOOD PRESSURE: 91 MMHG | SYSTOLIC BLOOD PRESSURE: 196 MMHG | TEMPERATURE: 98.4 F | WEIGHT: 177 LBS

## 2019-09-24 VITALS — SYSTOLIC BLOOD PRESSURE: 170 MMHG | HEART RATE: 60 BPM | DIASTOLIC BLOOD PRESSURE: 82 MMHG

## 2019-09-24 DIAGNOSIS — Z90.49 ACQUIRED ABSENCE OF OTHER SPECIFIED PARTS OF DIGESTIVE TRACT: Chronic | ICD-10-CM

## 2019-09-24 DIAGNOSIS — Z98.890 OTHER SPECIFIED POSTPROCEDURAL STATES: Chronic | ICD-10-CM

## 2019-09-24 DIAGNOSIS — Z00.8 ENCOUNTER FOR OTHER GENERAL EXAMINATION: ICD-10-CM

## 2019-09-24 PROCEDURE — 99213 OFFICE O/P EST LOW 20 MIN: CPT

## 2019-09-24 PROCEDURE — 76770 US EXAM ABDO BACK WALL COMP: CPT

## 2019-09-24 PROCEDURE — 76770 US EXAM ABDO BACK WALL COMP: CPT | Mod: 26

## 2019-09-24 RX ORDER — OXYCODONE AND ACETAMINOPHEN 5; 325 MG/1; MG/1
5-325 TABLET ORAL
Refills: 0 | Status: COMPLETED | COMMUNITY
End: 2019-09-24

## 2019-10-02 NOTE — HISTORY OF PRESENT ILLNESS
[FreeTextEntry1] : Here for one year follow up visit.\par Renal bladder US today:  small non obstructing kidney stone, several small bladder calculi (8 mm).  \par No hydronephrosis. \par \par Feels well. Denies abdominal pain, flank pain.\par Remains on tamsulosin.  Urinary function excellent.  No hematuria, dysuria.\par \par s/p Left Ureteroscopy with stone extraction Jan 2018.\par Ureteral stent was removed on 2/22/18. \par

## 2019-11-06 ENCOUNTER — EMERGENCY (EMERGENCY)
Facility: HOSPITAL | Age: 74
LOS: 1 days | Discharge: HOME | End: 2019-11-06
Attending: EMERGENCY MEDICINE
Payer: MEDICARE

## 2019-11-06 VITALS
DIASTOLIC BLOOD PRESSURE: 79 MMHG | OXYGEN SATURATION: 96 % | TEMPERATURE: 98 F | SYSTOLIC BLOOD PRESSURE: 129 MMHG | RESPIRATION RATE: 16 BRPM | HEART RATE: 72 BPM

## 2019-11-06 VITALS
RESPIRATION RATE: 18 BRPM | DIASTOLIC BLOOD PRESSURE: 80 MMHG | OXYGEN SATURATION: 97 % | SYSTOLIC BLOOD PRESSURE: 162 MMHG | TEMPERATURE: 98 F | HEART RATE: 70 BPM

## 2019-11-06 DIAGNOSIS — Z90.49 ACQUIRED ABSENCE OF OTHER SPECIFIED PARTS OF DIGESTIVE TRACT: Chronic | ICD-10-CM

## 2019-11-06 DIAGNOSIS — Z98.890 OTHER SPECIFIED POSTPROCEDURAL STATES: Chronic | ICD-10-CM

## 2019-11-06 LAB
ALBUMIN SERPL ELPH-MCNC: 4.6 G/DL — SIGNIFICANT CHANGE UP (ref 3.3–5)
ALP SERPL-CCNC: 60 U/L — SIGNIFICANT CHANGE UP (ref 40–120)
ALT FLD-CCNC: 21 U/L — SIGNIFICANT CHANGE UP (ref 10–45)
ANION GAP SERPL CALC-SCNC: 15 MMOL/L — SIGNIFICANT CHANGE UP (ref 5–17)
APPEARANCE UR: CLEAR — SIGNIFICANT CHANGE UP
AST SERPL-CCNC: 19 U/L — SIGNIFICANT CHANGE UP (ref 10–40)
BACTERIA # UR AUTO: NEGATIVE — SIGNIFICANT CHANGE UP
BASE EXCESS BLDV CALC-SCNC: 1.4 MMOL/L — SIGNIFICANT CHANGE UP (ref -2–2)
BILIRUB SERPL-MCNC: 0.6 MG/DL — SIGNIFICANT CHANGE UP (ref 0.2–1.2)
BILIRUB UR-MCNC: NEGATIVE — SIGNIFICANT CHANGE UP
BUN SERPL-MCNC: 42 MG/DL — HIGH (ref 7–23)
CA-I SERPL-SCNC: 1.23 MMOL/L — SIGNIFICANT CHANGE UP (ref 1.12–1.3)
CALCIUM SERPL-MCNC: 9.8 MG/DL — SIGNIFICANT CHANGE UP (ref 8.4–10.5)
CHLORIDE BLDV-SCNC: 105 MMOL/L — SIGNIFICANT CHANGE UP (ref 96–108)
CHLORIDE SERPL-SCNC: 104 MMOL/L — SIGNIFICANT CHANGE UP (ref 96–108)
CO2 BLDV-SCNC: 28 MMOL/L — SIGNIFICANT CHANGE UP (ref 22–30)
CO2 SERPL-SCNC: 24 MMOL/L — SIGNIFICANT CHANGE UP (ref 22–31)
COLOR SPEC: YELLOW — SIGNIFICANT CHANGE UP
CREAT SERPL-MCNC: 1.67 MG/DL — HIGH (ref 0.5–1.3)
DIFF PNL FLD: NEGATIVE — SIGNIFICANT CHANGE UP
EPI CELLS # UR: 1 — SIGNIFICANT CHANGE UP
GAS PNL BLDV: 144 MMOL/L — SIGNIFICANT CHANGE UP (ref 135–145)
GAS PNL BLDV: SIGNIFICANT CHANGE UP
GAS PNL BLDV: SIGNIFICANT CHANGE UP
GLUCOSE BLDV-MCNC: 167 MG/DL — HIGH (ref 70–99)
GLUCOSE SERPL-MCNC: 178 MG/DL — HIGH (ref 70–99)
GLUCOSE UR QL: NEGATIVE — SIGNIFICANT CHANGE UP
HCO3 BLDV-SCNC: 26 MMOL/L — SIGNIFICANT CHANGE UP (ref 21–29)
HCT VFR BLD CALC: 35.3 % — LOW (ref 39–50)
HCT VFR BLDA CALC: 37 % — LOW (ref 39–50)
HGB BLD CALC-MCNC: 12 G/DL — LOW (ref 13–17)
HGB BLD-MCNC: 11.7 G/DL — LOW (ref 13–17)
HYALINE CASTS # UR AUTO: 12 /LPF — HIGH (ref 0–7)
KETONES UR-MCNC: SIGNIFICANT CHANGE UP
LACTATE BLDV-MCNC: 2.2 MMOL/L — HIGH (ref 0.7–2)
LEUKOCYTE ESTERASE UR-ACNC: NEGATIVE — SIGNIFICANT CHANGE UP
MAGNESIUM SERPL-MCNC: 2.1 MG/DL — SIGNIFICANT CHANGE UP (ref 1.6–2.6)
MCHC RBC-ENTMCNC: 29.6 PG — SIGNIFICANT CHANGE UP (ref 27–34)
MCHC RBC-ENTMCNC: 33.1 GM/DL — SIGNIFICANT CHANGE UP (ref 32–36)
MCV RBC AUTO: 89.4 FL — SIGNIFICANT CHANGE UP (ref 80–100)
NITRITE UR-MCNC: NEGATIVE — SIGNIFICANT CHANGE UP
NRBC # BLD: 0 /100 WBCS — SIGNIFICANT CHANGE UP (ref 0–0)
PCO2 BLDV: 47 MMHG — SIGNIFICANT CHANGE UP (ref 35–50)
PH BLDV: 7.37 — SIGNIFICANT CHANGE UP (ref 7.35–7.45)
PH UR: 5.5 — SIGNIFICANT CHANGE UP (ref 5–8)
PLATELET # BLD AUTO: 221 K/UL — SIGNIFICANT CHANGE UP (ref 150–400)
PO2 BLDV: 43 MMHG — SIGNIFICANT CHANGE UP (ref 25–45)
POTASSIUM BLDV-SCNC: 6.8 MMOL/L — CRITICAL HIGH (ref 3.5–5.3)
POTASSIUM SERPL-MCNC: 4.3 MMOL/L — SIGNIFICANT CHANGE UP (ref 3.5–5.3)
POTASSIUM SERPL-SCNC: 4.3 MMOL/L — SIGNIFICANT CHANGE UP (ref 3.5–5.3)
PROT SERPL-MCNC: 7.6 G/DL — SIGNIFICANT CHANGE UP (ref 6–8.3)
PROT UR-MCNC: ABNORMAL
RBC # BLD: 3.95 M/UL — LOW (ref 4.2–5.8)
RBC # FLD: 12.9 % — SIGNIFICANT CHANGE UP (ref 10.3–14.5)
RBC CASTS # UR COMP ASSIST: 2 /HPF — SIGNIFICANT CHANGE UP (ref 0–4)
SAO2 % BLDV: 70 % — SIGNIFICANT CHANGE UP (ref 67–88)
SODIUM SERPL-SCNC: 143 MMOL/L — SIGNIFICANT CHANGE UP (ref 135–145)
SP GR SPEC: 1.02 — SIGNIFICANT CHANGE UP (ref 1.01–1.02)
TROPONIN T, HIGH SENSITIVITY RESULT: 31 NG/L — SIGNIFICANT CHANGE UP (ref 0–51)
TROPONIN T, HIGH SENSITIVITY RESULT: 41 NG/L — SIGNIFICANT CHANGE UP (ref 0–51)
UROBILINOGEN FLD QL: NEGATIVE — SIGNIFICANT CHANGE UP
WBC # BLD: 6.55 K/UL — SIGNIFICANT CHANGE UP (ref 3.8–10.5)
WBC # FLD AUTO: 6.55 K/UL — SIGNIFICANT CHANGE UP (ref 3.8–10.5)
WBC UR QL: 2 /HPF — SIGNIFICANT CHANGE UP (ref 0–5)

## 2019-11-06 PROCEDURE — 82435 ASSAY OF BLOOD CHLORIDE: CPT

## 2019-11-06 PROCEDURE — 84132 ASSAY OF SERUM POTASSIUM: CPT

## 2019-11-06 PROCEDURE — 99285 EMERGENCY DEPT VISIT HI MDM: CPT

## 2019-11-06 PROCEDURE — 84484 ASSAY OF TROPONIN QUANT: CPT

## 2019-11-06 PROCEDURE — 82962 GLUCOSE BLOOD TEST: CPT

## 2019-11-06 PROCEDURE — 71046 X-RAY EXAM CHEST 2 VIEWS: CPT

## 2019-11-06 PROCEDURE — 85014 HEMATOCRIT: CPT

## 2019-11-06 PROCEDURE — 82803 BLOOD GASES ANY COMBINATION: CPT

## 2019-11-06 PROCEDURE — 93010 ELECTROCARDIOGRAM REPORT: CPT

## 2019-11-06 PROCEDURE — 81001 URINALYSIS AUTO W/SCOPE: CPT

## 2019-11-06 PROCEDURE — 99284 EMERGENCY DEPT VISIT MOD MDM: CPT | Mod: 25

## 2019-11-06 PROCEDURE — 82330 ASSAY OF CALCIUM: CPT

## 2019-11-06 PROCEDURE — 83735 ASSAY OF MAGNESIUM: CPT

## 2019-11-06 PROCEDURE — 84295 ASSAY OF SERUM SODIUM: CPT

## 2019-11-06 PROCEDURE — 85027 COMPLETE CBC AUTOMATED: CPT

## 2019-11-06 PROCEDURE — 93005 ELECTROCARDIOGRAM TRACING: CPT

## 2019-11-06 PROCEDURE — 83605 ASSAY OF LACTIC ACID: CPT

## 2019-11-06 PROCEDURE — 82947 ASSAY GLUCOSE BLOOD QUANT: CPT

## 2019-11-06 PROCEDURE — 80053 COMPREHEN METABOLIC PANEL: CPT

## 2019-11-06 PROCEDURE — 71046 X-RAY EXAM CHEST 2 VIEWS: CPT | Mod: 26

## 2019-11-06 RX ORDER — SODIUM CHLORIDE 9 MG/ML
1000 INJECTION, SOLUTION INTRAVENOUS ONCE
Refills: 0 | Status: COMPLETED | OUTPATIENT
Start: 2019-11-06 | End: 2019-11-06

## 2019-11-06 RX ADMIN — SODIUM CHLORIDE 1000 MILLILITER(S): 9 INJECTION, SOLUTION INTRAVENOUS at 19:14

## 2019-11-06 NOTE — ED PROVIDER NOTE - NSFOLLOWUPCLINICS_GEN_ALL_ED_FT
[facility]  Cardiology  [address] [place] [id number]  Phone: [phone]  Fax:   Follow Up Time:

## 2019-11-06 NOTE — ED PROVIDER NOTE - NS ED ROS FT
Constitutional: No fever or chills  Eyes: No visual changes, eye pain or redness  HEENT: No throat pain, ear pain, nasal pain. No nose bleeding.  CV: No chest pain or lower extremity edema  Resp: No SOB no cough  GI: No abd pain. No nausea or vomiting. No diarrhea. No constipation.   : No dysuria, hematuria.   MSK: No musculoskeletal pain  Skin: No rash  Neuro: No headache. No numbness or tingling. No weakness. +syncope

## 2019-11-06 NOTE — ED ADULT NURSE NOTE - OBJECTIVE STATEMENT
73 year old male at baseline mental status, a+ox4, BIBA was at the nail salon and had a period of unresponsiveness. Pt states he was sweaty at the moment that this occurred and recollects he was thirsty and became lightheaded before he "passed out."  Pt presents with fingerstick 175, hypertensive on arrival.  arrives with 18g IVL to left hand. Pt is stuttering, and states its his baseline. Trinity stroke scale negative. no difficulty speaking or using appropriate words. equal facial expressions, moves all extremities x4, baseline strength.  No nausea/vomiting/ fevers/chest pain/sob.  Abd soft nontender, nondistended.  No pedal edema.  reports feeling anxious. emotional support provided. on cardiac monitor. ekg performed. md at bedside

## 2019-11-06 NOTE — ED PROVIDER NOTE - CLINICAL SUMMARY MEDICAL DECISION MAKING FREE TEXT BOX
Dr. Bergman (Attending Physician)  Pt. with ho dm, htn pw syncopal event while in nail salon preceded by diaphoresis, denies chest pain, shortness of breath, nausea, vomiting, diarrhea. FS normal.  RRR, Lungs clear. ECG with RBBB and LAFB which can be concerning complete heart block causing symptoms recommended observation for EP eval, but patient does not want to stay. States he has anxiety and believes this was anxiety attack.  Will reassess after labs.

## 2019-11-06 NOTE — ED ADULT NURSE NOTE - CAS ELECT INFOMATION PROVIDED
Manisha Giron is a 79 y.o. female who was seen in clinic today (7/17/2018) for a full physical.        Assessment & Plan:   Diagnoses and all orders for this visit:    1. Medicare annual wellness visit, subsequent    2. Encounter for immunization    3. Screening for alcoholism  -     Annual  Alcohol Screen 15 min ()    4. Essential hypertension, benign- at goal, continue current treatment, labs from renal reviewed    5. Gastroesophageal reflux disease without esophagitis- chronic, stable, she had concerns about B12 and H2B, these were discussed, no changes. Continue Zantac and B12 supplement. 6. Gastroparesis- chronic, stable, not ideally controlled, defer to specialist     7. Irritable bowel syndrome with diarrhea- chronic, stable, not ideally controlled, defer to specialist     8. Neuropathy- stable, continue to monitor. 9. Spinal stenosis of lumbar region without neurogenic claudication- intermittent, will defer to specialist, continue w/ heat and stretching, can use Tylenol, needs at avoid NSAIDs    10. CKD (chronic kidney disease) stage 3, GFR 30-59 ml/min- stable, defer to specialist     11. Recurrent major depressive disorder, in remission ([facility] [address]- not ideally controlled, reviewed PHQ-9, recommended counseling but she declined, recommended to d/w her psychiatrist.         Follow-up Disposition:  Return in about 1 year (around 7/17/2019), or if symptoms worsen or fail to improve, for FULL PHYSICAL - 30 minutes. ------------------------------------------------------------------------------------------    Subjective: Annual Wellness Visit- Subsequent Visit    End of Life Planning: This was discussed with her today and she has an advanced directive - a copy HAS NOT been provided. Reviewed DNR/DNI and patient is interested in remaining a DNR, no changes made. She has a copy at home.       Depression Screen:  PHQ over the last two weeks 7/17/2018   PHQ Not Done -   Little interest or pleasure in doing things Not at all   Feeling down, depressed or hopeless Nearly every day   Total Score PHQ 2 3   Trouble falling or staying asleep, or sleeping too much Nearly every day   Feeling tired or having little energy Nearly every day   Poor appetite or overeating Not at all   Feeling bad about yourself - or that you are a failure or have let yourself or your family down Not at all   Trouble concentrating on things such as school, work, reading or watching TV Not at all   Moving or speaking so slowly that other people could have noticed; or the opposite being so fidgety that others notice Not at all   Thoughts of being better off dead, or hurting yourself in some way Not at all   PHQ 9 Score 9   How difficult have these problems made it for you to do your work, take care of your home and get along with others Very difficult         Fall Risk:   Fall Risk Assessment, last 12 mths 7/17/2018   Able to walk? Yes   Fall in past 12 months? Yes   Fall with injury? Yes   Number of falls in past 12 months 1   Fall Risk Score 2       Abuse Screen:  Abuse Screening Questionnaire 7/14/2017   Do you ever feel afraid of your partner? N   Are you in a relationship with someone who physically or mentally threatens you? N   Is it safe for you to go home? Y         Alcohol Risk Factor Screening: On any occasion during the past 3 months, have you had more than 3 drinks containing alcohol? No  Do you average more than 7 drinks per week? No    Hearing Loss: The patient wears hearing aids. Cognition Screen:  Has your family/caregiver stated any concerns about your memory: no    Activities of Daily Living:    Requires assistance with: no ADLs  Home contains: handrails and grab bars  Exercise: very active    Adult Nutrition Screen:  No risk factors noted.        Health Maintenance  Daily Aspirin: yes  Bone Density: UTD - done in 10/20/15  Glaucoma Screening: records requested    Immunizations:    Influenza: she will plan on getting it this fall. Tetanus: patient declines. Shingles: patient declines. Pneumonia: not up to date - has allergy. Cancer screening:    Cervical: n/a. Breast: n/a. Colon: n/a. Patient Care Team:  Otoniel King MD as PCP - General (Internal Medicine)  Hoang Owen MD (Nephrology)  Honey Meek MD (Dermatology)  Valarie Wilson MD (Ophthalmology)  Tonia Perea NP (Psychiatry)  Peter Patel (Pain Management)  Bruce Solorio MD as Physician (Gastroenterology)  Nick Rojas RN as Ambulatory Care Navigator  Anthony(Loreta) Luz Marina Martin MD as Physician (Orthopedic Surgery)       The following sections were reviewed & updated as appropriate: PMH, PSH, FH, and SH. Patient Active Problem List   Diagnosis Code    Essential hypertension, benign I10    DDD (degenerative disc disease), cervical M50.30    Hypertriglyceridemia E78.1    Depression with anxiety F41.8    Allergic rhinitis J30.9    DDD (degenerative disc disease), lumbar M51.36    Other and unspecified noninfectious gastroenteritis and colitis(558.9) K52.9    CKD (chronic kidney disease) stage 3, GFR 30-59 ml/min N18.3    Pre-diabetes R73.03    History of pulmonary embolism Z86.711    Gastroparesis K31.84    Spinal stenosis, lumbar M48.061    Fibromyalgia M79.7    Neuropathy G62.9    Gastroesophageal reflux disease without esophagitis K21.9    Irritable bowel syndrome with diarrhea K58.0          Prior to Admission medications    Medication Sig Start Date End Date Taking? Authorizing Provider   bismuth subsalicylate (PEPTO-BISMOL PO) Take  by mouth as needed. Yes Historical Provider   polyethylene glycol (MIRALAX) 17 gram packet Take 17 g by mouth as needed. Yes Historical Provider   Wheat Dextrin (BENEFIBER CLEAR) 3 gram/3.5 gram pwpk Take  by mouth daily.    Yes Historical Provider   metoprolol tartrate (LOPRESSOR) 25 mg tablet TAKE ONE-QUARTER TABLET BY MOUTH EVERY DAY 5/22/18  Yes Otoniel King MD   amLODIPine (NORVASC) 5 mg tablet TAKE 1 TAB BY MOUTH DAILY. 11/7/17  Yes Silvano Sr MD   aspirin delayed-release 81 mg tablet Take 1 Tab by mouth daily. 8/4/17  Yes Neda FISHER NP   LORazepam (ATIVAN) 0.5 mg tablet Take 0.5 mg by mouth every morning. Yes Historical Provider   hyoscyamine SL (LEVSIN/SL) 0.125 mg SL tablet 0.125 mg by SubLINGual route Before breakfast, lunch, and dinner. Yes Historical Provider   acetaminophen (TYLENOL ARTHRITIS PAIN) 650 mg CR tablet Take 650 mg by mouth every six (6) hours as needed for Pain. Yes Historical Provider   raNITIdine (ZANTAC) 150 mg tablet Take 150 mg by mouth Before breakfast and dinner. Yes Historical Provider   cholecalciferol (VITAMIN D3) 1,000 unit tablet Take 1,000 Units by mouth daily. Yes Historical Provider   FERROUS FUMARATE/VIT BCOMP,C (SUPER B COMPLEX PO) Take  by mouth daily (after dinner). Yes Historical Provider   calcium carbonate (TUMS) 300 mg (750 mg) chewable tablet Take 1 Tab by mouth daily. Historical Provider          Allergies   Allergen Reactions    Latex Itching    Dilaudid [Hydromorphone (Bulk)] Other (comments)     ? loss of consciousness    Altace [Ramipril] Unknown (comments)    Ambien [Zolpidem] Unknown (comments)    [employer] Test [Blood Sugar Diagnostic, Disc-Type] Unknown (comments)    Aspirin Unknown (comments)    Astelin [Azelastine] Unknown (comments)    Atenolol Unknown (comments)    Banex La Unknown (comments)    Benicar [Olmesartan] Unknown (comments)    Carbocaine [Mepivacaine] Unknown (comments)    Cleocin [Clindamycin Hcl] Hives    Codeine Nausea and Vomiting    Codeine-Asa-Salicyl-Acetam-Caf Other (comments)     Stomach pain    Cortisporin [Neomy-Polymyxinb-Bacitracin-Hc] Unknown (comments)    Cymbalta [Duloxetine] Other (comments)    Cytotec [Misoprostol] Unknown (comments)    Dilaudid [Hydromorphone] Unknown (comments)     Patient states that she was unable to respond, but was breathing    Effexor [Venlafaxine] Unknown (comments)    Epinephrine Unknown (comments)     Heart racing    Estra-D Unknown (comments)    Estrace [Estradiol] Unknown (comments)    Fentanyl Nausea and Vomiting and Unknown (comments)     shakes    Flexeril [Cyclobenzaprine] Unable to Obtain     \"drugs me\"    Flonase [Fluticasone] Unknown (comments)    Flunisolide Unknown (comments)    Guiatussin W/Codeine Unknown (comments)    Ketek [Telithromycin] Nausea Only    Klonopin [Clonazepam] Unknown (comments)    Lexapro [Escitalopram] Nausea and Vomiting    Lodine [Etodolac] Nausea and Vomiting    Morphine Unknown (comments)    Nsaids (Non-Steroidal Anti-Inflammatory Drug) Nausea and Vomiting    Other Medication Unknown (comments)     Gareth Alford    Paxil [Paroxetine Hcl] Nausea and Vomiting    Pcn [Penicillins] Hives    Percocet [Oxycodone-Acetaminophen] Unknown (comments)    Percodan [Oxycodone Hcl-Oxycodone-Asa] Unknown (comments)    Pneumococcal 23-Mary Ps Vaccine Swelling    Proctocream-Hc [Hydrocortisone] Unknown (comments)    Prozac [Fluoxetine] Nausea Only     Suicidal visions      Pseudoephedrine Other (comments)    Quinidine Unknown (comments)    Reglan [Metoclopramide] Unknown (comments)    Remeron [Mirtazapine] Other (comments)    Resaid Other (comments)     inflammed eyes    Robaxin [Methocarbamol] Unknown (comments)    Sonata [Zaleplon] Unknown (comments)    Sulfa (Sulfonamide Antibiotics) Hives    Surmontil [Trimipramine] Nausea and Vomiting    Talwin [Pentazocine Lactate] Unknown (comments)    Toradol [Ketorolac Tromethamine] Nausea and Vomiting    Ultram [Tramadol] Unknown (comments)    Vancomycin Nausea Only    Vasoconstrictor Drug Unknown (comments)    Vigamox [Moxifloxacin] Rash and Swelling    Vioxx [Rofecoxib] Unknown (comments)    Voltaren [Diclofenac Sodium] Itching    Wellbutrin [Bupropion Hcl] Nausea and Vomiting    Zelnorm [Tegaserod Hydrogen Maleate] Unknown (comments)    Zoloft [Sertraline] Nausea and Vomiting              Review of Systems   Constitutional: Negative for malaise/fatigue and weight loss. Respiratory: Negative for cough and shortness of breath. Cardiovascular: Negative for chest pain, palpitations and leg swelling. Gastrointestinal: Negative for abdominal pain, constipation, diarrhea, heartburn, nausea and vomiting. Genitourinary: Positive for frequency and urgency. Musculoskeletal: Positive for back pain and neck pain. Negative for joint pain and myalgias. Skin: Negative for rash. Neurological: Negative for tingling, sensory change, focal weakness and headaches. Psychiatric/Behavioral: Negative for depression. The patient is not nervous/anxious and does not have insomnia. Objective:   Physical Exam   Constitutional: No distress. thin   Eyes: Conjunctivae are normal. No scleral icterus. Cardiovascular: Regular rhythm and normal heart sounds. No murmur heard. Pulmonary/Chest: Effort normal and breath sounds normal. She has no wheezes. She has no rales. Abdominal: Bowel sounds are normal. She exhibits no mass. There is no hepatosplenomegaly. There is tenderness in the right upper quadrant and epigastric area. Musculoskeletal: She exhibits no edema. Psychiatric: Her mood appears not anxious. She does not exhibit a depressed mood. Visit Vitals    /70    Pulse 98    Temp 98 °F (36.7 °C) (Oral)    Resp 18    Ht 5' 2.1\" (1.577 m)    Wt 111 lb (50.3 kg)    SpO2 98%    BMI 20.24 kg/m2          Advised her to call back or return to office if symptoms worsen/change/persist.  Discussed expected course/resolution/complications of diagnosis in detail with patient. Medication risks/benefits/costs/interactions/alternatives discussed with patient. She was given an after visit summary which includes diagnoses, current medications, & vitals.   She expressed understanding with the diagnosis and plan. Aspects of this note may have been generated using voice recognition software. Despite editing, there may be some syntax errors.        Otoniel King MD DC instructions/follow up with Cardiology, worsening s/s return to ED, pt verbalizes understanding

## 2019-11-06 NOTE — ED ADULT NURSE NOTE - NSIMPLEMENTINTERV_GEN_ALL_ED
Implemented All Universal Safety Interventions:  [place] to call system. Call bell, personal items and telephone within reach. Instruct patient to call for assistance. Room bathroom lighting operational. Non-slip footwear when patient is off stretcher. Physically safe environment: no spills, clutter or unnecessary equipment. Stretcher in lowest position, wheels locked, appropriate side rails in place.

## 2019-11-06 NOTE — ED PROVIDER NOTE - NSFOLLOWUPINSTRUCTIONS_ED_ALL_ED_FT
Follow-up with cardiology in the next 2 days. Return for syncope. Follow-up with cardiology in the next 2 days. Return for syncope. Please return if you have chest pain, shortness of breath, dizziness, fevers, weakness, or have any other concerns.

## 2019-11-06 NOTE — ED PROVIDER NOTE - CARE PLAN
Principal Discharge DX:	Syncope, unspecified syncope type Principal Discharge DX:	Syncope, unspecified syncope type  Secondary Diagnosis:	Right bundle branch block

## 2019-11-06 NOTE — ED PROVIDER NOTE - OBJECTIVE STATEMENT
73yom pmhx of HTN HLD DM BIB EMS for syncopal episode at the [facility]. pt reports sitting down and was getting manicure when "had an anxiety attack" became sweaty and closed eyes for few mins. pt reports hx of similar episodes since teenager with extensive workup and attributed to anxiety. No chest pain or sob. No fever or chills. No nausea or vomiting. no abd pain. +smoker. reports only have breakfast this am. No lunch.

## 2019-11-06 NOTE — ED ADULT NURSE REASSESSMENT NOTE - NS ED NURSE REASSESS COMMENT FT1
Received report from KELSEY Montiel. Patient a/ox3, VSS and in NAD at this time. Patient denies CP/SOB, dizziness/lightheadedness, numbness/tingling/weakness at this time. Lung sounds are clear and equal b/l with no labored breathing noted. Abdomen is soft, nontender and nondistended. Peripheral pulses are strong and equal b/l with no edema noted. Patient aware urine sample is pending. Urinal given to patient at bedside. Patient verbalizes understanding. Cardiac monitor in place p/w NSR, non-slip socks in place, side rails up and call bell within reach. Will continue to monitor and reassess.

## 2019-11-06 NOTE — ED PROVIDER NOTE - PROGRESS NOTE DETAILS
Dr. Bergman (Attending Physician)  Observed for 4 hours with no episodes on monitor. Patient does not want to stay in CDU will give urgent Follow up with cardiology for RBBB and LAFB with syncope.  Patient understands.  Delta trop downtrending. Dr. Bergman (Attending Physician)  Observed for 4 hours with no episodes on monitor. Patient does not want to stay in CDU will give urgent Follow-up with cardiology for RBBB and LAFB with syncope.  Patient understands.  Delta trop downtrending. Will dc.

## 2019-11-06 NOTE — ED PROVIDER NOTE - PATIENT PORTAL LINK FT
You can access the FollowMyHealth Patient Portal offered by [facility] by registering at the following website: [email or web address]. By joining [employer]’s FollowMyHealth portal, you will also be able to view your health information using other applications (apps) compatible with our system.

## 2019-11-08 ENCOUNTER — NON-APPOINTMENT (OUTPATIENT)
Age: 74
End: 2019-11-08

## 2019-11-08 ENCOUNTER — APPOINTMENT (OUTPATIENT)
Dept: CARDIOLOGY | Facility: CLINIC | Age: 74
End: 2019-11-08
Payer: MEDICARE

## 2019-11-08 VITALS
WEIGHT: 175 LBS | BODY MASS INDEX: 26.52 KG/M2 | HEART RATE: 63 BPM | SYSTOLIC BLOOD PRESSURE: 134 MMHG | HEIGHT: 68 IN | OXYGEN SATURATION: 98 % | DIASTOLIC BLOOD PRESSURE: 78 MMHG

## 2019-11-08 DIAGNOSIS — R55 SYNCOPE AND COLLAPSE: ICD-10-CM

## 2019-11-08 DIAGNOSIS — E78.00 PURE HYPERCHOLESTEROLEMIA, UNSPECIFIED: ICD-10-CM

## 2019-11-08 DIAGNOSIS — I44.60 UNSPECIFIED FASCICULAR BLOCK: ICD-10-CM

## 2019-11-08 DIAGNOSIS — H57.89 OTHER SPECIFIED DISORDERS OF EYE AND ADNEXA: ICD-10-CM

## 2019-11-08 DIAGNOSIS — I45.10 UNSPECIFIED RIGHT BUNDLE-BRANCH BLOCK: ICD-10-CM

## 2019-11-08 DIAGNOSIS — E11.9 TYPE 2 DIABETES MELLITUS W/OUT COMPLICATIONS: ICD-10-CM

## 2019-11-08 PROCEDURE — 93000 ELECTROCARDIOGRAM COMPLETE: CPT

## 2019-11-08 PROCEDURE — 99203 OFFICE O/P NEW LOW 30 MIN: CPT

## 2019-11-08 RX ORDER — FENOFIBRATE 160 MG/1
160 TABLET ORAL
Refills: 0 | Status: DISCONTINUED | COMMUNITY
End: 2019-11-08

## 2019-11-08 RX ORDER — AMLODIPINE BESYLATE 5 MG/1
5 TABLET ORAL
Refills: 0 | Status: DISCONTINUED | COMMUNITY
End: 2019-11-08

## 2019-11-08 RX ORDER — MULTIVIT-MIN/FOLIC/VIT K/LYCOP 400-300MCG
25 MCG TABLET ORAL
Refills: 0 | Status: DISCONTINUED | COMMUNITY
End: 2019-11-08

## 2019-11-08 RX ORDER — GLIMEPIRIDE 1 MG/1
1 TABLET ORAL AT BEDTIME
Refills: 0 | Status: ACTIVE | COMMUNITY

## 2019-11-13 PROBLEM — E11.9 TYPE II DIABETES MELLITUS: Status: ACTIVE | Noted: 2019-11-13

## 2019-11-13 NOTE — DISCUSSION/SUMMARY
[Bundle Branch Block] : ~T bundle branch block [None] : There are no changes in medication management [Stable] : stable [Family] : the patient's family [With Me] : with me [Patient] : the patient [FreeTextEntry3] : after recommended testing has been completed

## 2019-11-13 NOTE — PHYSICAL EXAM
[General Appearance - Well Developed] : well developed [General Appearance - Well Nourished] : well nourished [Normal Oral Mucosa] : normal oral mucosa [Normal Conjunctiva] : the conjunctiva exhibited no abnormalities [Normal Jugular Venous V Waves Present] : normal jugular venous V waves present [Heart Sounds] : normal S1 and S2 [Murmurs] : no murmurs present [Edema] : no peripheral edema present [Abdomen Tenderness] : non-tender [Abdomen Soft] : soft [Auscultation Breath Sounds / Voice Sounds] : lungs were clear to auscultation bilaterally [Abnormal Walk] : normal gait [Nail Clubbing] : no clubbing of the fingernails [Cyanosis, Localized] : no localized cyanosis [Skin Color & Pigmentation] : normal skin color and pigmentation [] : no rash [Oriented To Time, Place, And Person] : oriented to person, place, and time [Impaired Insight] : insight and judgment were intact [Affect] : the affect was normal [No Anxiety] : not feeling anxious [FreeTextEntry1] : No bruits

## 2019-11-13 NOTE — REVIEW OF SYSTEMS
[Negative] : Constitutional [Dyspnea on exertion] : not dyspnea during exertion [Shortness Of Breath] : no shortness of breath [Chest  Pressure] : no chest pressure [Chest Pain] : no chest pain [Leg Claudication] : no intermittent leg claudication [Abdominal Pain] : no abdominal pain [Palpitations] : no palpitations [Cough] : no cough [Joint Swelling] : no joint swelling [Muscle Cramps] : no muscle cramps [Dizziness] : no dizziness [Confusion] : no confusion was observed [Skin: A Rash] : no rash: [Anxiety] : no anxiety

## 2019-11-13 NOTE — ASSESSMENT
[FreeTextEntry1] : What happened to Mr. Chiu earlier this week is unclear. It appears that the emergency room inference was that this episode was one of many, and related to anxiety. This may be accurate. However, he is diabetic and has conduction system disease. Therefore, the following is recommended:\par \par Echocardiogram\par ZIO (ambulatory telemetry) for two weeks\par Further recommendations to-follow

## 2019-11-13 NOTE — REASON FOR VISIT
[Consultation] : a consultation regarding [Abnormal ECG] : an abnormal ECG [Syncope] : syncope [FreeTextEntry1] : Syncope

## 2019-11-13 NOTE — HISTORY OF PRESENT ILLNESS
[FreeTextEntry1] : Mr. Pratik Chiu is a 74-year old man with a history of type-2 diabetes. He was seen in the emergency room two nights ago after having had a syncopal episode and released; he was given follow-up here today.\par \par There is no echocardiogram in the system. Today's electrocardiogram demonstrates NSR with RBBB/LAHB. \par \par Mr. Chiu reports that he "passed out while having manicure"\par No history of chest pain or chest pressure\par History of stroke four years ago in [place], manifest as what sounds like dysarthria. Unaware what tests were or were not done at thqt time. \par No history of any irregular heart rhythms. \par Walks "a lot" every day., approximately 1.5 miles every day. Can ascend inclines "a little slowly". \par Never smoked cigarettes. Remote history of cigars. \par States glucoses are well-controlled; this AM reports 135 mg%. \par Has been told of retinopathy O.U. \par \par The emergency medicine note reads as follows:\par \par "73yom pmhx of HTN HLD DM BIB EMS for syncopal episode at the [facility]. pt reports sitting down and was getting manicure when "had an anxiety attack" became sweaty and closed eyes for few mins. pt reports hx of similar episodes since teenager with extensive workup and attributed to anxiety. No chest pain or sob. No fever or chills. No nausea or vomiting. no abd pain. +smoker. reports only have breakfast this am."\par \par The patient's brother (with him here today states had a similar episode occurred approximately one year ago.\par \par Labs - creatinine 1.7 mg%; eGFR ~ 40 cc/min.\par \par

## 2019-12-18 ENCOUNTER — APPOINTMENT (OUTPATIENT)
Dept: CV DIAGNOSITCS | Facility: HOSPITAL | Age: 74
End: 2019-12-18

## 2019-12-18 ENCOUNTER — OUTPATIENT (OUTPATIENT)
Dept: OUTPATIENT SERVICES | Facility: HOSPITAL | Age: 74
LOS: 1 days | End: 2019-12-18
Payer: MEDICARE

## 2019-12-18 DIAGNOSIS — I45.10 UNSPECIFIED RIGHT BUNDLE-BRANCH BLOCK: ICD-10-CM

## 2019-12-18 DIAGNOSIS — Z98.890 OTHER SPECIFIED POSTPROCEDURAL STATES: Chronic | ICD-10-CM

## 2019-12-18 DIAGNOSIS — Z90.49 ACQUIRED ABSENCE OF OTHER SPECIFIED PARTS OF DIGESTIVE TRACT: Chronic | ICD-10-CM

## 2019-12-18 PROCEDURE — 93306 TTE W/DOPPLER COMPLETE: CPT | Mod: 26

## 2019-12-18 PROCEDURE — 93306 TTE W/DOPPLER COMPLETE: CPT

## 2020-01-29 ENCOUNTER — INPATIENT (INPATIENT)
Facility: HOSPITAL | Age: 75
LOS: 7 days | Discharge: HOME | DRG: 871 | End: 2020-02-06
Attending: FAMILY MEDICINE | Admitting: FAMILY MEDICINE
Payer: MEDICARE

## 2020-01-29 VITALS
SYSTOLIC BLOOD PRESSURE: 154 MMHG | WEIGHT: 199.96 LBS | OXYGEN SATURATION: 98 % | TEMPERATURE: 98 F | HEIGHT: 68 IN | HEART RATE: 89 BPM | DIASTOLIC BLOOD PRESSURE: 86 MMHG | RESPIRATION RATE: 16 BRPM

## 2020-01-29 DIAGNOSIS — Z98.890 OTHER SPECIFIED POSTPROCEDURAL STATES: Chronic | ICD-10-CM

## 2020-01-29 DIAGNOSIS — Z90.49 ACQUIRED ABSENCE OF OTHER SPECIFIED PARTS OF DIGESTIVE TRACT: Chronic | ICD-10-CM

## 2020-01-29 DIAGNOSIS — J69.0 PNEUMONITIS DUE TO INHALATION OF FOOD AND VOMIT: ICD-10-CM

## 2020-01-29 LAB
ALBUMIN SERPL ELPH-MCNC: 4.4 G/DL — SIGNIFICANT CHANGE UP (ref 3.3–5)
ALP SERPL-CCNC: 63 U/L — SIGNIFICANT CHANGE UP (ref 40–120)
ALT FLD-CCNC: 22 U/L — SIGNIFICANT CHANGE UP (ref 10–45)
ANION GAP SERPL CALC-SCNC: 18 MMOL/L — HIGH (ref 5–17)
APAP SERPL-MCNC: <15 UG/ML — SIGNIFICANT CHANGE UP (ref 10–30)
APTT BLD: 29.4 SEC — SIGNIFICANT CHANGE UP (ref 27.5–36.3)
AST SERPL-CCNC: 14 U/L — SIGNIFICANT CHANGE UP (ref 10–40)
BASOPHILS # BLD AUTO: 0.01 K/UL — SIGNIFICANT CHANGE UP (ref 0–0.2)
BASOPHILS NFR BLD AUTO: 0.1 % — SIGNIFICANT CHANGE UP (ref 0–2)
BILIRUB SERPL-MCNC: 0.6 MG/DL — SIGNIFICANT CHANGE UP (ref 0.2–1.2)
BUN SERPL-MCNC: 32 MG/DL — HIGH (ref 7–23)
CALCIUM SERPL-MCNC: 9 MG/DL — SIGNIFICANT CHANGE UP (ref 8.4–10.5)
CHLORIDE SERPL-SCNC: 104 MMOL/L — SIGNIFICANT CHANGE UP (ref 96–108)
CO2 SERPL-SCNC: 23 MMOL/L — SIGNIFICANT CHANGE UP (ref 22–31)
CREAT SERPL-MCNC: 1.32 MG/DL — HIGH (ref 0.5–1.3)
EOSINOPHIL # BLD AUTO: 0.04 K/UL — SIGNIFICANT CHANGE UP (ref 0–0.5)
EOSINOPHIL NFR BLD AUTO: 0.4 % — SIGNIFICANT CHANGE UP (ref 0–6)
ETHANOL SERPL-MCNC: SIGNIFICANT CHANGE UP MG/DL (ref 0–10)
GAS PNL BLDV: SIGNIFICANT CHANGE UP
GLUCOSE SERPL-MCNC: 282 MG/DL — HIGH (ref 70–99)
HCT VFR BLD CALC: 37.5 % — LOW (ref 39–50)
HGB BLD-MCNC: 11.7 G/DL — LOW (ref 13–17)
IMM GRANULOCYTES NFR BLD AUTO: 0.2 % — SIGNIFICANT CHANGE UP (ref 0–1.5)
INR BLD: 0.97 RATIO — SIGNIFICANT CHANGE UP (ref 0.88–1.16)
LYMPHOCYTES # BLD AUTO: 0.64 K/UL — LOW (ref 1–3.3)
LYMPHOCYTES # BLD AUTO: 7 % — LOW (ref 13–44)
MCHC RBC-ENTMCNC: 29.3 PG — SIGNIFICANT CHANGE UP (ref 27–34)
MCHC RBC-ENTMCNC: 31.2 GM/DL — LOW (ref 32–36)
MCV RBC AUTO: 93.8 FL — SIGNIFICANT CHANGE UP (ref 80–100)
MONOCYTES # BLD AUTO: 0.43 K/UL — SIGNIFICANT CHANGE UP (ref 0–0.9)
MONOCYTES NFR BLD AUTO: 4.7 % — SIGNIFICANT CHANGE UP (ref 2–14)
NEUTROPHILS # BLD AUTO: 7.96 K/UL — HIGH (ref 1.8–7.4)
NEUTROPHILS NFR BLD AUTO: 87.6 % — HIGH (ref 43–77)
NRBC # BLD: 0 /100 WBCS — SIGNIFICANT CHANGE UP (ref 0–0)
NT-PROBNP SERPL-SCNC: 214 PG/ML — SIGNIFICANT CHANGE UP (ref 0–300)
PLATELET # BLD AUTO: 242 K/UL — SIGNIFICANT CHANGE UP (ref 150–400)
POTASSIUM SERPL-MCNC: 3.7 MMOL/L — SIGNIFICANT CHANGE UP (ref 3.5–5.3)
POTASSIUM SERPL-SCNC: 3.7 MMOL/L — SIGNIFICANT CHANGE UP (ref 3.5–5.3)
PROT SERPL-MCNC: 7.7 G/DL — SIGNIFICANT CHANGE UP (ref 6–8.3)
PROTHROM AB SERPL-ACNC: 11 SEC — SIGNIFICANT CHANGE UP (ref 10–12.9)
RAPID RVP RESULT: SIGNIFICANT CHANGE UP
RBC # BLD: 4 M/UL — LOW (ref 4.2–5.8)
RBC # FLD: 12.8 % — SIGNIFICANT CHANGE UP (ref 10.3–14.5)
SALICYLATES SERPL-MCNC: <2 MG/DL — LOW (ref 15–30)
SODIUM SERPL-SCNC: 145 MMOL/L — SIGNIFICANT CHANGE UP (ref 135–145)
TROPONIN T, HIGH SENSITIVITY RESULT: 27 NG/L — SIGNIFICANT CHANGE UP (ref 0–51)
TROPONIN T, HIGH SENSITIVITY RESULT: 30 NG/L — SIGNIFICANT CHANGE UP (ref 0–51)
WBC # BLD: 9.1 K/UL — SIGNIFICANT CHANGE UP (ref 3.8–10.5)
WBC # FLD AUTO: 9.1 K/UL — SIGNIFICANT CHANGE UP (ref 3.8–10.5)

## 2020-01-29 PROCEDURE — 71275 CT ANGIOGRAPHY CHEST: CPT | Mod: 26

## 2020-01-29 PROCEDURE — 99291 CRITICAL CARE FIRST HOUR: CPT | Mod: GC

## 2020-01-29 PROCEDURE — 71045 X-RAY EXAM CHEST 1 VIEW: CPT | Mod: 26

## 2020-01-29 PROCEDURE — 99223 1ST HOSP IP/OBS HIGH 75: CPT

## 2020-01-29 PROCEDURE — 93010 ELECTROCARDIOGRAM REPORT: CPT | Mod: GC

## 2020-01-29 RX ORDER — PIPERACILLIN AND TAZOBACTAM 4; .5 G/20ML; G/20ML
3.38 INJECTION, POWDER, LYOPHILIZED, FOR SOLUTION INTRAVENOUS ONCE
Refills: 0 | Status: COMPLETED | OUTPATIENT
Start: 2020-01-29 | End: 2020-01-29

## 2020-01-29 RX ORDER — SODIUM CHLORIDE 9 MG/ML
1000 INJECTION INTRAMUSCULAR; INTRAVENOUS; SUBCUTANEOUS ONCE
Refills: 0 | Status: COMPLETED | OUTPATIENT
Start: 2020-01-29 | End: 2020-01-29

## 2020-01-29 RX ORDER — ONDANSETRON 8 MG/1
4 TABLET, FILM COATED ORAL ONCE
Refills: 0 | Status: COMPLETED | OUTPATIENT
Start: 2020-01-29 | End: 2020-01-29

## 2020-01-29 RX ADMIN — SODIUM CHLORIDE 1000 MILLILITER(S): 9 INJECTION INTRAMUSCULAR; INTRAVENOUS; SUBCUTANEOUS at 20:15

## 2020-01-29 RX ADMIN — SODIUM CHLORIDE 1000 MILLILITER(S): 9 INJECTION INTRAMUSCULAR; INTRAVENOUS; SUBCUTANEOUS at 23:34

## 2020-01-29 RX ADMIN — ONDANSETRON 4 MILLIGRAM(S): 8 TABLET, FILM COATED ORAL at 19:44

## 2020-01-29 NOTE — ED PROVIDER NOTE - CLINICAL SUMMARY MEDICAL DECISION MAKING FREE TEXT BOX
75 y/o M w/ PMH of HTN, DM, HLD, prior CVA presenting w/ nausea and vomiting. Hypoxia improving on supplemental oxygen. Concern for aspiration given vomiting and R lung crackles. Will check labs, CXR, RVP. Pt will require admission for further management of his hypoxia. Will reassess the need for additional interventions as clinically warranted.

## 2020-01-29 NOTE — H&P ADULT - HISTORY OF PRESENT ILLNESS
74M PMH HTN, HLD, T2DM, prior CVA w/o residual deficits, BPH, ureteral obstruction requiring prior stents, appendectomy p/w nausea and vomiting. 74M PMH HTN, HLD, T2DM, prior CVA w/o residual deficits, BPH, ureteral obstruction requiring prior stents, appendectomy p/w nausea and vomiting. Patient somewhat poor historian. Patient felt relatively well this morning, but noted his finger sticks were relatively high at 130-150s. Later developed nausea with vomiting. NBNB. Had transient, mild abdominal pain that was "sore," lower, non-radiating. Pt's brother came home and found him covered in vomit, so called EMS. Upon arrival in the ED, he was noted to be significantly hypoxic which improved initially with BIPAP. Due to the nausea, he was switched to high flow nasal cannula. By the time he arrived here, abdominal pain had completely resolved. Nausea and vomiting also resolved. He denied chest pain, feeling significantly SOB, palpitations, dysuria, LE edema.

## 2020-01-29 NOTE — ED PROVIDER NOTE - ATTENDING CONTRIBUTION TO CARE
75 yo male BIBEMS for n/v; reportedly aspirated per their on-scene assessment.  Hypoxic on 15L nonrebreather here; transitioned to BiPAP, initially 10/5/50% then uptitrated to a max of 20/14/100%.  O2 sats slowly improved.  Transitioned from BiPAP to hi-flow nasal cannula, 50LPM @ 50%.  CTA chest negative for PE, showing R sided consolidations c/w aspiration pneumonitis.  afebrile, no leukocytosis, defer PNA/abx coverage to admitting team.

## 2020-01-29 NOTE — H&P ADULT - NSICDXPASTSURGICALHX_GEN_ALL_CORE_FT
PAST SURGICAL HISTORY:  H/O cystoscopy and left  stent placement    H/O myringotomy right ear in 2017    History of appendectomy

## 2020-01-29 NOTE — H&P ADULT - NSHPPHYSICALEXAM_GEN_ALL_CORE
Vital Signs Last 24 Hrs  T(C): 36.7 (01-29-20 @ 22:13)  T(F): 98 (01-29-20 @ 22:13), Max: 98 (01-29-20 @ 22:13)  HR: 96 (01-30-20 @ 00:00) (80 - 96)  BP: 164/84 (01-29-20 @ 22:13)  BP(mean): --  RR: 19 (01-30-20 @ 00:00) (16 - 20)  SpO2: 94% (01-30-20 @ 00:00) (80% - 98%)  Wt(kg): --    CAPILLARY BLOOD GLUCOSE  POCT Blood Glucose.: 223 mg/dL (29 Jan 2020 19:24)    PHYSICAL EXAM:  GENERAL: NAD, well-developed  HEAD:  Atraumatic, Normocephalic  EYES: EOMI, PERRLA, conjunctiva and sclera clear  NECK: Supple, No JVD  CHEST/LUNG: course breath sounds and crackles b/l at bases, R > L  HEART: tachycardia; No murmurs, rubs, or gallops  ABDOMEN: Soft, Nontender to deep palpation, Nondistended; Bowel sounds present  EXTREMITIES:  2+ Peripheral Pulses, No clubbing, cyanosis, or edema  PSYCH: AAOx3  NEUROLOGY: non-focal  SKIN: No rashes or lesions

## 2020-01-29 NOTE — ED PROVIDER NOTE - PHYSICAL EXAMINATION
Gen: NAD, AOx3, able to make needs known, non-toxic, tired appearing  Head: NCAT  HEENT: EOMI, oral mucosa moist, normal conjunctiva  Lung: Hypoxic to 70s on NC. Crackles at the bases b/l, R > L. Not tachypneic  CV: RRR, no murmurs  Abd: soft, NTND, no guarding  MSK: no visible deformities. No LE edema  Neuro: No focal sensory or motor deficits  Skin: Warm, well perfused  Psych: normal affect

## 2020-01-29 NOTE — ED PROVIDER NOTE - NS ED ROS FT
GENERAL: No fever or chills  EYES: No change in vision  HEENT: No trouble swallowing or speaking  CARDIAC: No chest pain  PULMONARY: +cough and congestion. No SOB  GI: No abdominal pain, +nausea and vomiting, no diarrhea or constipation  : No changes in urination  SKIN: No rashes  NEURO: No headache, no numbness  MSK: No joint pain  Otherwise as HPI or negative.

## 2020-01-29 NOTE — H&P ADULT - NSICDXPASTMEDICALHX_GEN_ALL_CORE_FT
PAST MEDICAL HISTORY:  Bladder calculus     BPH with obstruction/lower urinary tract symptoms     Cerebrovascular accident (CVA), unspecified mechanism 2 years ago    Diabetes mellitus Type 2    High cholesterol     High triglycerides     HTN (hypertension)     Ureteral calculus, left

## 2020-01-29 NOTE — ED ADULT NURSE NOTE - OBJECTIVE STATEMENT
74 y M arrived tot he ED BIBEMS reporting vomiting. EMS reports pt was found at home laying in vomit, EMS called by brother who came home from work. Pt A&Ox3 with confusion, Pt O2 sat 80% on RA, respiratory called placed pt on high flow NC O2 sat currently 97%. Pt and brother poor historian. Pt denies chest pain, urinary symptoms, hematuria, weakness, dizziness, numbness, tinging. Peripheral pulses present b/l. Skin warm, dry and pink. Pt placed in position of comfort. Pt educated on call bell system and provided call bell.

## 2020-01-29 NOTE — H&P ADULT - ASSESSMENT
74M PMH HTN, HLD, T2DM, prior CVA w/o residual deficits, BPH, ureteral obstruction requiring prior stents, appendectomy p/w nausea and vomiting, found to have acute respiratory failure with hypoxia likely from aspiration pneumonitis vs pneumonia.

## 2020-01-29 NOTE — H&P ADULT - NSHPLABSRESULTS_GEN_ALL_CORE
EKG, labs, and imaging personally reviewed and interpreted.     LABS:                        11.7   9.10  )-----------( 242      ( 29 Jan 2020 19:27 )             37.5     145  |  104  |  32<H>  ----------------------------<  282<H>  3.7   |  23  |  1.32<H>    Ca    9.0      29 Jan 2020 19:27    TPro  7.7  /  Alb  4.4  /  TBili  0.6  /  DBili  x   /  AST  14  /  ALT  22  /  AlkPhos  63  01-29    PT/INR - ( 29 Jan 2020 19:27 )   PT: 11.0 sec;   INR: 0.97 ratio    PTT - ( 29 Jan 2020 19:27 )  PTT:29.4 sec    RADIOLOGY & ADDITIONAL TESTS:  Imaging Personally Reviewed:    CTA chest:  IMPRESSION:  Motion degraded exam. No gross central or segmental pulmonary embolism.  Multifocal pneumonia.    Consultant(s) Notes Reviewed: Yes  Care Discussed with Consultants/Other Providers: Yes  Outpatient and prior hospitalization records reviewed: Yes

## 2020-01-29 NOTE — H&P ADULT - PROBLEM SELECTOR PLAN 3
--unclear cause of his acute symptoms which have now resolved.   --abdomen is very benign and non-tender to deep palpation, so will hold off on CT A/P. Pt had BM <24 hours ago.   --continue to monitor and consider imaging if symptoms recur.

## 2020-01-29 NOTE — H&P ADULT - PROBLEM SELECTOR PROBLEM 4
History of CVA (cerebrovascular accident) Type 2 diabetes mellitus with hyperglycemia, without long-term current use of insulin

## 2020-01-29 NOTE — H&P ADULT - PROBLEM SELECTOR PROBLEM 3
Type 2 diabetes mellitus with hyperglycemia, without long-term current use of insulin Nausea and vomiting

## 2020-01-29 NOTE — ED PROVIDER NOTE - PROGRESS NOTE DETAILS
Dr. Jhoan Iverson, PGY-2: pt hypoxic to 70s w/ good wave form on NC. Placed on NRB w/ improvement to 80s. Stat portable CXR ordered. Dr. Jhoan Iverson, PGY-2: O2 sat improved to 90s on bipap. CO2 only in the 50s. Given pt is nauseous, will trial him on high flow NC as hypercarbia does not seem to be the primarily problem at this time. Dr. Jhoan Iverson, PGY-2: pt hypoxic to high 80s on high flow. CTA ordered. Dr. Jhoan Iverson, PGY-2: spoke w/ pt's PCP Dr. Sun who accepted pt under his service. Will defer abx at this time given likely aspiration pneumonitis. Saturating in the 90s at this time on high flow NC.

## 2020-01-29 NOTE — ED PROVIDER NOTE - INTERPRETATION
EKG reviewed for rate, rhythm, axis, intervals and segments, including QRS morphology, P wave appearance T wave appearance, MI interval, and QT interval.  I find the EKG to be unremarkable in all of these regards except as follows: borderline sinus tach, RBBB, LAFB

## 2020-01-29 NOTE — ED ADULT NURSE REASSESSMENT NOTE - NS ED NURSE REASSESS COMMENT FT1
Pt brought to CT scan @ 2143, Respiratory and MD made aware, respiratory reports pt ok to go on nonrebreather mask, Pt placed on non rebreather mask. Pt will be placed back on high flow NC once returned to room.

## 2020-01-29 NOTE — ED PROVIDER NOTE - OBJECTIVE STATEMENT
73 y/o M w/ PMH of HTN, DM, HLD, prior CVA presenting w/ nausea and vomiting. Brought in by EMS. Room 3. Upon initial eval of pt found him to be hypoxic w/ good wave form to 70s on NC. Placed him on NRB w/ improvement to 80s. Pt reports this morning developed nausea and vomiting. Has had multiple episodes of non bloody, non bilious vomiting. Lives with his brother who found him in bed this evening w/ vomit on him and called EMS to bring pt to the hospital. Pt endorsing nausea at this time along w/ cough and nasal congestion. Had abdominal discomfort earlier, but none now. No known sick contacts. Reports feeling well prior to symptoms starting today. Denies fevers, chills, headache, dizziness, blurred vision, chest pain,, shortness of breath, abdominal pain, d/c, urinary symptoms, MSK pain, rash. 73 y/o M w/ PMH of HTN, DM, HLD, prior CVA presenting w/ nausea and vomiting. Brought in by EMS. Room 3. Upon initial eval of pt found him to be hypoxic w/ good wave form to 70s on NC. Placed him on NRB w/ improvement to 80s. Pt reports this morning developed nausea and vomiting. Has had multiple episodes of non bloody, non bilious vomiting. Lives with his brother who found him in bed this evening w/ vomit on him and called EMS to bring pt to the hospital. Pt endorsing nausea at this time along w/ cough and nasal congestion. Had abdominal discomfort earlier, but none now. No known sick contacts. Reports feeling well prior to symptoms starting today. Denies fevers, chills, headache, dizziness, blurred vision, chest pain, shortness of breath, abdominal pain, d/c, urinary symptoms, MSK pain, rash.

## 2020-01-29 NOTE — H&P ADULT - NSHPREVIEWOFSYSTEMS_GEN_ALL_CORE
Review of Systems:   CONSTITUTIONAL: No fever, weight loss, or fatigue  EYES: No eye pain, visual disturbances, or discharge  ENMT:  No difficulty hearing, tinnitus, vertigo; No sinus or throat pain  NECK: No pain or stiffness  RESPIRATORY: No cough, wheezing, chills or hemoptysis; No shortness of breath  CARDIOVASCULAR: No chest pain, palpitations, dizziness, or leg swelling  GASTROINTESTINAL: +nausea, vomiting, transient abdominal pain; No hematemesis; No diarrhea or constipation. No melena or hematochezia.  GENITOURINARY: No dysuria, frequency, hematuria, or incontinence  NEUROLOGICAL: No headaches, memory loss, loss of strength, numbness, or tremors  SKIN: No itching, burning, rashes, or lesions   LYMPH NODES: No enlarged glands  MUSCULOSKELETAL: No joint pain or swelling; No muscle, back, or extremity pain  PSYCHIATRIC: No depression, anxiety, mood swings, or difficulty sleeping  HEME/LYMPH: No easy bruising, or bleeding gums  [x] all other systems negative or as per HPI

## 2020-01-29 NOTE — H&P ADULT - PROBLEM SELECTOR PLAN 1
--CTA neg for PE. CT showing multifocal areas of consolidation, mostly R sided consistent with aspiration PNA vs pneumonitis, likely occurring today with significant vomiting. Pt denies any history of dysphagia.   --c/w HFNC at 50LPM FiO2 50%. Wean as tolerated.   --chest PT to facilitate secretions --CTA neg for PE. CT showing multifocal areas of consolidation, mostly R sided consistent with aspiration PNA vs pneumonitis, likely occurring today with significant vomiting. Pt denies any history of dysphagia.   --c/w HFNC at 50LPM FiO2 50%. Wean as tolerated.   --chest PT to facilitate secretions  --pulm consult in the AM as per Dr. Sun.

## 2020-01-30 DIAGNOSIS — I10 ESSENTIAL (PRIMARY) HYPERTENSION: ICD-10-CM

## 2020-01-30 DIAGNOSIS — J69.0 PNEUMONITIS DUE TO INHALATION OF FOOD AND VOMIT: ICD-10-CM

## 2020-01-30 DIAGNOSIS — Z86.73 PERSONAL HISTORY OF TRANSIENT ISCHEMIC ATTACK (TIA), AND CEREBRAL INFARCTION WITHOUT RESIDUAL DEFICITS: ICD-10-CM

## 2020-01-30 DIAGNOSIS — J96.01 ACUTE RESPIRATORY FAILURE WITH HYPOXIA: ICD-10-CM

## 2020-01-30 DIAGNOSIS — Z29.9 ENCOUNTER FOR PROPHYLACTIC MEASURES, UNSPECIFIED: ICD-10-CM

## 2020-01-30 DIAGNOSIS — R11.2 NAUSEA WITH VOMITING, UNSPECIFIED: ICD-10-CM

## 2020-01-30 DIAGNOSIS — E11.65 TYPE 2 DIABETES MELLITUS WITH HYPERGLYCEMIA: ICD-10-CM

## 2020-01-30 DIAGNOSIS — N40.1 BENIGN PROSTATIC HYPERPLASIA WITH LOWER URINARY TRACT SYMPTOMS: ICD-10-CM

## 2020-01-30 DIAGNOSIS — N18.9 CHRONIC KIDNEY DISEASE, UNSPECIFIED: ICD-10-CM

## 2020-01-30 DIAGNOSIS — E78.00 PURE HYPERCHOLESTEROLEMIA, UNSPECIFIED: ICD-10-CM

## 2020-01-30 LAB
ALBUMIN SERPL ELPH-MCNC: 3.7 G/DL — SIGNIFICANT CHANGE UP (ref 3.3–5)
ALP SERPL-CCNC: 43 U/L — SIGNIFICANT CHANGE UP (ref 40–120)
ALT FLD-CCNC: 18 U/L — SIGNIFICANT CHANGE UP (ref 10–45)
AMPHET UR-MCNC: NEGATIVE — SIGNIFICANT CHANGE UP
ANION GAP SERPL CALC-SCNC: 13 MMOL/L — SIGNIFICANT CHANGE UP (ref 5–17)
APPEARANCE UR: CLEAR — SIGNIFICANT CHANGE UP
AST SERPL-CCNC: 11 U/L — SIGNIFICANT CHANGE UP (ref 10–40)
BARBITURATES UR SCN-MCNC: NEGATIVE — SIGNIFICANT CHANGE UP
BASOPHILS # BLD AUTO: 0.09 K/UL — SIGNIFICANT CHANGE UP (ref 0–0.2)
BASOPHILS NFR BLD AUTO: 0.9 % — SIGNIFICANT CHANGE UP (ref 0–2)
BENZODIAZ UR-MCNC: NEGATIVE — SIGNIFICANT CHANGE UP
BILIRUB SERPL-MCNC: 0.9 MG/DL — SIGNIFICANT CHANGE UP (ref 0.2–1.2)
BILIRUB UR-MCNC: NEGATIVE — SIGNIFICANT CHANGE UP
BUN SERPL-MCNC: 32 MG/DL — HIGH (ref 7–23)
CALCIUM SERPL-MCNC: 8.5 MG/DL — SIGNIFICANT CHANGE UP (ref 8.4–10.5)
CHLORIDE SERPL-SCNC: 108 MMOL/L — SIGNIFICANT CHANGE UP (ref 96–108)
CO2 SERPL-SCNC: 20 MMOL/L — LOW (ref 22–31)
COCAINE METAB.OTHER UR-MCNC: NEGATIVE — SIGNIFICANT CHANGE UP
COLOR SPEC: SIGNIFICANT CHANGE UP
CREAT SERPL-MCNC: 1.56 MG/DL — HIGH (ref 0.5–1.3)
DIFF PNL FLD: NEGATIVE — SIGNIFICANT CHANGE UP
EOSINOPHIL # BLD AUTO: 0 K/UL — SIGNIFICANT CHANGE UP (ref 0–0.5)
EOSINOPHIL NFR BLD AUTO: 0 % — SIGNIFICANT CHANGE UP (ref 0–6)
GAS PNL BLDV: SIGNIFICANT CHANGE UP
GIANT PLATELETS BLD QL SMEAR: PRESENT — SIGNIFICANT CHANGE UP
GLUCOSE BLDC GLUCOMTR-MCNC: 105 MG/DL — HIGH (ref 70–99)
GLUCOSE BLDC GLUCOMTR-MCNC: 126 MG/DL — HIGH (ref 70–99)
GLUCOSE BLDC GLUCOMTR-MCNC: 160 MG/DL — HIGH (ref 70–99)
GLUCOSE BLDC GLUCOMTR-MCNC: 179 MG/DL — HIGH (ref 70–99)
GLUCOSE BLDC GLUCOMTR-MCNC: 184 MG/DL — HIGH (ref 70–99)
GLUCOSE SERPL-MCNC: 218 MG/DL — HIGH (ref 70–99)
GLUCOSE UR QL: ABNORMAL
HBA1C BLD-MCNC: 7.1 % — HIGH (ref 4–5.6)
HCT VFR BLD CALC: 33.3 % — LOW (ref 39–50)
HGB BLD-MCNC: 10.6 G/DL — LOW (ref 13–17)
KETONES UR-MCNC: SIGNIFICANT CHANGE UP
LACTATE SERPL-SCNC: 3.2 MMOL/L — HIGH (ref 0.7–2)
LEUKOCYTE ESTERASE UR-ACNC: NEGATIVE — SIGNIFICANT CHANGE UP
LIDOCAIN IGE QN: 23 U/L — SIGNIFICANT CHANGE UP (ref 7–60)
LYMPHOCYTES # BLD AUTO: 0.79 K/UL — LOW (ref 1–3.3)
LYMPHOCYTES # BLD AUTO: 8 % — LOW (ref 13–44)
MCHC RBC-ENTMCNC: 29.8 PG — SIGNIFICANT CHANGE UP (ref 27–34)
MCHC RBC-ENTMCNC: 31.8 GM/DL — LOW (ref 32–36)
MCV RBC AUTO: 93.5 FL — SIGNIFICANT CHANGE UP (ref 80–100)
METAMYELOCYTES # FLD: 0.9 % — HIGH (ref 0–0)
METHADONE UR-MCNC: NEGATIVE — SIGNIFICANT CHANGE UP
MONOCYTES # BLD AUTO: 0.26 K/UL — SIGNIFICANT CHANGE UP (ref 0–0.9)
MONOCYTES NFR BLD AUTO: 2.6 % — SIGNIFICANT CHANGE UP (ref 2–14)
NEUTROPHILS # BLD AUTO: 8.65 K/UL — HIGH (ref 1.8–7.4)
NEUTROPHILS NFR BLD AUTO: 85 % — HIGH (ref 43–77)
NEUTS BAND # BLD: 2.6 % — SIGNIFICANT CHANGE UP (ref 0–8)
NITRITE UR-MCNC: NEGATIVE — SIGNIFICANT CHANGE UP
OPIATES UR-MCNC: NEGATIVE — SIGNIFICANT CHANGE UP
OXYCODONE UR-MCNC: NEGATIVE — SIGNIFICANT CHANGE UP
PCP SPEC-MCNC: SIGNIFICANT CHANGE UP
PCP UR-MCNC: NEGATIVE — SIGNIFICANT CHANGE UP
PH UR: 5.5 — SIGNIFICANT CHANGE UP (ref 5–8)
PLAT MORPH BLD: NORMAL — SIGNIFICANT CHANGE UP
PLATELET # BLD AUTO: 202 K/UL — SIGNIFICANT CHANGE UP (ref 150–400)
PLATELET COUNT - ESTIMATE: NORMAL — SIGNIFICANT CHANGE UP
POTASSIUM SERPL-MCNC: 4 MMOL/L — SIGNIFICANT CHANGE UP (ref 3.5–5.3)
POTASSIUM SERPL-SCNC: 4 MMOL/L — SIGNIFICANT CHANGE UP (ref 3.5–5.3)
PROT SERPL-MCNC: 6.7 G/DL — SIGNIFICANT CHANGE UP (ref 6–8.3)
PROT UR-MCNC: ABNORMAL
RBC # BLD: 3.56 M/UL — LOW (ref 4.2–5.8)
RBC # FLD: 13.1 % — SIGNIFICANT CHANGE UP (ref 10.3–14.5)
RBC BLD AUTO: SIGNIFICANT CHANGE UP
SODIUM SERPL-SCNC: 141 MMOL/L — SIGNIFICANT CHANGE UP (ref 135–145)
SP GR SPEC: 1.04 — HIGH (ref 1.01–1.02)
THC UR QL: NEGATIVE — SIGNIFICANT CHANGE UP
UROBILINOGEN FLD QL: NEGATIVE — SIGNIFICANT CHANGE UP
WBC # BLD: 9.87 K/UL — SIGNIFICANT CHANGE UP (ref 3.8–10.5)
WBC # FLD AUTO: 9.87 K/UL — SIGNIFICANT CHANGE UP (ref 3.8–10.5)

## 2020-01-30 RX ORDER — ONDANSETRON 8 MG/1
4 TABLET, FILM COATED ORAL EVERY 6 HOURS
Refills: 0 | Status: DISCONTINUED | OUTPATIENT
Start: 2020-01-30 | End: 2020-02-06

## 2020-01-30 RX ORDER — PIPERACILLIN AND TAZOBACTAM 4; .5 G/20ML; G/20ML
3.38 INJECTION, POWDER, LYOPHILIZED, FOR SOLUTION INTRAVENOUS EVERY 8 HOURS
Refills: 0 | Status: DISCONTINUED | OUTPATIENT
Start: 2020-01-30 | End: 2020-02-05

## 2020-01-30 RX ORDER — FENOFIBRATE,MICRONIZED 130 MG
145 CAPSULE ORAL DAILY
Refills: 0 | Status: DISCONTINUED | OUTPATIENT
Start: 2020-01-30 | End: 2020-02-06

## 2020-01-30 RX ORDER — ENOXAPARIN SODIUM 100 MG/ML
40 INJECTION SUBCUTANEOUS EVERY 24 HOURS
Refills: 0 | Status: DISCONTINUED | OUTPATIENT
Start: 2020-01-30 | End: 2020-02-06

## 2020-01-30 RX ORDER — ROSUVASTATIN CALCIUM 5 MG/1
1 TABLET ORAL
Qty: 0 | Refills: 0 | DISCHARGE

## 2020-01-30 RX ORDER — LOSARTAN POTASSIUM 100 MG/1
100 TABLET, FILM COATED ORAL DAILY
Refills: 0 | Status: DISCONTINUED | OUTPATIENT
Start: 2020-01-30 | End: 2020-02-06

## 2020-01-30 RX ORDER — INSULIN LISPRO 100/ML
VIAL (ML) SUBCUTANEOUS AT BEDTIME
Refills: 0 | Status: DISCONTINUED | OUTPATIENT
Start: 2020-01-30 | End: 2020-02-06

## 2020-01-30 RX ORDER — INSULIN LISPRO 100/ML
VIAL (ML) SUBCUTANEOUS
Refills: 0 | Status: DISCONTINUED | OUTPATIENT
Start: 2020-01-30 | End: 2020-02-06

## 2020-01-30 RX ORDER — DEXTROSE 50 % IN WATER 50 %
25 SYRINGE (ML) INTRAVENOUS ONCE
Refills: 0 | Status: DISCONTINUED | OUTPATIENT
Start: 2020-01-30 | End: 2020-02-06

## 2020-01-30 RX ORDER — TAMSULOSIN HYDROCHLORIDE 0.4 MG/1
0.4 CAPSULE ORAL AT BEDTIME
Refills: 0 | Status: DISCONTINUED | OUTPATIENT
Start: 2020-01-30 | End: 2020-02-06

## 2020-01-30 RX ORDER — ATORVASTATIN CALCIUM 80 MG/1
80 TABLET, FILM COATED ORAL AT BEDTIME
Refills: 0 | Status: DISCONTINUED | OUTPATIENT
Start: 2020-01-30 | End: 2020-02-06

## 2020-01-30 RX ORDER — DEXTROSE 50 % IN WATER 50 %
12.5 SYRINGE (ML) INTRAVENOUS ONCE
Refills: 0 | Status: DISCONTINUED | OUTPATIENT
Start: 2020-01-30 | End: 2020-02-06

## 2020-01-30 RX ORDER — ASPIRIN/CALCIUM CARB/MAGNESIUM 324 MG
81 TABLET ORAL DAILY
Refills: 0 | Status: DISCONTINUED | OUTPATIENT
Start: 2020-01-30 | End: 2020-02-06

## 2020-01-30 RX ORDER — GLUCAGON INJECTION, SOLUTION 0.5 MG/.1ML
1 INJECTION, SOLUTION SUBCUTANEOUS ONCE
Refills: 0 | Status: DISCONTINUED | OUTPATIENT
Start: 2020-01-30 | End: 2020-02-06

## 2020-01-30 RX ORDER — CARVEDILOL PHOSPHATE 80 MG/1
6.25 CAPSULE, EXTENDED RELEASE ORAL EVERY 12 HOURS
Refills: 0 | Status: DISCONTINUED | OUTPATIENT
Start: 2020-01-30 | End: 2020-02-04

## 2020-01-30 RX ORDER — DEXTROSE 50 % IN WATER 50 %
15 SYRINGE (ML) INTRAVENOUS ONCE
Refills: 0 | Status: DISCONTINUED | OUTPATIENT
Start: 2020-01-30 | End: 2020-02-06

## 2020-01-30 RX ORDER — SODIUM CHLORIDE 9 MG/ML
1000 INJECTION INTRAMUSCULAR; INTRAVENOUS; SUBCUTANEOUS
Refills: 0 | Status: DISCONTINUED | OUTPATIENT
Start: 2020-01-30 | End: 2020-02-06

## 2020-01-30 RX ORDER — SODIUM CHLORIDE 9 MG/ML
1000 INJECTION, SOLUTION INTRAVENOUS
Refills: 0 | Status: DISCONTINUED | OUTPATIENT
Start: 2020-01-30 | End: 2020-02-06

## 2020-01-30 RX ADMIN — PIPERACILLIN AND TAZOBACTAM 25 GRAM(S): 4; .5 INJECTION, POWDER, LYOPHILIZED, FOR SOLUTION INTRAVENOUS at 12:48

## 2020-01-30 RX ADMIN — PIPERACILLIN AND TAZOBACTAM 200 GRAM(S): 4; .5 INJECTION, POWDER, LYOPHILIZED, FOR SOLUTION INTRAVENOUS at 00:12

## 2020-01-30 RX ADMIN — PIPERACILLIN AND TAZOBACTAM 25 GRAM(S): 4; .5 INJECTION, POWDER, LYOPHILIZED, FOR SOLUTION INTRAVENOUS at 05:43

## 2020-01-30 RX ADMIN — ATORVASTATIN CALCIUM 80 MILLIGRAM(S): 80 TABLET, FILM COATED ORAL at 22:47

## 2020-01-30 RX ADMIN — TAMSULOSIN HYDROCHLORIDE 0.4 MILLIGRAM(S): 0.4 CAPSULE ORAL at 22:53

## 2020-01-30 RX ADMIN — Medication 1: at 09:36

## 2020-01-30 RX ADMIN — Medication 145 MILLIGRAM(S): at 12:49

## 2020-01-30 RX ADMIN — ONDANSETRON 4 MILLIGRAM(S): 8 TABLET, FILM COATED ORAL at 04:09

## 2020-01-30 RX ADMIN — PIPERACILLIN AND TAZOBACTAM 25 GRAM(S): 4; .5 INJECTION, POWDER, LYOPHILIZED, FOR SOLUTION INTRAVENOUS at 22:47

## 2020-01-30 RX ADMIN — LOSARTAN POTASSIUM 100 MILLIGRAM(S): 100 TABLET, FILM COATED ORAL at 05:42

## 2020-01-30 RX ADMIN — Medication 1: at 17:25

## 2020-01-30 RX ADMIN — SODIUM CHLORIDE 100 MILLILITER(S): 9 INJECTION INTRAMUSCULAR; INTRAVENOUS; SUBCUTANEOUS at 09:41

## 2020-01-30 RX ADMIN — CARVEDILOL PHOSPHATE 6.25 MILLIGRAM(S): 80 CAPSULE, EXTENDED RELEASE ORAL at 17:25

## 2020-01-30 RX ADMIN — CARVEDILOL PHOSPHATE 6.25 MILLIGRAM(S): 80 CAPSULE, EXTENDED RELEASE ORAL at 06:31

## 2020-01-30 RX ADMIN — Medication 81 MILLIGRAM(S): at 12:48

## 2020-01-30 RX ADMIN — ENOXAPARIN SODIUM 40 MILLIGRAM(S): 100 INJECTION SUBCUTANEOUS at 05:42

## 2020-01-30 NOTE — PATIENT PROFILE ADULT - ABILITY TO HEAR (WITH HEARING AID OR HEARING APPLIANCE IF NORMALLY USED):
Mildly to Moderately Impaired: difficulty hearing in some environments or speaker may need to increase volume or speak distinctly/R ear hearing poor - has stent placed as per pt

## 2020-01-31 DIAGNOSIS — E11.9 TYPE 2 DIABETES MELLITUS WITHOUT COMPLICATIONS: ICD-10-CM

## 2020-01-31 DIAGNOSIS — R11.2 NAUSEA WITH VOMITING, UNSPECIFIED: ICD-10-CM

## 2020-01-31 LAB
ANION GAP SERPL CALC-SCNC: 14 MMOL/L — SIGNIFICANT CHANGE UP (ref 5–17)
BUN SERPL-MCNC: 30 MG/DL — HIGH (ref 7–23)
CALCIUM SERPL-MCNC: 8.3 MG/DL — LOW (ref 8.4–10.5)
CHLORIDE SERPL-SCNC: 106 MMOL/L — SIGNIFICANT CHANGE UP (ref 96–108)
CO2 SERPL-SCNC: 23 MMOL/L — SIGNIFICANT CHANGE UP (ref 22–31)
CREAT SERPL-MCNC: 1.83 MG/DL — HIGH (ref 0.5–1.3)
CULTURE RESULTS: SIGNIFICANT CHANGE UP
GLUCOSE BLDC GLUCOMTR-MCNC: 130 MG/DL — HIGH (ref 70–99)
GLUCOSE BLDC GLUCOMTR-MCNC: 140 MG/DL — HIGH (ref 70–99)
GLUCOSE BLDC GLUCOMTR-MCNC: 176 MG/DL — HIGH (ref 70–99)
GLUCOSE BLDC GLUCOMTR-MCNC: 191 MG/DL — HIGH (ref 70–99)
GLUCOSE SERPL-MCNC: 150 MG/DL — HIGH (ref 70–99)
HCT VFR BLD CALC: 29 % — LOW (ref 39–50)
HGB BLD-MCNC: 9.2 G/DL — LOW (ref 13–17)
MCHC RBC-ENTMCNC: 30 PG — SIGNIFICANT CHANGE UP (ref 27–34)
MCHC RBC-ENTMCNC: 31.7 GM/DL — LOW (ref 32–36)
MCV RBC AUTO: 94.5 FL — SIGNIFICANT CHANGE UP (ref 80–100)
NRBC # BLD: 0 /100 WBCS — SIGNIFICANT CHANGE UP (ref 0–0)
PLATELET # BLD AUTO: 175 K/UL — SIGNIFICANT CHANGE UP (ref 150–400)
POTASSIUM SERPL-MCNC: 3.7 MMOL/L — SIGNIFICANT CHANGE UP (ref 3.5–5.3)
POTASSIUM SERPL-SCNC: 3.7 MMOL/L — SIGNIFICANT CHANGE UP (ref 3.5–5.3)
RBC # BLD: 3.07 M/UL — LOW (ref 4.2–5.8)
RBC # FLD: 13.7 % — SIGNIFICANT CHANGE UP (ref 10.3–14.5)
SODIUM SERPL-SCNC: 143 MMOL/L — SIGNIFICANT CHANGE UP (ref 135–145)
SPECIMEN SOURCE: SIGNIFICANT CHANGE UP
WBC # BLD: 10.91 K/UL — HIGH (ref 3.8–10.5)
WBC # FLD AUTO: 10.91 K/UL — HIGH (ref 3.8–10.5)

## 2020-01-31 RX ORDER — ACETAMINOPHEN 500 MG
650 TABLET ORAL ONCE
Refills: 0 | Status: COMPLETED | OUTPATIENT
Start: 2020-01-31 | End: 2020-01-31

## 2020-01-31 RX ADMIN — CARVEDILOL PHOSPHATE 6.25 MILLIGRAM(S): 80 CAPSULE, EXTENDED RELEASE ORAL at 05:36

## 2020-01-31 RX ADMIN — PIPERACILLIN AND TAZOBACTAM 25 GRAM(S): 4; .5 INJECTION, POWDER, LYOPHILIZED, FOR SOLUTION INTRAVENOUS at 14:26

## 2020-01-31 RX ADMIN — Medication 145 MILLIGRAM(S): at 13:33

## 2020-01-31 RX ADMIN — ATORVASTATIN CALCIUM 80 MILLIGRAM(S): 80 TABLET, FILM COATED ORAL at 21:20

## 2020-01-31 RX ADMIN — PIPERACILLIN AND TAZOBACTAM 25 GRAM(S): 4; .5 INJECTION, POWDER, LYOPHILIZED, FOR SOLUTION INTRAVENOUS at 05:36

## 2020-01-31 RX ADMIN — CARVEDILOL PHOSPHATE 6.25 MILLIGRAM(S): 80 CAPSULE, EXTENDED RELEASE ORAL at 17:49

## 2020-01-31 RX ADMIN — Medication 81 MILLIGRAM(S): at 13:34

## 2020-01-31 RX ADMIN — ENOXAPARIN SODIUM 40 MILLIGRAM(S): 100 INJECTION SUBCUTANEOUS at 05:36

## 2020-01-31 RX ADMIN — PIPERACILLIN AND TAZOBACTAM 25 GRAM(S): 4; .5 INJECTION, POWDER, LYOPHILIZED, FOR SOLUTION INTRAVENOUS at 21:20

## 2020-01-31 RX ADMIN — Medication 1: at 13:33

## 2020-01-31 RX ADMIN — TAMSULOSIN HYDROCHLORIDE 0.4 MILLIGRAM(S): 0.4 CAPSULE ORAL at 21:20

## 2020-01-31 RX ADMIN — LOSARTAN POTASSIUM 100 MILLIGRAM(S): 100 TABLET, FILM COATED ORAL at 05:36

## 2020-01-31 NOTE — SWALLOW BEDSIDE ASSESSMENT ADULT - SLP PERTINENT HISTORY OF CURRENT PROBLEM
74M PMH HTN, HLD, T2DM, prior CVA w/o residual deficits, BPH, ureteral obstruction requiring prior stents, appendectomy p/w nausea and vomiting. Patient somewhat poor historian. Patient felt relatively well this morning, but noted his finger sticks were relatively high at 130-150s. Later developed nausea with vomiting. NBNB. Had transient, mild abdominal pain that was "sore," lower, non-radiating. Pt's brother came home and found him covered in vomit, so called EMS. Upon arrival in the ED, he was noted to be significantly hypoxic which improved initially with BIPAP. Due to the nausea, he was switched to high flow nasal cannula. By the time he arrived here, abdominal pain had completely resolved. Nausea and vomiting also resolved. He denied chest pain, feeling significantly SOB, palpitations, dysuria, LE edema. On admit pt AAOx3

## 2020-01-31 NOTE — PHYSICAL THERAPY INITIAL EVALUATION ADULT - PERTINENT HX OF CURRENT PROBLEM, REHAB EVAL
Pt is a 74M PMH HTN, HLD, T2DM, prior CVA w/o residual deficits, BPH, ureteral obstruction requiring prior stents, appendectomy p/w nausea and vomiting, found to have acute respiratory failure with hypoxia likely from aspiration pneumonitis vs pneumonia.

## 2020-01-31 NOTE — SWALLOW BEDSIDE ASSESSMENT ADULT - COMMENTS
Pt p/w nausea and vomiting, found to have acute respiratory failure with hypoxia likely from aspiration pneumonitis vs pneumonia. CT showing multifocal areas of consolidation, mostly R sided consistent with aspiration PNA vs pneumonitis, likely occurring today with significant vomiting. Pt denies any history of dysphagia. Low suspicion for dysphagia and aspiration likely occurred during vomiting, but would assess his swallowing capability given somewhat poor historian and pt with h/o CVA.  1/29: CXR: clear lungs. CTA chest: Motion degraded exam. No gross central or segmental pulmonary embolism. Multifocal pneumonia. baseline cough makes behavioral analysis of swallow function difficult to a degree however cough did not increase in intensity or severity with PO intake.

## 2020-01-31 NOTE — PHYSICAL THERAPY INITIAL EVALUATION ADULT - ADDITIONAL COMMENTS
Pt lives with brother in apartment. There are no steps to enter and none in his home. He lives on the third floor and has an elevator in the building. He reports that he was previously independent with all ADLs. He reports LBP 6/10 with activity. He is R handed, uses glasses for long distance, and does not use hearing aids.

## 2020-01-31 NOTE — SWALLOW BEDSIDE ASSESSMENT ADULT - ASR SWALLOW ASPIRATION MONITOR
change of breathing pattern/cough/fever/Monitor for s/s aspiration/laryngeal penetration. If noted:  D/C p.o. intake, provide non-oral nutrition/hydration/meds, and contact this service @ [phone]/felicia voice/pneumonia/upper respiratory infection/throat clearing

## 2020-01-31 NOTE — SWALLOW BEDSIDE ASSESSMENT ADULT - SWALLOW EVAL: PATIENT/FAMILY GOALS STATEMENT
Pt denied history of dysphagia PTA, reported acute onset c/o occasional pharyngeal statis (~ sternal notch) and throat pain with swallowing (however pt denied above complaints during current assessment).

## 2020-01-31 NOTE — SWALLOW BEDSIDE ASSESSMENT ADULT - SWALLOW EVAL: DIAGNOSIS
Pt admitted with nausea and vomiting, found to have acute respiratory failure with hypoxia likely from aspiration pneumonitis vs pneumonia. Now presents with an oral and suspected pharyngeal dysphagia superimposed upon baseline cough which makes behavioral analysis of swallow function difficult to a degree. The swallow is marked by prolonged mastication, delayed oral transit time, reduced hyolaryngeal excursion, and subjective c/o occasional pharyngeal stasis ~ sternal notch (with meat and turkey), pt denied above complaints during PO trials on current exam.

## 2020-01-31 NOTE — SWALLOW BEDSIDE ASSESSMENT ADULT - SLP GENERAL OBSERVATIONS
Pt awake OOB in chair, 3L O2 via NC. Pt able to communicate needs and follow directions for exam. Oriented x3 (name, place, month, year when provided with choice of two). Reduced to absent eye contact, paucity of verbal output. + Baseline cough.

## 2020-01-31 NOTE — SWALLOW BEDSIDE ASSESSMENT ADULT - NS SPL SWALLOW CLINIC TRIAL FT
baseline cough makes behavioral analysis of swallow function difficult to a degree however cough did not increase in intensity or severity with PO intake.

## 2020-01-31 NOTE — PHYSICAL THERAPY INITIAL EVALUATION ADULT - GENERAL OBSERVATIONS, REHAB EVAL
Pt rec'd in chair, +IVL, +high flow O2, NAD, agreeable to PT evaluation Pt rec'd in chair, +chair alarm, +IVL, +high flow O2, NAD, agreeable to PT evaluation

## 2020-02-01 LAB
ANION GAP SERPL CALC-SCNC: 15 MMOL/L — SIGNIFICANT CHANGE UP (ref 5–17)
BUN SERPL-MCNC: 29 MG/DL — HIGH (ref 7–23)
CALCIUM SERPL-MCNC: 8.7 MG/DL — SIGNIFICANT CHANGE UP (ref 8.4–10.5)
CHLORIDE SERPL-SCNC: 107 MMOL/L — SIGNIFICANT CHANGE UP (ref 96–108)
CO2 SERPL-SCNC: 23 MMOL/L — SIGNIFICANT CHANGE UP (ref 22–31)
CREAT SERPL-MCNC: 1.75 MG/DL — HIGH (ref 0.5–1.3)
GLUCOSE BLDC GLUCOMTR-MCNC: 163 MG/DL — HIGH (ref 70–99)
GLUCOSE BLDC GLUCOMTR-MCNC: 184 MG/DL — HIGH (ref 70–99)
GLUCOSE BLDC GLUCOMTR-MCNC: 253 MG/DL — HIGH (ref 70–99)
GLUCOSE SERPL-MCNC: 134 MG/DL — HIGH (ref 70–99)
HCT VFR BLD CALC: 30.3 % — LOW (ref 39–50)
HGB BLD-MCNC: 9.5 G/DL — LOW (ref 13–17)
MCHC RBC-ENTMCNC: 29.8 PG — SIGNIFICANT CHANGE UP (ref 27–34)
MCHC RBC-ENTMCNC: 31.4 GM/DL — LOW (ref 32–36)
MCV RBC AUTO: 95 FL — SIGNIFICANT CHANGE UP (ref 80–100)
NRBC # BLD: 0 /100 WBCS — SIGNIFICANT CHANGE UP (ref 0–0)
PLATELET # BLD AUTO: 200 K/UL — SIGNIFICANT CHANGE UP (ref 150–400)
POTASSIUM SERPL-MCNC: 3.9 MMOL/L — SIGNIFICANT CHANGE UP (ref 3.5–5.3)
POTASSIUM SERPL-SCNC: 3.9 MMOL/L — SIGNIFICANT CHANGE UP (ref 3.5–5.3)
RBC # BLD: 3.19 M/UL — LOW (ref 4.2–5.8)
RBC # FLD: 13.2 % — SIGNIFICANT CHANGE UP (ref 10.3–14.5)
SODIUM SERPL-SCNC: 145 MMOL/L — SIGNIFICANT CHANGE UP (ref 135–145)
WBC # BLD: 9.2 K/UL — SIGNIFICANT CHANGE UP (ref 3.8–10.5)
WBC # FLD AUTO: 9.2 K/UL — SIGNIFICANT CHANGE UP (ref 3.8–10.5)

## 2020-02-01 RX ORDER — HYDRALAZINE HCL 50 MG
5 TABLET ORAL ONCE
Refills: 0 | Status: COMPLETED | OUTPATIENT
Start: 2020-02-01 | End: 2020-02-01

## 2020-02-01 RX ORDER — GUAIFENESIN/DEXTROMETHORPHAN 600MG-30MG
5 TABLET, EXTENDED RELEASE 12 HR ORAL ONCE
Refills: 0 | Status: COMPLETED | OUTPATIENT
Start: 2020-02-01 | End: 2020-02-01

## 2020-02-01 RX ADMIN — CARVEDILOL PHOSPHATE 6.25 MILLIGRAM(S): 80 CAPSULE, EXTENDED RELEASE ORAL at 17:10

## 2020-02-01 RX ADMIN — ENOXAPARIN SODIUM 40 MILLIGRAM(S): 100 INJECTION SUBCUTANEOUS at 05:14

## 2020-02-01 RX ADMIN — CARVEDILOL PHOSPHATE 6.25 MILLIGRAM(S): 80 CAPSULE, EXTENDED RELEASE ORAL at 05:14

## 2020-02-01 RX ADMIN — TAMSULOSIN HYDROCHLORIDE 0.4 MILLIGRAM(S): 0.4 CAPSULE ORAL at 21:57

## 2020-02-01 RX ADMIN — Medication 81 MILLIGRAM(S): at 12:13

## 2020-02-01 RX ADMIN — Medication 1: at 22:07

## 2020-02-01 RX ADMIN — Medication 1: at 08:28

## 2020-02-01 RX ADMIN — PIPERACILLIN AND TAZOBACTAM 25 GRAM(S): 4; .5 INJECTION, POWDER, LYOPHILIZED, FOR SOLUTION INTRAVENOUS at 21:57

## 2020-02-01 RX ADMIN — Medication 145 MILLIGRAM(S): at 12:13

## 2020-02-01 RX ADMIN — Medication 5 MILLILITER(S): at 23:14

## 2020-02-01 RX ADMIN — PIPERACILLIN AND TAZOBACTAM 25 GRAM(S): 4; .5 INJECTION, POWDER, LYOPHILIZED, FOR SOLUTION INTRAVENOUS at 13:35

## 2020-02-01 RX ADMIN — ATORVASTATIN CALCIUM 80 MILLIGRAM(S): 80 TABLET, FILM COATED ORAL at 21:57

## 2020-02-01 RX ADMIN — LOSARTAN POTASSIUM 100 MILLIGRAM(S): 100 TABLET, FILM COATED ORAL at 05:14

## 2020-02-01 RX ADMIN — PIPERACILLIN AND TAZOBACTAM 25 GRAM(S): 4; .5 INJECTION, POWDER, LYOPHILIZED, FOR SOLUTION INTRAVENOUS at 05:14

## 2020-02-01 RX ADMIN — Medication 5 MILLIGRAM(S): at 23:50

## 2020-02-01 RX ADMIN — Medication 1: at 12:11

## 2020-02-02 LAB
ANION GAP SERPL CALC-SCNC: 12 MMOL/L — SIGNIFICANT CHANGE UP (ref 5–17)
BUN SERPL-MCNC: 29 MG/DL — HIGH (ref 7–23)
CALCIUM SERPL-MCNC: 9.4 MG/DL — SIGNIFICANT CHANGE UP (ref 8.4–10.5)
CHLORIDE SERPL-SCNC: 103 MMOL/L — SIGNIFICANT CHANGE UP (ref 96–108)
CO2 SERPL-SCNC: 24 MMOL/L — SIGNIFICANT CHANGE UP (ref 22–31)
CREAT SERPL-MCNC: 1.5 MG/DL — HIGH (ref 0.5–1.3)
GLUCOSE BLDC GLUCOMTR-MCNC: 175 MG/DL — HIGH (ref 70–99)
GLUCOSE BLDC GLUCOMTR-MCNC: 196 MG/DL — HIGH (ref 70–99)
GLUCOSE BLDC GLUCOMTR-MCNC: 199 MG/DL — HIGH (ref 70–99)
GLUCOSE BLDC GLUCOMTR-MCNC: 206 MG/DL — HIGH (ref 70–99)
GLUCOSE SERPL-MCNC: 215 MG/DL — HIGH (ref 70–99)
HCT VFR BLD CALC: 33 % — LOW (ref 39–50)
HGB BLD-MCNC: 10.4 G/DL — LOW (ref 13–17)
MCHC RBC-ENTMCNC: 29.3 PG — SIGNIFICANT CHANGE UP (ref 27–34)
MCHC RBC-ENTMCNC: 31.5 GM/DL — LOW (ref 32–36)
MCV RBC AUTO: 93 FL — SIGNIFICANT CHANGE UP (ref 80–100)
NRBC # BLD: 0 /100 WBCS — SIGNIFICANT CHANGE UP (ref 0–0)
PLATELET # BLD AUTO: 230 K/UL — SIGNIFICANT CHANGE UP (ref 150–400)
POTASSIUM SERPL-MCNC: 4 MMOL/L — SIGNIFICANT CHANGE UP (ref 3.5–5.3)
POTASSIUM SERPL-SCNC: 4 MMOL/L — SIGNIFICANT CHANGE UP (ref 3.5–5.3)
RBC # BLD: 3.55 M/UL — LOW (ref 4.2–5.8)
RBC # FLD: 13 % — SIGNIFICANT CHANGE UP (ref 10.3–14.5)
SODIUM SERPL-SCNC: 139 MMOL/L — SIGNIFICANT CHANGE UP (ref 135–145)
WBC # BLD: 9.03 K/UL — SIGNIFICANT CHANGE UP (ref 3.8–10.5)
WBC # FLD AUTO: 9.03 K/UL — SIGNIFICANT CHANGE UP (ref 3.8–10.5)

## 2020-02-02 RX ORDER — SODIUM CHLORIDE 0.65 %
1 AEROSOL, SPRAY (ML) NASAL
Refills: 0 | Status: DISCONTINUED | OUTPATIENT
Start: 2020-02-02 | End: 2020-02-05

## 2020-02-02 RX ORDER — ACETAMINOPHEN 500 MG
650 TABLET ORAL EVERY 6 HOURS
Refills: 0 | Status: DISCONTINUED | OUTPATIENT
Start: 2020-02-02 | End: 2020-02-06

## 2020-02-02 RX ORDER — HYDRALAZINE HCL 50 MG
10 TABLET ORAL ONCE
Refills: 0 | Status: COMPLETED | OUTPATIENT
Start: 2020-02-02 | End: 2020-02-02

## 2020-02-02 RX ORDER — AMLODIPINE BESYLATE 2.5 MG/1
5 TABLET ORAL DAILY
Refills: 0 | Status: DISCONTINUED | OUTPATIENT
Start: 2020-02-02 | End: 2020-02-05

## 2020-02-02 RX ORDER — HYDRALAZINE HCL 50 MG
5 TABLET ORAL ONCE
Refills: 0 | Status: COMPLETED | OUTPATIENT
Start: 2020-02-02 | End: 2020-02-02

## 2020-02-02 RX ADMIN — Medication 2: at 08:48

## 2020-02-02 RX ADMIN — TAMSULOSIN HYDROCHLORIDE 0.4 MILLIGRAM(S): 0.4 CAPSULE ORAL at 22:10

## 2020-02-02 RX ADMIN — Medication 1: at 18:42

## 2020-02-02 RX ADMIN — Medication 1 SPRAY(S): at 01:57

## 2020-02-02 RX ADMIN — PIPERACILLIN AND TAZOBACTAM 25 GRAM(S): 4; .5 INJECTION, POWDER, LYOPHILIZED, FOR SOLUTION INTRAVENOUS at 05:27

## 2020-02-02 RX ADMIN — ATORVASTATIN CALCIUM 80 MILLIGRAM(S): 80 TABLET, FILM COATED ORAL at 22:10

## 2020-02-02 RX ADMIN — Medication 81 MILLIGRAM(S): at 11:18

## 2020-02-02 RX ADMIN — CARVEDILOL PHOSPHATE 6.25 MILLIGRAM(S): 80 CAPSULE, EXTENDED RELEASE ORAL at 05:27

## 2020-02-02 RX ADMIN — Medication 5 MILLIGRAM(S): at 11:16

## 2020-02-02 RX ADMIN — PIPERACILLIN AND TAZOBACTAM 25 GRAM(S): 4; .5 INJECTION, POWDER, LYOPHILIZED, FOR SOLUTION INTRAVENOUS at 14:09

## 2020-02-02 RX ADMIN — Medication 1: at 15:30

## 2020-02-02 RX ADMIN — CARVEDILOL PHOSPHATE 6.25 MILLIGRAM(S): 80 CAPSULE, EXTENDED RELEASE ORAL at 18:16

## 2020-02-02 RX ADMIN — Medication 10 MILLIGRAM(S): at 01:20

## 2020-02-02 RX ADMIN — AMLODIPINE BESYLATE 5 MILLIGRAM(S): 2.5 TABLET ORAL at 13:05

## 2020-02-02 RX ADMIN — Medication 145 MILLIGRAM(S): at 11:22

## 2020-02-02 RX ADMIN — LOSARTAN POTASSIUM 100 MILLIGRAM(S): 100 TABLET, FILM COATED ORAL at 05:27

## 2020-02-02 RX ADMIN — ENOXAPARIN SODIUM 40 MILLIGRAM(S): 100 INJECTION SUBCUTANEOUS at 05:27

## 2020-02-02 RX ADMIN — PIPERACILLIN AND TAZOBACTAM 25 GRAM(S): 4; .5 INJECTION, POWDER, LYOPHILIZED, FOR SOLUTION INTRAVENOUS at 22:10

## 2020-02-02 NOTE — CHART NOTE - NSCHARTNOTEFT_GEN_A_CORE
Medicine PA Note     KAYKAY JIMENES  MRN-[id number]      74M PMH HTN, HLD, T2DM, prior CVA w/o residual deficits, BPH, ureteral obstruction requiring prior stents, appendectomy     Notified by RN for patient with HTN urgency, patient in NAD, lying in bed, poor historian, denies any acute complaint. denies any CP/SOB/abdominal pain/diaphoresis or dizziness.       Vital Signs Last 24 Hrs  T(C): 36.8 (02-02-20 @ 00:58), Max: 36.9 (02-01-20 @ 10:15)  T(F): 98.3 (02-02-20 @ 00:58), Max: 98.5 (02-01-20 @ 10:15)  HR: 65 (02-02-20 @ 00:58) (55 - 66)  BP: 195/80 (02-02-20 @ 00:58) (160/72 - 203/92)  BP(mean): --  RR: 18 (02-02-20 @ 00:58) (16 - 18)  SpO2: 94% (02-01-20 @ 23:38) (93% - 94%)  Daily     Daily   I&O's Summary    31 Jan 2020 07:01  -  01 Feb 2020 07:00  --------------------------------------------------------  IN: 1220 mL / OUT: 450 mL / NET: 770 mL    01 Feb 2020 07:01  -  02 Feb 2020 02:20  --------------------------------------------------------  IN: 1240 mL / OUT: 400 mL / NET: 840 mL      CAPILLARY BLOOD GLUCOSE                          9.5    9.20  )-----------( 200      ( 01 Feb 2020 07:39 )             30.3     02-01    145  |  107  |  29<H>  ----------------------------<  134<H>  3.9   |  23  |  1.75<H>    Ca    8.7      01 Feb 2020 07:39            PHYSICAL EXAM:  GENERAL: NAD,   CHEST/LUNG: Clear to auscultation bilaterally;   HEART: Regular rate and rhythm;     Assessment/Plan: htn urgency   - vital signs remarkable for HTN SBP 190s  - patient in NAD   - patient on po antihypertensive medications (coreg bid / losartan/d )  - SBP trend has been > 150,   - given iv hydralazine 5mg IVP with 1 hr f/u with little improvement   - patient still sbp 190s, given IV hydralazine 10mg IVP , rn instructed to f/u bp both electronic and manual s/p 1 hr after dose   - if still persistently elevated will consider giving AM meds early   - will monitor patient closely   - will endorse events to AM team   - attending to follow in AM     Rodrick Morales PA-C,   Department of Medicine

## 2020-02-03 DIAGNOSIS — N20.1 CALCULUS OF URETER: ICD-10-CM

## 2020-02-03 LAB
ANION GAP SERPL CALC-SCNC: 17 MMOL/L — SIGNIFICANT CHANGE UP (ref 5–17)
BASOPHILS # BLD AUTO: 0.03 K/UL — SIGNIFICANT CHANGE UP (ref 0–0.2)
BASOPHILS NFR BLD AUTO: 0.4 % — SIGNIFICANT CHANGE UP (ref 0–2)
BUN SERPL-MCNC: 27 MG/DL — HIGH (ref 7–23)
CALCIUM SERPL-MCNC: 9.2 MG/DL — SIGNIFICANT CHANGE UP (ref 8.4–10.5)
CHLORIDE SERPL-SCNC: 103 MMOL/L — SIGNIFICANT CHANGE UP (ref 96–108)
CO2 SERPL-SCNC: 20 MMOL/L — LOW (ref 22–31)
CREAT SERPL-MCNC: 1.6 MG/DL — HIGH (ref 0.5–1.3)
CULTURE RESULTS: SIGNIFICANT CHANGE UP
CULTURE RESULTS: SIGNIFICANT CHANGE UP
EOSINOPHIL # BLD AUTO: 0.65 K/UL — HIGH (ref 0–0.5)
EOSINOPHIL NFR BLD AUTO: 9.4 % — HIGH (ref 0–6)
GLUCOSE BLDC GLUCOMTR-MCNC: 139 MG/DL — HIGH (ref 70–99)
GLUCOSE BLDC GLUCOMTR-MCNC: 154 MG/DL — HIGH (ref 70–99)
GLUCOSE BLDC GLUCOMTR-MCNC: 191 MG/DL — HIGH (ref 70–99)
GLUCOSE BLDC GLUCOMTR-MCNC: 213 MG/DL — HIGH (ref 70–99)
GLUCOSE BLDC GLUCOMTR-MCNC: 276 MG/DL — HIGH (ref 70–99)
GLUCOSE SERPL-MCNC: 190 MG/DL — HIGH (ref 70–99)
HCT VFR BLD CALC: 31.7 % — LOW (ref 39–50)
HGB BLD-MCNC: 10.1 G/DL — LOW (ref 13–17)
IMM GRANULOCYTES NFR BLD AUTO: 0.4 % — SIGNIFICANT CHANGE UP (ref 0–1.5)
LYMPHOCYTES # BLD AUTO: 1.56 K/UL — SIGNIFICANT CHANGE UP (ref 1–3.3)
LYMPHOCYTES # BLD AUTO: 22.5 % — SIGNIFICANT CHANGE UP (ref 13–44)
MCHC RBC-ENTMCNC: 29 PG — SIGNIFICANT CHANGE UP (ref 27–34)
MCHC RBC-ENTMCNC: 31.9 GM/DL — LOW (ref 32–36)
MCV RBC AUTO: 91.1 FL — SIGNIFICANT CHANGE UP (ref 80–100)
MONOCYTES # BLD AUTO: 0.64 K/UL — SIGNIFICANT CHANGE UP (ref 0–0.9)
MONOCYTES NFR BLD AUTO: 9.2 % — SIGNIFICANT CHANGE UP (ref 2–14)
NEUTROPHILS # BLD AUTO: 4.01 K/UL — SIGNIFICANT CHANGE UP (ref 1.8–7.4)
NEUTROPHILS NFR BLD AUTO: 58.1 % — SIGNIFICANT CHANGE UP (ref 43–77)
NRBC # BLD: 0 /100 WBCS — SIGNIFICANT CHANGE UP (ref 0–0)
PLATELET # BLD AUTO: 231 K/UL — SIGNIFICANT CHANGE UP (ref 150–400)
POTASSIUM SERPL-MCNC: 3.8 MMOL/L — SIGNIFICANT CHANGE UP (ref 3.5–5.3)
POTASSIUM SERPL-SCNC: 3.8 MMOL/L — SIGNIFICANT CHANGE UP (ref 3.5–5.3)
RBC # BLD: 3.48 M/UL — LOW (ref 4.2–5.8)
RBC # FLD: 13 % — SIGNIFICANT CHANGE UP (ref 10.3–14.5)
SODIUM SERPL-SCNC: 140 MMOL/L — SIGNIFICANT CHANGE UP (ref 135–145)
SPECIMEN SOURCE: SIGNIFICANT CHANGE UP
SPECIMEN SOURCE: SIGNIFICANT CHANGE UP
WBC # BLD: 6.92 K/UL — SIGNIFICANT CHANGE UP (ref 3.8–10.5)
WBC # FLD AUTO: 6.92 K/UL — SIGNIFICANT CHANGE UP (ref 3.8–10.5)

## 2020-02-03 PROCEDURE — 99232 SBSQ HOSP IP/OBS MODERATE 35: CPT

## 2020-02-03 PROCEDURE — 74230 X-RAY XM SWLNG FUNCJ C+: CPT | Mod: 26

## 2020-02-03 RX ORDER — LANOLIN ALCOHOL/MO/W.PET/CERES
3 CREAM (GRAM) TOPICAL ONCE
Refills: 0 | Status: COMPLETED | OUTPATIENT
Start: 2020-02-03 | End: 2020-02-03

## 2020-02-03 RX ORDER — OXYCODONE HYDROCHLORIDE 5 MG/1
5 TABLET ORAL ONCE
Refills: 0 | Status: DISCONTINUED | OUTPATIENT
Start: 2020-02-03 | End: 2020-02-03

## 2020-02-03 RX ORDER — HYDRALAZINE HCL 50 MG
10 TABLET ORAL ONCE
Refills: 0 | Status: COMPLETED | OUTPATIENT
Start: 2020-02-03 | End: 2020-02-03

## 2020-02-03 RX ORDER — ACETAMINOPHEN 500 MG
975 TABLET ORAL ONCE
Refills: 0 | Status: COMPLETED | OUTPATIENT
Start: 2020-02-03 | End: 2020-02-03

## 2020-02-03 RX ADMIN — PIPERACILLIN AND TAZOBACTAM 25 GRAM(S): 4; .5 INJECTION, POWDER, LYOPHILIZED, FOR SOLUTION INTRAVENOUS at 14:49

## 2020-02-03 RX ADMIN — OXYCODONE HYDROCHLORIDE 5 MILLIGRAM(S): 5 TABLET ORAL at 03:32

## 2020-02-03 RX ADMIN — LOSARTAN POTASSIUM 100 MILLIGRAM(S): 100 TABLET, FILM COATED ORAL at 04:27

## 2020-02-03 RX ADMIN — Medication 3 MILLIGRAM(S): at 03:03

## 2020-02-03 RX ADMIN — Medication 81 MILLIGRAM(S): at 11:41

## 2020-02-03 RX ADMIN — ATORVASTATIN CALCIUM 80 MILLIGRAM(S): 80 TABLET, FILM COATED ORAL at 22:24

## 2020-02-03 RX ADMIN — ENOXAPARIN SODIUM 40 MILLIGRAM(S): 100 INJECTION SUBCUTANEOUS at 04:24

## 2020-02-03 RX ADMIN — PIPERACILLIN AND TAZOBACTAM 25 GRAM(S): 4; .5 INJECTION, POWDER, LYOPHILIZED, FOR SOLUTION INTRAVENOUS at 22:24

## 2020-02-03 RX ADMIN — Medication 145 MILLIGRAM(S): at 14:51

## 2020-02-03 RX ADMIN — PIPERACILLIN AND TAZOBACTAM 25 GRAM(S): 4; .5 INJECTION, POWDER, LYOPHILIZED, FOR SOLUTION INTRAVENOUS at 04:25

## 2020-02-03 RX ADMIN — Medication 3: at 11:36

## 2020-02-03 RX ADMIN — Medication 975 MILLIGRAM(S): at 03:02

## 2020-02-03 RX ADMIN — OXYCODONE HYDROCHLORIDE 5 MILLIGRAM(S): 5 TABLET ORAL at 03:02

## 2020-02-03 RX ADMIN — Medication 10 MILLIGRAM(S): at 01:08

## 2020-02-03 RX ADMIN — TAMSULOSIN HYDROCHLORIDE 0.4 MILLIGRAM(S): 0.4 CAPSULE ORAL at 22:24

## 2020-02-03 RX ADMIN — CARVEDILOL PHOSPHATE 6.25 MILLIGRAM(S): 80 CAPSULE, EXTENDED RELEASE ORAL at 18:48

## 2020-02-03 RX ADMIN — Medication 1: at 14:49

## 2020-02-03 RX ADMIN — AMLODIPINE BESYLATE 5 MILLIGRAM(S): 2.5 TABLET ORAL at 04:26

## 2020-02-03 RX ADMIN — Medication 1: at 19:36

## 2020-02-03 NOTE — SWALLOW VFSS/MBS ASSESSMENT ADULT - SPECIFY REASON(S)
to objectively assess the swallow mechanism; r/o dysphagia. to objectively assess the swallow mechanism; r/o dysphagia. Pt c/o h/o occasional odynophagia and pharyngeal stasis.

## 2020-02-03 NOTE — CHART NOTE - NSCHARTNOTEFT_GEN_A_CORE
Chart/Event Note  [facility] 2MON 206 D1  KAYKAY JIMENES, 74y, Male  [id number]    Reason for Notification:   Notified by RN that the above patient had asymptomatic hypertension, with blood pressure at 220/90.    Events/History of Present Illness  Went to bedside with nursing staff to evaluate patient. Patient was awake and alert at baseline mentation in no acute distress. 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 Additionally reports some trouble sleeping due to pain. Denies other complaints.   No syncope, dizziness, weakness, headaches, vision changes, new focal/lateralizing neurologic deficits, chest pain, palpitations, shortness of breath, abdominal pain, nausea, or vomiting.   Patient's chart reviewed. Patient has a history of hypertension, with elevated blood pressures noted across inpatient stay, received IVP Hydralazine on 2/1-2 overnight shift. Patient is currently on active medication therapy to treat hypertension with nursing documentations showing adherence with these medications while inpatient.     Review of Systems:  Constitutional: No fever, chills, or fatigue.  Neurologic: Moderate stabbing right eye pain (chronic) Mild headache. No dizziness, vision/speech changes, numbness, or weakness.  Cardiovascular: No chest pain, pressure, or palpitations.   GI: No abdominal pain, nausea, vomiting, diarrhea, constipation.   Musculoskeletal: No joint pain or swelling.     T(C): 36.7 (02-03-20 @ 04:10), Max: 37.2 (02-02-20 @ 16:45)  HR: 58 (02-03-20 @ 04:10) (58 - 65)  BP: 156/86 (02-03-20 @ 04:10) (156/86 - 220/90)  RR: 18 (02-03-20 @ 04:10) (18 - 18)  SpO2: 98% (02-03-20 @ 04:10) (94% - 98%)             Physical Examination:  GENERAL: No acute distress noted during examination.  NERVOUS SYSTEM:  Alert & oriented X3 with appropriate concentration. Motor strength is 5/5 in both bilateral upper and lower extremities. No focal or lateralizing neurologic deficits noted.   HEAD: Atraumatic and normocephalic.  NECK: Supple with no JVD.   CHEST: Clear to ascultation bilaterally. Symmetrical/bilateral chest wall rise.   HEART: Regular rate and rhythm.  ABDOMEN: Soft, nontender, and nondistended.   EXTREMITIES:  2+ Peripheral Pulses without clubbing, cyanosis, or edema.    Medical Decision Making/Assessment/Plan:  74-year-old male with a past medical history of hypertension, DM, and BPH, admitted with pneumonia, now with hypertension.     - Diagnosis: Hypertension   Patient's other vitals are stable. Patient's hypertension is noted to be chronic. Patient has no complaints or symptoms that raise suspicion for acute hypertension related end organ damage. Suspect that pain from known eye issue and lack of sleep are contributing to patient's hypertension. Of note, patient was previously on Hi-Flow or BIPAP therapy during admission, tonight is first night without one of these devices. Unsure if there is a   Will treat hypertension with PO agents with a goal reduction in blood pressure of 20% over the next few hours. / Will continue to monitor patient's blood pressure and continue with current plan of care at this time.     1) Hydralizine 10 mg PO once. / ____ mg PO once.   2) Repeat blood pressure in one hour post intervention.   3) Will continue to monitor for the development of symptoms and/or signs of end organ dysfunction.   4) Will endorse the above diagnostics and findings to the day medicine team for review and follow up. Will additionally sign out to day medicine teams to follow with relevant specialties.     Klever Us NP  Department of Medicine   # Chart/Event Note  [facility] 2MON 206 D1  KAYKAY JIMENES, 74y, Male  [id number]    Reason for Notification:   Notified by RN that the above patient had asymptomatic hypertension, with blood pressure at 220/90.    Events/History of Present Illness  Went to bedside with nursing staff to evaluate patient. Patient was awake and alert at baseline mentation in no acute distress. 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 Additionally reports some trouble sleeping due to pain. Denies other complaints.   No syncope, dizziness, weakness, headaches, vision changes, new focal/lateralizing neurologic deficits, chest pain, palpitations, shortness of breath, abdominal pain, nausea, or vomiting.   Patient's chart reviewed. Patient has a history of hypertension, with elevated blood pressures noted across inpatient stay, received IVP Hydralazine on 2/1-2 overnight shift. Patient is currently on active medication therapy to treat hypertension with nursing documentations showing adherence with these medications while inpatient.     Review of Systems:  Constitutional: No fever, chills, or fatigue.  Neurologic: Moderate stabbing right eye pain (chronic) Mild headache. No dizziness, vision/speech changes, numbness, or weakness.  Cardiovascular: No chest pain, pressure, or palpitations.   GI: No abdominal pain, nausea, vomiting, diarrhea, constipation.   Musculoskeletal: No joint pain or swelling.     T(C): 36.7 (02-03-20 @ 04:10), Max: 37.2 (02-02-20 @ 16:45)  HR: 58 (02-03-20 @ 04:10) (58 - 65)  BP: 156/86 (02-03-20 @ 04:10) (156/86 - 220/90)  RR: 18 (02-03-20 @ 04:10) (18 - 18)  SpO2: 98% (02-03-20 @ 04:10) (94% - 98%)             Physical Examination:  GENERAL: No acute distress noted during examination.  NERVOUS SYSTEM:  Alert & oriented X3 with appropriate concentration. Motor strength is 5/5 in both bilateral upper and lower extremities. No focal or lateralizing neurologic deficits noted.   HEAD: Atraumatic and normocephalic.  NECK: Supple with no JVD.   CHEST: Clear to ascultation bilaterally. Symmetrical/bilateral chest wall rise.   HEART: Regular rate and rhythm.  ABDOMEN: Soft, nontender, and nondistended.   EXTREMITIES:  2+ Peripheral Pulses without clubbing, cyanosis, or edema.    Medical Decision Making/Assessment/Plan:  74-year-old male with a past medical history of hypertension, DM, and BPH, admitted with pneumonia, now with hypertension.     - Diagnosis: Hypertension   Patient's other vitals are stable. Patient's hypertension is noted to be chronic. Patient has no complaints or symptoms that raise suspicion for acute hypertension related end organ damage. Suspect that pain from known eye issue and lack of sleep are contributing to patient's hypertension. Of note, patient was previously on Hi-Flow or BIPAP therapy during admission, tonight is first night without one of these devices. Unsure if there is a component of ANEESH? For now, will treat pain and then re-evaluate BP. Goal reduction in blood pressure of 20% over the next few hours.     1) Oxycodone, Tylenol, and Melatonin.   2) Repeat blood pressure in one hour post intervention.   3) Will continue to monitor for the development of symptoms and/or signs of end organ dysfunction.   4) Will endorse the above diagnostics and findings to the day medicine team for review and follow up. Will additionally sign out to day medicine teams to follow with relevant specialties.     Klever Us NP  Department of Medicine   #[id number]    Addendum 0430: BP improved, patient sleeping, no signs of pain or acute distress.

## 2020-02-03 NOTE — SWALLOW VFSS/MBS ASSESSMENT ADULT - DIAGNOSTIC IMPRESSIONS
Patient presents with an oropharyngeal dysphagia. Mildly prolonged yet functional oral prep of solids, suspect impacted by poor dental status/incomplete/ground down dentition. Pharyngeal trigger is delayed. There is silent aspiration of thin liquids on successive sips. Airway protection is improved with use of single cup sips. There is no evidence of laryngeal penetration or aspiration on remaining consistencies.    Swallow disorders: reduced lingual strength/ROM/Rate of motion, reduced BOT to posterior pharyngeal wall contact, delay in trigger of the swallow reflex, reduced hyo-laryngeal excursion, reduced laryngeal closure, reduced pharyngeal contractility, reduced supraglottic sensation, reduced subglottic sensation.

## 2020-02-03 NOTE — SWALLOW VFSS/MBS ASSESSMENT ADULT - NS SWALLOW VFSS REC ASPIR MON
Monitor for s/s aspiration/laryngeal penetration. If noted:  D/C p.o. intake, provide non-oral nutrition/hydration/meds, and contact this service @ [phone]/change of breathing pattern/cough/gurgly voice/fever/pneumonia/throat clearing/upper respiratory infection

## 2020-02-03 NOTE — SWALLOW VFSS/MBS ASSESSMENT ADULT - DELAYED INITIATION OF THE PHARYNGEAL SWALLOW (IN SECONDS)
triggered at the level of the valleculae triggered variably at the level of the valleculae and a-e folds triggered at the level of the a-e folds

## 2020-02-03 NOTE — SWALLOW VFSS/MBS ASSESSMENT ADULT - ORAL PHASE
within functional limits Uncontrolled bolus / spillover in hypopharynx/Uncontrolled bolus / spillover in colton-pharynx/Laryngeal penetration before swallow - silent/Aspiration before swallow - silent

## 2020-02-03 NOTE — SWALLOW VFSS/MBS ASSESSMENT ADULT - COMMENTS
Pt p/w nausea and vomiting, found to have acute respiratory failure with hypoxia likely from aspiration pneumonitis vs pneumonia. CT showing multifocal areas of consolidation, mostly R sided consistent with aspiration PNA vs pneumonitis, likely occurring today with significant vomiting. Pt denies any history of dysphagia. Low suspicion for dysphagia and aspiration likely occurred during vomiting, but would assess his swallowing capability given somewhat poor historian and pt with h/o CVA.  1/29: CXR: clear lungs. CTA chest: Motion degraded exam. No gross central or segmental pulmonary embolism. Multifocal pneumonia.  Bedside swallow 1/31 with baseline cough; MBS warranted to differentiate deglutitive vs non-deglutitive cough; r/o aspiration given presence of pneumonia.

## 2020-02-04 LAB
ANION GAP SERPL CALC-SCNC: 15 MMOL/L — SIGNIFICANT CHANGE UP (ref 5–17)
BUN SERPL-MCNC: 28 MG/DL — HIGH (ref 7–23)
CALCIUM SERPL-MCNC: 9.5 MG/DL — SIGNIFICANT CHANGE UP (ref 8.4–10.5)
CHLORIDE SERPL-SCNC: 103 MMOL/L — SIGNIFICANT CHANGE UP (ref 96–108)
CO2 SERPL-SCNC: 23 MMOL/L — SIGNIFICANT CHANGE UP (ref 22–31)
CREAT SERPL-MCNC: 1.83 MG/DL — HIGH (ref 0.5–1.3)
GLUCOSE BLDC GLUCOMTR-MCNC: 180 MG/DL — HIGH (ref 70–99)
GLUCOSE BLDC GLUCOMTR-MCNC: 186 MG/DL — HIGH (ref 70–99)
GLUCOSE BLDC GLUCOMTR-MCNC: 188 MG/DL — HIGH (ref 70–99)
GLUCOSE BLDC GLUCOMTR-MCNC: 210 MG/DL — HIGH (ref 70–99)
GLUCOSE SERPL-MCNC: 161 MG/DL — HIGH (ref 70–99)
HCT VFR BLD CALC: 33.9 % — LOW (ref 39–50)
HGB BLD-MCNC: 10.8 G/DL — LOW (ref 13–17)
MCHC RBC-ENTMCNC: 29.6 PG — SIGNIFICANT CHANGE UP (ref 27–34)
MCHC RBC-ENTMCNC: 31.9 GM/DL — LOW (ref 32–36)
MCV RBC AUTO: 92.9 FL — SIGNIFICANT CHANGE UP (ref 80–100)
NRBC # BLD: 0 /100 WBCS — SIGNIFICANT CHANGE UP (ref 0–0)
PLATELET # BLD AUTO: 255 K/UL — SIGNIFICANT CHANGE UP (ref 150–400)
POTASSIUM SERPL-MCNC: 4 MMOL/L — SIGNIFICANT CHANGE UP (ref 3.5–5.3)
POTASSIUM SERPL-SCNC: 4 MMOL/L — SIGNIFICANT CHANGE UP (ref 3.5–5.3)
RBC # BLD: 3.65 M/UL — LOW (ref 4.2–5.8)
RBC # FLD: 13 % — SIGNIFICANT CHANGE UP (ref 10.3–14.5)
SODIUM SERPL-SCNC: 141 MMOL/L — SIGNIFICANT CHANGE UP (ref 135–145)
WBC # BLD: 8.46 K/UL — SIGNIFICANT CHANGE UP (ref 3.8–10.5)
WBC # FLD AUTO: 8.46 K/UL — SIGNIFICANT CHANGE UP (ref 3.8–10.5)

## 2020-02-04 RX ORDER — METOPROLOL TARTRATE 50 MG
50 TABLET ORAL AT BEDTIME
Refills: 0 | Status: DISCONTINUED | OUTPATIENT
Start: 2020-02-04 | End: 2020-02-06

## 2020-02-04 RX ADMIN — Medication 1: at 13:31

## 2020-02-04 RX ADMIN — AMLODIPINE BESYLATE 5 MILLIGRAM(S): 2.5 TABLET ORAL at 05:20

## 2020-02-04 RX ADMIN — PIPERACILLIN AND TAZOBACTAM 25 GRAM(S): 4; .5 INJECTION, POWDER, LYOPHILIZED, FOR SOLUTION INTRAVENOUS at 05:20

## 2020-02-04 RX ADMIN — PIPERACILLIN AND TAZOBACTAM 25 GRAM(S): 4; .5 INJECTION, POWDER, LYOPHILIZED, FOR SOLUTION INTRAVENOUS at 23:37

## 2020-02-04 RX ADMIN — LOSARTAN POTASSIUM 100 MILLIGRAM(S): 100 TABLET, FILM COATED ORAL at 05:20

## 2020-02-04 RX ADMIN — Medication 1: at 08:05

## 2020-02-04 RX ADMIN — Medication 650 MILLIGRAM(S): at 05:58

## 2020-02-04 RX ADMIN — Medication 1: at 18:30

## 2020-02-04 RX ADMIN — Medication 650 MILLIGRAM(S): at 05:28

## 2020-02-04 RX ADMIN — Medication 50 MILLIGRAM(S): at 23:37

## 2020-02-04 RX ADMIN — ATORVASTATIN CALCIUM 80 MILLIGRAM(S): 80 TABLET, FILM COATED ORAL at 23:37

## 2020-02-04 RX ADMIN — Medication 81 MILLIGRAM(S): at 13:33

## 2020-02-04 RX ADMIN — ENOXAPARIN SODIUM 40 MILLIGRAM(S): 100 INJECTION SUBCUTANEOUS at 05:20

## 2020-02-04 RX ADMIN — PIPERACILLIN AND TAZOBACTAM 25 GRAM(S): 4; .5 INJECTION, POWDER, LYOPHILIZED, FOR SOLUTION INTRAVENOUS at 13:33

## 2020-02-04 RX ADMIN — CARVEDILOL PHOSPHATE 6.25 MILLIGRAM(S): 80 CAPSULE, EXTENDED RELEASE ORAL at 05:20

## 2020-02-04 RX ADMIN — TAMSULOSIN HYDROCHLORIDE 0.4 MILLIGRAM(S): 0.4 CAPSULE ORAL at 23:37

## 2020-02-04 RX ADMIN — Medication 145 MILLIGRAM(S): at 13:33

## 2020-02-04 NOTE — DIETITIAN INITIAL EVALUATION ADULT. - PHYSICAL APPEARANCE
well nourished/obese/other (specify) Ht: 68 inches , Wt: 199lbs, BMI: 30.4kg/m2, IBW: 154lbs +/- 10%, %IBW: 129%  Edema: +1 left wrist, right arm, Skin: free of pressure injuries per nursing flow sheets

## 2020-02-04 NOTE — DIETITIAN INITIAL EVALUATION ADULT. - PROBLEM SELECTOR PLAN 2
--no overt signs of systemic infection. No leukocytosis, fever. Tachycardia likely secondary to hypoxia and mild hypervolemia (pt dry appearing and hypernatremic)  --given burden of consolidations present on CT scan, though, will cover for now with zosyn. If hypoxia improves and pt well appearing/afebrile, would consider very short course of Abx. BCx sent in ED.  --low suspicion for dysphagia and aspiration likely occurred during vomiting, but would assess his swallowing capability given somewhat poor historian and pt with h/o CVA.

## 2020-02-04 NOTE — DIETITIAN INITIAL EVALUATION ADULT. - PROBLEM SELECTOR PLAN 1
--CTA neg for PE. CT showing multifocal areas of consolidation, mostly R sided consistent with aspiration PNA vs pneumonitis, likely occurring today with significant vomiting. Pt denies any history of dysphagia.   --c/w HFNC at 50LPM FiO2 50%. Wean as tolerated.   --chest PT to facilitate secretions  --pulm consult in the AM as per Dr. Sun.

## 2020-02-05 ENCOUNTER — TRANSCRIPTION ENCOUNTER (OUTPATIENT)
Age: 75
End: 2020-02-05

## 2020-02-05 DIAGNOSIS — I10 ESSENTIAL (PRIMARY) HYPERTENSION: ICD-10-CM

## 2020-02-05 LAB
ANION GAP SERPL CALC-SCNC: 15 MMOL/L — SIGNIFICANT CHANGE UP (ref 5–17)
BUN SERPL-MCNC: 35 MG/DL — HIGH (ref 7–23)
CALCIUM SERPL-MCNC: 9.2 MG/DL — SIGNIFICANT CHANGE UP (ref 8.4–10.5)
CHLORIDE SERPL-SCNC: 104 MMOL/L — SIGNIFICANT CHANGE UP (ref 96–108)
CO2 SERPL-SCNC: 22 MMOL/L — SIGNIFICANT CHANGE UP (ref 22–31)
CREAT SERPL-MCNC: 1.9 MG/DL — HIGH (ref 0.5–1.3)
GLUCOSE BLDC GLUCOMTR-MCNC: 193 MG/DL — HIGH (ref 70–99)
GLUCOSE BLDC GLUCOMTR-MCNC: 210 MG/DL — HIGH (ref 70–99)
GLUCOSE BLDC GLUCOMTR-MCNC: 237 MG/DL — HIGH (ref 70–99)
GLUCOSE BLDC GLUCOMTR-MCNC: 248 MG/DL — HIGH (ref 70–99)
GLUCOSE SERPL-MCNC: 200 MG/DL — HIGH (ref 70–99)
HCT VFR BLD CALC: 31.7 % — LOW (ref 39–50)
HGB BLD-MCNC: 10.2 G/DL — LOW (ref 13–17)
MCHC RBC-ENTMCNC: 29.8 PG — SIGNIFICANT CHANGE UP (ref 27–34)
MCHC RBC-ENTMCNC: 32.2 GM/DL — SIGNIFICANT CHANGE UP (ref 32–36)
MCV RBC AUTO: 92.7 FL — SIGNIFICANT CHANGE UP (ref 80–100)
NRBC # BLD: 0 /100 WBCS — SIGNIFICANT CHANGE UP (ref 0–0)
PLATELET # BLD AUTO: 251 K/UL — SIGNIFICANT CHANGE UP (ref 150–400)
POTASSIUM SERPL-MCNC: 3.9 MMOL/L — SIGNIFICANT CHANGE UP (ref 3.5–5.3)
POTASSIUM SERPL-SCNC: 3.9 MMOL/L — SIGNIFICANT CHANGE UP (ref 3.5–5.3)
RBC # BLD: 3.42 M/UL — LOW (ref 4.2–5.8)
RBC # FLD: 12.9 % — SIGNIFICANT CHANGE UP (ref 10.3–14.5)
SODIUM SERPL-SCNC: 141 MMOL/L — SIGNIFICANT CHANGE UP (ref 135–145)
WBC # BLD: 8.21 K/UL — SIGNIFICANT CHANGE UP (ref 3.8–10.5)
WBC # FLD AUTO: 8.21 K/UL — SIGNIFICANT CHANGE UP (ref 3.8–10.5)

## 2020-02-05 RX ORDER — AMLODIPINE BESYLATE 2.5 MG/1
5 TABLET ORAL ONCE
Refills: 0 | Status: COMPLETED | OUTPATIENT
Start: 2020-02-05 | End: 2020-02-05

## 2020-02-05 RX ORDER — CEFUROXIME AXETIL 250 MG
250 TABLET ORAL EVERY 12 HOURS
Refills: 0 | Status: DISCONTINUED | OUTPATIENT
Start: 2020-02-05 | End: 2020-02-06

## 2020-02-05 RX ORDER — AMLODIPINE BESYLATE 2.5 MG/1
5 TABLET ORAL DAILY
Refills: 0 | Status: DISCONTINUED | OUTPATIENT
Start: 2020-02-06 | End: 2020-02-06

## 2020-02-05 RX ADMIN — TAMSULOSIN HYDROCHLORIDE 0.4 MILLIGRAM(S): 0.4 CAPSULE ORAL at 21:31

## 2020-02-05 RX ADMIN — Medication 81 MILLIGRAM(S): at 12:34

## 2020-02-05 RX ADMIN — Medication 2: at 12:34

## 2020-02-05 RX ADMIN — ENOXAPARIN SODIUM 40 MILLIGRAM(S): 100 INJECTION SUBCUTANEOUS at 04:31

## 2020-02-05 RX ADMIN — Medication 145 MILLIGRAM(S): at 12:34

## 2020-02-05 RX ADMIN — ATORVASTATIN CALCIUM 80 MILLIGRAM(S): 80 TABLET, FILM COATED ORAL at 21:31

## 2020-02-05 RX ADMIN — AMLODIPINE BESYLATE 5 MILLIGRAM(S): 2.5 TABLET ORAL at 17:26

## 2020-02-05 RX ADMIN — Medication 50 MILLIGRAM(S): at 21:31

## 2020-02-05 RX ADMIN — PIPERACILLIN AND TAZOBACTAM 25 GRAM(S): 4; .5 INJECTION, POWDER, LYOPHILIZED, FOR SOLUTION INTRAVENOUS at 04:31

## 2020-02-05 RX ADMIN — Medication 1: at 18:12

## 2020-02-05 RX ADMIN — AMLODIPINE BESYLATE 5 MILLIGRAM(S): 2.5 TABLET ORAL at 04:31

## 2020-02-05 RX ADMIN — Medication 250 MILLIGRAM(S): at 17:27

## 2020-02-05 RX ADMIN — LOSARTAN POTASSIUM 100 MILLIGRAM(S): 100 TABLET, FILM COATED ORAL at 04:31

## 2020-02-05 RX ADMIN — Medication 2: at 09:20

## 2020-02-05 NOTE — DISCHARGE NOTE PROVIDER - HOSPITAL COURSE
74M PMH HTN, HLD, T2DM, prior CVA w/o residual deficits, BPH, ureteral obstruction requiring prior stents, appendectomy p/w nausea and vomiting. Patient somewhat poor historian. Patient felt relatively well this morning, but noted his finger sticks were relatively high at 130-150s. Later developed nausea with vomiting. NBNB. Had transient, mild abdominal pain that was "sore," lower, non-radiating. Pt's brother came home and found him covered in vomit, so called EMS. Upon arrival in the ED, he was noted to be significantly hypoxic which improved initially with BIPAP. Due to the nausea, he was switched to high flow nasal cannula. By the time he arrived here, abdominal pain had completely resolved. Nausea and vomiting also resolved. He denied chest pain, feeling significantly SOB, palpitations, dysuria, LE edema. (29 Jan 2020 23:48)        Aspiration pneumonia.  Plan: iv abx > po    eat slowly see S&S.         Nausea and vomiting.  Plan: res    poss d gastrparesis.         Diabetes mellitus.  Plan: hosp protocol.         CKD (chronic kidney disease).  Plan: chr.         Essential hypertension.  Plan: see meds    see above.         High cholesterol. Plan: statin.        BPH with obstruction/lower urinary tract symptoms.  Plan: Hx stone. 74M PMH HTN, HLD, T2DM, prior CVA w/o residual deficits, BPH, ureteral obstruction requiring prior stents, appendectomy p/w nausea and vomiting. Patient somewhat poor historian. Patient felt relatively well this morning, but noted his finger sticks were relatively high at 130-150s. Later developed nausea with vomiting. NBNB. Admited with sepsis secondart to pna treated with IV zosyn can now switch to  po ceftin , got 5 days iv abx fopr muktifocal PNA, 2-3 more days po ok. Stable for discharge with out pt follow up 74M PMH HTN, HLD, T2DM, prior CVA w/o residual deficits, BPH, ureteral obstruction requiring prior stents, appendectomy p/w nausea and vomiting. Patient somewhat poor historian. Patient felt relatively well this morning, but noted his finger sticks were relatively high at 130-150s. Later developed nausea with vomiting. NBNB. Admited with sepsis secondart to pna treated with IV zosyn can now switch to  po ceftin , got 5 days iv abx fopr muktifocal PNA, 2-3 more days po ok. Stable for discharge with out pt follow up - office

## 2020-02-05 NOTE — CHART NOTE - NSCHARTNOTEFT_GEN_A_CORE
Chart/Event Note  [facility] 2MON 206 D1  KAYKAY JIMENES, 74y, Male  [id number]    Reason for Notification:   Notified by RN that the above patient had asymptomatic hypertension, with blood pressure at 195/89.     Events/History of Present Illness  Patient's chart reviewed. Patient has a history of hypertension, with elevated blood pressures across inpatient stay, of note, patient appears to have worsening hypertension overnight. Had medication adjustment today, newly started on Metroprolol ER 50 mg QHS this evening. Patient is currently on multiple active medication therapy to treat hypertension with nursing documentations showing adherence with these medications while inpatient.   Patient sleeping without distress during evaluation.     T(C): 37 (05 Feb 2020 01:41), Max: 37.1 (04 Feb 2020 21:25)  T(F): 98.6 (05 Feb 2020 01:41), Max: 98.7 (04 Feb 2020 21:25)  HR: 68 (05 Feb 2020 01:41) (60 - 71)  BP: 195/89 (05 Feb 2020 01:41) (149/78 - 204/84)  RR: 18 (05 Feb 2020 01:41) (18 - 18)  SpO2: 95% (05 Feb 2020 01:41) (91% - 95%)    Medical Decision Making/Assessment/Plan:  74-year-old male with a past medical history of hypertension, DM, and BPH, admitted with pneumonia, now with continued asymptomatic hypertension.     - Diagnosis: Hypertension   Patient's other vitals are stable. Patient's hypertension is noted to be chronic. Patient has no complaints or symptoms that raise suspicion for acute hypertension related end organ damage. Will give am blood pressure medications early and then re-evaluate BP. Goal reduction in blood pressure of 20% over the next few hours.     1) Discussed with nursing who will give am blood pressure medications early.   2) Repeat blood pressure in one hour post intervention.   3) Will continue to monitor for the development of symptoms and/or signs of end organ dysfunction.   4) Will endorse the above diagnostics and findings to the day medicine team for review and follow up. Will additionally sign out to day medicine teams to follow with relevant specialties.     Klever Us NP  Department of Medicine   #[id number]

## 2020-02-05 NOTE — DISCHARGE NOTE PROVIDER - NSDCCPCAREPLAN_GEN_ALL_CORE_FT
PRINCIPAL DISCHARGE DIAGNOSIS  Diagnosis: Aspiration pneumonitis  Assessment and Plan of Treatment:       SECONDARY DISCHARGE DIAGNOSES  Diagnosis: Essential hypertension  Assessment and Plan of Treatment: Essential hypertension  Low salt diet  Activity as tolerated.  Take all medication as prescribed.  Follow up with your medical doctor for routine blood pressure monitoring at your next visit.  Notify your doctor if you have any of the following symptoms:   Dizziness, Lightheadedness, Blurry vision, Headache, Chest pain, Shortness of breath      Diagnosis: Diabetes mellitus  Assessment and Plan of Treatment: Diabetes mellitus  HgA1C this admission.  Make sure you get your HgA1c checked every three months.  If you take oral diabetes medications, check your blood glucose two times a day.  If you take insulin, check your blood glucose before meals and at bedtime.  It's important not to skip any meals.  Keep a log of your blood glucose results and always take it with you to your doctor appointments.  Keep a list of your current medications including injectables and over the counter medications and bring this medication list with you to all your doctor appointments.  If you have not seen your opthalmologist this year call for appointment.  Check your feet daily for redness, sores, or openings. Do not self treat. If no improvement in two days call your primary care physician for an appointment.  Low blood sugar (hypoglycemia) is a blood sugar below 70mg/dl. Check your blood sugar if you feel signs/symptoms of hypoglycemia. If your blood sugar is below 70 take 15 grams of carbohydrates (ex 4 oz of apple juice, 3-4 glucosr tablets, or 4-6 oz of regular soda) wait 15 minutes and repeat blood sugar to make sure it comes up above 70.  If your blood sugar is above 70 and you are due for a meal, have a meal.  If you are not due for a meal have a snack.  This snack helps keeps your blood sugar at a safe range.      Diagnosis: BPH with obstruction/lower urinary tract symptoms  Assessment and Plan of Treatment: BPH with obstruction/lower urinary tract symptoms  You have an enlarged prostate gland which gets bigger as men get older - it is a very common problem and has nothing to do with prostate cancer  Call your doctor if you are urinating more frequently, have trouble starting to urinate, have weak stream, urine leaking or dribbling, and feeling as though bladder is not empty after urination  Your doctor will monitor your prostate with a rectal exam as well as urine or blood testing  You can help yourself by reducing the amount of fluid you drink before going to bed, limiting the amount of alcohol & caffeine you drink   Avoid cold & allergy medication that contain decongestants or antihistamines which make BPH symptoms worse  You can also "double void" by waiting a moment after urinating & trying again  Take your medication as prescribed - one medication helps to relax the muscle aroung the urethra and the other medication you may take prevents the prostate from growing more or even shrinking the prostate      Diagnosis: Hypoxia  Assessment and Plan of Treatment: PRINCIPAL DISCHARGE DIAGNOSIS  Diagnosis: Aspiration pneumonitis  Assessment and Plan of Treatment: Take medication as prescribed  Follow up with your primary care provider      SECONDARY DISCHARGE DIAGNOSES  Diagnosis: BPH with obstruction/lower urinary tract symptoms  Assessment and Plan of Treatment: BPH with obstruction/lower urinary tract symptoms  You have an enlarged prostate gland which gets bigger as men get older - it is a very common problem and has nothing to do with prostate cancer  Call your doctor if you are urinating more frequently, have trouble starting to urinate, have weak stream, urine leaking or dribbling, and feeling as though bladder is not empty after urination  Your doctor will monitor your prostate with a rectal exam as well as urine or blood testing  You can help yourself by reducing the amount of fluid you drink before going to bed, limiting the amount of alcohol & caffeine you drink   Avoid cold & allergy medication that contain decongestants or antihistamines which make BPH symptoms worse  You can also "double void" by waiting a moment after urinating & trying again  Take your medication as prescribed - one medication helps to relax the muscle aroung the urethra and the other medication you may take prevents the prostate from growing more or even shrinking the prostate      Diagnosis: Diabetes mellitus  Assessment and Plan of Treatment: Diabetes mellitus  Make sure you get your HgA1c checked every three months.  If you take oral diabetes medications, check your blood glucose two times a day.  If you take insulin, check your blood glucose before meals and at bedtime.  It's important not to skip any meals.  Keep a log of your blood glucose results and always take it with you to your doctor appointments.  Keep a list of your current medications including injectables and over the counter medications and bring this medication list with you to all your doctor appointments.  If you have not seen your opthalmologist this year call for appointment.  Check your feet daily for redness, sores, or openings. Do not self treat. If no improvement in two days call your primary care physician for an appointment.  Low blood sugar (hypoglycemia) is a blood sugar below 70mg/dl. Check your blood sugar if you feel signs/symptoms of hypoglycemia. If your blood sugar is below 70 take 15 grams of carbohydrates (ex 4 oz of apple juice, 3-4 glucosr tablets, or 4-6 oz of regular soda) wait 15 minutes and repeat blood sugar to make sure it comes up above 70.  If your blood sugar is above 70 and you are due for a meal, have a meal.  If you are not due for a meal have a snack.  This snack helps keeps your blood sugar at a safe range.      Diagnosis: Essential hypertension  Assessment and Plan of Treatment: Essential hypertension  Low salt diet  Activity as tolerated.  Take all medication as prescribed.  Follow up with your medical doctor for routine blood pressure monitoring at your next visit.  Notify your doctor if you have any of the following symptoms:   Dizziness, Lightheadedness, Blurry vision, Headache, Chest pain, Shortness of breath      Diagnosis: Hypoxia  Assessment and Plan of Treatment:

## 2020-02-05 NOTE — DISCHARGE NOTE PROVIDER - CARE PROVIDER_API CALL
Dallas Sun)  Family Medicine  [address] [id number]  Phone: [phone]  Fax: [phone]  Follow Up Time:

## 2020-02-05 NOTE — DISCHARGE NOTE PROVIDER - NSDCMRMEDTOKEN_GEN_ALL_CORE_FT
amLODIPine 2.5 mg oral tablet: 3 tab(s) orally once a day  carvedilol 6.25 mg oral tablet: 1 tab(s) orally 2 times a day  fenofibrate 160 mg oral tablet: 1 tab(s) orally once a day  glimepiride 1 mg oral tablet: 3  orally once a day (at bedtime)  LORazepam 0.5 mg oral tablet: 1 tab(s) orally 2 times a day, As Needed  losartan 100 mg oral tablet: 1 tab(s) orally once a day  Low Dose ASA 81 mg oral tablet: 1 tab(s) orally once a day  metFORMIN 500 mg oral tablet, extended release: 4 tab(s) orally once a day with dinner  rosuvastatin 20 mg oral tablet: 1 tab(s) orally once a day (at bedtime)  Spectravite Senior oral tablet: 1 tab(s) orally once a day  tamsulosin 0.4 mg oral capsule: 1 cap(s) orally once a day  Victoza 18 mg/3 mL subcutaneous solution: 1.8 milligram(s) subcutaneous once a day  Vitamin D3: 1 tab(s) orally once a day acetaminophen 325 mg oral tablet: 2 tab(s) orally every 6 hours, As needed, Moderate Pain (4 - 6)  aspirin 81 mg oral delayed release tablet: 1 tab(s) orally once a day  cefuroxime 250 mg oral tablet: 1 tab(s) orally every 12 hours  fenofibrate 160 mg oral tablet: 1 tab(s) orally once a day  glimepiride 1 mg oral tablet: 3  orally once a day (at bedtime)  losartan 100 mg oral tablet: 1 tab(s) orally once a day  metFORMIN 500 mg oral tablet, extended release: 4 tab(s) orally once a day with dinner  metoprolol succinate 50 mg oral tablet, extended release: 1 tab(s) orally once a day (at bedtime)  Norvasc 10 mg oral tablet: 1 tab(s) orally once a day  rosuvastatin 20 mg oral tablet: 1 tab(s) orally once a day (at bedtime)  Spectravite Senior oral tablet: 1 tab(s) orally once a day  tamsulosin 0.4 mg oral capsule: 1 cap(s) orally once a day  Victoza 18 mg/3 mL subcutaneous solution: 1.8 milligram(s) subcutaneous once a day

## 2020-02-06 ENCOUNTER — TRANSCRIPTION ENCOUNTER (OUTPATIENT)
Age: 75
End: 2020-02-06

## 2020-02-06 VITALS
TEMPERATURE: 98 F | OXYGEN SATURATION: 98 % | DIASTOLIC BLOOD PRESSURE: 70 MMHG | HEART RATE: 64 BPM | RESPIRATION RATE: 18 BRPM | SYSTOLIC BLOOD PRESSURE: 155 MMHG

## 2020-02-06 LAB
ANION GAP SERPL CALC-SCNC: 17 MMOL/L — SIGNIFICANT CHANGE UP (ref 5–17)
BUN SERPL-MCNC: 43 MG/DL — HIGH (ref 7–23)
CALCIUM SERPL-MCNC: 9.2 MG/DL — SIGNIFICANT CHANGE UP (ref 8.4–10.5)
CHLORIDE SERPL-SCNC: 104 MMOL/L — SIGNIFICANT CHANGE UP (ref 96–108)
CO2 SERPL-SCNC: 19 MMOL/L — LOW (ref 22–31)
CREAT SERPL-MCNC: 1.66 MG/DL — HIGH (ref 0.5–1.3)
GLUCOSE BLDC GLUCOMTR-MCNC: 215 MG/DL — HIGH (ref 70–99)
GLUCOSE BLDC GLUCOMTR-MCNC: 247 MG/DL — HIGH (ref 70–99)
GLUCOSE BLDC GLUCOMTR-MCNC: 296 MG/DL — HIGH (ref 70–99)
GLUCOSE BLDC GLUCOMTR-MCNC: 304 MG/DL — HIGH (ref 70–99)
GLUCOSE SERPL-MCNC: 221 MG/DL — HIGH (ref 70–99)
HCT VFR BLD CALC: 34.7 % — LOW (ref 39–50)
HGB BLD-MCNC: 11.3 G/DL — LOW (ref 13–17)
MCHC RBC-ENTMCNC: 29.8 PG — SIGNIFICANT CHANGE UP (ref 27–34)
MCHC RBC-ENTMCNC: 32.6 GM/DL — SIGNIFICANT CHANGE UP (ref 32–36)
MCV RBC AUTO: 91.6 FL — SIGNIFICANT CHANGE UP (ref 80–100)
NRBC # BLD: 0 /100 WBCS — SIGNIFICANT CHANGE UP (ref 0–0)
PLATELET # BLD AUTO: 276 K/UL — SIGNIFICANT CHANGE UP (ref 150–400)
POTASSIUM SERPL-MCNC: 3.9 MMOL/L — SIGNIFICANT CHANGE UP (ref 3.5–5.3)
POTASSIUM SERPL-SCNC: 3.9 MMOL/L — SIGNIFICANT CHANGE UP (ref 3.5–5.3)
RBC # BLD: 3.79 M/UL — LOW (ref 4.2–5.8)
RBC # FLD: 12.9 % — SIGNIFICANT CHANGE UP (ref 10.3–14.5)
SODIUM SERPL-SCNC: 140 MMOL/L — SIGNIFICANT CHANGE UP (ref 135–145)
WBC # BLD: 6.63 K/UL — SIGNIFICANT CHANGE UP (ref 3.8–10.5)
WBC # FLD AUTO: 6.63 K/UL — SIGNIFICANT CHANGE UP (ref 3.8–10.5)

## 2020-02-06 PROCEDURE — 87086 URINE CULTURE/COLONY COUNT: CPT

## 2020-02-06 PROCEDURE — 97530 THERAPEUTIC ACTIVITIES: CPT

## 2020-02-06 PROCEDURE — 83690 ASSAY OF LIPASE: CPT

## 2020-02-06 PROCEDURE — 99238 HOSP IP/OBS DSCHRG MGMT 30/<: CPT

## 2020-02-06 PROCEDURE — 80307 DRUG TEST PRSMV CHEM ANLYZR: CPT

## 2020-02-06 PROCEDURE — 82565 ASSAY OF CREATININE: CPT

## 2020-02-06 PROCEDURE — 85027 COMPLETE CBC AUTOMATED: CPT

## 2020-02-06 PROCEDURE — 71275 CT ANGIOGRAPHY CHEST: CPT

## 2020-02-06 PROCEDURE — 74230 X-RAY XM SWLNG FUNCJ C+: CPT

## 2020-02-06 PROCEDURE — 83605 ASSAY OF LACTIC ACID: CPT

## 2020-02-06 PROCEDURE — 80053 COMPREHEN METABOLIC PANEL: CPT

## 2020-02-06 PROCEDURE — 97161 PT EVAL LOW COMPLEX 20 MIN: CPT

## 2020-02-06 PROCEDURE — 99291 CRITICAL CARE FIRST HOUR: CPT | Mod: 25

## 2020-02-06 PROCEDURE — 84132 ASSAY OF SERUM POTASSIUM: CPT

## 2020-02-06 PROCEDURE — 85730 THROMBOPLASTIN TIME PARTIAL: CPT

## 2020-02-06 PROCEDURE — 82947 ASSAY GLUCOSE BLOOD QUANT: CPT

## 2020-02-06 PROCEDURE — 82435 ASSAY OF BLOOD CHLORIDE: CPT

## 2020-02-06 PROCEDURE — 82330 ASSAY OF CALCIUM: CPT

## 2020-02-06 PROCEDURE — 83036 HEMOGLOBIN GLYCOSYLATED A1C: CPT

## 2020-02-06 PROCEDURE — 81001 URINALYSIS AUTO W/SCOPE: CPT

## 2020-02-06 PROCEDURE — 96374 THER/PROPH/DIAG INJ IV PUSH: CPT | Mod: XU

## 2020-02-06 PROCEDURE — 85014 HEMATOCRIT: CPT

## 2020-02-06 PROCEDURE — 84484 ASSAY OF TROPONIN QUANT: CPT

## 2020-02-06 PROCEDURE — 93005 ELECTROCARDIOGRAM TRACING: CPT

## 2020-02-06 PROCEDURE — 97110 THERAPEUTIC EXERCISES: CPT

## 2020-02-06 PROCEDURE — 82803 BLOOD GASES ANY COMBINATION: CPT

## 2020-02-06 PROCEDURE — 87486 CHLMYD PNEUM DNA AMP PROBE: CPT

## 2020-02-06 PROCEDURE — 85610 PROTHROMBIN TIME: CPT

## 2020-02-06 PROCEDURE — 87633 RESP VIRUS 12-25 TARGETS: CPT

## 2020-02-06 PROCEDURE — 80048 BASIC METABOLIC PNL TOTAL CA: CPT

## 2020-02-06 PROCEDURE — 92611 MOTION FLUOROSCOPY/SWALLOW: CPT

## 2020-02-06 PROCEDURE — 83880 ASSAY OF NATRIURETIC PEPTIDE: CPT

## 2020-02-06 PROCEDURE — 71045 X-RAY EXAM CHEST 1 VIEW: CPT

## 2020-02-06 PROCEDURE — 87040 BLOOD CULTURE FOR BACTERIA: CPT

## 2020-02-06 PROCEDURE — 87581 M.PNEUMON DNA AMP PROBE: CPT

## 2020-02-06 PROCEDURE — 84295 ASSAY OF SERUM SODIUM: CPT

## 2020-02-06 PROCEDURE — 87798 DETECT AGENT NOS DNA AMP: CPT

## 2020-02-06 PROCEDURE — 82962 GLUCOSE BLOOD TEST: CPT

## 2020-02-06 PROCEDURE — 92610 EVALUATE SWALLOWING FUNCTION: CPT

## 2020-02-06 RX ORDER — CARVEDILOL PHOSPHATE 80 MG/1
1 CAPSULE, EXTENDED RELEASE ORAL
Qty: 0 | Refills: 0 | DISCHARGE

## 2020-02-06 RX ORDER — ASPIRIN/CALCIUM CARB/MAGNESIUM 324 MG
1 TABLET ORAL
Qty: 0 | Refills: 0 | DISCHARGE

## 2020-02-06 RX ORDER — LOSARTAN POTASSIUM 100 MG/1
1 TABLET, FILM COATED ORAL
Qty: 0 | Refills: 0 | DISCHARGE
Start: 2020-02-06

## 2020-02-06 RX ORDER — AMLODIPINE BESYLATE 2.5 MG/1
5 TABLET ORAL ONCE
Refills: 0 | Status: COMPLETED | OUTPATIENT
Start: 2020-02-06 | End: 2020-02-06

## 2020-02-06 RX ORDER — CEFUROXIME AXETIL 250 MG
1 TABLET ORAL
Qty: 4 | Refills: 0
Start: 2020-02-06 | End: 2020-02-07

## 2020-02-06 RX ORDER — ACETAMINOPHEN 500 MG
2 TABLET ORAL
Qty: 0 | Refills: 0 | DISCHARGE
Start: 2020-02-06

## 2020-02-06 RX ORDER — ASPIRIN/CALCIUM CARB/MAGNESIUM 324 MG
1 TABLET ORAL
Qty: 0 | Refills: 0 | DISCHARGE
Start: 2020-02-06

## 2020-02-06 RX ORDER — METOPROLOL TARTRATE 50 MG
1 TABLET ORAL
Qty: 30 | Refills: 0
Start: 2020-02-06 | End: 2020-03-06

## 2020-02-06 RX ORDER — CHOLECALCIFEROL (VITAMIN D3) 125 MCG
1 CAPSULE ORAL
Qty: 0 | Refills: 0 | DISCHARGE

## 2020-02-06 RX ORDER — AMLODIPINE BESYLATE 2.5 MG/1
1 TABLET ORAL
Qty: 30 | Refills: 0
Start: 2020-02-06 | End: 2020-03-06

## 2020-02-06 RX ORDER — AMLODIPINE BESYLATE 2.5 MG/1
10 TABLET ORAL DAILY
Refills: 0 | Status: DISCONTINUED | OUTPATIENT
Start: 2020-02-06 | End: 2020-02-06

## 2020-02-06 RX ORDER — HYDRALAZINE HCL 50 MG
10 TABLET ORAL ONCE
Refills: 0 | Status: COMPLETED | OUTPATIENT
Start: 2020-02-06 | End: 2020-02-06

## 2020-02-06 RX ADMIN — AMLODIPINE BESYLATE 10 MILLIGRAM(S): 2.5 TABLET ORAL at 14:14

## 2020-02-06 RX ADMIN — Medication 10 MILLIGRAM(S): at 00:37

## 2020-02-06 RX ADMIN — AMLODIPINE BESYLATE 5 MILLIGRAM(S): 2.5 TABLET ORAL at 00:37

## 2020-02-06 RX ADMIN — Medication 2: at 19:29

## 2020-02-06 RX ADMIN — LOSARTAN POTASSIUM 100 MILLIGRAM(S): 100 TABLET, FILM COATED ORAL at 05:31

## 2020-02-06 RX ADMIN — Medication 145 MILLIGRAM(S): at 14:04

## 2020-02-06 RX ADMIN — Medication 250 MILLIGRAM(S): at 18:53

## 2020-02-06 RX ADMIN — Medication 3: at 14:02

## 2020-02-06 RX ADMIN — ENOXAPARIN SODIUM 40 MILLIGRAM(S): 100 INJECTION SUBCUTANEOUS at 05:31

## 2020-02-06 RX ADMIN — Medication 81 MILLIGRAM(S): at 14:03

## 2020-02-06 RX ADMIN — AMLODIPINE BESYLATE 5 MILLIGRAM(S): 2.5 TABLET ORAL at 06:25

## 2020-02-06 RX ADMIN — Medication 250 MILLIGRAM(S): at 06:25

## 2020-02-06 NOTE — PROGRESS NOTE ADULT - NSHPATTENDINGPLANDISCUSS_GEN_ALL_CORE
ED res, Hospirtalist, ED RN, pt,
NP, , will call brotherpt,
comp, NP Antonio now, pt, brother
pt, brother
pt
pt
Dr Horvath twice , pt, RN< staff

## 2020-02-06 NOTE — PROGRESS NOTE ADULT - PROBLEM SELECTOR PROBLEM 3
Diabetes mellitus
Aspiration pneumonia
Diabetes mellitus

## 2020-02-06 NOTE — PROVIDER CONTACT NOTE (OTHER) - SITUATION
Pts B/P is 193/96
Pts B/P is 195/82
pt bp 199/80
pt has losartaan and norvasc ordered for the morning. pt received a dose of norvasc and hydralizine overnight. Pt is hypertensive.
pt systolic 
pt bp 211/93

## 2020-02-06 NOTE — PROVIDER CONTACT NOTE (OTHER) - ASSESSMENT
pt is asymptomatic.
Pt asymptomatic, resting in bed. Denies, CP, SOB, headache, lightheadedness and/or dizziness
Pt resting comfortably in bed at this time, denies CP. SOB, headache, dizziness and/or lightheadedness
no ss of headache, discomfort, etc. all other vss stable besides hypertension
pt is asymptomatic.
pt systolic . denies headache, lightheadedness, etc. laying in bed comfortable. all other vss stable

## 2020-02-06 NOTE — PROVIDER CONTACT NOTE (OTHER) - DATE AND TIME:
01-Feb-2020 05:08
01-Feb-2020 23:29
04-Feb-2020 21:30
06-Feb-2020 00:15
06-Feb-2020 05:20
03-Feb-2020 00:45

## 2020-02-06 NOTE — DISCHARGE NOTE NURSING/CASE MANAGEMENT/SOCIAL WORK - NSDCPEPTSTRK_GEN_ALL_CORE
Risk factors for stroke/Stroke warning signs and symptoms/Call 911 for stroke/Prescribed medications/Stroke education booklet/Stroke support groups for patients, families, and friends/Signs and symptoms of stroke/Need for follow up after discharge

## 2020-02-06 NOTE — PROGRESS NOTE ADULT - ASSESSMENT
see labs, wbc incr, SCr incr, glu elev=dm  on abx day 2   needs to be odalys rodrigez , PT p
dc zosyn iv , dc NS  fu BP, Incr amlodipine to BID if BP remains elev  start po ceftin , got 5 days iv abx fopr muktifocal PNA, 2-3 more days po ok
labsopk  fu bp  oob amblate c assist re weakness  RN aware  iv abx day 5  fu in am re abx and S&S and BP
see labs, wbc incr, SCr incr, glu elev=dm  on abx day 3   needs to be odalys rodrigez , PT p
see labs, wbc incr, SCr incr, glu elev=dm  on abx day 4  needs to be odalys rodrigez , PT p
tom thompson  NP mihaela MOJICA, returned call, repeatedly busy [phone]  abx 2 ,more days  fu BP outpt,  ophtho outpt
wbc nl but has shift, lactate hi  CT multifocal pNA  ON ABX  orderingbPT  STILL IN ED

## 2020-02-06 NOTE — PROGRESS NOTE ADULT - PROVIDER SPECIALTY LIST ADULT
Family Medicine

## 2020-02-06 NOTE — PROGRESS NOTE ADULT - SUBJECTIVE AND OBJECTIVE BOX
HPI:  74M PMH HTN, HLD, T2DM, prior CVA w/o residual deficits, BPH, ureteral obstruction requiring prior stents, appendectomy p/w nausea and vomiting. Patient somewhat poor historian. Patient felt relatively well this morning, but noted his finger sticks were relatively high at 130-150s. Later developed nausea with vomiting. NBNB. Had transient, mild abdominal pain that was "sore," lower, non-radiating. Pt's brother came home and found him covered in vomit, so called EMS. Upon arrival in the ED, he was noted to be significantly hypoxic which improved initially with BIPAP. Due to the nausea, he was switched to high flow nasal cannula. By the time he arrived here, abdominal pain had completely resolved. Nausea and vomiting also resolved. He denied chest pain, feeling significantly SOB, palpitations, dysuria, LE edema. (29 Jan 2020 23:48)      Interval Events  BP Still hi, gets Rx, this am incr amlodipine, fu outpt    MEDICATIONS  (STANDING):  amLODIPine   Tablet 10 milliGRAM(s) Oral daily  aspirin enteric coated 81 milliGRAM(s) Oral daily  atorvastatin 80 milliGRAM(s) Oral at bedtime  cefuroxime   Tablet 250 milliGRAM(s) Oral every 12 hours  dextrose 5%. 1000 milliLiter(s) (50 mL/Hr) IV Continuous <Continuous>  dextrose 50% Injectable 12.5 Gram(s) IV Push once  dextrose 50% Injectable 25 Gram(s) IV Push once  dextrose 50% Injectable 25 Gram(s) IV Push once  enoxaparin Injectable 40 milliGRAM(s) SubCutaneous every 24 hours  fenofibrate Tablet 145 milliGRAM(s) Oral daily  insulin lispro (HumaLOG) corrective regimen sliding scale   SubCutaneous three times a day before meals  insulin lispro (HumaLOG) corrective regimen sliding scale   SubCutaneous at bedtime  losartan 100 milliGRAM(s) Oral daily  metoprolol succinate ER 50 milliGRAM(s) Oral at bedtime  sodium chloride 0.9%. 1000 milliLiter(s) (100 mL/Hr) IV Continuous <Continuous>  tamsulosin 0.4 milliGRAM(s) Oral at bedtime    MEDICATIONS  (PRN):  acetaminophen   Tablet .. 650 milliGRAM(s) Oral every 6 hours PRN Moderate Pain (4 - 6)  dextrose 40% Gel 15 Gram(s) Oral once PRN Blood Glucose LESS THAN 70 milliGRAM(s)/deciliter  glucagon  Injectable 1 milliGRAM(s) IntraMuscular once PRN Glucose LESS THAN 70 milligrams/deciliter  ondansetron Injectable 4 milliGRAM(s) IV Push every 6 hours PRN Nausea and/or Vomiting      Patient is a 74y old  Male who presents with a chief complaint of nausea (05 Feb 2020 16:33)      REVIEW OF SYSTEMS    General:nad no co no ch  Skin/Breast:  Ophthalmologic:sees ok, knows to go to ophtho outpt  ENMT:	hears eats ok   Respiratory and Thorax:no cough no sp no sob  Cardiovascular:no cp no palp  Gastrointestinal:no nvcd  Genitourinary:no fdi  Musculoskeletal:	no opain  Neurological:	no co  Psychiatric:	  Hematology/Lymphatics:	  Endocrine:no polyudd	  Allergic/Immunologic:  AOSN	y      Vital Signs Last 24 Hrs  T(C): 36.8 (06 Feb 2020 05:02), Max: 36.8 (05 Feb 2020 14:10)  T(F): 98.2 (06 Feb 2020 05:02), Max: 98.3 (05 Feb 2020 21:15)  HR: 68 (06 Feb 2020 06:22) (58 - 68)  BP: 153/74 (06 Feb 2020 06:22) (153/74 - 199/92)  BP(mean): --  RR: 18 (06 Feb 2020 05:02) (18 - 18)  SpO2: 94% (06 Feb 2020 05:02) (93% - 98%)    PHYSICAL EXAM:    Constitutional:nad no co oob amb, no SNN  H+N ncat  Eyes:lyudmila cwnl  ENMT:hears swallows eats  Neck:no cb no tm  Breasts:  Back:  Respiratory:ctab no rrw  Cardiovascular:rrr  Gastrointestinal:soft nt  Genitourinary:  Rectal:  Extremities:no cce  Vascular:  Neurological:stutters, ambulate indep  Skin:  Lymph Nodes:  Musculoskeletal:  Psychiatric:    LABS  CBC Full  -  ( 06 Feb 2020 06:34 )  WBC Count : 6.63 K/uL  RBC Count : 3.79 M/uL  Hemoglobin : 11.3 g/dL  Hematocrit : 34.7 %  Platelet Count - Automated : 276 K/uL  Mean Cell Volume : 91.6 fl  Mean Cell Hemoglobin : 29.8 pg  Mean Cell Hemoglobin Concentration : 32.6 gm/dL  Auto Neutrophil # : x  Auto Lymphocyte # : x  Auto Monocyte # : x  Auto Eosinophil # : x  Auto Basophil # : x  Auto Neutrophil % : x  Auto Lymphocyte % : x  Auto Monocyte % : x  Auto Eosinophil % : x  Auto Basophil % : x      02-06    140  |  104  |  43<H>  ----------------------------<  221<H>  3.9   |  19<L>  |  1.66<H>    Ca    9.2      06 Feb 2020 06:34            Imaging:    Xray:    Echo:    CT:    MRI:    Tele:    Orders:    ALONZO Sun [phone]
HPI:  74M PMH HTN, HLD, T2DM, prior CVA w/o residual deficits, BPH, ureteral obstruction requiring prior stents, appendectomy p/w nausea and vomiting. Patient somewhat poor historian. Patient felt relatively well this morning, but noted his finger sticks were relatively high at 130-150s. Later developed nausea with vomiting. NBNB. Had transient, mild abdominal pain that was "sore," lower, non-radiating. Pt's brother came home and found him covered in vomit, so called EMS. Upon arrival in the ED, he was noted to be significantly hypoxic which improved initially with BIPAP. Due to the nausea, he was switched to high flow nasal cannula. By the time he arrived here, abdominal pain had completely resolved. Nausea and vomiting also resolved. He denied chest pain, feeling significantly SOB, palpitations, dysuria, LE edema. (29 Jan 2020 23:48)      Interval Events  BP hi again last nite even after BLKr changed to HS  still on iv abx and NS Ns, so will dc both ath and monitor BP  can be oob and ambulate  otherwise feels better no co    MEDICATIONS  (STANDING):  amLODIPine   Tablet 5 milliGRAM(s) Oral daily  aspirin enteric coated 81 milliGRAM(s) Oral daily  atorvastatin 80 milliGRAM(s) Oral at bedtime  cefuroxime   Tablet 250 milliGRAM(s) Oral every 12 hours  dextrose 5%. 1000 milliLiter(s) (50 mL/Hr) IV Continuous <Continuous>  dextrose 50% Injectable 12.5 Gram(s) IV Push once  dextrose 50% Injectable 25 Gram(s) IV Push once  dextrose 50% Injectable 25 Gram(s) IV Push once  enoxaparin Injectable 40 milliGRAM(s) SubCutaneous every 24 hours  fenofibrate Tablet 145 milliGRAM(s) Oral daily  insulin lispro (HumaLOG) corrective regimen sliding scale   SubCutaneous three times a day before meals  insulin lispro (HumaLOG) corrective regimen sliding scale   SubCutaneous at bedtime  losartan 100 milliGRAM(s) Oral daily  metoprolol succinate ER 50 milliGRAM(s) Oral at bedtime  sodium chloride 0.9%. 1000 milliLiter(s) (100 mL/Hr) IV Continuous <Continuous>  tamsulosin 0.4 milliGRAM(s) Oral at bedtime    MEDICATIONS  (PRN):  acetaminophen   Tablet .. 650 milliGRAM(s) Oral every 6 hours PRN Moderate Pain (4 - 6)  dextrose 40% Gel 15 Gram(s) Oral once PRN Blood Glucose LESS THAN 70 milliGRAM(s)/deciliter  glucagon  Injectable 1 milliGRAM(s) IntraMuscular once PRN Glucose LESS THAN 70 milligrams/deciliter  ondansetron Injectable 4 milliGRAM(s) IV Push every 6 hours PRN Nausea and/or Vomiting      Patient is a 74y old  Male who presents with a chief complaint of nausea (03 Feb 2020 10:22)      REVIEW OF SYSTEMS    General:nad wants to go home  Skin/Breast:  Ophthalmologic:no co no ch sees ok  ENMT:	herars swalows eats ok no co  Respiratory and Thorax:nomcough no sp no sob  Cardiovascular:no cp no palp  Gastrointestinal:no nvcd  Genitourinary:no fdi  Musculoskeletal:	no pain  Neurological:	  Psychiatric:	  Hematology/Lymphatics:	  Endocrine:	no poly udd  Allergic/Immunologic:  AOSN	y      Vital Signs Last 24 Hrs  T(C): 36.8 (05 Feb 2020 09:13), Max: 37.1 (04 Feb 2020 21:25)  T(F): 98.2 (05 Feb 2020 09:13), Max: 98.7 (04 Feb 2020 21:25)  HR: 60 (05 Feb 2020 09:13) (60 - 68)  BP: 193/84 (05 Feb 2020 09:13) (149/78 - 199/80)  BP(mean): --  RR: 18 (05 Feb 2020 09:13) (18 - 18)  SpO2: 95% (05 Feb 2020 09:13) (92% - 95%)    PHYSICAL EXAM:    Constitutional:vss stable ex BP nad, better  H+N ncat  Eyes:lyudmila cwnl  ENMT:hears, eats ok  Neck:no cb no tm  Breasts:  Back:  Respiratory:ctab no rrw  Cardiovascular:rrr  Gastrointestinal:soft nt  Genitourinary:no fdi  Rectal:  Extremities:no cce  Vascular:  Neurological:  Skin:cdi  Lymph Nodes:  Musculoskeletal:  Psychiatric:    LABS  CBC Full  -  ( 05 Feb 2020 07:10 )  WBC Count : 8.21 K/uL  RBC Count : 3.42 M/uL  Hemoglobin : 10.2 g/dL  Hematocrit : 31.7 %  Platelet Count - Automated : 251 K/uL  Mean Cell Volume : 92.7 fl  Mean Cell Hemoglobin : 29.8 pg  Mean Cell Hemoglobin Concentration : 32.2 gm/dL  Auto Neutrophil # : x  Auto Lymphocyte # : x  Auto Monocyte # : x  Auto Eosinophil # : x  Auto Basophil # : x  Auto Neutrophil % : x  Auto Lymphocyte % : x  Auto Monocyte % : x  Auto Eosinophil % : x  Auto Basophil % : x      02-05    141  |  104  |  35<H>  ----------------------------<  200<H>  3.9   |  22  |  1.90<H>    Ca    9.2      05 Feb 2020 06:58            Imaging:    Xray:    Echo:    CT:    MRI:    Tele:    Orders:    ALONZO Sun [phone]
HPI:  74M PMH HTN, HLD, T2DM, prior CVA w/o residual deficits, BPH, ureteral obstruction requiring prior stents, appendectomy p/w nausea and vomiting. Patient somewhat poor historian. Patient felt relatively well this morning, but noted his finger sticks were relatively high at 130-150s. Later developed nausea with vomiting. NBNB. Had transient, mild abdominal pain that was "sore," lower, non-radiating. Pt's brother came home and found him covered in vomit, so called EMS. Upon arrival in the ED, he was noted to be significantly hypoxic which improved initially with BIPAP. Due to the nausea, he was switched to high flow nasal cannula. By the time he arrived here, abdominal pain had completely resolved. Nausea and vomiting also resolved. He denied chest pain, feeling significantly SOB, palpitations, dysuria, LE edema. (29 Jan 2020 23:48)      Interval Events  adm x hospitalist after we spoke on phone after i spoke to ED res     MEDICATIONS  (STANDING):  aspirin enteric coated 81 milliGRAM(s) Oral daily  atorvastatin 80 milliGRAM(s) Oral at bedtime  carvedilol 6.25 milliGRAM(s) Oral every 12 hours  dextrose 5%. 1000 milliLiter(s) (50 mL/Hr) IV Continuous <Continuous>  dextrose 50% Injectable 12.5 Gram(s) IV Push once  dextrose 50% Injectable 25 Gram(s) IV Push once  dextrose 50% Injectable 25 Gram(s) IV Push once  enoxaparin Injectable 40 milliGRAM(s) SubCutaneous every 24 hours  fenofibrate Tablet 145 milliGRAM(s) Oral daily  insulin lispro (HumaLOG) corrective regimen sliding scale   SubCutaneous three times a day before meals  insulin lispro (HumaLOG) corrective regimen sliding scale   SubCutaneous at bedtime  losartan 100 milliGRAM(s) Oral daily  piperacillin/tazobactam IVPB.. 3.375 Gram(s) IV Intermittent every 8 hours  sodium chloride 0.9%. 1000 milliLiter(s) (100 mL/Hr) IV Continuous <Continuous>  tamsulosin 0.4 milliGRAM(s) Oral at bedtime    MEDICATIONS  (PRN):  dextrose 40% Gel 15 Gram(s) Oral once PRN Blood Glucose LESS THAN 70 milliGRAM(s)/deciliter  glucagon  Injectable 1 milliGRAM(s) IntraMuscular once PRN Glucose LESS THAN 70 milligrams/deciliter  ondansetron Injectable 4 milliGRAM(s) IV Push every 6 hours PRN Nausea and/or Vomiting      Patient is a 74y old  Male who presents with a chief complaint of nausea (29 Jan 2020 23:48)      REVIEW OF SYSTEMS    General:nasd, better  Skin/Breast:nop co no co  Ophthalmologic:sees ok no co no c  ENMT:	hears no co, x co sore throat  Respiratory and Thorax:cough no sp no sob  Cardiovascular:no cp nopalp  Gastrointestinal:n/v yersterday had bm mo pain  Genitourinary:no fdi  Musculoskeletal:	  Neurological:	  Psychiatric:	  Hematology/Lymphatics:	  Endocrine:	no polyudd  Allergic/Immunologic:  AOSN	ty      Vital Signs Last 24 Hrs  T(C): 37.3 (30 Jan 2020 05:00), Max: 37.6 (30 Jan 2020 02:58)  T(F): 99.2 (30 Jan 2020 05:00), Max: 99.6 (30 Jan 2020 02:58)  HR: 93 (30 Jan 2020 05:00) (79 - 102)  BP: 126/67 (30 Jan 2020 05:00) (126/67 - 165/85)  BP(mean): --  RR: 20 (30 Jan 2020 05:00) (16 - 20)  SpO2: 100% (30 Jan 2020 05:00) (80% - 100%)    PHYSICAL EXAM:    Constitutional:feels better, no co now x throat, vss  H+N ncat   Eyes:lyudmila cwnl x eyes watery  ENMT:  Neck:no cb no tm  Breasts:  Back:  Respiratory:ctab no rrw has cough no sp  Cardiovascular:rrr  Gastrointestinal:soft nt  Genitourinary:  Rectal:  Extremities:no cce  Vascular:h/m -, vv-  Neurological:stutters, rest nl  Skin:cdi  Lymph Nodes:  Musculoskeletal:  Psychiatric:    LABS  CBC Full  -  ( 30 Jan 2020 05:29 )  WBC Count : 9.87 K/uL  RBC Count : 3.56 M/uL  Hemoglobin : 10.6 g/dL  Hematocrit : 33.3 %  Platelet Count - Automated : 202 K/uL  Mean Cell Volume : 93.5 fl  Mean Cell Hemoglobin : 29.8 pg  Mean Cell Hemoglobin Concentration : 31.8 gm/dL  Auto Neutrophil # : 8.65 K/uL  Auto Lymphocyte # : 0.79 K/uL  Auto Monocyte # : 0.26 K/uL  Auto Eosinophil # : 0.00 K/uL  Auto Basophil # : 0.09 K/uL  Auto Neutrophil % : 85.0 %  Auto Lymphocyte % : 8.0 %  Auto Monocyte % : 2.6 %  Auto Eosinophil % : 0.0 %  Auto Basophil % : 0.9 %      01-30    141  |  108  |  32<H>  ----------------------------<  218<H>  4.0   |  20<L>  |  1.56<H>    Ca    8.5      30 Jan 2020 05:29    TPro  6.7  /  Alb  3.7  /  TBili  0.9  /  DBili  x   /  AST  11  /  ALT  18  /  AlkPhos  43  01-30      PT/INR - ( 29 Jan 2020 19:27 )   PT: 11.0 sec;   INR: 0.97 ratio         PTT - ( 29 Jan 2020 19:27 )  PTT:29.4 sec    Imaging:    Xray:    Echo:    CT:    MRI:    Tele:    Orders:    ALONZO Sun [phone]
HPI:  74M PMH HTN, HLD, T2DM, prior CVA w/o residual deficits, BPH, ureteral obstruction requiring prior stents, appendectomy p/w nausea and vomiting. Patient somewhat poor historian. Patient felt relatively well this morning, but noted his finger sticks were relatively high at 130-150s. Later developed nausea with vomiting. NBNB. Had transient, mild abdominal pain that was "sore," lower, non-radiating. Pt's brother came home and found him covered in vomit, so called EMS. Upon arrival in the ED, he was noted to be significantly hypoxic which improved initially with BIPAP. Due to the nausea, he was switched to high flow nasal cannula. By the time he arrived here, abdominal pain had completely resolved. Nausea and vomiting also resolved. He denied chest pain, feeling significantly SOB, palpitations, dysuria, LE edema. (29 Jan 2020 23:48)      Interval Events  coverage Dr Horvath appreciated  S&S this am,. report p  feels better, less cough  on iv Abx day 5  BP was hi overnite, will fu  ambulatesd c NE no Skilled need    MEDICATIONS  (STANDING):  amLODIPine   Tablet 5 milliGRAM(s) Oral daily  aspirin enteric coated 81 milliGRAM(s) Oral daily  atorvastatin 80 milliGRAM(s) Oral at bedtime  carvedilol 6.25 milliGRAM(s) Oral every 12 hours  dextrose 5%. 1000 milliLiter(s) (50 mL/Hr) IV Continuous <Continuous>  dextrose 50% Injectable 12.5 Gram(s) IV Push once  dextrose 50% Injectable 25 Gram(s) IV Push once  dextrose 50% Injectable 25 Gram(s) IV Push once  enoxaparin Injectable 40 milliGRAM(s) SubCutaneous every 24 hours  fenofibrate Tablet 145 milliGRAM(s) Oral daily  insulin lispro (HumaLOG) corrective regimen sliding scale   SubCutaneous three times a day before meals  insulin lispro (HumaLOG) corrective regimen sliding scale   SubCutaneous at bedtime  losartan 100 milliGRAM(s) Oral daily  piperacillin/tazobactam IVPB.. 3.375 Gram(s) IV Intermittent every 8 hours  sodium chloride 0.9%. 1000 milliLiter(s) (100 mL/Hr) IV Continuous <Continuous>  tamsulosin 0.4 milliGRAM(s) Oral at bedtime    MEDICATIONS  (PRN):  acetaminophen   Tablet .. 650 milliGRAM(s) Oral every 6 hours PRN Moderate Pain (4 - 6)  dextrose 40% Gel 15 Gram(s) Oral once PRN Blood Glucose LESS THAN 70 milliGRAM(s)/deciliter  glucagon  Injectable 1 milliGRAM(s) IntraMuscular once PRN Glucose LESS THAN 70 milligrams/deciliter  ondansetron Injectable 4 milliGRAM(s) IV Push every 6 hours PRN Nausea and/or Vomiting  sodium chloride 0.65% Nasal 1 Spray(s) Both Nostrils four times a day PRN Nasal Congestion      Patient is a 74y old  Male who presents with a chief complaint of nausea (02 Feb 2020 12:49)      REVIEW OF SYSTEMS    General:nad no co now feels better but weak  Skin/Breast:no rid  Ophthalmologic:sees noc nio ch  ENMT:	hears swallows eats  Respiratory and Thorax:no cough no sp no sob  Cardiovascular:no cp no palp  Gastrointestinal:no nvcd  Genitourinary:no fdi  Musculoskeletal:	no pain  Neurological:	no sandi  Psychiatric:	  Hematology/Lymphatics:	  Endocrine:	no poly udd  Allergic/Immunologic:  AOSN	y      Vital Signs Last 24 Hrs  T(C): 36.4 (03 Feb 2020 09:23), Max: 37.2 (02 Feb 2020 16:45)  T(F): 97.5 (03 Feb 2020 09:23), Max: 98.9 (02 Feb 2020 16:45)  HR: 58 (03 Feb 2020 09:23) (58 - 65)  BP: 148/81 (03 Feb 2020 09:23) (148/81 - 220/90)  BP(mean): --  RR: 18 (03 Feb 2020 09:23) (18 - 18)  SpO2: 94% (03 Feb 2020 09:23) (94% - 98%)    PHYSICAL EXAM:    Constitutional:bp better hr ok, no co nad  H+N ncat  Eyes:lyudmila cwnl  ENMT:hears eats ok  Neck:no cb no tm  Breasts:  Back:  Respiratory:ctab no rrw  Cardiovascular:rrr  Gastrointestinal:soft nt  Genitourinary:  Rectal:  Extremities:no cce  Vascular:  Neurological:nfatmae in chair  Skin:  Lymph Nodes:  Musculoskeletal:  Psychiatric:    LABS  CBC Full  -  ( 03 Feb 2020 07:07 )  WBC Count : 6.92 K/uL  RBC Count : 3.48 M/uL  Hemoglobin : 10.1 g/dL  Hematocrit : 31.7 %  Platelet Count - Automated : 231 K/uL  Mean Cell Volume : 91.1 fl  Mean Cell Hemoglobin : 29.0 pg  Mean Cell Hemoglobin Concentration : 31.9 gm/dL  Auto Neutrophil # : 4.01 K/uL  Auto Lymphocyte # : 1.56 K/uL  Auto Monocyte # : 0.64 K/uL  Auto Eosinophil # : 0.65 K/uL  Auto Basophil # : 0.03 K/uL  Auto Neutrophil % : 58.1 %  Auto Lymphocyte % : 22.5 %  Auto Monocyte % : 9.2 %  Auto Eosinophil % : 9.4 %  Auto Basophil % : 0.4 %      02-03    140  |  103  |  27<H>  ----------------------------<  190<H>  3.8   |  20<L>  |  1.60<H>    Ca    9.2      03 Feb 2020 07:05            Imaging:    Xray:    Echo:    CT:    MRI:    Tele:    Orders:    ALONZO Sun [phone]
weekend coverage for Dr. Sun    HPI:  74M PMH HTN, HLD, T2DM, prior CVA w/o residual deficits, BPH, ureteral obstruction requiring prior stents, appendectomy p/w nausea and vomiting. Patient somewhat poor historian. Patient felt relatively well this morning, but noted his finger sticks were relatively high at 130-150s. Later developed nausea with vomiting. NBNB. Had transient, mild abdominal pain that was "sore," lower, non-radiating. Pt's brother came home and found him covered in vomit, so called EMS. Upon arrival in the ED, he was noted to be significantly hypoxic which improved initially with BIPAP. Due to the nausea, he was switched to high flow nasal cannula. By the time he arrived here, abdominal pain had completely resolved. Nausea and vomiting also resolved. He denied chest pain, feeling significantly SOB, palpitations, dysuria, LE edema. (29 Jan 2020 23:48)      Interval Events: none. Denies chest pain, SOB, nausea or vomiting. Has a slight cough.      REVIEW OF SYSTEMS    General:nad no co feels better still has vcough  Skin/Breast:  Ophthalmologic:no co no ch sees ok  ENMT:	hears oeats k noco  Respiratory and Thorax:cough no sp nosobn but on o2  Cardiovascular:no cp no palp  Gastrointestinal:no nvcd  Genitourinary:no fdi  Musculoskeletal:	no pain  Neurological:	  Psychiatric:	  Hematology/Lymphatics:	  Endocrine:no polyudd	  Allergic/Immunologic:  AOSN	y      ICU Vital Signs Last 24 Hrs  T(C): 36.9 (01 Feb 2020 10:15), Max: 37.1 (31 Jan 2020 17:15)  T(F): 98.5 (01 Feb 2020 10:15), Max: 98.7 (31 Jan 2020 17:15)  HR: 55 (01 Feb 2020 10:15) (55 - 69)  BP: 177/81 (01 Feb 2020 10:15) (153/75 - 193/96)  BP(mean): --  ABP: --  ABP(mean): --  RR: 18 (01 Feb 2020 10:15) (16 - 18)  SpO2: 94% (01 Feb 2020 10:15) (93% - 96%)    PHYSICAL EXAM:    Constitutional:vss nsd no co better no nv  H+N ncat  Eyes:lyudmila cwnl  ENMT:hears swallows speaks ok  Neck:no cb no tm  Breasts:  Back:  Respiratory:ctab no rrw  Cardiovascular:rrr  Gastrointestinal:soft nt  Genitourinary:  Rectal:  Extremities:nom cce  Vascular:  Neurological:nfatmae  Skin:cdi  Lymph Nodes:  Musculoskeletal:  Psychiatric:             Labs                  9.5    9.20  )-----------( 200      ( 01 Feb 2020 07:39 )             30.3   02-01    145  |  107  |  29<H>  ----------------------------<  134<H>  3.9   |  23  |  1.75<H>    Ca    8.7      01 Feb 2020 07:39    POCT  Blood Glucose (02.01.20 @ 12:10)    POCT Blood Glucose.: 184 mg/dL
weekend coverage for Dr. Sun    HPI:  74M PMH HTN, HLD, T2DM, prior CVA w/o residual deficits, BPH, ureteral obstruction requiring prior stents, appendectomy p/w nausea and vomiting. Patient somewhat poor historian. Patient felt relatively well this morning, but noted his finger sticks were relatively high at 130-150s. Later developed nausea with vomiting. NBNB. Had transient, mild abdominal pain that was "sore," lower, non-radiating. Pt's brother came home and found him covered in vomit, so called EMS. Upon arrival in the ED, he was noted to be significantly hypoxic which improved initially with BIPAP. Due to the nausea, he was switched to high flow nasal cannula. By the time he arrived here, abdominal pain had completely resolved. Nausea and vomiting also resolved. He denied chest pain, feeling significantly SOB, palpitations, dysuria, LE edema. (29 Jan 2020 23:48)      Interval Events: none. Denies chest pain, SOB, nausea or vomiting. Has a slight cough. Blood pressure has been high.      REVIEW OF SYSTEMS    General:nad no co feels better still has vcough  Skin/Breast:  Ophthalmologic:no co no ch sees ok  ENMT:	hears oeats k noco  Respiratory and Thorax:cough no sp nosobn but on o2  Cardiovascular:no cp no palp  Gastrointestinal:no nvcd  Genitourinary:no fdi  Musculoskeletal:	no pain  Neurological:	  Psychiatric:	  Hematology/Lymphatics:	  Endocrine:no polyudd	  Allergic/Immunologic:  AOSN	y      ICU Vital Signs Last 24 Hrs  T(C): 36.8 (02 Feb 2020 10:15), Max: 37.1 (02 Feb 2020 04:27)  T(F): 98.2 (02 Feb 2020 10:15), Max: 98.7 (02 Feb 2020 04:27)  HR: 64 (02 Feb 2020 11:48) (57 - 65)  BP: 181/87 (02 Feb 2020 11:48) (160/72 - 203/92)  BP(mean): --  ABP: --  ABP(mean): --  RR: 18 (02 Feb 2020 10:15) (18 - 18)  SpO2: 96% (02 Feb 2020 10:15) (93% - 96%)      PHYSICAL EXAM:  Constitutional:vss nsd no co better no nv  H+N ncat  Eyes:lyudmila cwnl  ENMT:hears swallows speaks ok  Neck:no cb no tm  Breasts:  Back:  Respiratory:ctab no rrw  Cardiovascular:rrr  Gastrointestinal:soft nt  Genitourinary:  Rectal:  Extremities:nom cce  Vascular:  Neurological:nfatmae  Skin:cdi  Lymph Nodes:  Musculoskeletal:  Psychiatric:             Labs                                  10.4   9.03  )-----------( 230      ( 02 Feb 2020 07:19 )             33.0   02-02    139  |  103  |  29<H>  ----------------------------<  215<H>  4.0   |  24  |  1.50<H>    Ca    9.4      02 Feb 2020 07:19    CAPILLARY BLOOD GLUCOSE      POCT Blood Glucose.: 206 mg/dL (02 Feb 2020 08:37)  POCT Blood Glucose.: 253 mg/dL (01 Feb 2020 21:54)
HPI:  74M PMH HTN, HLD, T2DM, prior CVA w/o residual deficits, BPH, ureteral obstruction requiring prior stents, appendectomy p/w nausea and vomiting. Patient somewhat poor historian. Patient felt relatively well this morning, but noted his finger sticks were relatively high at 130-150s. Later developed nausea with vomiting. NBNB. Had transient, mild abdominal pain that was "sore," lower, non-radiating. Pt's brother came home and found him covered in vomit, so called EMS. Upon arrival in the ED, he was noted to be significantly hypoxic which improved initially with BIPAP. Due to the nausea, he was switched to high flow nasal cannula. By the time he arrived here, abdominal pain had completely resolved. Nausea and vomiting also resolved. He denied chest pain, feeling significantly SOB, palpitations, dysuria, LE edema. (29 Jan 2020 23:48)      Interval Events  ED > @jesse   on O2 hi cheli  feels better no more n/v    MEDICATIONS  (STANDING):  aspirin enteric coated 81 milliGRAM(s) Oral daily  atorvastatin 80 milliGRAM(s) Oral at bedtime  carvedilol 6.25 milliGRAM(s) Oral every 12 hours  dextrose 5%. 1000 milliLiter(s) (50 mL/Hr) IV Continuous <Continuous>  dextrose 50% Injectable 12.5 Gram(s) IV Push once  dextrose 50% Injectable 25 Gram(s) IV Push once  dextrose 50% Injectable 25 Gram(s) IV Push once  enoxaparin Injectable 40 milliGRAM(s) SubCutaneous every 24 hours  fenofibrate Tablet 145 milliGRAM(s) Oral daily  insulin lispro (HumaLOG) corrective regimen sliding scale   SubCutaneous three times a day before meals  insulin lispro (HumaLOG) corrective regimen sliding scale   SubCutaneous at bedtime  losartan 100 milliGRAM(s) Oral daily  piperacillin/tazobactam IVPB.. 3.375 Gram(s) IV Intermittent every 8 hours  sodium chloride 0.9%. 1000 milliLiter(s) (100 mL/Hr) IV Continuous <Continuous>  tamsulosin 0.4 milliGRAM(s) Oral at bedtime    MEDICATIONS  (PRN):  dextrose 40% Gel 15 Gram(s) Oral once PRN Blood Glucose LESS THAN 70 milliGRAM(s)/deciliter  glucagon  Injectable 1 milliGRAM(s) IntraMuscular once PRN Glucose LESS THAN 70 milligrams/deciliter  ondansetron Injectable 4 milliGRAM(s) IV Push every 6 hours PRN Nausea and/or Vomiting      Patient is a 74y old  Male who presents with a chief complaint of nausea/pna (30 Jan 2020 08:43)      REVIEW OF SYSTEMS    General:nad no co feels better still has vcough  Skin/Breast:  Ophthalmologic:no co no ch sees ok  ENMT:	hears oeats k noco  Respiratory and Thorax:cough no sp nosobn but on o2  Cardiovascular:no cp no palp  Gastrointestinal:no nvcd  Genitourinary:no fdi  Musculoskeletal:	no pain  Neurological:	  Psychiatric:	  Hematology/Lymphatics:	  Endocrine:no polyudd	  Allergic/Immunologic:  AOSN	y      Vital Signs Last 24 Hrs  T(C): 37.7 (31 Jan 2020 05:20), Max: 37.7 (31 Jan 2020 05:20)  T(F): 99.8 (31 Jan 2020 05:20), Max: 99.8 (31 Jan 2020 05:20)  HR: 68 (31 Jan 2020 06:44) (61 - 72)  BP: 154/77 (31 Jan 2020 05:20) (130/69 - 154/77)  BP(mean): --  RR: 20 (31 Jan 2020 06:44) (19 - 20)  SpO2: 92% (31 Jan 2020 06:44) (92% - 100%)    PHYSICAL EXAM:    Constitutional:vss nsd no co better no nv  H+N ncat  Eyes:lyudmila cwnl  ENMT:hears swallows speaks ok  Neck:no cb no tm  Breasts:  Back:  Respiratory:ctab no rrw  Cardiovascular:rrr  Gastrointestinal:soft nt  Genitourinary:  Rectal:  Extremities:nom cce  Vascular:  Neurological:nfatmae  Skin:cdi  Lymph Nodes:  Musculoskeletal:  Psychiatric:    LABS  CBC Full  -  ( 31 Jan 2020 07:06 )  WBC Count : 10.91 K/uL  RBC Count : 3.07 M/uL  Hemoglobin : 9.2 g/dL  Hematocrit : 29.0 %  Platelet Count - Automated : 175 K/uL  Mean Cell Volume : 94.5 fl  Mean Cell Hemoglobin : 30.0 pg  Mean Cell Hemoglobin Concentration : 31.7 gm/dL  Auto Neutrophil # : x  Auto Lymphocyte # : x  Auto Monocyte # : x  Auto Eosinophil # : x  Auto Basophil # : x  Auto Neutrophil % : x  Auto Lymphocyte % : x  Auto Monocyte % : x  Auto Eosinophil % : x  Auto Basophil % : x      01-31    143  |  106  |  30<H>  ----------------------------<  150<H>  3.7   |  23  |  1.83<H>    Ca    8.3<L>      31 Jan 2020 07:05    TPro  6.7  /  Alb  3.7  /  TBili  0.9  /  DBili  x   /  AST  11  /  ALT  18  /  AlkPhos  43  01-30      PT/INR - ( 29 Jan 2020 19:27 )   PT: 11.0 sec;   INR: 0.97 ratio         PTT - ( 29 Jan 2020 19:27 )  PTT:29.4 sec    Imaging:    Xray:    Echo:    CT:    MRI:    Tele:    Orders:    ALONZO Sun [phone]

## 2020-02-06 NOTE — PROVIDER CONTACT NOTE (OTHER) - BACKGROUND
Pt admit 1/29 w/ N/V, hypotic and asp pneumonia
Pt admit N/V, hypotic, asp pneumonia
pt admitted for aspiration PNA. pt has hx of HTN.
pt admitted for hypoxia
pt admitted for hypoxia, abdominal pain
pt admitted for N/V, hypoxic and aspiration pneumonia.

## 2020-02-06 NOTE — PROGRESS NOTE ADULT - PROBLEM SELECTOR PROBLEM 7
BPH with obstruction/lower urinary tract symptoms
BPH with obstruction/lower urinary tract symptoms
High cholesterol
History of CVA (cerebrovascular accident)
CKD (chronic kidney disease)

## 2020-02-06 NOTE — PROGRESS NOTE ADULT - PROBLEM SELECTOR PROBLEM 2
Aspiration pneumonia
Nausea & vomiting
Nausea and vomiting
Nausea and vomiting
Type 2 diabetes mellitus with hyperglycemia, without long-term current use of insulin

## 2020-02-06 NOTE — PROGRESS NOTE ADULT - PROBLEM SELECTOR PLAN 1
resolved  gastroparesis?
adm c pna, aftyer n/v  s&s mild dysphagia, eats well, ok if slowly  Abx 2 more days
iv abx  fu S&S
iv abx > po  eat slowly  see S&S
res, poss D gastrparesis
resolved  gastroparesis?
resolved  gastroparesis?

## 2020-02-06 NOTE — PROGRESS NOTE ADULT - PROBLEM SELECTOR PLAN 3
hospital protocol
Abx  S&S ordered
CKD, retinopathy, ophtho out pt
hosp protocol
hospital protocol
hospital protocol
see meds

## 2020-02-06 NOTE — DISCHARGE NOTE NURSING/CASE MANAGEMENT/SOCIAL WORK - PATIENT PORTAL LINK FT
You can access the FollowMyHealth Patient Portal offered by [facility] by registering at the following website: [email or web address]. By joining [employer]’s FollowMyHealth portal, you will also be able to view your health information using other applications (apps) compatible with our system.

## 2020-02-06 NOTE — PROGRESS NOTE ADULT - PROBLEM SELECTOR PROBLEM 1
Nausea & vomiting
Aspiration pneumonia
Nausea & vomiting
Nausea & vomiting
Nausea and vomiting

## 2020-02-06 NOTE — CHART NOTE - NSCHARTNOTEFT_GEN_A_CORE
Medicine PA Episodic Note    Notified by RN of pt. having a BP of 199/92. Pt. was seen and examined at bedside, AO x 3, in NAD, resting. Upon questioning, pt. denies lightheadedness, dizziness, lethargy, weakness, CP, SOB, palpitations or difficulty breathing. VSS otherwise    Vital Signs Last 24 Hrs  T(C): 36.8 (05 Feb 2020 23:50), Max: 37.1 (05 Feb 2020 05:58)  T(F): 98.2 (05 Feb 2020 23:50), Max: 98.7 (05 Feb 2020 05:58)  HR: 66 (05 Feb 2020 23:50) (58 - 68)  BP: 199/92 (05 Feb 2020 23:50) (154/81 - 199/92)  BP(mean): --  RR: 18 (05 Feb 2020 23:50) (18 - 18)  SpO2: 93% (05 Feb 2020 23:50) (93% - 98%)    Plan:  #Hypertensive urgency  - Pt. is mentating well  - Amlodipine 5 mg, Hydralazine 10 IV push  - Reached out to Dr. Sun - awaiting callback  - Monitor VS  - Will endorse above to primary team in AM    Follow up with Attending in AM.    Sebastian ZUNIGAC  Department of Medicine  Spectralink [id number]

## 2020-02-06 NOTE — PROGRESS NOTE ADULT - PROBLEM SELECTOR PLAN 2
multifocal on CT, on Abx, cough, sob, elev wbc
controlled  hosp protocol
multifocal on CT, on Abx, cough, sob, elev wbc improved
multifocal on CT, on Abx, cough, sob, elev wbc improved
res  poss d gastrparesis
res poss D gastroparesis
resolved on and, poss d gastroparesis

## 2020-02-06 NOTE — PROVIDER CONTACT NOTE (OTHER) - ACTION/TREATMENT ORDERED:
NP Klever Lord notified. As per NP give 10 hydralazine oral and repeat vitals in 1 hour. Will continue to monitor.
AARTI Mensah notified and made aware. Awaiting orders. Will continue to monitor.
FRANCESCA Dorado notified and made aware. Administer scheduled 0600 b/p meds. Will continue to monitor.
FRANCESCA Sevilla Notified. As per continue to monitor.
PA aware. 10mg of hydralizine, 5mg norvasc to be ordered and given. will recheck in 1 hour. will continue to monitor.
PA aware. give dana now, recheck BP in one hour then give norvasc. will continue to monitor.

## 2020-02-06 NOTE — PROGRESS NOTE ADULT - PROBLEM SELECTOR PROBLEM 4
CKD (chronic kidney disease)
Essential hypertension
HTN (hypertension)

## 2020-07-02 NOTE — PHYSICAL THERAPY INITIAL EVALUATION ADULT - IMPAIRED TRANSFERS: SIT/STAND, REHAB EVAL
OB?  No  Pharmacy Verified? Yes   Reason for Call: Patient states she is having reoccurring yeast infections with discharged and it not clearing up and the discharge is changing colors and had some concerns   Best form of Contact:      Cell Phone:   Telephone Information:   Mobile [phone]     Okay to leave a detailed message regarding call? Yes    Patient also  states it  Goes from  yeast infection to BV     Have you or a member of your household experienced any of the following symptoms?  Fever greater than 100.4? No   New or worsening cough, runny nose, shortness of breath or sore throat? No   Headache, body aches or fatigue? No   Nausea, vomiting or diarrhea? No   New onset of loss of taste or smell? No   Have you or a member of your household had contact with a laboratory confirmed COVID-19 person within the past 14 days? No   Have you or a member of your household traveled outside of [place] within the last 14 days, if yes where? No           
Pt is scheduled.  
Pt would like to see Dr. Yoedr or Dr. Clark. Routed to Dr. Clark to see when we can get patiet in   
Reviewed chart, patient treated several times via phone call. Spoke with patient recommended patient schedule an appointment to be seen and evaluated for persistent vaginal symptoms. Patient agreeable to plan. Will have PSR contact patient to schedule.   
decreased strength

## 2020-09-28 ENCOUNTER — APPOINTMENT (OUTPATIENT)
Dept: UROLOGY | Facility: CLINIC | Age: 75
End: 2020-09-28
Payer: MEDICARE

## 2020-09-28 ENCOUNTER — OUTPATIENT (OUTPATIENT)
Dept: OUTPATIENT SERVICES | Facility: HOSPITAL | Age: 75
LOS: 1 days | End: 2020-09-28
Payer: MEDICARE

## 2020-09-28 VITALS — TEMPERATURE: 97.1 F

## 2020-09-28 DIAGNOSIS — Z98.890 OTHER SPECIFIED POSTPROCEDURAL STATES: Chronic | ICD-10-CM

## 2020-09-28 DIAGNOSIS — R35.0 FREQUENCY OF MICTURITION: ICD-10-CM

## 2020-09-28 DIAGNOSIS — Z90.49 ACQUIRED ABSENCE OF OTHER SPECIFIED PARTS OF DIGESTIVE TRACT: Chronic | ICD-10-CM

## 2020-09-28 PROCEDURE — 99213 OFFICE O/P EST LOW 20 MIN: CPT | Mod: 25

## 2020-09-28 PROCEDURE — 76775 US EXAM ABDO BACK WALL LIM: CPT | Mod: 26

## 2020-09-28 PROCEDURE — 76775 US EXAM ABDO BACK WALL LIM: CPT

## 2020-09-28 NOTE — ASSESSMENT
[FreeTextEntry1] : Doing well. Continue tamsulosin 0.4 mg once daily.\par Follow up in one year for office renal/bladder US.

## 2020-09-28 NOTE — HISTORY OF PRESENT ILLNESS
[FreeTextEntry1] : Here for one year follow up visit.\par Renal bladder US today:  small non obstructing kidney stone, one possible 2.5 mm stone in the bladder.\par No hydronephrosis.   No flank pain.  Denies abdominal pain, flank pain.\par Remains on tamsulosin.  Urinary function excellent.  No hematuria, dysuria.\par \par s/p Left Ureteroscopy with stone extraction Jan 2018.\par Ureteral stent was removed on 2/22/18. \par

## 2020-10-12 DIAGNOSIS — N20.1 CALCULUS OF URETER: ICD-10-CM

## 2020-10-12 DIAGNOSIS — N40.1 BENIGN PROSTATIC HYPERPLASIA WITH LOWER URINARY TRACT SYMPTOMS: ICD-10-CM

## 2020-10-12 DIAGNOSIS — N21.0 CALCULUS IN BLADDER: ICD-10-CM

## 2020-10-21 ENCOUNTER — RX RENEWAL (OUTPATIENT)
Age: 75
End: 2020-10-21

## 2021-07-16 NOTE — ED PROVIDER NOTE - NS ED MD EM SELECTION
Brief Operative Note    Patient: Michelle Law 77 year old female    MRN: [id number]    Surgeon(s): Michael Wynn MD  Phone Number: [phone]                       Surgeon(s) and Role:     * Michael Wynn MD - Primary    Assistant(s):   Arnol Adkins MD    Pre-Op Diagnosis: LEFT KNEE OSTEOARTHRITIS     Post-Op Diagnosis: *LEFT KNEE OSTEOARTHRITIS     Procedure: Procedure(s):  LEFT TOTAL KNEE ARTHROPLASTY    Anesthesia Type: Spinal                                   Complications: None      Findings: OSTEOPHYTES, CARTILAGE LOSS    Specimens Removed:   ID Type Source Tests Collected by Time   A : Left knee bone and tissue Bone Joint, Knee, Left SURGICAL PATHOLOGY Michael Wynn MD 7/16/2021 0805        Estimated Blood Loss: 75CC    Assistant Tasks: Harvesting grafts, Dissecting tissue and Implanting devices     Implants:   Implant Name Type Inv. Item Serial No.  Lot No. LRB No. Used Action   CEMENT BONE REG SIMPLEX PRO - [id number] Filler CEMENT BONE REG SIMPLEX PRO  [facility] [id number] Left 2 Implanted   FEMUR CMNTD BCS OXINIUM LT 3 - [id number] Component FEMUR CMNTD BCS OXINIUM LT 3  [employer] [id number] Left 1 Implanted   TIBIA JOURNEY BASE NP LT 2 - [id number] Baseplate TIBIA JOURNEY BASE NP LT 2  [employer] [id number] Left 1 Implanted   PATELLA JOURNEY RESURF [id number] - [id number] Component PATELLA JOURNEY RESURF [id number]  [employer] [id number] Left 1 Implanted   JOURNEY II BCS XLPE A/P 42MM, M/L 60MM ARTICULAR INSERT     [id number] Left 1 Implanted         I was present for the key portions of the procedure and was immediately available for the non-key portions       [id number] Comprehensive

## 2021-09-30 ENCOUNTER — APPOINTMENT (OUTPATIENT)
Dept: UROLOGY | Facility: CLINIC | Age: 76
End: 2021-09-30
Payer: MEDICARE

## 2021-09-30 ENCOUNTER — OUTPATIENT (OUTPATIENT)
Dept: OUTPATIENT SERVICES | Facility: HOSPITAL | Age: 76
LOS: 1 days | End: 2021-09-30
Payer: MEDICARE

## 2021-09-30 DIAGNOSIS — N40.1 BENIGN PROSTATIC HYPERPLASIA WITH LOWER URINARY TRACT SYMPTOMS: ICD-10-CM

## 2021-09-30 DIAGNOSIS — N20.1 CALCULUS OF URETER: ICD-10-CM

## 2021-09-30 DIAGNOSIS — N21.0 CALCULUS IN BLADDER: ICD-10-CM

## 2021-09-30 DIAGNOSIS — Z98.890 OTHER SPECIFIED POSTPROCEDURAL STATES: Chronic | ICD-10-CM

## 2021-09-30 DIAGNOSIS — Z90.49 ACQUIRED ABSENCE OF OTHER SPECIFIED PARTS OF DIGESTIVE TRACT: Chronic | ICD-10-CM

## 2021-09-30 DIAGNOSIS — R35.0 FREQUENCY OF MICTURITION: ICD-10-CM

## 2021-09-30 PROCEDURE — 99213 OFFICE O/P EST LOW 20 MIN: CPT | Mod: 25

## 2021-09-30 PROCEDURE — 76775 US EXAM ABDO BACK WALL LIM: CPT

## 2021-09-30 PROCEDURE — 76775 US EXAM ABDO BACK WALL LIM: CPT | Mod: 26

## 2021-09-30 RX ORDER — LORAZEPAM 0.5 MG/1
0.5 TABLET ORAL
Refills: 0 | Status: COMPLETED | COMMUNITY
End: 2021-09-30

## 2021-10-03 LAB
PSA FREE FLD-MCNC: 56 %
PSA FREE SERPL-MCNC: 0.32 NG/ML
PSA SERPL-MCNC: 0.57 NG/ML

## 2021-10-04 ENCOUNTER — EMERGENCY (EMERGENCY)
Facility: HOSPITAL | Age: 76
LOS: 1 days | Discharge: HOME | End: 2021-10-04
Attending: EMERGENCY MEDICINE
Payer: MEDICARE

## 2021-10-04 VITALS
OXYGEN SATURATION: 97 % | HEART RATE: 68 BPM | WEIGHT: 169.98 LBS | SYSTOLIC BLOOD PRESSURE: 169 MMHG | RESPIRATION RATE: 20 BRPM | HEIGHT: 68 IN | DIASTOLIC BLOOD PRESSURE: 84 MMHG | TEMPERATURE: 98 F

## 2021-10-04 DIAGNOSIS — Z90.49 ACQUIRED ABSENCE OF OTHER SPECIFIED PARTS OF DIGESTIVE TRACT: Chronic | ICD-10-CM

## 2021-10-04 DIAGNOSIS — Z98.890 OTHER SPECIFIED POSTPROCEDURAL STATES: Chronic | ICD-10-CM

## 2021-10-04 LAB
ALBUMIN SERPL ELPH-MCNC: 4.2 G/DL — SIGNIFICANT CHANGE UP (ref 3.3–5)
ALP SERPL-CCNC: 62 U/L — SIGNIFICANT CHANGE UP (ref 40–120)
ALT FLD-CCNC: 36 U/L — SIGNIFICANT CHANGE UP (ref 10–45)
ANION GAP SERPL CALC-SCNC: 17 MMOL/L — SIGNIFICANT CHANGE UP (ref 5–17)
ANION GAP SERPL CALC-SCNC: 17 MMOL/L — SIGNIFICANT CHANGE UP (ref 5–17)
AST SERPL-CCNC: 44 U/L — HIGH (ref 10–40)
BASOPHILS # BLD AUTO: 0.04 K/UL — SIGNIFICANT CHANGE UP (ref 0–0.2)
BASOPHILS NFR BLD AUTO: 0.4 % — SIGNIFICANT CHANGE UP (ref 0–2)
BILIRUB SERPL-MCNC: 0.5 MG/DL — SIGNIFICANT CHANGE UP (ref 0.2–1.2)
BUN SERPL-MCNC: 33 MG/DL — HIGH (ref 7–23)
BUN SERPL-MCNC: 34 MG/DL — HIGH (ref 7–23)
CALCIUM SERPL-MCNC: 9.1 MG/DL — SIGNIFICANT CHANGE UP (ref 8.4–10.5)
CALCIUM SERPL-MCNC: 9.3 MG/DL — SIGNIFICANT CHANGE UP (ref 8.4–10.5)
CHLORIDE SERPL-SCNC: 103 MMOL/L — SIGNIFICANT CHANGE UP (ref 96–108)
CHLORIDE SERPL-SCNC: 105 MMOL/L — SIGNIFICANT CHANGE UP (ref 96–108)
CO2 SERPL-SCNC: 19 MMOL/L — LOW (ref 22–31)
CO2 SERPL-SCNC: 19 MMOL/L — LOW (ref 22–31)
CREAT SERPL-MCNC: 1.36 MG/DL — HIGH (ref 0.5–1.3)
CREAT SERPL-MCNC: 1.49 MG/DL — HIGH (ref 0.5–1.3)
EOSINOPHIL # BLD AUTO: 0.39 K/UL — SIGNIFICANT CHANGE UP (ref 0–0.5)
EOSINOPHIL NFR BLD AUTO: 3.5 % — SIGNIFICANT CHANGE UP (ref 0–6)
GLUCOSE SERPL-MCNC: 139 MG/DL — HIGH (ref 70–99)
GLUCOSE SERPL-MCNC: 215 MG/DL — HIGH (ref 70–99)
HCT VFR BLD CALC: 35.3 % — LOW (ref 39–50)
HGB BLD-MCNC: 11.6 G/DL — LOW (ref 13–17)
IMM GRANULOCYTES NFR BLD AUTO: 0.6 % — SIGNIFICANT CHANGE UP (ref 0–1.5)
LIDOCAIN IGE QN: 53 U/L — SIGNIFICANT CHANGE UP (ref 7–60)
LYMPHOCYTES # BLD AUTO: 1.13 K/UL — SIGNIFICANT CHANGE UP (ref 1–3.3)
LYMPHOCYTES # BLD AUTO: 10.2 % — LOW (ref 13–44)
MCHC RBC-ENTMCNC: 30.1 PG — SIGNIFICANT CHANGE UP (ref 27–34)
MCHC RBC-ENTMCNC: 32.9 GM/DL — SIGNIFICANT CHANGE UP (ref 32–36)
MCV RBC AUTO: 91.5 FL — SIGNIFICANT CHANGE UP (ref 80–100)
MONOCYTES # BLD AUTO: 0.74 K/UL — SIGNIFICANT CHANGE UP (ref 0–0.9)
MONOCYTES NFR BLD AUTO: 6.7 % — SIGNIFICANT CHANGE UP (ref 2–14)
NEUTROPHILS # BLD AUTO: 8.68 K/UL — HIGH (ref 1.8–7.4)
NEUTROPHILS NFR BLD AUTO: 78.6 % — HIGH (ref 43–77)
NRBC # BLD: 0 /100 WBCS — SIGNIFICANT CHANGE UP (ref 0–0)
PLATELET # BLD AUTO: 194 K/UL — SIGNIFICANT CHANGE UP (ref 150–400)
POTASSIUM SERPL-MCNC: 5.4 MMOL/L — HIGH (ref 3.5–5.3)
POTASSIUM SERPL-MCNC: 7.3 MMOL/L — CRITICAL HIGH (ref 3.5–5.3)
POTASSIUM SERPL-SCNC: 5.4 MMOL/L — HIGH (ref 3.5–5.3)
POTASSIUM SERPL-SCNC: 7.3 MMOL/L — CRITICAL HIGH (ref 3.5–5.3)
PROT SERPL-MCNC: 7.7 G/DL — SIGNIFICANT CHANGE UP (ref 6–8.3)
RBC # BLD: 3.86 M/UL — LOW (ref 4.2–5.8)
RBC # FLD: 13.3 % — SIGNIFICANT CHANGE UP (ref 10.3–14.5)
SODIUM SERPL-SCNC: 139 MMOL/L — SIGNIFICANT CHANGE UP (ref 135–145)
SODIUM SERPL-SCNC: 141 MMOL/L — SIGNIFICANT CHANGE UP (ref 135–145)
TROPONIN T, HIGH SENSITIVITY RESULT: 38 NG/L — SIGNIFICANT CHANGE UP (ref 0–51)
WBC # BLD: 11.05 K/UL — HIGH (ref 3.8–10.5)
WBC # FLD AUTO: 11.05 K/UL — HIGH (ref 3.8–10.5)

## 2021-10-04 PROCEDURE — 99285 EMERGENCY DEPT VISIT HI MDM: CPT

## 2021-10-04 PROCEDURE — 71045 X-RAY EXAM CHEST 1 VIEW: CPT | Mod: 26

## 2021-10-04 PROCEDURE — 93010 ELECTROCARDIOGRAM REPORT: CPT

## 2021-10-04 NOTE — ED PROVIDER NOTE - PATIENT PORTAL LINK FT
You can access the FollowMyHealth Patient Portal offered by [facility] by registering at the following website: [email or web address]. By joining [email or web address]’s FollowMyHealth portal, you will also be able to view your health information using other applications (apps) compatible with our system.

## 2021-10-04 NOTE — ED PROVIDER NOTE - NSFOLLOWUPINSTRUCTIONS_ED_ALL_ED_FT
Lightheadedness    WHAT YOU NEED TO KNOW:    Lightheadedness is the feeling that you may faint, but you do not. Your heartbeat may be fast or feel like it flutters. Lightheadedness may occur when you take certain medicines, such as medicine to lower your blood pressure. Dehydration, low sodium, low blood sugar, an abnormal heart rhythm, and anxiety are other common causes.     DISCHARGE INSTRUCTIONS:    Return to the emergency department if:   •You have sudden chest pain.     •You have trouble breathing or shortness of breath.     •You have vision changes, are sweating, and have nausea while you are sitting or lying down.     •You feel flushed and your heart is fluttering.    •You faint.       Contact your healthcare provider if:   •You feel lightheaded often.     •Your heart beats faster or slower than usual.     •You have questions or concerns about your condition or care.      Follow up with your healthcare provider as directed: You may need more tests to help find the cause of your lightheadedness. The tests will help healthcare providers plan the best treatment for you. Write down your questions so you remember to ask them during your visits.     Self-care: Talk with your healthcare provider about these and other ways to manage your symptoms:  •Lie down when you feel lightheaded, your throat gets tight, or your vision changes. Raise your legs above the level of your heart.    •Stand up slowly. Sit on the side of the bed or couch for a few minutes before you stand up.     •Take slow, deep breaths when you feel lightheaded. This can help decrease the feeling that you might faint.     •Ask if you need to avoid hot baths and saunas. These may make your symptoms worse.       Watch for signs of low blood sugar: These include hunger, nervousness, sweating, and fast or fluttery heartbeats. Talk with your healthcare provider about ways to keep your blood sugar level steady.    Check your blood pressure often: You should do this especially if you take medicine to lower your blood pressure. Check your blood pressure when you are lying down and when you are standing. Ask how often to check during the day. Keep a record of your blood pressure numbers. Your healthcare provider may use the record to help plan your treatment.    Keep a record of your lightheadedness episodes: Include your symptoms and your activity before and after the episode. The record can help your healthcare provider find the cause of your lightheadedness and help you manage episodes.

## 2021-10-04 NOTE — ED PROVIDER NOTE - ATTENDING CONTRIBUTION TO CARE
75yr M hx of HTN DM w presyncope symptoms. reports feeling lightheaded bearing down on toilet, felt light headed and diaphoretic but no cp, sob. palpitations. no LOC. had similar episodes and has been checked out by pmd per brother. currently asx and at baseline.   exam notable for mild stutter which is baseline for him, clear lungs S1-2, no abd ttp, no peripheral edema.  plan for ACS work up and abbreviated syncope work up. low concern for neurogenic source. pt is agreeable to outpt follow up if lab works unrevealing. and brother is in agreement.

## 2021-10-04 NOTE — ED ADULT NURSE NOTE - OBJECTIVE STATEMENT
75 year old male PMH of DM, HTN, HLD presents to the ED by EMS c/o near syncope.  Patient said he was walking back from the bathroom when he felt lightheaded and he tried to lower himself to the ground.  He said he hit the back of his head.  EMS found patient on the ground.  He denies LOC, shortness of breath, chest pain, nausea, vomiting, change in urination.

## 2021-10-04 NOTE — ED PROVIDER NOTE - CLINICAL SUMMARY MEDICAL DECISION MAKING FREE TEXT BOX
Kristian FLOR PGY-2: 74 yo M hx HTN, DM, BPH, presenting s/p pre-syncope while using the toilet earlier today. Likely vasovagal. Patient appears well on exam. EKG NSR, no signs pericarditis. WIll obtain trop to eval for ACS. Labs for electrolyte imabalnace and anemia. Anticipate discharge home with outpatient cardiology follow up.

## 2021-10-04 NOTE — ED ADULT NURSE REASSESSMENT NOTE - NS ED NURSE REASSESS COMMENT FT1
Patient A&Ox4, breathing spontaneous and unlabored on room air, sinus rhythm on cardiac monitor. Denies pain, awaiting xray at this time. Brother at bedside, bed locked and in lowest position for safety.

## 2021-10-04 NOTE — ED PROVIDER NOTE - OBJECTIVE STATEMENT
76 yo M hx DM, HTN, presenting s/p presyncopal episode. STates that earlier today was on the toilet when he felt weak and faint, +diaphoresis however denies any SOB, chest pain or palpitations. Has had similar episodes in the past. Was previously worked up by cardiologist in [place], had normal echo and negative findings otehrwise. Currently feels well, denies any chest pain/discomfort, SOB, fever or chills.

## 2021-10-05 VITALS
HEART RATE: 76 BPM | DIASTOLIC BLOOD PRESSURE: 103 MMHG | TEMPERATURE: 98 F | RESPIRATION RATE: 18 BRPM | OXYGEN SATURATION: 99 % | SYSTOLIC BLOOD PRESSURE: 179 MMHG

## 2021-10-05 LAB
ANION GAP SERPL CALC-SCNC: 20 MMOL/L — HIGH (ref 5–17)
BUN SERPL-MCNC: 33 MG/DL — HIGH (ref 7–23)
CALCIUM SERPL-MCNC: 9.6 MG/DL — SIGNIFICANT CHANGE UP (ref 8.4–10.5)
CHLORIDE SERPL-SCNC: 104 MMOL/L — SIGNIFICANT CHANGE UP (ref 96–108)
CO2 SERPL-SCNC: 20 MMOL/L — LOW (ref 22–31)
CREAT SERPL-MCNC: 1.39 MG/DL — HIGH (ref 0.5–1.3)
GLUCOSE SERPL-MCNC: 130 MG/DL — HIGH (ref 70–99)
POTASSIUM SERPL-MCNC: 4.2 MMOL/L — SIGNIFICANT CHANGE UP (ref 3.5–5.3)
POTASSIUM SERPL-SCNC: 4.2 MMOL/L — SIGNIFICANT CHANGE UP (ref 3.5–5.3)
SARS-COV-2 RNA SPEC QL NAA+PROBE: SIGNIFICANT CHANGE UP
SODIUM SERPL-SCNC: 144 MMOL/L — SIGNIFICANT CHANGE UP (ref 135–145)

## 2021-10-05 PROCEDURE — U0003: CPT

## 2021-10-05 PROCEDURE — 85025 COMPLETE CBC W/AUTO DIFF WBC: CPT

## 2021-10-05 PROCEDURE — 71045 X-RAY EXAM CHEST 1 VIEW: CPT

## 2021-10-05 PROCEDURE — 84484 ASSAY OF TROPONIN QUANT: CPT

## 2021-10-05 PROCEDURE — 80053 COMPREHEN METABOLIC PANEL: CPT

## 2021-10-05 PROCEDURE — 83690 ASSAY OF LIPASE: CPT

## 2021-10-05 PROCEDURE — 99284 EMERGENCY DEPT VISIT MOD MDM: CPT | Mod: 25

## 2021-10-05 PROCEDURE — U0005: CPT

## 2021-10-05 PROCEDURE — 82962 GLUCOSE BLOOD TEST: CPT

## 2021-10-05 PROCEDURE — 93005 ELECTROCARDIOGRAM TRACING: CPT

## 2021-10-05 PROCEDURE — 80048 BASIC METABOLIC PNL TOTAL CA: CPT

## 2021-10-05 NOTE — ED ADULT NURSE REASSESSMENT NOTE - NS ED NURSE REASSESS COMMENT FT1
Patient stable, awaiting lab results at this time. Bed locked and in lowest position for safety, brother at bedside

## 2021-10-18 PROBLEM — N21.0 BLADDER CALCULUS: Status: ACTIVE | Noted: 2018-01-02

## 2021-10-18 NOTE — HISTORY OF PRESENT ILLNESS
[FreeTextEntry1] : Here for one year follow up visit.\par Here with his brother.\par \par Office Renal bladder US today:  No stones visualized today.  Simple renal cyst, right kidney, left kidney.\par No hydronephrosis.   No flank pain.  Denies abdominal pain, flank pain.\par Remains on tamsulosin.  Urinary function excellent.  No hematuria, dysuria.\par \par s/p Left Ureteroscopy with stone extraction Jan 2018.\par Ureteral stent was removed on 2/22/18. \par

## 2021-10-18 NOTE — ASSESSMENT
[FreeTextEntry1] : Recommend to continue on tamsulosin 0.4 mg.\par Rx renewed.\par Office renal US results reviewed.\par Follow up in one year.

## 2022-01-04 NOTE — DISCHARGE NOTE PROVIDER - NSDCHOSPICE_GEN_A_CORE
DISCHARGE INSTRUCTIONS  Robotic Hysterectomy  and Removal of right ovary and both fallopian tubes    Please call the office ([phone]) within 48 hours of returning home to schedule or confirm a postoperative visit with your physician.    PAIN:  It is common for women to experience mild to moderate pain after they are discharged from the hospital. We expect that this pain should lessen with each day after surgery. You will receive a prescription for pain medicine at the time of your discharge. It is important to use pain medications as you need them, in order for you to be comfortable, to promote healing and to increase your daily activities.    Most commonly prescribed medications include:  · Norco: You may take this medication every 4-6 hours as needed for pain. DO NOT combine this medication with Tylenol.  · Tramadol: You may take this medication every 4-6 hours as needed for pain. It is typically given to patients who do not tolerate Norco or who have allergies to Norco.  · Tylenol Regular Strength: 325mg every 6 hours as needed for pain. As you begin to feel better you can stop taking your prescription pain medication and take the Tylenol alone.  · DO NOT take medications such as Motrin, aspirin or ibuprofen if you are taking other blood thinning medications (such as: Lovenox, Coumadin or Plavix).  · Motrin: If you are not taking a prescribed blood thinning medication, you may take Motrin 400mg- 600mg every 6 hours as needed for pain.  · You should not drive while taking narcotic pain medications.  · Taking a warm shower or bath often helps to relieve pain as well.  · As you begin to feel better you can stop taking your prescribed narcotic pain medication and take Tylenol alone.      Refilling your prescription medications:    Please keep in mind that prescription medications cannot be refilled after office hours or on the weekends. Please give the office 24-48 hours’ notice if you need a refill. Certain  pain medications cannot be called into your pharmacy, these prescriptions will need to be picked up at the office.    Call your doctor if: You are experiencing worsening pain or pain that is not relieved by  Medications.    BLOOD CLOTS:  · You may be sent home on injectable medication blood thinners (Fragmin, Lovenox or Arixtra) to prevent blood clots from forming in your legs or pelvis after surgery.  · You will be instructed on how to inject this medication into your thigh on a daily basis.  · Never inject this medication anywhere near your abdominal incision.  · Blood clots can move to different places in your body, such as your heart or lungs.  · DO NOT take medications such as Motrin, Coumadin, aspirin or ibuprofen while taking your injectable blood thinning medications.  · It is possible for patients that are considered “high risk” for developing blood clots to go home with up to a 1-2 week supply of injectable blood thinning medication.  · Most importantly be active. Walk around during the daytime every 2 hours. This will help prevent blood clots from forming.      Call your doctor if: You are experiencing pain and unequal swelling in your claves, shortness of breath or chest pain.    NAUSEA:  Patients often experience nausea after surgery. This is often related to anesthesia and slow return of bowel function.    Commonly prescribed medications for nausea include:  · Compazine: Take every 6 hours as needed for nausea.  · Zofran: Take every 6 hours as needed for nausea.  · Small meals frequent meals, slow sips of fluids and saltine crackers can also help to relieve nausea.    Call your doctor if: You are experiencing nausea and vomiting that is uncontrolled by medication.    BOWEL AND BLADDER FUNCTION:  Abdominal surgery can cause changes in bowel patterns. Pain medication can also cause constipation, although this is not a reason to stop taking your prescribed pain medication. It will be helpful to follow the  tips below to avoid constipation after surgery.  · Colace stool softener: It is important to take a stool softener while on narcotic pain medication. You will be given a prescription for this at the time of your discharge. It is also available at your pharmacy without a prescription. Colace will NOT help you move your bowel. It will only help to keep your stool soft. It is important to take a stool softener as long as you are on pain medications. You may stop this medication if you are having loose stools.  · For the first few days after surgery avoid high fiber foods as they are hard to digest. Once you are passing gas regularly you may add high fiber foods to your diet. Fresh fruits and bran cereals can help avoid constipation.  · Prune juice and apricot nectar are also helpful to avoid constipation.  · Drink plenty of fluids (at least 6-8 glasses of water or other non-caffeinated fluids daily).  · Staying active will also promote good bowel activity. Walk around the house or take short walks outside.    If you are still unable to move your bowels you can use one of the following:  · Dulcolax or Senna: are available without a prescription and can help stimulate your bowels.  · Miralax: Can also be used daily until your bowel patterns have returned to normal.    Some women experience an increase in bowel motility after surgery- diarrhea.  · Stop Colace stool softener.  · Metamucil/ Citrucel: 1-2 teaspoons can be taken daily to slow down diarrhea.  · If you have used Metamucil/ Citrucel for 2 days and diarrhea persists- please call the office.    Some women will get a urinary tract infection after surgery: The best way to avoid this is to drink plenty of fluids. Symptoms of a urinary tract infection include:  · Pain with urination  · Fever  · Blood in your urine  Call your doctor if you are experiencing any of these symptoms.    Call the doctor if:  · You have diarrhea that is not relieved by Metamucil/  Citrucel.  · You have constipation that persists beyond 3 days after discharge from the hospital.  · You experience any rectal bleeding or blood in your urine.    ACTIVITY:  · May not resume driving until: you have completed your post-operative doctor’s appointment, you can walk with ease and are no longer taking narcotic pain medication.  · Gradually resume your daily activities. Take rest periods throughout the day.  · Avoid sitting for long periods of time.  · Avoid strenuous activities, heavy housework (vacuuming) and heavy lifting (greater that 5-10 pounds). A good rule of judgement is: if it weighs more than a gallon of milk or requires more than one hand to  an object, DO NOT LIFT IT for at least 4 weeks.  · Climbing stairs is OK- just take them one at a time until you regain your strength.  · It is important to stay active to reduce the risk of developing blood clots in your legs and lungs. We suggest that you take short walks (in the house or outside) at least every 1-2 hours during your waking hours. If you notice spotting or vaginal bleeding after activity, rest until it resolves.    Call the doctor if you experience any of the following symptoms:  · Persistent fatigue prohibiting you from your daily activities  · Shortness of breath  · Chest pain  · Swelling in one leg/foot more than the other  · Calf pain    BLEEDING AND VAGINAL DISCHARGE:  · DO NOT PLACE ANYTHING IN THE VAGINA. This includes intercourse, douching, tampons, etc.  Your doctor will let you know when you can resume normal vaginal activity.  · Expect slight spotting for up to 2 weeks after surgery. This may be pink, red or brownish. There should be no offensive odor. You may wear a pad or panty liner until discharge resolves.    Call the doctor for:  · Any heavy bleeding or bright red blood from the vagina. Soaking through more than one pad in one hour or if you are using more than 3 mini pads per day.  · Temperature above 101.0° F  (38.3° C) on two occasions 4 hours apart.  · Any foul smelling discharge.    WOUND CARE:  · You may shower daily. Avoid baths for the first 2 weeks after surgery.  · You do have stitches (sutures) at the top of your vagina. These sutures will dissolve and will fall out as they do. Do not be alarmed if you see small pieces of black or blue string.  · You also have small sutures called Dermabond on the 3-5 laparoscopic sites on your belly. The sutures will dissolve on their own over the next few weeks. The sites do not require any type of bandage over it.    Call the doctor for:  · Temperature above 101.0° F (38.3° C) on two occasions 4 hours apart.  · You have any foul smelling (fishy) discharge.  · Redness, swelling or warmth around incision sites that is increasing.  · Any drainage from the incision that soaks a gauze pad.  · You have pain which is not relieved by pain medication.  · You have not moved your bowels for three days.  · You have burning or pain with urination.    FOLLOW UP:  · We plan to see you in the office within 7-10 days of your surgery asses your post-operative recovery and to discuss the pathology results from your surgery.  · If you are not given an office appointment at the time of your discharge from the hospital, please call the office to schedule a post-operative appointment with your doctor at [phone].    CALLING THE DOCTOR  · During office hours (M-F; 8am- 4:30pm) call: [phone] and ask to speak with the Nurse or Physician Assistant.  · After office hours: call [phone] to reach the on-call physician. Please reserve evening and weekend phone calls for urgent matters only.  · If you are receiving home healthcare, call your home healthcare nurse, who will assess your condition and call the doctor if necessary.  · For emergencies call: [phone] or go immediately to the nearest Emergency Room.   No

## 2022-04-01 NOTE — H&P ADULT - PROBLEM SELECTOR PLAN 2
--no overt signs of systemic infection. No leukocytosis, fever. Tachycardia likely secondary to hypoxia and mild hypervolemia (pt dry appearing and hypernatremic)  --given burden of consolidations present on CT scan, though, will cover for now with zosyn. If hypoxia improves and pt well appearing/afebrile, would consider very short course of Abx. BCx sent in ED.  --low suspicion for dysphagia and aspiration likely occurred during vomiting, but would assess his swallowing capability given somewhat poor historian and pt with h/o CVA.
Self

## 2022-04-06 NOTE — ED ADULT TRIAGE NOTE - WEIGHT IN LBS
"Principal Problem:Failed total left knee replacement    Principal Orthopedic Problem: Same    Interval History: Patient seen and examined at bedside.  No acute events overnight.  Pain controlled.       Review of patient's allergies indicates:  No Known Allergies    Current Facility-Administered Medications   Medication    0.9%  NaCl infusion    acetaminophen tablet 1,000 mg    aspirin EC tablet 81 mg    bisacodyL suppository 10 mg    famotidine tablet 20 mg    methocarbamoL tablet 750 mg    naloxone 0.4 mg/mL injection 0.02 mg    ondansetron injection 4 mg    oxyCODONE immediate release tablet 5 mg    oxyCODONE immediate release tablet Tab 10 mg    polyethylene glycol packet 17 g    pregabalin capsule 75 mg    prochlorperazine injection Soln 5 mg    ropivacaine 0.2% [facility] PainPRO Pump infusion 500 ML    senna-docusate 8.6-50 mg per tablet 1 tablet    sodium chloride 0.9% flush 10 mL     Objective:     Vital Signs (Most Recent):  Temp: 98.4 °F (36.9 °C) (04/06/22 0403)  Pulse: 82 (04/06/22 0403)  Resp: 18 (04/06/22 0445)  BP: 102/61 (04/06/22 0403)  SpO2: 97 % (04/06/22 0403) Vital Signs (24h Range):  Temp:  [96.5 °F (35.8 °C)-98.4 °F (36.9 °C)] 98.4 °F (36.9 °C)  Pulse:  [71-87] 82  Resp:  [3-55] 18  SpO2:  [91 %-100 %] 97 %  BP: ()/(49-94) 102/61     Weight: 83.9 kg (185 lb)  Height: 5' 0.5" (153.7 cm)  Body mass index is 35.54 kg/m².      Intake/Output Summary (Last 24 hours) at 4/6/2022 0632  Last data filed at 4/6/2022 0600  Gross per 24 hour   Intake 5938.33 ml   Output 1900 ml   Net 4038.33 ml       Ortho/SPM Exam    Significant Labs: CBC: No results for input(s): WBC, HGB, HCT, PLT in the last 48 hours.  All pertinent labs within the past 24 hours have been reviewed.    Significant Imaging: X-Ray: I have reviewed all pertinent results/findings and my personal findings are:  Post op revision TKA in good position   " 199.9

## 2022-07-20 ENCOUNTER — INPATIENT (INPATIENT)
Facility: HOSPITAL | Age: 77
LOS: 4 days | Discharge: HOME HEALTH | DRG: 312 | End: 2022-07-25
Attending: INTERNAL MEDICINE | Admitting: INTERNAL MEDICINE
Payer: MEDICARE

## 2022-07-20 VITALS
HEIGHT: 68 IN | TEMPERATURE: 98 F | WEIGHT: 179.9 LBS | HEART RATE: 80 BPM | OXYGEN SATURATION: 97 % | RESPIRATION RATE: 16 BRPM | SYSTOLIC BLOOD PRESSURE: 156 MMHG | DIASTOLIC BLOOD PRESSURE: 76 MMHG

## 2022-07-20 DIAGNOSIS — Z98.890 OTHER SPECIFIED POSTPROCEDURAL STATES: Chronic | ICD-10-CM

## 2022-07-20 DIAGNOSIS — Z90.49 ACQUIRED ABSENCE OF OTHER SPECIFIED PARTS OF DIGESTIVE TRACT: Chronic | ICD-10-CM

## 2022-07-20 DIAGNOSIS — R55 SYNCOPE AND COLLAPSE: ICD-10-CM

## 2022-07-20 LAB
ALBUMIN SERPL ELPH-MCNC: 4.7 G/DL — SIGNIFICANT CHANGE UP (ref 3.3–5)
ALP SERPL-CCNC: 53 U/L — SIGNIFICANT CHANGE UP (ref 40–120)
ALT FLD-CCNC: 36 U/L — SIGNIFICANT CHANGE UP (ref 10–45)
ANION GAP SERPL CALC-SCNC: 17 MMOL/L — SIGNIFICANT CHANGE UP (ref 5–17)
APPEARANCE UR: CLEAR — SIGNIFICANT CHANGE UP
AST SERPL-CCNC: 20 U/L — SIGNIFICANT CHANGE UP (ref 10–40)
BACTERIA # UR AUTO: ABNORMAL
BASE EXCESS BLDV CALC-SCNC: -3.9 MMOL/L — LOW (ref -2–2)
BASE EXCESS BLDV CALC-SCNC: -4.4 MMOL/L — LOW (ref -2–2)
BASE EXCESS BLDV CALC-SCNC: -9.8 MMOL/L — LOW (ref -2–2)
BASOPHILS # BLD AUTO: 0.04 K/UL — SIGNIFICANT CHANGE UP (ref 0–0.2)
BASOPHILS NFR BLD AUTO: 0.6 % — SIGNIFICANT CHANGE UP (ref 0–2)
BILIRUB SERPL-MCNC: 0.3 MG/DL — SIGNIFICANT CHANGE UP (ref 0.2–1.2)
BILIRUB UR-MCNC: NEGATIVE — SIGNIFICANT CHANGE UP
BUN SERPL-MCNC: 40 MG/DL — HIGH (ref 7–23)
CA-I SERPL-SCNC: 1.03 MMOL/L — LOW (ref 1.15–1.33)
CA-I SERPL-SCNC: 1.14 MMOL/L — LOW (ref 1.15–1.33)
CA-I SERPL-SCNC: 1.22 MMOL/L — SIGNIFICANT CHANGE UP (ref 1.15–1.33)
CALCIUM SERPL-MCNC: 9.3 MG/DL — SIGNIFICANT CHANGE UP (ref 8.4–10.5)
CHLORIDE BLDV-SCNC: 108 MMOL/L — SIGNIFICANT CHANGE UP (ref 96–108)
CHLORIDE BLDV-SCNC: 108 MMOL/L — SIGNIFICANT CHANGE UP (ref 96–108)
CHLORIDE BLDV-SCNC: 115 MMOL/L — HIGH (ref 96–108)
CHLORIDE SERPL-SCNC: 105 MMOL/L — SIGNIFICANT CHANGE UP (ref 96–108)
CO2 BLDV-SCNC: 17 MMOL/L — LOW (ref 22–26)
CO2 BLDV-SCNC: 22 MMOL/L — SIGNIFICANT CHANGE UP (ref 22–26)
CO2 BLDV-SCNC: 23 MMOL/L — SIGNIFICANT CHANGE UP (ref 22–26)
CO2 SERPL-SCNC: 20 MMOL/L — LOW (ref 22–31)
COLOR SPEC: YELLOW — SIGNIFICANT CHANGE UP
CREAT SERPL-MCNC: 1.46 MG/DL — HIGH (ref 0.5–1.3)
DIFF PNL FLD: NEGATIVE — SIGNIFICANT CHANGE UP
EGFR: 49 ML/MIN/1.73M2 — LOW
EOSINOPHIL # BLD AUTO: 0.19 K/UL — SIGNIFICANT CHANGE UP (ref 0–0.5)
EOSINOPHIL NFR BLD AUTO: 3 % — SIGNIFICANT CHANGE UP (ref 0–6)
EPI CELLS # UR: 1 — SIGNIFICANT CHANGE UP
FLUAV AG NPH QL: SIGNIFICANT CHANGE UP
FLUBV AG NPH QL: SIGNIFICANT CHANGE UP
GAS PNL BLDV: 138 MMOL/L — SIGNIFICANT CHANGE UP (ref 136–145)
GAS PNL BLDV: 141 MMOL/L — SIGNIFICANT CHANGE UP (ref 136–145)
GAS PNL BLDV: 142 MMOL/L — SIGNIFICANT CHANGE UP (ref 136–145)
GAS PNL BLDV: SIGNIFICANT CHANGE UP
GLUCOSE BLDV-MCNC: 144 MG/DL — HIGH (ref 70–99)
GLUCOSE BLDV-MCNC: 191 MG/DL — HIGH (ref 70–99)
GLUCOSE BLDV-MCNC: 196 MG/DL — HIGH (ref 70–99)
GLUCOSE SERPL-MCNC: 199 MG/DL — HIGH (ref 70–99)
GLUCOSE UR QL: NEGATIVE — SIGNIFICANT CHANGE UP
HCO3 BLDV-SCNC: 16 MMOL/L — LOW (ref 22–29)
HCO3 BLDV-SCNC: 21 MMOL/L — LOW (ref 22–29)
HCO3 BLDV-SCNC: 22 MMOL/L — SIGNIFICANT CHANGE UP (ref 22–29)
HCT VFR BLD CALC: 33.2 % — LOW (ref 39–50)
HCT VFR BLDA CALC: 24 % — LOW (ref 39–51)
HCT VFR BLDA CALC: 32 % — LOW (ref 39–51)
HCT VFR BLDA CALC: 32 % — LOW (ref 39–51)
HGB BLD CALC-MCNC: 10.6 G/DL — LOW (ref 12.6–17.4)
HGB BLD CALC-MCNC: 10.7 G/DL — LOW (ref 12.6–17.4)
HGB BLD CALC-MCNC: 8.1 G/DL — LOW (ref 12.6–17.4)
HGB BLD-MCNC: 10.6 G/DL — LOW (ref 13–17)
HYALINE CASTS # UR AUTO: 0 /LPF — SIGNIFICANT CHANGE UP (ref 0–7)
IMM GRANULOCYTES NFR BLD AUTO: 0.3 % — SIGNIFICANT CHANGE UP (ref 0–1.5)
KETONES UR-MCNC: NEGATIVE — SIGNIFICANT CHANGE UP
LACTATE BLDV-MCNC: 4 MMOL/L — CRITICAL HIGH (ref 0.7–2)
LACTATE BLDV-MCNC: 4.1 MMOL/L — CRITICAL HIGH (ref 0.7–2)
LACTATE BLDV-MCNC: 4.5 MMOL/L — CRITICAL HIGH (ref 0.7–2)
LEUKOCYTE ESTERASE UR-ACNC: NEGATIVE — SIGNIFICANT CHANGE UP
LYMPHOCYTES # BLD AUTO: 1.5 K/UL — SIGNIFICANT CHANGE UP (ref 1–3.3)
LYMPHOCYTES # BLD AUTO: 23.4 % — SIGNIFICANT CHANGE UP (ref 13–44)
MCHC RBC-ENTMCNC: 30.5 PG — SIGNIFICANT CHANGE UP (ref 27–34)
MCHC RBC-ENTMCNC: 31.9 GM/DL — LOW (ref 32–36)
MCV RBC AUTO: 95.7 FL — SIGNIFICANT CHANGE UP (ref 80–100)
MONOCYTES # BLD AUTO: 0.47 K/UL — SIGNIFICANT CHANGE UP (ref 0–0.9)
MONOCYTES NFR BLD AUTO: 7.3 % — SIGNIFICANT CHANGE UP (ref 2–14)
NEUTROPHILS # BLD AUTO: 4.19 K/UL — SIGNIFICANT CHANGE UP (ref 1.8–7.4)
NEUTROPHILS NFR BLD AUTO: 65.4 % — SIGNIFICANT CHANGE UP (ref 43–77)
NITRITE UR-MCNC: NEGATIVE — SIGNIFICANT CHANGE UP
NRBC # BLD: 0 /100 WBCS — SIGNIFICANT CHANGE UP (ref 0–0)
PCO2 BLDV: 32 MMHG — LOW (ref 42–55)
PCO2 BLDV: 39 MMHG — LOW (ref 42–55)
PCO2 BLDV: 40 MMHG — LOW (ref 42–55)
PH BLDV: 7.3 — LOW (ref 7.32–7.43)
PH BLDV: 7.34 — SIGNIFICANT CHANGE UP (ref 7.32–7.43)
PH BLDV: 7.34 — SIGNIFICANT CHANGE UP (ref 7.32–7.43)
PH UR: 5 — SIGNIFICANT CHANGE UP (ref 5–8)
PLATELET # BLD AUTO: 214 K/UL — SIGNIFICANT CHANGE UP (ref 150–400)
PO2 BLDV: 50 MMHG — HIGH (ref 25–45)
PO2 BLDV: 56 MMHG — HIGH (ref 25–45)
PO2 BLDV: 63 MMHG — HIGH (ref 25–45)
POTASSIUM BLDV-SCNC: 4.1 MMOL/L — SIGNIFICANT CHANGE UP (ref 3.5–5.1)
POTASSIUM BLDV-SCNC: 4.8 MMOL/L — SIGNIFICANT CHANGE UP (ref 3.5–5.1)
POTASSIUM BLDV-SCNC: 5.4 MMOL/L — HIGH (ref 3.5–5.1)
POTASSIUM SERPL-MCNC: 4 MMOL/L — SIGNIFICANT CHANGE UP (ref 3.5–5.3)
POTASSIUM SERPL-SCNC: 4 MMOL/L — SIGNIFICANT CHANGE UP (ref 3.5–5.3)
PROT SERPL-MCNC: 7.8 G/DL — SIGNIFICANT CHANGE UP (ref 6–8.3)
PROT UR-MCNC: ABNORMAL
RBC # BLD: 3.47 M/UL — LOW (ref 4.2–5.8)
RBC # FLD: 13.2 % — SIGNIFICANT CHANGE UP (ref 10.3–14.5)
RBC CASTS # UR COMP ASSIST: 0 /HPF — SIGNIFICANT CHANGE UP (ref 0–4)
RSV RNA NPH QL NAA+NON-PROBE: SIGNIFICANT CHANGE UP
SAO2 % BLDV: 77.7 % — SIGNIFICANT CHANGE UP (ref 67–88)
SAO2 % BLDV: 84.1 % — SIGNIFICANT CHANGE UP (ref 67–88)
SAO2 % BLDV: 88.5 % — HIGH (ref 67–88)
SARS-COV-2 RNA SPEC QL NAA+PROBE: SIGNIFICANT CHANGE UP
SODIUM SERPL-SCNC: 142 MMOL/L — SIGNIFICANT CHANGE UP (ref 135–145)
SP GR SPEC: 1.02 — SIGNIFICANT CHANGE UP (ref 1.01–1.02)
TROPONIN T, HIGH SENSITIVITY RESULT: 32 NG/L — SIGNIFICANT CHANGE UP (ref 0–51)
TROPONIN T, HIGH SENSITIVITY RESULT: 40 NG/L — SIGNIFICANT CHANGE UP (ref 0–51)
UROBILINOGEN FLD QL: NEGATIVE — SIGNIFICANT CHANGE UP
WBC # BLD: 6.41 K/UL — SIGNIFICANT CHANGE UP (ref 3.8–10.5)
WBC # FLD AUTO: 6.41 K/UL — SIGNIFICANT CHANGE UP (ref 3.8–10.5)
WBC UR QL: 1 /HPF — SIGNIFICANT CHANGE UP (ref 0–5)

## 2022-07-20 PROCEDURE — 71045 X-RAY EXAM CHEST 1 VIEW: CPT | Mod: 26

## 2022-07-20 PROCEDURE — 99285 EMERGENCY DEPT VISIT HI MDM: CPT | Mod: FS

## 2022-07-20 RX ORDER — SODIUM CHLORIDE 9 MG/ML
500 INJECTION INTRAMUSCULAR; INTRAVENOUS; SUBCUTANEOUS ONCE
Refills: 0 | Status: COMPLETED | OUTPATIENT
Start: 2022-07-20 | End: 2022-07-20

## 2022-07-20 RX ADMIN — SODIUM CHLORIDE 500 MILLILITER(S): 9 INJECTION INTRAMUSCULAR; INTRAVENOUS; SUBCUTANEOUS at 18:50

## 2022-07-20 NOTE — ED PROVIDER NOTE - OBJECTIVE STATEMENT
75 yo M with pmhx CVA, hld, htn and bph presenting with near syncopal episode today. patient states he was out to dinner after having a salad and a soda when he had sudden onset of lightheadedness, nausea and diaphoresis. Patient states it lasted for a couple of minutes and he felt like he was going to "pass out". States the  gave him juice which he drank and then felt better. Patient currently asymptomatic. At baseline as per brother. Patient with no history of heart disease. Patient states he had a similar episode in the past and seen here. Nothing was found as per patient. Patient denies chest pain, sob, cough, fever, palpitations, abd pain, nvd, dysuria, hematuria, back pain, flank pain and ha. Patient felt fine prior to this event. 77 yo M with pmhx CVA, hld, htn and bph presenting with near syncopal episode today. patient states he was out to dinner after having a salad and a soda when he had sudden onset of lightheadedness, nausea and diaphoresis. Patient states it lasted for a couple of minutes and he felt like he was going to "pass out". States the  gave him juice which he drank and then felt better. Patient currently asymptomatic. At baseline as per brother. Patient with no history of heart disease. Patient states he had a similar episode in the past and seen here. Nothing was found as per patient. Patient denies chest pain, sob, cough, fever, palpitations, abd pain, nvd, dysuria, hematuria, back pain, flank pain and ha. Patient felt fine prior to this event.    Attendinyo male presents with feeling lightheaded.  occurred while eating lunch at a restaurant.  feels fine now.  no chest pain or shortness of breath.

## 2022-07-20 NOTE — ED PROVIDER NOTE - NSCAREINITIATED _GEN_ER
Gifty Peña) Patient presents with a history of achalasia and aspiration pneumonia.  Daughter, Katelyn, was present at bedside.  As per patient's daughter, patient tolerating a regular diet, however she did report patient would pat and/or rub her chest after she swallowed indicating discomfort.  This swallowing evaluation was conducted in Arabic by this clinician.  Patient was awake and alert with noted confusion.

## 2022-07-20 NOTE — ED PROVIDER NOTE - NS ED ATTENDING STATEMENT MOD
This was a shared visit with the SY. I reviewed and verified the documentation and independently performed the documented:

## 2022-07-20 NOTE — ED ADULT NURSE NOTE - OBJECTIVE STATEMENT
PT is a 76 year old A&OX3 male with stutter at baseline and PMH of HTN, HLD, DM, and on aspirin who presents to the ED via EMS with c/o nausea and feeling faint. PT states he was eating a salad and drinking a soda at a restaurant when he began to feel faint, nauseous, and diaphoretic. PT's  called EMS. PT states he feels okay now. PT denies chest pain, SOB, fevers, palpitations, cough, N/V/D, and headaches. PT is resting comfortably in bed, breathing unlabored, and speaking in complete sentences with stutter. Abdomen is soft, non-tender, and non-distended. Skin is warm and dry, no diaphoresis noted. No edema noted to B/L extremities. Strong strength in B/L extremities, sensation intact. IV access established Q-DOC 20G in right forearm. PT ambulatory with steady gait. Safety and comfort maintained. Brother at the bedside.

## 2022-07-20 NOTE — ED PROVIDER NOTE - PROGRESS NOTE DETAILS
Received repeat lactate results of 4.5. Discussed with RN at time and states was pulled by prior RN who did it before fluids. Will repeat again Received signout on pt from AARTI Amaro pending UA and cxr. CXR w/ bilateral patchy opacities. Lactate still 4.1 although pt only received 500 ccs. Will provide gentle IVF hydration and trend, abdomen benign, attending aware, would not act on lactate outside of hydration at this time. Pt endorsed to Dr. Alicia Mckinney for admission for TTE and continued monitoring. Stable for admission. - Ottoniel Parker PA-C

## 2022-07-20 NOTE — ED PROVIDER NOTE - RAPID ASSESSMENT
75 y/o M with PMHx of HLD, HTN presents to the ED c/o near syncope. felt sudden onset dizziness as if he was going to pass out but did not. Of note, EMS gave pt juice to which pt has been feeling better. Denies fever, cough, CP, hematochezia, HA. PT is poor historian, has flat affect.    IPilar (Scrib) have documented this rapid assessment note under the dictation of Casey Sloan (PA)  which has been reviewed and affirmed to be accurate. Patient was seen as a QPA patient. The patient will be seen and further worked up in the main emergency department and their care will be completed by the main emergency department team along with a thorough physical exam. Receiving team will follow up on labs, analgesia, any clinical imaging, reassess and disposition as clinically indicated, all decisions regarding the progression of care will be made at their discretion. 77 y/o M with PMHx of HLD, HTN presents to the ED c/o near syncope. felt sudden onset dizziness as if he was going to pass out but did not. Of note, EMS gave pt juice to which pt has been feeling better. Denies fever, cough, CP, hematochezia, HA. PT is poor historian, has flat affect. Upon arrival sugar in 200s. Does not know what medications he takes daily.     IPilar (Jackibrobson) have documented this rapid assessment note under the dictation of Casey Sloan (PA)  which has been reviewed and affirmed to be accurate. Patient was seen as a QPA patient. The patient will be seen and further worked up in the main emergency department and their care will be completed by the main emergency department team along with a thorough physical exam. Receiving team will follow up on labs, analgesia, any clinical imaging, reassess and disposition as clinically indicated, all decisions regarding the progression of care will be made at their discretion.      INathalia PA-C, personally performed the service described in the documentation recorded by the scribe in my presence, and it accurately and completely records my words and actions.

## 2022-07-20 NOTE — ED ADULT NURSE NOTE - NSIMPLEMENTINTERV_GEN_ALL_ED
Implemented All Fall Risk Interventions:  [place] to call system. Call bell, personal items and telephone within reach. Instruct patient to call for assistance. Room bathroom lighting operational. Non-slip footwear when patient is off stretcher. Physically safe environment: no spills, clutter or unnecessary equipment. Stretcher in lowest position, wheels locked, appropriate side rails in place. Provide visual cue, wrist band, yellow gown, etc. Monitor gait and stability. Monitor for mental status changes and reorient to person, place, and time. Review medications for side effects contributing to fall risk. Reinforce activity limits and safety measures with patient and family.

## 2022-07-20 NOTE — ED PROVIDER NOTE - CLINICAL SUMMARY MEDICAL DECISION MAKING FREE TEXT BOX
77 yo M with pmhx CVA, hld, htn and bph presenting with near syncopal episode today. PE unremarkable. Will obtain ekg, labs, urine, and cxr. Will reassess pending results

## 2022-07-21 DIAGNOSIS — I10 ESSENTIAL (PRIMARY) HYPERTENSION: ICD-10-CM

## 2022-07-21 DIAGNOSIS — Z29.9 ENCOUNTER FOR PROPHYLACTIC MEASURES, UNSPECIFIED: ICD-10-CM

## 2022-07-21 DIAGNOSIS — E11.9 TYPE 2 DIABETES MELLITUS WITHOUT COMPLICATIONS: ICD-10-CM

## 2022-07-21 DIAGNOSIS — N18.30 CHRONIC KIDNEY DISEASE, STAGE 3 UNSPECIFIED: ICD-10-CM

## 2022-07-21 DIAGNOSIS — I16.0 HYPERTENSIVE URGENCY: ICD-10-CM

## 2022-07-21 DIAGNOSIS — E87.2 ACIDOSIS: ICD-10-CM

## 2022-07-21 DIAGNOSIS — R55 SYNCOPE AND COLLAPSE: ICD-10-CM

## 2022-07-21 LAB
ANION GAP SERPL CALC-SCNC: 17 MMOL/L — SIGNIFICANT CHANGE UP (ref 5–17)
BUN SERPL-MCNC: 33 MG/DL — HIGH (ref 7–23)
CALCIUM SERPL-MCNC: 9.3 MG/DL — SIGNIFICANT CHANGE UP (ref 8.4–10.5)
CHLORIDE SERPL-SCNC: 108 MMOL/L — SIGNIFICANT CHANGE UP (ref 96–108)
CO2 SERPL-SCNC: 18 MMOL/L — LOW (ref 22–31)
CREAT SERPL-MCNC: 1.19 MG/DL — SIGNIFICANT CHANGE UP (ref 0.5–1.3)
EGFR: 63 ML/MIN/1.73M2 — SIGNIFICANT CHANGE UP
GLUCOSE BLDC GLUCOMTR-MCNC: 137 MG/DL — HIGH (ref 70–99)
GLUCOSE BLDC GLUCOMTR-MCNC: 185 MG/DL — HIGH (ref 70–99)
GLUCOSE BLDC GLUCOMTR-MCNC: 229 MG/DL — HIGH (ref 70–99)
GLUCOSE BLDC GLUCOMTR-MCNC: 230 MG/DL — HIGH (ref 70–99)
GLUCOSE SERPL-MCNC: 94 MG/DL — SIGNIFICANT CHANGE UP (ref 70–99)
HCT VFR BLD CALC: 35.9 % — LOW (ref 39–50)
HGB BLD-MCNC: 11.5 G/DL — LOW (ref 13–17)
LACTATE BLDV-MCNC: 1.4 MMOL/L — SIGNIFICANT CHANGE UP (ref 0.7–2)
LACTATE SERPL-SCNC: 1.3 MMOL/L — SIGNIFICANT CHANGE UP (ref 0.7–2)
MAGNESIUM SERPL-MCNC: 1.8 MG/DL — SIGNIFICANT CHANGE UP (ref 1.6–2.6)
MCHC RBC-ENTMCNC: 29.8 PG — SIGNIFICANT CHANGE UP (ref 27–34)
MCHC RBC-ENTMCNC: 32 GM/DL — SIGNIFICANT CHANGE UP (ref 32–36)
MCV RBC AUTO: 93 FL — SIGNIFICANT CHANGE UP (ref 80–100)
NRBC # BLD: 0 /100 WBCS — SIGNIFICANT CHANGE UP (ref 0–0)
PHOSPHATE SERPL-MCNC: 3 MG/DL — SIGNIFICANT CHANGE UP (ref 2.5–4.5)
PLATELET # BLD AUTO: 218 K/UL — SIGNIFICANT CHANGE UP (ref 150–400)
POTASSIUM SERPL-MCNC: 3.9 MMOL/L — SIGNIFICANT CHANGE UP (ref 3.5–5.3)
POTASSIUM SERPL-SCNC: 3.9 MMOL/L — SIGNIFICANT CHANGE UP (ref 3.5–5.3)
RBC # BLD: 3.86 M/UL — LOW (ref 4.2–5.8)
RBC # FLD: 13.1 % — SIGNIFICANT CHANGE UP (ref 10.3–14.5)
SODIUM SERPL-SCNC: 143 MMOL/L — SIGNIFICANT CHANGE UP (ref 135–145)
TSH SERPL-MCNC: 3.15 UIU/ML — SIGNIFICANT CHANGE UP (ref 0.27–4.2)
WBC # BLD: 7.02 K/UL — SIGNIFICANT CHANGE UP (ref 3.8–10.5)
WBC # FLD AUTO: 7.02 K/UL — SIGNIFICANT CHANGE UP (ref 3.8–10.5)

## 2022-07-21 PROCEDURE — 93306 TTE W/DOPPLER COMPLETE: CPT | Mod: 26

## 2022-07-21 PROCEDURE — 99222 1ST HOSP IP/OBS MODERATE 55: CPT | Mod: GC

## 2022-07-21 PROCEDURE — 99223 1ST HOSP IP/OBS HIGH 75: CPT

## 2022-07-21 PROCEDURE — 71250 CT THORAX DX C-: CPT | Mod: 26

## 2022-07-21 RX ORDER — FENOFIBRATE,MICRONIZED 130 MG
1 CAPSULE ORAL
Qty: 0 | Refills: 0 | DISCHARGE

## 2022-07-21 RX ORDER — GLIMEPIRIDE 1 MG
2 TABLET ORAL
Qty: 0 | Refills: 0 | DISCHARGE

## 2022-07-21 RX ORDER — LANOLIN ALCOHOL/MO/W.PET/CERES
3 CREAM (GRAM) TOPICAL AT BEDTIME
Refills: 0 | Status: DISCONTINUED | OUTPATIENT
Start: 2022-07-21 | End: 2022-07-25

## 2022-07-21 RX ORDER — GLUCAGON INJECTION, SOLUTION 0.5 MG/.1ML
1 INJECTION, SOLUTION SUBCUTANEOUS ONCE
Refills: 0 | Status: DISCONTINUED | OUTPATIENT
Start: 2022-07-21 | End: 2022-07-25

## 2022-07-21 RX ORDER — MULTIVIT-MIN/FERROUS GLUCONATE 9 MG/15 ML
1 LIQUID (ML) ORAL
Qty: 0 | Refills: 0 | DISCHARGE

## 2022-07-21 RX ORDER — ONDANSETRON 8 MG/1
4 TABLET, FILM COATED ORAL EVERY 8 HOURS
Refills: 0 | Status: DISCONTINUED | OUTPATIENT
Start: 2022-07-21 | End: 2022-07-21

## 2022-07-21 RX ORDER — LIRAGLUTIDE 6 MG/ML
1.8 INJECTION SUBCUTANEOUS
Qty: 0 | Refills: 0 | DISCHARGE

## 2022-07-21 RX ORDER — SODIUM CHLORIDE 9 MG/ML
1000 INJECTION INTRAMUSCULAR; INTRAVENOUS; SUBCUTANEOUS
Refills: 0 | Status: DISCONTINUED | OUTPATIENT
Start: 2022-07-21 | End: 2022-07-22

## 2022-07-21 RX ORDER — SODIUM CHLORIDE 9 MG/ML
500 INJECTION INTRAMUSCULAR; INTRAVENOUS; SUBCUTANEOUS ONCE
Refills: 0 | Status: COMPLETED | OUTPATIENT
Start: 2022-07-21 | End: 2022-07-21

## 2022-07-21 RX ORDER — HYDRALAZINE HCL 50 MG
10 TABLET ORAL THREE TIMES A DAY
Refills: 0 | Status: DISCONTINUED | OUTPATIENT
Start: 2022-07-21 | End: 2022-07-22

## 2022-07-21 RX ORDER — DEXTROSE 50 % IN WATER 50 %
25 SYRINGE (ML) INTRAVENOUS ONCE
Refills: 0 | Status: DISCONTINUED | OUTPATIENT
Start: 2022-07-21 | End: 2022-07-25

## 2022-07-21 RX ORDER — SODIUM CHLORIDE 9 MG/ML
1000 INJECTION, SOLUTION INTRAVENOUS
Refills: 0 | Status: DISCONTINUED | OUTPATIENT
Start: 2022-07-21 | End: 2022-07-25

## 2022-07-21 RX ORDER — CEFTRIAXONE 500 MG/1
1000 INJECTION, POWDER, FOR SOLUTION INTRAMUSCULAR; INTRAVENOUS EVERY 24 HOURS
Refills: 0 | Status: DISCONTINUED | OUTPATIENT
Start: 2022-07-21 | End: 2022-07-21

## 2022-07-21 RX ORDER — INSULIN LISPRO 100/ML
VIAL (ML) SUBCUTANEOUS
Refills: 0 | Status: DISCONTINUED | OUTPATIENT
Start: 2022-07-21 | End: 2022-07-25

## 2022-07-21 RX ORDER — DEXTROSE 50 % IN WATER 50 %
12.5 SYRINGE (ML) INTRAVENOUS ONCE
Refills: 0 | Status: DISCONTINUED | OUTPATIENT
Start: 2022-07-21 | End: 2022-07-25

## 2022-07-21 RX ORDER — AMLODIPINE BESYLATE 2.5 MG/1
10 TABLET ORAL DAILY
Refills: 0 | Status: DISCONTINUED | OUTPATIENT
Start: 2022-07-21 | End: 2022-07-25

## 2022-07-21 RX ORDER — ACETAMINOPHEN 500 MG
650 TABLET ORAL EVERY 6 HOURS
Refills: 0 | Status: DISCONTINUED | OUTPATIENT
Start: 2022-07-21 | End: 2022-07-25

## 2022-07-21 RX ORDER — TAMSULOSIN HYDROCHLORIDE 0.4 MG/1
1 CAPSULE ORAL
Qty: 0 | Refills: 0 | DISCHARGE

## 2022-07-21 RX ORDER — HEPARIN SODIUM 5000 [USP'U]/ML
5000 INJECTION INTRAVENOUS; SUBCUTANEOUS EVERY 8 HOURS
Refills: 0 | Status: DISCONTINUED | OUTPATIENT
Start: 2022-07-21 | End: 2022-07-25

## 2022-07-21 RX ORDER — AMLODIPINE BESYLATE 2.5 MG/1
10 TABLET ORAL ONCE
Refills: 0 | Status: COMPLETED | OUTPATIENT
Start: 2022-07-21 | End: 2022-07-21

## 2022-07-21 RX ORDER — GLIMEPIRIDE 1 MG
3 TABLET ORAL
Qty: 0 | Refills: 0 | DISCHARGE

## 2022-07-21 RX ORDER — DEXTROSE 50 % IN WATER 50 %
15 SYRINGE (ML) INTRAVENOUS ONCE
Refills: 0 | Status: DISCONTINUED | OUTPATIENT
Start: 2022-07-21 | End: 2022-07-25

## 2022-07-21 RX ORDER — INSULIN LISPRO 100/ML
VIAL (ML) SUBCUTANEOUS AT BEDTIME
Refills: 0 | Status: DISCONTINUED | OUTPATIENT
Start: 2022-07-21 | End: 2022-07-25

## 2022-07-21 RX ORDER — METFORMIN HYDROCHLORIDE 850 MG/1
4 TABLET ORAL
Qty: 0 | Refills: 0 | DISCHARGE

## 2022-07-21 RX ADMIN — Medication 0: at 22:11

## 2022-07-21 RX ADMIN — AMLODIPINE BESYLATE 10 MILLIGRAM(S): 2.5 TABLET ORAL at 05:20

## 2022-07-21 RX ADMIN — Medication 1: at 13:29

## 2022-07-21 RX ADMIN — SODIUM CHLORIDE 75 MILLILITER(S): 9 INJECTION INTRAMUSCULAR; INTRAVENOUS; SUBCUTANEOUS at 13:08

## 2022-07-21 RX ADMIN — Medication 10 MILLIGRAM(S): at 22:11

## 2022-07-21 RX ADMIN — HEPARIN SODIUM 5000 UNIT(S): 5000 INJECTION INTRAVENOUS; SUBCUTANEOUS at 13:08

## 2022-07-21 RX ADMIN — Medication 650 MILLIGRAM(S): at 23:33

## 2022-07-21 RX ADMIN — Medication 650 MILLIGRAM(S): at 10:00

## 2022-07-21 RX ADMIN — HEPARIN SODIUM 5000 UNIT(S): 5000 INJECTION INTRAVENOUS; SUBCUTANEOUS at 22:11

## 2022-07-21 RX ADMIN — AMLODIPINE BESYLATE 10 MILLIGRAM(S): 2.5 TABLET ORAL at 02:27

## 2022-07-21 RX ADMIN — Medication 2: at 18:10

## 2022-07-21 RX ADMIN — Medication 10 MILLIGRAM(S): at 13:08

## 2022-07-21 RX ADMIN — SODIUM CHLORIDE 250 MILLILITER(S): 9 INJECTION INTRAMUSCULAR; INTRAVENOUS; SUBCUTANEOUS at 02:28

## 2022-07-21 RX ADMIN — HEPARIN SODIUM 5000 UNIT(S): 5000 INJECTION INTRAVENOUS; SUBCUTANEOUS at 05:20

## 2022-07-21 RX ADMIN — Medication 3 MILLIGRAM(S): at 23:34

## 2022-07-21 RX ADMIN — CEFTRIAXONE 100 MILLIGRAM(S): 500 INJECTION, POWDER, FOR SOLUTION INTRAMUSCULAR; INTRAVENOUS at 13:08

## 2022-07-21 NOTE — H&P ADULT - NSHPLABSRESULTS_GEN_ALL_CORE
LABS:                         10.6   6.41  )-----------( 214      ( 2022 17:49 )             33.2     07-    142  |  105  |  40<H>  ----------------------------<  199<H>  4.0   |  20<L>  |  1.46<H>    Ca    9.3      2022 17:49    TPro  7.8  /  Alb  4.7  /  TBili  0.3  /  DBili  x   /  AST  20  /  ALT  36  /  AlkPhos  53  07-      Urinalysis Basic - ( 2022 19:31 )    Color: Yellow / Appearance: Clear / S.021 / pH: x  Gluc: x / Ketone: Negative  / Bili: Negative / Urobili: Negative   Blood: x / Protein: 100 mg/dL / Nitrite: Negative   Leuk Esterase: Negative / RBC: 0 /hpf / WBC 1 /HPF   Sq Epi: x / Non Sq Epi: 1 / Bacteria: Occasional              Records reviewed from prior hospitalization.  Labs reviewed remarkable for - normocytic anemia hgb 10.5 (at baseline). CMP shows BUN/Cr of 40/1.46 (at baseline). Lactate 4.1. U/A negative for UTI.  EKG personally reviewed - RBBB with nonspecific T wave inversion in lead III

## 2022-07-21 NOTE — H&P ADULT - PROBLEM SELECTOR PLAN 2
Pt did not know his home medications. Requested that family not be contacted this early in the morning and for medications to be confirmed with his pharmacy ([facility] in [facility]) during the day  - on medication list from 2020, the patient was on amlodipine, metoprolol succinate, and losartan  - will give amlodipine 10mg PO x1 now and start amlodipine 10 mg daily for now  - please confirm the pt's home medications this morning. Med Techs have been emailed to assist.

## 2022-07-21 NOTE — H&P ADULT - NSVTERISKASSESS_GEN_ALL_CORE FT
[FreeTextEntry1] : Lesion of the left ear. [de-identified] : Tender, occurring over the past week.  No bleeding.  No self tx. Medical Assessment Completed on: 21-Jul-2022 02:01

## 2022-07-21 NOTE — H&P ADULT - NSHPSOCIALHISTORY_GEN_ALL_CORE
Lives with his brother, Adam, at home. Brother assists the patient in medication administration. He has no home health aides or visiting nurses. Ambulates with cane assistance.

## 2022-07-21 NOTE — H&P ADULT - ASSESSMENT
76 year old male with a PMHx of HLD, HTN, DM2, CKD stage III, CVA and BPH presents with presyncope. Ddx includes panic attack versus presyncope secondary to cardiac etiology, vasovagal. Should obtain collateral information from the patient's brother; however, the patient requested that brother not be called this early in this morning (1:30 AM). Will monitor the patient on telemetry and obtain routine TTE.

## 2022-07-21 NOTE — PROGRESS NOTE ADULT - ASSESSMENT
76 year old male     h/o    HLD, HTN, DM2, CKD stage III, CVA and BPH  h/o   bladder calculi, 2mm right ureteral calculus with forniceal rupture., urologist Dr. Rae       presents with presyncope.   pt  with  anxiety   panic attack,  felt  like  he  was  going  to  pass  out   tele.  card  d r carmelo     HYN/  HLD   was on  amlodipine, metoprolol succinate,   losartan  normal  ef     Lactic acidosis, note d on  arrival.  no  infectioius  cause  found ;   no  co/abd  pain. dysuria   DM,  follow  fs   c/c  anemia  on  dvt  ppx    check  orthostatics   bp  meds  pe r card       from: CT Abdomen and Pelvis No Cont (01.19.19 @ 09:03) >  IMPRESSION: A punctate 2 mm proximal right ureteral calculus with mild   hydronephrosis. Right perinephric fluid compatible with forniceal rupture.  Subcentimeter bladder calculi  < end of copied text >            	  76 year old male     h/o    HLD, HTN, DM2, CKD stage III, CVA and BPH  h/o   bladder calculi, 2mm right ureteral calculus with forniceal rupture., urologist Dr. Rae       presents with presyncope.   pt  with  anxiety   panic attack,  felt  like  he  was  going  to  pass  out   tele.  card  d r carmelo     HYN/  HLD   was on  amlodipine, metoprolol succinate,   losartan  normal  ef     Lactic acidosis, noted.  suspect  from  sepsis  on  arrival, from   pna/ ?  aspiration   on iv  rocephin/  swallow  eval   no  co/abd  pain. dysuria    DM,  follow  fs   c/c  anemia  on  dvt  ppx    check  orthostatics   bp  meds  pe r card       from: CT Abdomen and Pelvis No Cont (01.19.19 @ 09:03) >  IMPRESSION: A punctate 2 mm proximal right ureteral calculus with mild   hydronephrosis. Right perinephric fluid compatible with forniceal rupture.  Subcentimeter bladder calculi  < end of copied text >            	  76 year old male     h/o    HLD, HTN, DM2, CKD stage III, CVA and BPH  h/o   bladder calculi, 2mm right ureteral calculus with forniceal rupture., urologist Dr. Rae       presents with presyncope.   pt  with  anxiety   panic attack,  felt  like  he  was  going  to  pass  out   tele.  card  d r carmelo     HYN/  HLD   was on  amlodipine, metoprolol succinate,   losartan  normal  ef     Lactic acidosis, noted.  suspect  from  ?  sepsis  on  arrival, from   pna/    was on iv  rocephin/    no  co/abd  pain. dysuria    DM,  follow  fs   CKD   c/c  anemia  on  dvt  ppx    check  orthostatics   bp  meds  pe r card     addendum/   2.14 pm   ct chest, no pna/ normal lactic  acid now/ pe r hous e ID,  no ab   echo, normla  ef       from: CT Abdomen and Pelvis No Cont (01.19.19 @ 09:03) >  IMPRESSION: A punctate 2 mm proximal right ureteral calculus with mild   hydronephrosis. Right perinephric fluid compatible with forniceal rupture.  Subcentimeter bladder calculi  < end of copied text >

## 2022-07-21 NOTE — H&P ADULT - HISTORY OF PRESENT ILLNESS
76 year old male with a PMHx of HLD, HTN, DM2, CKD stage III, CVA and BPH presents with near syncope.      Vitals: afebrile, HR 68, /82, SpO2 94% on room air.  Labs: normocytic anemia hgb 10.5 (at baseline). CMP shows BUN/Cr of 40/1.46 (at baseline). Lactate 4.1. U/A negative for UTI.  CXR: (prelim read by radiology) bilateral patchy opacities.    ED interventions:  ml bolus. 76 year old male with a PMHx of HLD, HTN, DM2, CKD stage III, CVA and BPH presents with anxiety attack. Pt was in his usual state of health when he suddenly developed severe anxiety associated with dizziness and nausea as well as sensation of being about to pass out. Episode occurred while the patient was out eating lunch at a restaurant. There was no known provoking factor. At present, the patient feels improved and has no dizziness, no anxiety.     Vitals: afebrile, HR 68, /82, SpO2 94% on room air.  Labs: normocytic anemia hgb 10.5 (at baseline). CMP shows BUN/Cr of 40/1.46 (at baseline). Lactate 4.1. U/A negative for UTI.  CXR: (prelim read by radiology) bilateral patchy opacities.    ED interventions:  ml bolus. 76 year old male with a PMHx of HLD, HTN, DM2, CKD stage III, CVA and BPH presents with an episode of dizziness and severe anxiety. Pt was in his usual state of health when he suddenly developed severe anxiety associated with dizziness and nausea as well as sensation of being about to pass out. Episode occurred while the patient was out eating lunch at a restaurant. There was no known provoking factor. At present, the patient feels improved and has no dizziness, no anxiety.     Vitals: afebrile, HR 68, /82, SpO2 94% on room air.  Labs: normocytic anemia hgb 10.5 (at baseline). CMP shows BUN/Cr of 40/1.46 (at baseline). Lactate 4.1. U/A negative for UTI.  CXR: (prelim read by radiology) bilateral patchy opacities.    ED interventions:  ml bolus.

## 2022-07-21 NOTE — H&P ADULT - NSHPPHYSICALEXAM_GEN_ALL_CORE
Vital Signs Last 24 Hrs  T(C): 36.7 (21 Jul 2022 00:58), Max: 36.7 (20 Jul 2022 21:00)  T(F): 98.1 (21 Jul 2022 00:58), Max: 98.1 (20 Jul 2022 23:00)  HR: 58 (21 Jul 2022 00:58) (58 - 80)  BP: 193/88 (21 Jul 2022 00:58) (156/76 - 193/88)  BP(mean): 112 (20 Jul 2022 23:00) (107 - 112)  RR: 17 (21 Jul 2022 00:58) (16 - 18)  SpO2: 97% (21 Jul 2022 00:58) (94% - 97%)    Parameters below as of 21 Jul 2022 00:58  Patient On (Oxygen Delivery Method): room air

## 2022-07-21 NOTE — CONSULT NOTE ADULT - ASSESSMENT
Mr. Chiu is a 76 year old gentleman with a PMHx of HLD, HTN, DM2, CKD stage III, CVA and BPH who presented last night with an episode of dizziness and severe anxiety. Pt was in his usual state of health when he suddenly developed severe anxiety associated with dizziness and nausea as well as sensation of being about to pass out. Episode occurred while the patient was out eating lunch at a restaurant. There was no known provoking factor. In ED VSS. Labs with normocytic anemia hgb 10.5 (at baseline), BUN/Cr of 40/1.46 (at baseline) and increased Lactate of 4.1 that resolved with fluid. U/A negative for UTI, however CXR prelim read showed bilateral patchy opacities. Pt was given a dose of CTX1gm Q24 and ID consulted.    WORKUP  Blood Lactate: 1.4 (07-21) < - 4.1 (07-20)  Xray Chest (07.20.22): B/L patchy opacities, predominatly in the perihilar region. azygos lobe.   UA (7/220): Nitrite: Negative, Leuk Esterase: Negative / WBC 1 /HPF / Bacteria: Occasional    DIAGNOSIS and IMPRESSION  ·	Lactic acidosis  ·	r/o PNA  ·	Pre-Syncope    Afebrile with no leukocytosis  CT chest w/o infiltrates as read by me  s/p CTX x 1 dose in ED    RECOMMENDATIONS  Will f/u CT chest read      PT TO BE SEEN. PRELIM NOTE  PENDING RECS. PLEASE WAIT FOR FINAL RECS AFTER DISCUSSION WITH ATTENDINGAmadou Garcia MD, PGY5  ID fellow  [employer] Preferred  After 5pm/weekends call [phone]   Mr. Chiu is a 76 year old gentleman with a PMHx of HLD, HTN, DM2, CKD stage III, CVA and BPH who presented last night with an episode of dizziness and severe anxiety. Pt was in his usual state of health when he suddenly developed severe anxiety associated with dizziness and nausea as well as sensation of being about to pass out. Episode occurred while the patient was out eating lunch at a restaurant. There was no known provoking factor. In ED VSS. Labs with normocytic anemia hgb 10.5 (at baseline), BUN/Cr of 40/1.46 (at baseline) and increased Lactate of 4.1 that resolved with fluid. U/A negative for UTI, however CXR prelim read showed bilateral patchy opacities. Pt was given a dose of CTX1gm Q24 and ID consulted.    WORKUP  Blood Lactate: 1.4 (07-21) < - 4.1 (07-20)  CT VCHest (7/21): negative for pna  Xray Chest (07.20.22): B/L patchy opacities, predominatly in the perihilar region. azygos lobe.   UA (7/220): Nitrite: Negative, Leuk Esterase: Negative / WBC 1 /HPF / Bacteria: Occasional    DIAGNOSIS and IMPRESSION  ·	Lactic acidosis  ·	r/o PNA  ·	Pre-Syncope    Afebrile with no leukocytosis  CT chest w/o infiltrates   s/p CTX x 1 dose in ED  low suspicion for infection at this time given negative imaging, negative ROS and lack of fever or leukocytosis    RECOMMENDATIONS  Agree with monitoring off abx      PT TO BE SEEN. PRELIM NOTE  PENDING RECS. PLEASE WAIT FOR FINAL RECS AFTER DISCUSSION WITH ATTENDINGAmadou Garcia MD, PGY5  ID fellow  [employer] Preferred  After 5pm/weekends call [phone]   Mr. Chiu is a 76 year old gentleman with a PMHx of HLD, HTN, DM2, CKD stage III, CVA and BPH who presented last night with an episode of dizziness and severe anxiety. Pt was in his usual state of health when he suddenly developed severe anxiety associated with dizziness and nausea as well as sensation of being about to pass out. Episode occurred while the patient was out eating lunch at a restaurant. There was no known provoking factor. In ED VSS. Labs with normocytic anemia hgb 10.5 (at baseline), BUN/Cr of 40/1.46 (at baseline) and increased Lactate of 4.1 that resolved with fluid. U/A negative for UTI, however CXR prelim read showed bilateral patchy opacities. Pt was given a dose of CTX1gm Q24 and ID consulted.    WORKUP  Blood Lactate: 1.4 (07-21) < - 4.1 (07-20)  CT VCHest (7/21): negative for pna  Xray Chest (07.20.22): B/L patchy opacities, predominatly in the perihilar region. azygos lobe.   UA (7/220): Nitrite: Negative, Leuk Esterase: Negative / WBC 1 /HPF / Bacteria: Occasional    DIAGNOSIS and IMPRESSION  ·	Lactic acidosis  ·	r/o PNA  ·	Pre-Syncope    Afebrile with no leukocytosis  CT chest w/o infiltrates   s/p CTX x 1 dose in ED  low suspicion for infection at this time given negative imaging, negative ROS and lack of fever or leukocytosis    RECOMMENDATIONS  Recommend monitoring off abx  Trend WBC and monitor for fevers    Pt seen and examined. Case dw attending and primary team    Bassam Garcia MD, PGY5  ID fellow  [employer] Teams Preferred  After 5pm/weekends call [phone]

## 2022-07-21 NOTE — H&P ADULT - PROBLEM SELECTOR PLAN 1
Ddx as above  - orthostatic vital signs  - obtain TTE  - telemetry monitoring Ddx as above  - orthostatic vital signs  - obtain TSH this morning  - obtain TTE  - telemetry monitoring

## 2022-07-21 NOTE — H&P ADULT - PROBLEM SELECTOR PLAN 3
- Perhaps in the setting of dehydration  - s/p  ml IV in the ED  - will give another  ml now  - repeat lactate level this morning

## 2022-07-21 NOTE — CONSULT NOTE ADULT - SUBJECTIVE AND OBJECTIVE BOX
Patient is a 76y old  Male who presents with a chief complaint of concern for presyncope (2022 09:21)    HPI: Mr. Chiu is a 76 year old gentleman with a PMHx of HLD, HTN, DM2, CKD stage III, CVA and BPH who presented last night with an episode of dizziness and severe anxiety. Pt was in his usual state of health when he suddenly developed severe anxiety associated with dizziness and nausea as well as sensation of being about to pass out. Episode occurred while the patient was out eating lunch at a restaurant. There was no known provoking factor. In ED VSS. Labs with normocytic anemia hgb 10.5 (at baseline), BUN/Cr of 40/1.46 (at baseline) and increased Lactate of 4.1 that resolved with fluid. U/A negative for UTI, however CXR prelim read showed bilateral patchy opacities. Pt was given a dose of CTX1gm Q24 and ID consulted.      REVIEW OF SYSTEMS  [  ] ROS unobtainable because:    [ x ] All other systems negative except as noted below    Constitutional:  [ ] fever [ ] chills  [ ] weight loss  [ ]night sweat  [ ]poor appetite/PO intake [ ]fatigue   Skin:  [ ] rash [ ] phlebitis	  Eyes: [ ] icterus [ ] pain  [ ] discharge	  ENMT: [ ] sore throat  [ ] thrush [ ] ulcers [ ] exudates [ ]anosmia  Respiratory: [ ] dyspnea [ ] hemoptysis [ ] cough [ ] sputum	  Cardiovascular:  [ ] chest pain [ ] palpitations [ ] edema	  Gastrointestinal:  [ ] nausea [ ] vomiting [ ] diarrhea [ ] constipation [ ] pain	  Genitourinary:  [ ] dysuria [ ] frequency [ ] hematuria [ ] discharge [ ] flank pain  [ ] incontinence  Musculoskeletal:  [ ] myalgias [ ] arthralgias [ ] arthritis  [ ] back pain  Neurological:  [ ] headache [ ] weakness [ ] seizures  [ ] confusion/altered mental status    prior hospital charts reviewed [V]  primary team notes reviewed [V]  other consultant notes reviewed [V]    PAST MEDICAL & SURGICAL HISTORY:  Diabetes mellitus Type 2  High cholesterol  HTN (hypertension)  Cerebrovascular accident (CVA), unspecified mechanism 2 years ago  High triglycerides  Bladder calculus  BPH with obstruction/lower urinary tract symptoms  Ureteral calculus, left  History of appendectomy  H/O cystoscopy and left  stent placement  H/O myringotomy right ear in 2017    SOCIAL HISTORY:  - Denied smoking/vaping/alcohol/recreational drug use    FAMILY HISTORY:  Family history of hypertension        Allergies  No Known Allergies        ANTIMICROBIALS:  cefTRIAXone   IVPB 1000 every 24 hours      ANTIMICROBIALS (past 90 days):  MEDICATIONS  (STANDING):        OTHER MEDS:   MEDICATIONS  (STANDING):  acetaminophen     Tablet .. 650 every 6 hours PRN  amLODIPine   Tablet 10 daily  dextrose 50% Injectable 25 once  dextrose 50% Injectable 12.5 once  dextrose 50% Injectable 25 once  dextrose Oral Gel 15 once PRN  glucagon  Injectable 1 once  heparin   Injectable 5000 every 8 hours  hydrALAZINE 10 three times a day  insulin lispro (ADMELOG) corrective regimen sliding scale  three times a day before meals  insulin lispro (ADMELOG) corrective regimen sliding scale  at bedtime  melatonin 3 at bedtime PRN      VITALS:  Vital Signs Last 24 Hrs  T(F): 97.7 (22 @ 04:40), Max: 98.1 (22 @ 23:00)    Vital Signs Last 24 Hrs  HR: 58 (22 @ 04:40) (58 - 80)  BP: 197/89 (22 @ 04:40) (156/76 - 197/89)  RR: 17 (22 @ 04:40)  SpO2: 96% (22 @ 04:40) (94% - 97%)  Wt(kg): --    EXAM:    GA: NAD, AOx3  HEENT: oral cavity no lesion  CV: nl S1/S2, no RMG  Lungs: CTAB, No distress  Abd: BS+, soft, nontender, no rebounding pain  Ext: no edema  Neuro: No focal deficits  Skin: Intact  IV: no phlebitis    Labs:                        10.6   6.41  )-----------( 214      ( 2022 17:49 )             33.2     07-21    143  |  108  |  33<H>  ----------------------------<  94  3.9   |  18<L>  |  1.19    Ca    9.3      2022 06:40  Phos  3.0     07-21  Mg     1.8     07-    TPro  7.8  /  Alb  4.7  /  TBili  0.3  /  DBili  x   /  AST  20  /  ALT  36  /  AlkPhos  53  07-20      WBC Trend:  WBC Count: 6.41 (22 @ 17:49)      Auto Neutrophil #: 4.19 K/uL (22 @ 17:49)  Auto Neutrophil #: 8.68 K/uL (10-04-21 @ 19:28)      Creatine Trend:  Creatinine, Serum: 1.19 ()  Creatinine, Serum: 1.46 ()      Liver Biochemical Testing Trend:  Alanine Aminotransferase (ALT/SGPT): 36 ()  Alanine Aminotransferase (ALT/SGPT): 36 (10-04)  Aspartate Aminotransferase (AST/SGOT): 20 (22 @ 17:49)  Aspartate Aminotransferase (AST/SGOT): 44 (10-04-21 @ 19:28)  Bilirubin Total, Serum: 0.3 ()  Bilirubin Total, Serum: 0.5 (10-04)      Trend LDH      Auto Eosinophil %: 3.0 % (22 @ 17:49)      Urinalysis Basic - ( 2022 19:31 )    Color: Yellow / Appearance: Clear / S.021 / pH: x  Gluc: x / Ketone: Negative  / Bili: Negative / Urobili: Negative   Blood: x / Protein: 100 mg/dL / Nitrite: Negative   Leuk Esterase: Negative / RBC: 0 /hpf / WBC 1 /HPF   Sq Epi: x / Non Sq Epi: 1 / Bacteria: Occasional        MICROBIOLOGY:      Troponin T, High Sensitivity Result: 32 ()  Troponin T, High Sensitivity Result: 40 (20)    Blood Gas Venous - Lactate: 1.4 ( @ 06:39)  Blood Gas Venous - Lactate: 4.1 ( @ 19:16)  Blood Gas Venous - Lactate: 4.5 ( @ 19:06)  Blood Gas Venous - Lactate: 4.0 ( @ 17:43)      RADIOLOGY:  imaging below personally reviewed    < from: Xray Chest 1 View AP/PA (22 @ 19:56) >   bilateral patchy opacities, predominatly in the perihilar region. azygos lobe.   < end of copied text >     Patient is a 76y old  Male who presents with a chief complaint of concern for presyncope (2022 09:21)    HPI: Mr. Chiu is a 76 year old gentleman with a PMHx of HLD, HTN, DM2, CKD stage III, CVA and BPH who presented last night with an episode of dizziness and severe anxiety. Pt was in his usual state of health when he suddenly developed severe anxiety associated with dizziness and nausea as well as sensation of being about to pass out. Episode occurred while the patient was out eating lunch at a restaurant. There was no known provoking factor. In ED VSS. Labs with normocytic anemia hgb 10.5 (at baseline), BUN/Cr of 40/1.46 (at baseline) and increased Lactate of 4.1 that resolved with fluid. U/A negative for UTI, however CXR prelim read showed bilateral patchy opacities. Pt was given a dose of CTX1gm Q24 and ID consulted. Pt at this time denying any coughing, sob, chest pain, fevers, chills, dysuria, abdominal pain or diarrhea. He reports that the dizziness and lightheadedness is similar to prior anxiety attacks.       REVIEW OF SYSTEMS  [  ] ROS unobtainable because:    [ x ] All other systems negative except as noted below    Constitutional:  [ ] fever [ ] chills  [ ] weight loss  [ ]night sweat  [ ]poor appetite/PO intake [ ]fatigue   Skin:  [ ] rash [ ] phlebitis	  Eyes: [ ] icterus [ ] pain  [ ] discharge	  ENMT: [ ] sore throat  [ ] thrush [ ] ulcers [ ] exudates [ ]anosmia  Respiratory: [ ] dyspnea [ ] hemoptysis [ ] cough [ ] sputum	  Cardiovascular:  [ ] chest pain [ ] palpitations [ ] edema	  Gastrointestinal:  [ ] nausea [ ] vomiting [ ] diarrhea [ ] constipation [ ] pain	  Genitourinary:  [ ] dysuria [ ] frequency [ ] hematuria [ ] discharge [ ] flank pain  [ ] incontinence  Musculoskeletal:  [ ] myalgias [ ] arthralgias [ ] arthritis  [ ] back pain  Neurological:  [ ] headache [ ] weakness [ ] seizures  [ ] confusion/altered mental status    prior hospital charts reviewed [V]  primary team notes reviewed [V]  other consultant notes reviewed [V]    PAST MEDICAL & SURGICAL HISTORY:  Diabetes mellitus Type 2  High cholesterol  HTN (hypertension)  Cerebrovascular accident (CVA), unspecified mechanism 2 years ago  High triglycerides  Bladder calculus  BPH with obstruction/lower urinary tract symptoms  Ureteral calculus, left  History of appendectomy  H/O cystoscopy and left  stent placement  H/O myringotomy right ear in 2017    SOCIAL HISTORY:  - Denied smoking/vaping/alcohol/recreational drug use    FAMILY HISTORY:  Family history of hypertension        Allergies  No Known Allergies        ANTIMICROBIALS:  cefTRIAXone   IVPB 1000 every 24 hours      ANTIMICROBIALS (past 90 days):  MEDICATIONS  (STANDING):        OTHER MEDS:   MEDICATIONS  (STANDING):  acetaminophen     Tablet .. 650 every 6 hours PRN  amLODIPine   Tablet 10 daily  dextrose 50% Injectable 25 once  dextrose 50% Injectable 12.5 once  dextrose 50% Injectable 25 once  dextrose Oral Gel 15 once PRN  glucagon  Injectable 1 once  heparin   Injectable 5000 every 8 hours  hydrALAZINE 10 three times a day  insulin lispro (ADMELOG) corrective regimen sliding scale  three times a day before meals  insulin lispro (ADMELOG) corrective regimen sliding scale  at bedtime  melatonin 3 at bedtime PRN      VITALS:  Vital Signs Last 24 Hrs  T(F): 97.7 (22 @ 04:40), Max: 98.1 (22 @ 23:00)    Vital Signs Last 24 Hrs  HR: 58 (22 @ 04:40) (58 - 80)  BP: 197/89 (22 @ 04:40) (156/76 - 197/89)  RR: 17 (22 @ 04:40)  SpO2: 96% (22 @ 04:40) (94% - 97%)  Wt(kg): --    EXAM:    PHYSICAL EXAM:  General:  non-toxic, AOx3  HEAD/EYES: PERRL, white sclera   ENT:  normal, No thrush and no pharyngeal exudate  Neck: Supple  Cardiovascular:   No murmur,  normal S1 and S2  Respiratory: clear to ausculation bilaterally  GI:  soft, non-tender, normal bowel sounds  : no teague, no CVA tenderness   Musculoskeletal:  no synovitis  Neurologic: non-focal exam   Skin: no rash  Lymph:  no lymphadenopathy  Psychiatric: Cooperative, appropriate affect, alert & oriented  Lines:  no phlebitis         Labs:                        10.6   6.41  )-----------( 214      ( 2022 17:49 )             33.2     -    143  |  108  |  33<H>  ----------------------------<  94  3.9   |  18<L>  |  1.19    Ca    9.3      2022 06:40  Phos  3.0       Mg     1.8         TPro  7.8  /  Alb  4.7  /  TBili  0.3  /  DBili  x   /  AST  20  /  ALT  36  /  AlkPhos  53        WBC Trend:  WBC Count: 6.41 (22 @ 17:49)      Auto Neutrophil #: 4.19 K/uL (22 @ 17:49)  Auto Neutrophil #: 8.68 K/uL (10-04-21 @ 19:28)      Creatine Trend:  Creatinine, Serum: 1.19 ()  Creatinine, Serum: 1.46 ()      Liver Biochemical Testing Trend:  Alanine Aminotransferase (ALT/SGPT): 36 ()  Alanine Aminotransferase (ALT/SGPT): 36 (10-04)  Aspartate Aminotransferase (AST/SGOT): 20 (22 @ 17:49)  Aspartate Aminotransferase (AST/SGOT): 44 (10-04-21 @ 19:28)  Bilirubin Total, Serum: 0.3 ()  Bilirubin Total, Serum: 0.5 (10-04)      Trend LDH      Auto Eosinophil %: 3.0 % (22 @ 17:49)      Urinalysis Basic - ( 2022 19:31 )    Color: Yellow / Appearance: Clear / S.021 / pH: x  Gluc: x / Ketone: Negative  / Bili: Negative / Urobili: Negative   Blood: x / Protein: 100 mg/dL / Nitrite: Negative   Leuk Esterase: Negative / RBC: 0 /hpf / WBC 1 /HPF   Sq Epi: x / Non Sq Epi: 1 / Bacteria: Occasional        MICROBIOLOGY:      Troponin T, High Sensitivity Result: 32 ()  Troponin T, High Sensitivity Result: 40 ()    Blood Gas Venous - Lactate: 1.4 ( @ 06:39)  Blood Gas Venous - Lactate: 4.1 ( @ 19:16)  Blood Gas Venous - Lactate: 4.5 ( @ 19:06)  Blood Gas Venous - Lactate: 4.0 ( @ 17:43)      RADIOLOGY:  imaging below personally reviewed    < from: Xray Chest 1 View AP/PA (22 @ 19:56) >   bilateral patchy opacities, predominatly in the perihilar region. azygos lobe.   < end of copied text >

## 2022-07-21 NOTE — CONSULT NOTE ADULT - SUBJECTIVE AND OBJECTIVE BOX
CHIEF COMPLAINT:Patient is a 76y old  Male who presents with a chief complaint of concern for presyncope (21 Jul 2022 06:53)      HPI:  76 year old male with a PMHx of HLD, HTN, DM2, CKD stage III, CVA and BPH presents with an episode of dizziness and severe anxiety. Pt was in his usual state of health when he suddenly developed severe anxiety associated with dizziness and nausea as well as sensation of being about to pass out. Episode occurred while the patient was out eating lunch at a restaurant. There was no known provoking factor. At present, the patient feels improved and has no dizziness, no anxiety.     Vitals: afebrile, HR 68, /82, SpO2 94% on room air.  Labs: normocytic anemia hgb 10.5 (at baseline). CMP shows BUN/Cr of 40/1.46 (at baseline). Lactate 4.1. U/A negative for UTI.  CXR: (prelim read by radiology) bilateral patchy opacities.    ED interventions:  ml bolus. (21 Jul 2022 00:44)      PAST MEDICAL & SURGICAL HISTORY:  Diabetes mellitus  Type 2      High cholesterol      HTN (hypertension)      Cerebrovascular accident (CVA), unspecified mechanism  2 years ago      High triglycerides      Bladder calculus      BPH with obstruction/lower urinary tract symptoms      Ureteral calculus, left      History of appendectomy      H/O cystoscopy  and left  stent placement      H/O myringotomy  right ear in 2017          MEDICATIONS  (STANDING):  amLODIPine   Tablet 10 milliGRAM(s) Oral daily  cefTRIAXone   IVPB 1000 milliGRAM(s) IV Intermittent every 24 hours  dextrose 5%. 1000 milliLiter(s) (50 mL/Hr) IV Continuous <Continuous>  dextrose 5%. 1000 milliLiter(s) (100 mL/Hr) IV Continuous <Continuous>  dextrose 50% Injectable 25 Gram(s) IV Push once  dextrose 50% Injectable 12.5 Gram(s) IV Push once  dextrose 50% Injectable 25 Gram(s) IV Push once  glucagon  Injectable 1 milliGRAM(s) IntraMuscular once  heparin   Injectable 5000 Unit(s) SubCutaneous every 8 hours  hydrALAZINE 10 milliGRAM(s) Oral three times a day  insulin lispro (ADMELOG) corrective regimen sliding scale   SubCutaneous three times a day before meals  insulin lispro (ADMELOG) corrective regimen sliding scale   SubCutaneous at bedtime  sodium chloride 0.9%. 1000 milliLiter(s) (75 mL/Hr) IV Continuous <Continuous>    MEDICATIONS  (PRN):  acetaminophen     Tablet .. 650 milliGRAM(s) Oral every 6 hours PRN Temp greater or equal to 38C (100.4F), Mild Pain (1 - 3)  dextrose Oral Gel 15 Gram(s) Oral once PRN Blood Glucose LESS THAN 70 milliGRAM(s)/deciliter  melatonin 3 milliGRAM(s) Oral at bedtime PRN Insomnia      FAMILY HISTORY:  Family history of hypertension        SOCIAL HISTORY:    [x ] Non-smoker  [ ] Smoker  [ ] Alcohol    Allergies    No Known Allergies    Intolerances    	    REVIEW OF SYSTEMS:  CONSTITUTIONAL: No fever, weight loss, or fatigue  EYES: No eye pain, visual disturbances, or discharge  ENT:  No difficulty hearing, tinnitus, vertigo; No sinus or throat pain  NECK: No pain or stiffness  RESPIRATORY: No cough, wheezing, chills or hemoptysis; No Shortness of Breath  CARDIOVASCULAR: No chest pain, palpitations, passing out, dizziness, or leg swelling  GASTROINTESTINAL: No abdominal or epigastric pain. No nausea, vomiting, or hematemesis; No diarrhea or constipation. No melena or hematochezia.  GENITOURINARY: No dysuria, frequency, hematuria, or incontinence  NEUROLOGICAL: No headaches, memory loss, loss of strength, numbness, or tremors, dizziness, pre syncope  SKIN: No itching, burning, rashes, or lesions   LYMPH Nodes: No enlarged glands  ENDOCRINE: No heat or cold intolerance; No hair loss  MUSCULOSKELETAL: No joint pain or swelling; No muscle, back, or extremity pain  PSYCHIATRIC: No depression, anxiety, mood swings, or difficulty sleeping  HEME/LYMPH: No easy bruising, or bleeding gums  ALLERGY AND IMMUNOLOGIC: No hives or eczema	    [ ] All others negative	  [ ] Unable to obtain    PHYSICAL EXAM:  T(C): 36.5 (07-21-22 @ 04:40), Max: 36.7 (07-20-22 @ 21:00)  HR: 58 (07-21-22 @ 04:40) (58 - 80)  BP: 197/89 (07-21-22 @ 04:40) (156/76 - 197/89)  RR: 17 (07-21-22 @ 04:40) (16 - 18)  SpO2: 96% (07-21-22 @ 04:40) (94% - 97%)  Wt(kg): --  I&O's Summary      Appearance: Normal	  HEENT:   Normal oral mucosa, PERRL, EOMI	  Lymphatic: No lymphadenopathy  Cardiovascular: Normal S1 S2, No JVD, + murmurs, No edema  Respiratory: rhonchi  Psychiatry: A & O x 3, Mood & affect appropriate  Gastrointestinal:  Soft, Non-tender, + BS	  Skin: No rashes, No ecchymoses, No cyanosis	  Neurologic: Non-focal  Extremities: Normal range of motion, No clubbing, cyanosis or edema  Vascular: Peripheral pulses palpable 2+ bilaterally    TELEMETRY: 	    ECG:  	  RADIOLOGY:  OTHER: 	  	  LABS:	 	    CARDIAC MARKERS:                              10.6   6.41  )-----------( 214      ( 20 Jul 2022 17:49 )             33.2     07-21    143  |  108  |  33<H>  ----------------------------<  94  3.9   |  18<L>  |  1.19    Ca    9.3      21 Jul 2022 06:40  Phos  3.0     07-21  Mg     1.8     07-21    TPro  7.8  /  Alb  4.7  /  TBili  0.3  /  DBili  x   /  AST  20  /  ALT  36  /  AlkPhos  53  07-20    proBNP:   Lipid Profile:   HgA1c:   TSH:       PREVIOUS DIAGNOSTIC TESTING:    < from: 12 Lead ECG (10.04.21 @ 17:52) >  Diagnosis Line NORMAL SINUS RHYTHM  RIGHT BUNDLE BRANCH BLOCK  LEFT ANTERIOR FASCICULAR BLOCK  *** BIFASCICULAR BLOCK ***  MINIMAL VOLTAGE CRITERIA FOR LVH, MAY BE NORMAL VARIANT  ABNORMAL ECG  WHEN COMPARED WITH ECG OF 29-JAN-2020 20:44,  NO SIGNIFICANT CHANGE WAS FOUND    < from: Transthoracic Echocardiogram (12.18.19 @ 13:59) >  Mitral Valve: Normal mitral valve.  Aortic Valve/Aorta: Calcified aortic valve with normal  opening.  Minimal aortic regurgitation.  Normal aortic root size.  Left Atrium: Normal left atrium.  Left Ventricle: Normal left ventricular internal  dimensions. Mild-moderate concentric left ventricular  hypertrophy.  Hyperdynamic left ventricular systolic function.  Impaired LV-relaxation with normal filling pressure.  Right Heart: Normal right atrium. Normal right ventricular  size and function. Normal tricuspid valve. Normal pulmonic  valve.  Pericardium/Pleura: Normal pericardium with no pericardial  effusion.  Hemodynamic: Estimated right atrial pressure is normal.  No evidence of pulmonary hypertension.  No PFO seen wtih color Doppler.  ------------------------------------------------------------------------  Conclusions:  Hyperdynamic left ventricular systolic function.  < from: CT Angio Chest w/ IV Cont (01.29.20 @ 22:05) >  IMPRESSION:  Motion degraded exam. No gross central or segmental pulmonary embolism.    Multifocal pneumonia.    Flu With COVID-19 By JODIE (07.20.22 @ 20:41)    SARS-CoV-2 Result: NotDetec: This Respiratory Panel uses polymerase chain reaction (PCR) to detect for  influenza A; influenza B; respiratory syncytial virus; and SARS-CoV-2.  This test was validated by [employer] and is in use under the FDA  Emergency Use Authorization (EUA) for clinical labs CLIA-certified to  perform high complexity testing. Test results should be correlated with  clinical presentation, patient history, and epidemiology.    Influenza A Result: NotDetec    Influenza B Result: NotDetec    Resp Syn Virus Result: NotDetec

## 2022-07-21 NOTE — CONSULT NOTE ADULT - ATTENDING COMMENTS
76M with panic attack and lactic acidosis  -no fever  -not septic  -no s/s of uti  -CT negative for pna   -dc abx and observe    Obie Lim  Attending Physician   Division of Infectious Disease  Office #[phone]  Available on Microsoft Teams also  After 5pm/weekend or no response, call #[phone]    Will sign off, recall ID if needed #[phone].

## 2022-07-21 NOTE — CONSULT NOTE ADULT - ASSESSMENT
76 year old male with a PMHx of HLD, HTN, DM2, CKD stage III, CVA and BPH presents with an episode of dizziness and severe anxiety. Pt was in his usual state of health when he suddenly developed severe anxiety associated with dizziness and nausea as well as sensation of being about to pass out. Episode occurred while the patient was out eating lunch at a restaurant. There was no known provoking factor. At present, the patient feels improved and has no dizziness, no anxiety.   Vitals: afebrile, HR 68, /82, SpO2 94% on room air.  Labs: normocytic anemia hgb 10.5 (at baseline). CMP shows BUN/Cr of 40/1.46 (at baseline). Lactate 4.1. U/A negative for UTI.  CXR: (prelim read by radiology) bilateral patchy opacities.  pt is known to me from the last admission with hx of htn, DM and ckd with dizziness and near syncope  check orthostatic ?vasovagal  repeat echo  ct head  abnormal chest x ray repeat ct chest  pre syncope ?hyperventilation sec to anxiety???  dvt prophylaxis  asa daily  lipid panel  continue norvasc for bp control

## 2022-07-22 ENCOUNTER — TRANSCRIPTION ENCOUNTER (OUTPATIENT)
Age: 77
End: 2022-07-22

## 2022-07-22 LAB
ANION GAP SERPL CALC-SCNC: 16 MMOL/L — SIGNIFICANT CHANGE UP (ref 5–17)
BUN SERPL-MCNC: 38 MG/DL — HIGH (ref 7–23)
CALCIUM SERPL-MCNC: 8.7 MG/DL — SIGNIFICANT CHANGE UP (ref 8.4–10.5)
CHLORIDE SERPL-SCNC: 111 MMOL/L — HIGH (ref 96–108)
CO2 SERPL-SCNC: 19 MMOL/L — LOW (ref 22–31)
CREAT SERPL-MCNC: 1.52 MG/DL — HIGH (ref 0.5–1.3)
EGFR: 47 ML/MIN/1.73M2 — LOW
GAS PNL BLDA: SIGNIFICANT CHANGE UP
GLUCOSE BLDC GLUCOMTR-MCNC: 162 MG/DL — HIGH (ref 70–99)
GLUCOSE BLDC GLUCOMTR-MCNC: 182 MG/DL — HIGH (ref 70–99)
GLUCOSE BLDC GLUCOMTR-MCNC: 219 MG/DL — HIGH (ref 70–99)
GLUCOSE BLDC GLUCOMTR-MCNC: 219 MG/DL — HIGH (ref 70–99)
GLUCOSE BLDC GLUCOMTR-MCNC: 275 MG/DL — HIGH (ref 70–99)
GLUCOSE SERPL-MCNC: 139 MG/DL — HIGH (ref 70–99)
HCT VFR BLD CALC: 31.4 % — LOW (ref 39–50)
HGB BLD-MCNC: 10.1 G/DL — LOW (ref 13–17)
MCHC RBC-ENTMCNC: 30.2 PG — SIGNIFICANT CHANGE UP (ref 27–34)
MCHC RBC-ENTMCNC: 32.2 GM/DL — SIGNIFICANT CHANGE UP (ref 32–36)
MCV RBC AUTO: 94 FL — SIGNIFICANT CHANGE UP (ref 80–100)
NRBC # BLD: 0 /100 WBCS — SIGNIFICANT CHANGE UP (ref 0–0)
PLATELET # BLD AUTO: 197 K/UL — SIGNIFICANT CHANGE UP (ref 150–400)
POTASSIUM SERPL-MCNC: 3.7 MMOL/L — SIGNIFICANT CHANGE UP (ref 3.5–5.3)
POTASSIUM SERPL-SCNC: 3.7 MMOL/L — SIGNIFICANT CHANGE UP (ref 3.5–5.3)
RBC # BLD: 3.34 M/UL — LOW (ref 4.2–5.8)
RBC # FLD: 13.1 % — SIGNIFICANT CHANGE UP (ref 10.3–14.5)
SODIUM SERPL-SCNC: 146 MMOL/L — HIGH (ref 135–145)
WBC # BLD: 5.45 K/UL — SIGNIFICANT CHANGE UP (ref 3.8–10.5)
WBC # FLD AUTO: 5.45 K/UL — SIGNIFICANT CHANGE UP (ref 3.8–10.5)

## 2022-07-22 RX ORDER — CHLORHEXIDINE GLUCONATE 213 G/1000ML
1 SOLUTION TOPICAL
Refills: 0 | Status: DISCONTINUED | OUTPATIENT
Start: 2022-07-22 | End: 2022-07-25

## 2022-07-22 RX ORDER — SODIUM CHLORIDE 9 MG/ML
1000 INJECTION, SOLUTION INTRAVENOUS
Refills: 0 | Status: DISCONTINUED | OUTPATIENT
Start: 2022-07-22 | End: 2022-07-23

## 2022-07-22 RX ORDER — INSULIN LISPRO 100/ML
3 VIAL (ML) SUBCUTANEOUS
Refills: 0 | Status: DISCONTINUED | OUTPATIENT
Start: 2022-07-22 | End: 2022-07-23

## 2022-07-22 RX ORDER — HYDRALAZINE HCL 50 MG
25 TABLET ORAL
Refills: 0 | Status: DISCONTINUED | OUTPATIENT
Start: 2022-07-22 | End: 2022-07-23

## 2022-07-22 RX ORDER — INSULIN GLARGINE 100 [IU]/ML
7 INJECTION, SOLUTION SUBCUTANEOUS EVERY MORNING
Refills: 0 | Status: DISCONTINUED | OUTPATIENT
Start: 2022-07-23 | End: 2022-07-23

## 2022-07-22 RX ADMIN — Medication 25 MILLIGRAM(S): at 17:21

## 2022-07-22 RX ADMIN — HEPARIN SODIUM 5000 UNIT(S): 5000 INJECTION INTRAVENOUS; SUBCUTANEOUS at 06:12

## 2022-07-22 RX ADMIN — Medication 2: at 12:53

## 2022-07-22 RX ADMIN — SODIUM CHLORIDE 75 MILLILITER(S): 9 INJECTION INTRAMUSCULAR; INTRAVENOUS; SUBCUTANEOUS at 06:09

## 2022-07-22 RX ADMIN — HEPARIN SODIUM 5000 UNIT(S): 5000 INJECTION INTRAVENOUS; SUBCUTANEOUS at 13:50

## 2022-07-22 RX ADMIN — Medication 3 UNIT(S): at 17:18

## 2022-07-22 RX ADMIN — Medication 1: at 08:17

## 2022-07-22 RX ADMIN — Medication 1: at 17:17

## 2022-07-22 RX ADMIN — HEPARIN SODIUM 5000 UNIT(S): 5000 INJECTION INTRAVENOUS; SUBCUTANEOUS at 23:21

## 2022-07-22 RX ADMIN — AMLODIPINE BESYLATE 10 MILLIGRAM(S): 2.5 TABLET ORAL at 09:02

## 2022-07-22 RX ADMIN — Medication 650 MILLIGRAM(S): at 00:15

## 2022-07-22 RX ADMIN — CHLORHEXIDINE GLUCONATE 1 APPLICATION(S): 213 SOLUTION TOPICAL at 13:50

## 2022-07-22 RX ADMIN — Medication 10 MILLIGRAM(S): at 06:11

## 2022-07-22 RX ADMIN — SODIUM CHLORIDE 50 MILLILITER(S): 9 INJECTION, SOLUTION INTRAVENOUS at 17:22

## 2022-07-22 NOTE — PHYSICAL THERAPY INITIAL EVALUATION ADULT - ADDITIONAL COMMENTS
Pt lives with his brother in an apartment, no steps to negotiate. Stated he has been using quad cane for ambulation but also owns a walker. Pt reported he is (I) with most ADLs, brother assists with showering at times.

## 2022-07-22 NOTE — DISCHARGE NOTE NURSING/CASE MANAGEMENT/SOCIAL WORK - NSDCFUADDAPPT_GEN_ALL_CORE_FT
APPTS ARE READY TO BE MADE: [X ] YES    Best Family or Patient Contact (if needed):    Additional Information about above appointments (if needed):    1: Primary care within 1-2 weeks  2: Endocrinology within 1-2 weeks of discharge  3: Cardiology within 1-2 weeks of discharge    Other comments or requests:

## 2022-07-22 NOTE — DISCHARGE NOTE PROVIDER - NSDCCPCAREPLAN_GEN_ALL_CORE_FT
PRINCIPAL DISCHARGE DIAGNOSIS  Diagnosis: Near syncope  Assessment and Plan of Treatment: pt  with  anxiety   panic attack,  felt  like  he  was  going  to  pass  out   tele.  Ruled out Pneumonia. Likely in setting of lactic acidosis per infectious diease. No antibiotics.      SECONDARY DISCHARGE DIAGNOSES  Diagnosis: Type 2 diabetes mellitus  Assessment and Plan of Treatment: Per endocrinology, continue your home regimen. Follow up with endocrinology routinely.    Diagnosis: Hypertension  Assessment and Plan of Treatment: Continue your BP regimen as prescribed.     PRINCIPAL DISCHARGE DIAGNOSIS  Diagnosis: Near syncope  Assessment and Plan of Treatment: pt  with  anxiety   panic attack,  felt  like  he  was  going  to  pass  out   tele.  Ruled out Pneumonia. Likely in setting of lactic acidosis per infectious diease. No antibiotics.      SECONDARY DISCHARGE DIAGNOSES  Diagnosis: Type 2 diabetes mellitus  Assessment and Plan of Treatment: Per endocrinology, continue your home regimen. Follow up with endocrinology routinely.  A1C in hospital is 7.6  Follow up with opthamology, podiatry, and nephrology also routinely.    Diagnosis: Hypertension  Assessment and Plan of Treatment: Continue your BP regimen as prescribed.

## 2022-07-22 NOTE — DISCHARGE NOTE PROVIDER - NSDCFUSCHEDAPPT_GEN_ALL_CORE_FT
[facility]  Urology [address] [place] [address]  Scheduled Appointment: 09/29/2022    Troy Rae  [facility]  Urology [address] [place] [address]  Scheduled Appointment: 09/29/2022

## 2022-07-22 NOTE — DISCHARGE NOTE PROVIDER - HOSPITAL COURSE
76 year old male     h/o    HLD, HTN, DM2, CKD stage III, CVA and BPH  h/o   bladder calculi, 2mm right ureteral calculus with forniceal rupture., urologist Dr. Rae       presents with presyncope.   pt  with  anxiety   panic attack,  felt  like  he  was  going  to  pass  out   tele.  card  d r carmelo     HYN/  HLD   was on  amlodipine, metoprolol succinate,   losartan  normal  ef     Lactic acidosis, noted.,  not form  sepsis   no  co/abd  pain. dysuria    DM,  follow  fs   CKD   c/c  anemia   ct chest, no pna/ normal lactic  acid now/ per  house ID,  no ab   echo, normal   ef    mild   hypernatremia and  karon. on iv  fluids,   resolved    sbp  noted today/  Hydralazine  increased  yesterday  card following    spoke  to  brother.   d/c  planning     Patient medically cleared per Dr. Mckinney. presents with presyncope.   pt  with  anxiety   panic attack,  felt  like  he  was  going  to  pass  out   tele.  card  d r alejandrolizzy     HYN/  HLD   was on  amlodipine, metoprolol succinate,   losartan  normal  ef     Lactic acidosis, noted.,  not form  sepsis   no  co/abd  pain. dysuria    DM,  follow  fs   CKD   c/c  anemia   ct chest, no pna/ normal lactic  acid now/ per  house ID,  no ab   echo, normal   ef    mild   hypernatremia and  karon. on iv  fluids,   resolved    sbp  noted today/  Hydralazine  increased  yesterday  card following    spoke  to  brother.   d/c  planning     Patient medically cleared per Dr. Mckinney.

## 2022-07-22 NOTE — CONSULT NOTE ADULT - PROBLEM SELECTOR RECOMMENDATION 9
Will start Lantus 7u daily AM and continue Admelog correction scale coverage. Will continue monitoring FS and FU.  Suggest to discontinue prior Metformin and Glimepiride given older age and CKD, risk for hypoglycemias.  DC on Tradjenta 5mg daily, continue prior Victoza.   Reports having an appointment with his Endo Dr Argueta next week.  Discussed plan with patient; Counseled for compliance with consistent low-carb diet. Will start Lantus 7u daily AM.  Will start Admelog 3u before meals and continue Admelog correction scale coverage. Will continue monitoring FS and FU.  Suggest to discontinue prior Glimepiride given older age and CKD, risk for hypoglycemias.  DC on Prandin 1mg TID before meals, continue prior Metformin and Victoza.   Reports having an appointment with his Endo Dr Argueta next week.  Discussed plan with patient; Counseled for compliance with consistent low-carb diet.

## 2022-07-22 NOTE — CONSULT NOTE ADULT - ASSESSMENT
Assessment  DMT2: 76y Male with DM T2 with hyperglycemia, A1C?, was on oral meds and Victoza at home, now on insulin coverage, blood sugars are fluctuating/running high and not at target, no hypoglycemic episodes, eating meals,  appears comfortable.  Presyncope: euthyroid, workup in progress, stable, monitored.  HTN: Controlled,  on antihypertensive medications.  CKD: Monitor labs/BMP,            Assessment  DMT2: 76y Male with DM T2 with hyperglycemia, A1C?, was on oral meds and Victoza at home, now on insulin coverage, blood sugars are fluctuating/running high and not at target, no hypoglycemic episodes, eating meals,  appears comfortable.  Presyncope: euthyroid, workup in progress, stable, monitored.  HTN: Controlled,  on antihypertensive medications.  CKD: Monitor labs/BMP,       Pro Alexey FLOR  [phone]

## 2022-07-22 NOTE — PHYSICAL THERAPY INITIAL EVALUATION ADULT - GAIT DEVIATIONS NOTED, PT EVAL
shuffling at times/increased time in double stance/decreased velocity of limb motion/decreased step length/decreased stride length

## 2022-07-22 NOTE — CONSULT NOTE ADULT - NS ATTEND AMEND GEN_ALL_CORE FT
I have made amendments to the documentation where necessary. Additional comments: I have made amendments to the documentation where necessary. Additional comments: I have made amendments to the documentation where necessary. Additional comments: I attest my time as attending is greater than 50% of the total combined time spent on qualifying patient care activities by the PA/NP and attending.     I have made amendments to the documentation where necessary. Additional comments: Pt seen and examined, plan of care discussed with the NP, spent 30 min on the case.

## 2022-07-22 NOTE — PHYSICAL THERAPY INITIAL EVALUATION ADULT - PERTINENT HX OF CURRENT PROBLEM, REHAB EVAL
Pt is a 77 y/o male with PMH of HLD, HTN, DM2, CKD stage III, CVA and BPH, bladder calculi, 2mm right ureteral calculus with forniceal rupture. Pt presents with an episode of dizziness and severe anxiety. Pt was in his usual state of health when he suddenly developed severe anxiety associated with dizziness and nausea as well as sensation of being about to pass out. Admitted for panic attack and lactic acidosis.

## 2022-07-22 NOTE — DISCHARGE NOTE NURSING/CASE MANAGEMENT/SOCIAL WORK - PATIENT PORTAL LINK FT
You can access the FollowMyHealth Patient Portal offered by [facility] by registering at the following website: [email or web address]. By joining [employer]’s FollowMyHealth portal, you will also be able to view your health information using other applications (apps) compatible with our system.

## 2022-07-22 NOTE — DISCHARGE NOTE PROVIDER - NSDCMRMEDTOKEN_GEN_ALL_CORE_FT
aspirin 81 mg oral delayed release tablet: 1 tab(s) orally once a day  glimepiride 4 mg oral tablet: 2 tab(s) orally once a day filled 6/28/22 x 90 days    NOTE: [facility] ALSO FILLED 2MG 3 TABS AT BEDTIME ON 5/29/22 X 90 DAYS       metFORMIN 500 mg oral tablet, extended release: 4 tab(s) orally once a day with dinner  rosuvastatin 20 mg oral tablet: 1 tab(s) orally once a day (at bedtime)  Shower Chair:   tamsulosin 0.4 mg oral capsule: 1 cap(s) orally once a day  Victoza 18 mg/3 mL subcutaneous solution: 1.8 milligram(s) subcutaneous once a day   aspirin 81 mg oral delayed release tablet: 1 tab(s) orally once a day  glimepiride 4 mg oral tablet: 2 tab(s) orally once a day filled 6/28/22 x 90 days    NOTE: [facility] ALSO FILLED 2MG 3 TABS AT BEDTIME ON 5/29/22 X 90 DAYS       metFORMIN 500 mg oral tablet, extended release: 4 tab(s) orally once a day with dinner  rosuvastatin 20 mg oral tablet: 1 tab(s) orally once a day (at bedtime)  tamsulosin 0.4 mg oral capsule: 1 cap(s) orally once a day  Victoza 18 mg/3 mL subcutaneous solution: 1.8 milligram(s) subcutaneous once a day   amLODIPine 10 mg oral tablet: 1 tab(s) orally once a day  aspirin 81 mg oral delayed release tablet: 1 tab(s) orally once a day  glimepiride 4 mg oral tablet: 2 tab(s) orally once a day filled 6/28/22 x 90 days    NOTE: [facility]SO FILLED 2MG 3 TABS AT BEDTIME ON 5/29/22 X 90 DAYS       hydrALAZINE 50 mg oral tablet: 1 tab(s) orally 3 times a day  metFORMIN 500 mg oral tablet, extended release: 4 tab(s) orally once a day with dinner  rosuvastatin 20 mg oral tablet: 1 tab(s) orally once a day (at bedtime)  Shower Chair:   tamsulosin 0.4 mg oral capsule: 1 cap(s) orally once a day  Victoza 18 mg/3 mL subcutaneous solution: 1.8 milligram(s) subcutaneous once a day

## 2022-07-22 NOTE — CONSULT NOTE ADULT - SUBJECTIVE AND OBJECTIVE BOX
HPI:  76 year old male with a PMHx of HLD, HTN, DM2, CKD stage III, CVA and BPH presents with an episode of dizziness and severe anxiety. Pt was in his usual state of health when he suddenly developed severe anxiety associated with dizziness and nausea as well as sensation of being about to pass out. Episode occurred while the patient was out eating lunch at a restaurant. There was no known provoking factor. At present, the patient feels improved and has no dizziness, no anxiety.     Vitals: afebrile, HR 68, /82, SpO2 94% on room air.  Labs: normocytic anemia hgb 10.5 (at baseline). CMP shows BUN/Cr of 40/1.46 (at baseline). Lactate 4.1. U/A negative for UTI.  CXR: (prelim read by radiology) bilateral patchy opacities.    ED interventions:  ml bolus. (21 Jul 2022 00:44)    Patient has history of diabetes, A1C? , on oral meds at home (glimepiride, Metformin) and Victoza daily injection, reports fluctuating FS with intermittent hypoglycemic episodes, no polyuria polydipsia. Patient follows up with Endo for diabetes management.  Endo was consulted for glycemic control.    PAST MEDICAL & SURGICAL HISTORY:  Diabetes mellitus  Type 2      High cholesterol      HTN (hypertension)      Cerebrovascular accident (CVA), unspecified mechanism  2 years ago      High triglycerides      Bladder calculus      BPH with obstruction/lower urinary tract symptoms      Ureteral calculus, left      History of appendectomy      H/O cystoscopy  and left  stent placement      H/O myringotomy  right ear in 2017          FAMILY HISTORY:  Family history of hypertension        Social History:    Outpatient Medications:    MEDICATIONS  (STANDING):  amLODIPine   Tablet 10 milliGRAM(s) Oral daily  dextrose 5% + sodium chloride 0.45%. 1000 milliLiter(s) (50 mL/Hr) IV Continuous <Continuous>  dextrose 5%. 1000 milliLiter(s) (50 mL/Hr) IV Continuous <Continuous>  dextrose 5%. 1000 milliLiter(s) (100 mL/Hr) IV Continuous <Continuous>  dextrose 50% Injectable 25 Gram(s) IV Push once  dextrose 50% Injectable 12.5 Gram(s) IV Push once  dextrose 50% Injectable 25 Gram(s) IV Push once  glucagon  Injectable 1 milliGRAM(s) IntraMuscular once  heparin   Injectable 5000 Unit(s) SubCutaneous every 8 hours  hydrALAZINE 25 milliGRAM(s) Oral two times a day  insulin lispro (ADMELOG) corrective regimen sliding scale   SubCutaneous three times a day before meals  insulin lispro (ADMELOG) corrective regimen sliding scale   SubCutaneous at bedtime    MEDICATIONS  (PRN):  acetaminophen     Tablet .. 650 milliGRAM(s) Oral every 6 hours PRN Temp greater or equal to 38C (100.4F), Mild Pain (1 - 3)  dextrose Oral Gel 15 Gram(s) Oral once PRN Blood Glucose LESS THAN 70 milliGRAM(s)/deciliter  melatonin 3 milliGRAM(s) Oral at bedtime PRN Insomnia      Allergies    No Known Allergies    Intolerances      Review of Systems:  Constitutional: No fever, no chills  Eyes: No blurry vision  Neuro: No tremors  HEENT: No pain, no neck swelling  Cardiovascular: No chest pain, no palpitations  Respiratory: Has SOB, no cough  GI: No nausea, vomiting, abdominal pain  : No dysuria  Skin: no rash  MSK: Has leg swelling.  Psych: no depression  Endocrine: no polyuria, polydipsia    ALL OTHER SYSTEMS REVIEWED AND NEGATIVE    UNABLE TO OBTAIN    PHYSICAL EXAM:  VITALS: T(C): 36.3 (07-22-22 @ 11:06)  T(F): 97.4 (07-22-22 @ 11:06), Max: 98.1 (07-22-22 @ 04:26)  HR: 69 (07-22-22 @ 13:08) (60 - 69)  BP: 157/78 (07-22-22 @ 13:08) (156/90 - 164/83)  RR:  (17 - 18)  SpO2:  (95% - 97%)  Wt(kg): --  GENERAL: NAD, well-groomed, well-developed  EYES: No proptosis, no lid lag  HEENT:  Atraumatic, Normocephalic  THYROID: Normal size, no palpable nodules  RESPIRATORY: Clear to auscultation bilaterally; No rales, rhonchi, wheezing  CARDIOVASCULAR: Si S2, No murmurs;  GI: Soft, non distended, normal bowel sounds  SKIN: Dry, intact, No rashes or lesions  MUSCULOSKELETAL: Has BL lower extremity edema.  NEURO:  no tremor, sensation decreased in feet BL,    POCT Blood Glucose.: 219 mg/dL (07-22-22 @ 12:52)  POCT Blood Glucose.: 275 mg/dL (07-22-22 @ 11:44)  POCT Blood Glucose.: 162 mg/dL (07-22-22 @ 07:36)  POCT Blood Glucose.: 230 mg/dL (07-21-22 @ 21:15)  POCT Blood Glucose.: 229 mg/dL (07-21-22 @ 17:43)  POCT Blood Glucose.: 185 mg/dL (07-21-22 @ 12:42)  POCT Blood Glucose.: 137 mg/dL (07-21-22 @ 09:27)  POCT Blood Glucose.: 201 mg/dL (07-20-22 @ 17:24)                            10.1   5.45  )-----------( 197      ( 22 Jul 2022 06:42 )             31.4       07-22    146<H>  |  111<H>  |  38<H>  ----------------------------<  139<H>  3.7   |  19<L>  |  1.52<H>    eGFR: 47<L>    Ca    8.7      07-22  Mg     1.8     07-21  Phos  3.0     07-21    TPro  7.8  /  Alb  4.7  /  TBili  0.3  /  DBili  x   /  AST  20  /  ALT  36  /  AlkPhos  53  07-20      Thyroid Function Tests:  07-21 @ 06:40 TSH 3.15 FreeT4 -- T3 -- Anti TPO -- Anti Thyroglobulin Ab -- TSI --              Radiology:

## 2022-07-22 NOTE — PROGRESS NOTE ADULT - ASSESSMENT
76 year old male     h/o    HLD, HTN, DM2, CKD stage III, CVA and BPH  h/o   bladder calculi, 2mm right ureteral calculus with forniceal rupture., urologist Dr. Rae       presents with presyncope.   pt  with  anxiety   panic attack,  felt  like  he  was  going  to  pass  out   tele.  card  d r carmelo     HYN/  HLD   was on  amlodipine, metoprolol succinate,   losartan  normal  ef     Lactic acidosis, noted.,  not form  sepsis   no  co/abd  pain. dysuria    DM,  follow  fs   CKD   c/c  anemia    check  orthostatics   ct chest, no pna/ normal lactic  acid now/ pe r house ID,  no ab   echo, normal   ef    mild   hypernatremia and  karon. on iv  fluids/  bladder scan    bmp  in am       from: CT Abdomen and Pelvis No Cont (01.19.19 @ 09:03) >  IMPRESSION: A punctate 2 mm proximal right ureteral calculus with mild   hydronephrosis. Right perinephric fluid compatible with forniceal rupture.  Subcentimeter bladder calculi  < end of copied text >

## 2022-07-22 NOTE — DISCHARGE NOTE PROVIDER - NSDCFUADDAPPT_GEN_ALL_CORE_FT
APPTS ARE READY TO BE MADE: [X ] YES    Best Family or Patient Contact (if needed):    Additional Information about above appointments (if needed):    1: Primary care within 1-2 weeks  2: Endocrinology within 1-2 weeks of discharge  3: Cardiology within 1-2 weeks of discharge    Other comments or requests:    APPTS ARE READY TO BE MADE: [X ] YES    Best Family or Patient Contact (if needed):    Additional Information about above appointments (if needed):    1: Primary care within 1-2 weeks   2: Endocrinology within 1-2 weeks of discharge  3: Cardiology within 1-2 weeks of discharge    Other comments or requests:    APPTS ARE READY TO BE MADE: [X ] YES    Best Family or Patient Contact (if needed):    Additional Information about above appointments (if needed):    1: Primary care within 1-2 weeks   2: Endocrinology within 1-2 weeks of discharge  3: Cardiology within 1-2 weeks of discharge      Other comments or requests:   Patient advised they currently have a specialist that they will follow up with.  Patient was previously scheduled with their PCP.

## 2022-07-23 LAB
A1C WITH ESTIMATED AVERAGE GLUCOSE RESULT: 7.6 % — HIGH (ref 4–5.6)
ANION GAP SERPL CALC-SCNC: 14 MMOL/L — SIGNIFICANT CHANGE UP (ref 5–17)
BUN SERPL-MCNC: 31 MG/DL — HIGH (ref 7–23)
CALCIUM SERPL-MCNC: 8.7 MG/DL — SIGNIFICANT CHANGE UP (ref 8.4–10.5)
CHLORIDE SERPL-SCNC: 108 MMOL/L — SIGNIFICANT CHANGE UP (ref 96–108)
CO2 SERPL-SCNC: 21 MMOL/L — LOW (ref 22–31)
CREAT SERPL-MCNC: 1.23 MG/DL — SIGNIFICANT CHANGE UP (ref 0.5–1.3)
EGFR: 60 ML/MIN/1.73M2 — SIGNIFICANT CHANGE UP
ESTIMATED AVERAGE GLUCOSE: 171 MG/DL — HIGH (ref 68–114)
GLUCOSE BLDC GLUCOMTR-MCNC: 182 MG/DL — HIGH (ref 70–99)
GLUCOSE BLDC GLUCOMTR-MCNC: 195 MG/DL — HIGH (ref 70–99)
GLUCOSE BLDC GLUCOMTR-MCNC: 200 MG/DL — HIGH (ref 70–99)
GLUCOSE BLDC GLUCOMTR-MCNC: 244 MG/DL — HIGH (ref 70–99)
GLUCOSE SERPL-MCNC: 200 MG/DL — HIGH (ref 70–99)
HCT VFR BLD CALC: 32.8 % — LOW (ref 39–50)
HGB BLD-MCNC: 10.5 G/DL — LOW (ref 13–17)
MAGNESIUM SERPL-MCNC: 1.8 MG/DL — SIGNIFICANT CHANGE UP (ref 1.6–2.6)
MCHC RBC-ENTMCNC: 29.7 PG — SIGNIFICANT CHANGE UP (ref 27–34)
MCHC RBC-ENTMCNC: 32 GM/DL — SIGNIFICANT CHANGE UP (ref 32–36)
MCV RBC AUTO: 92.7 FL — SIGNIFICANT CHANGE UP (ref 80–100)
NRBC # BLD: 0 /100 WBCS — SIGNIFICANT CHANGE UP (ref 0–0)
PHOSPHATE SERPL-MCNC: 2.8 MG/DL — SIGNIFICANT CHANGE UP (ref 2.5–4.5)
PLATELET # BLD AUTO: 187 K/UL — SIGNIFICANT CHANGE UP (ref 150–400)
POTASSIUM SERPL-MCNC: 3.5 MMOL/L — SIGNIFICANT CHANGE UP (ref 3.5–5.3)
POTASSIUM SERPL-SCNC: 3.5 MMOL/L — SIGNIFICANT CHANGE UP (ref 3.5–5.3)
RBC # BLD: 3.54 M/UL — LOW (ref 4.2–5.8)
RBC # FLD: 13.2 % — SIGNIFICANT CHANGE UP (ref 10.3–14.5)
SODIUM SERPL-SCNC: 143 MMOL/L — SIGNIFICANT CHANGE UP (ref 135–145)
WBC # BLD: 5.41 K/UL — SIGNIFICANT CHANGE UP (ref 3.8–10.5)
WBC # FLD AUTO: 5.41 K/UL — SIGNIFICANT CHANGE UP (ref 3.8–10.5)

## 2022-07-23 RX ORDER — HYDRALAZINE HCL 50 MG
25 TABLET ORAL THREE TIMES A DAY
Refills: 0 | Status: DISCONTINUED | OUTPATIENT
Start: 2022-07-23 | End: 2022-07-24

## 2022-07-23 RX ORDER — INSULIN LISPRO 100/ML
6 VIAL (ML) SUBCUTANEOUS
Refills: 0 | Status: DISCONTINUED | OUTPATIENT
Start: 2022-07-23 | End: 2022-07-24

## 2022-07-23 RX ORDER — INSULIN LISPRO 100/ML
6 VIAL (ML) SUBCUTANEOUS ONCE
Refills: 0 | Status: COMPLETED | OUTPATIENT
Start: 2022-07-23 | End: 2022-07-23

## 2022-07-23 RX ORDER — INSULIN GLARGINE 100 [IU]/ML
9 INJECTION, SOLUTION SUBCUTANEOUS EVERY MORNING
Refills: 0 | Status: DISCONTINUED | OUTPATIENT
Start: 2022-07-23 | End: 2022-07-24

## 2022-07-23 RX ORDER — INSULIN GLARGINE 100 [IU]/ML
2 INJECTION, SOLUTION SUBCUTANEOUS ONCE
Refills: 0 | Status: COMPLETED | OUTPATIENT
Start: 2022-07-23 | End: 2022-07-23

## 2022-07-23 RX ADMIN — Medication 25 MILLIGRAM(S): at 22:02

## 2022-07-23 RX ADMIN — Medication 25 MILLIGRAM(S): at 13:09

## 2022-07-23 RX ADMIN — Medication 6 UNIT(S): at 12:58

## 2022-07-23 RX ADMIN — CHLORHEXIDINE GLUCONATE 1 APPLICATION(S): 213 SOLUTION TOPICAL at 05:16

## 2022-07-23 RX ADMIN — HEPARIN SODIUM 5000 UNIT(S): 5000 INJECTION INTRAVENOUS; SUBCUTANEOUS at 13:07

## 2022-07-23 RX ADMIN — HEPARIN SODIUM 5000 UNIT(S): 5000 INJECTION INTRAVENOUS; SUBCUTANEOUS at 21:59

## 2022-07-23 RX ADMIN — Medication 3 UNIT(S): at 08:08

## 2022-07-23 RX ADMIN — HEPARIN SODIUM 5000 UNIT(S): 5000 INJECTION INTRAVENOUS; SUBCUTANEOUS at 05:14

## 2022-07-23 RX ADMIN — Medication 6 UNIT(S): at 16:48

## 2022-07-23 RX ADMIN — INSULIN GLARGINE 7 UNIT(S): 100 INJECTION, SOLUTION SUBCUTANEOUS at 08:09

## 2022-07-23 RX ADMIN — Medication 1: at 16:48

## 2022-07-23 RX ADMIN — INSULIN GLARGINE 2 UNIT(S): 100 INJECTION, SOLUTION SUBCUTANEOUS at 12:42

## 2022-07-23 RX ADMIN — Medication 1: at 08:08

## 2022-07-23 RX ADMIN — Medication 2: at 12:21

## 2022-07-23 RX ADMIN — AMLODIPINE BESYLATE 10 MILLIGRAM(S): 2.5 TABLET ORAL at 05:14

## 2022-07-23 RX ADMIN — Medication 25 MILLIGRAM(S): at 05:17

## 2022-07-23 NOTE — PROGRESS NOTE ADULT - ASSESSMENT
Assessment  DMT2: 76y Male with DM T2 with hyperglycemia, A1C?, was on oral meds and Victoza at home, now on basal bolus insulin, blood sugars are fluctuating/running high and not at target, no hypoglycemic episodes, eating meals,  appears comfortable.  Presyncope: euthyroid, workup in progress, stable, monitored.  HTN: Controlled,  on antihypertensive medications.  CKD: Monitor labs/BMP,       Pro Alexey FLOR  [phone]

## 2022-07-23 NOTE — PROGRESS NOTE ADULT - ASSESSMENT
76 year old male     h/o    HLD, HTN, DM2, CKD stage III, CVA and BPH  h/o   bladder calculi, 2mm right ureteral calculus with forniceal rupture., urologist Dr. Rae       presents with presyncope.   pt  with  anxiety   panic attack,  felt  like  he  was  going  to  pass  out   tele.  card  d r carmelo     HYN/  HLD   was on  amlodipine, metoprolol succinate,   losartan  normal  ef     Lactic acidosis, noted.,  not form  sepsis   no  co/abd  pain. dysuria    DM,  follow  fs   CKD   c/c  anemia    check  orthostatics   ct chest, no pna/ normal lactic  acid now/ pe r house ID,  no ab   echo, normal   ef    mild   hypernatremia and  karon. on iv  fluids,  ha s resolved     plan.   d/ c planning       from: CT Abdomen and Pelvis No Cont (01.19.19 @ 09:03) >  IMPRESSION: A punctate 2 mm proximal right ureteral calculus with mild   hydronephrosis. Right perinephric fluid compatible with forniceal rupture.  Subcentimeter bladder calculi  < end of copied text >

## 2022-07-24 LAB
A1C WITH ESTIMATED AVERAGE GLUCOSE RESULT: 7.6 % — HIGH (ref 4–5.6)
CULTURE RESULTS: SIGNIFICANT CHANGE UP
ESTIMATED AVERAGE GLUCOSE: 171 MG/DL — HIGH (ref 68–114)
GLUCOSE BLDC GLUCOMTR-MCNC: 177 MG/DL — HIGH (ref 70–99)
GLUCOSE BLDC GLUCOMTR-MCNC: 186 MG/DL — HIGH (ref 70–99)
GLUCOSE BLDC GLUCOMTR-MCNC: 201 MG/DL — HIGH (ref 70–99)
GLUCOSE BLDC GLUCOMTR-MCNC: 224 MG/DL — HIGH (ref 70–99)
SPECIMEN SOURCE: SIGNIFICANT CHANGE UP

## 2022-07-24 RX ORDER — HYDRALAZINE HCL 50 MG
1 TABLET ORAL
Qty: 90 | Refills: 0
Start: 2022-07-24 | End: 2022-08-22

## 2022-07-24 RX ORDER — INSULIN LISPRO 100/ML
8 VIAL (ML) SUBCUTANEOUS
Refills: 0 | Status: DISCONTINUED | OUTPATIENT
Start: 2022-07-24 | End: 2022-07-25

## 2022-07-24 RX ORDER — AMLODIPINE BESYLATE 2.5 MG/1
1 TABLET ORAL
Qty: 30 | Refills: 0
Start: 2022-07-24 | End: 2022-08-22

## 2022-07-24 RX ORDER — HYDRALAZINE HCL 50 MG
35 TABLET ORAL THREE TIMES A DAY
Refills: 0 | Status: DISCONTINUED | OUTPATIENT
Start: 2022-07-24 | End: 2022-07-25

## 2022-07-24 RX ORDER — HYDRALAZINE HCL 50 MG
10 TABLET ORAL ONCE
Refills: 0 | Status: COMPLETED | OUTPATIENT
Start: 2022-07-24 | End: 2022-07-24

## 2022-07-24 RX ORDER — INSULIN GLARGINE 100 [IU]/ML
12 INJECTION, SOLUTION SUBCUTANEOUS EVERY MORNING
Refills: 0 | Status: DISCONTINUED | OUTPATIENT
Start: 2022-07-25 | End: 2022-07-25

## 2022-07-24 RX ADMIN — Medication 1: at 08:10

## 2022-07-24 RX ADMIN — Medication 3 MILLIGRAM(S): at 21:28

## 2022-07-24 RX ADMIN — HEPARIN SODIUM 5000 UNIT(S): 5000 INJECTION INTRAVENOUS; SUBCUTANEOUS at 21:27

## 2022-07-24 RX ADMIN — Medication 2: at 12:09

## 2022-07-24 RX ADMIN — HEPARIN SODIUM 5000 UNIT(S): 5000 INJECTION INTRAVENOUS; SUBCUTANEOUS at 06:48

## 2022-07-24 RX ADMIN — Medication 2: at 16:59

## 2022-07-24 RX ADMIN — Medication 10 MILLIGRAM(S): at 11:22

## 2022-07-24 RX ADMIN — Medication 35 MILLIGRAM(S): at 21:26

## 2022-07-24 RX ADMIN — AMLODIPINE BESYLATE 10 MILLIGRAM(S): 2.5 TABLET ORAL at 06:49

## 2022-07-24 RX ADMIN — Medication 8 UNIT(S): at 17:00

## 2022-07-24 RX ADMIN — Medication 6 UNIT(S): at 08:10

## 2022-07-24 RX ADMIN — Medication 8 UNIT(S): at 12:10

## 2022-07-24 RX ADMIN — INSULIN GLARGINE 9 UNIT(S): 100 INJECTION, SOLUTION SUBCUTANEOUS at 08:11

## 2022-07-24 RX ADMIN — HEPARIN SODIUM 5000 UNIT(S): 5000 INJECTION INTRAVENOUS; SUBCUTANEOUS at 14:58

## 2022-07-24 RX ADMIN — Medication 25 MILLIGRAM(S): at 06:49

## 2022-07-24 RX ADMIN — Medication 35 MILLIGRAM(S): at 14:59

## 2022-07-24 RX ADMIN — CHLORHEXIDINE GLUCONATE 1 APPLICATION(S): 213 SOLUTION TOPICAL at 06:49

## 2022-07-24 NOTE — PROGRESS NOTE ADULT - ASSESSMENT
Assessment  DMT2: 76y Male with DM T2 with hyperglycemia, A1C?, was on oral meds and Victoza at home, now on basal bolus insulin, blood sugars are fluctuating/running high and not at target, no hypoglycemic episodes, eating meals,  appears comfortable.  Presyncope: euthyroid, workup in progress, stable, monitored.  HTN: Controlled,  on antihypertensive medications.  CKD: Monitor labs/BMP,       Pro Alexey FLOR  [phone]

## 2022-07-24 NOTE — PROGRESS NOTE ADULT - ASSESSMENT
76 year old male     h/o    HLD, HTN, DM2, CKD stage III, CVA and BPH  h/o   bladder calculi, 2mm right ureteral calculus with forniceal rupture., urologist Dr. Rae       presents with presyncope.   pt  with  anxiety   panic attack,  felt  like  he  was  going  to  pass  out   tele.  card  d r carmelo     HYN/  HLD   was on  amlodipine, metoprolol succinate,   losartan  normal  ef     Lactic acidosis, noted.,  not form  sepsis   no  co/abd  pain. dysuria    DM,  follow  fs   CKD   c/c  anemia   ct chest, no pna/ normal lactic  acid now/ per  house ID,  no ab   echo, normal   ef    mild   hypernatremia and  karon. on iv  fluids,   resolved    sbp  noted today/  Hydralazine  increased  yesterday  card following    spoke  to  brother.   d/c  planning        from: CT Abdomen and Pelvis No Cont (01.19.19 @ 09:03) >  IMPRESSION: A punctate 2 mm proximal right ureteral calculus with mild   hydronephrosis. Right perinephric fluid compatible with forniceal rupture.  Subcentimeter bladder calculi  < end of copied text >

## 2022-07-25 VITALS
SYSTOLIC BLOOD PRESSURE: 144 MMHG | HEART RATE: 73 BPM | RESPIRATION RATE: 18 BRPM | OXYGEN SATURATION: 97 % | TEMPERATURE: 98 F | DIASTOLIC BLOOD PRESSURE: 82 MMHG

## 2022-07-25 LAB
GLUCOSE BLDC GLUCOMTR-MCNC: 174 MG/DL — HIGH (ref 70–99)
GLUCOSE BLDC GLUCOMTR-MCNC: 205 MG/DL — HIGH (ref 70–99)
GLUCOSE BLDC GLUCOMTR-MCNC: 312 MG/DL — HIGH (ref 70–99)

## 2022-07-25 PROCEDURE — 82435 ASSAY OF BLOOD CHLORIDE: CPT

## 2022-07-25 PROCEDURE — 85027 COMPLETE CBC AUTOMATED: CPT

## 2022-07-25 PROCEDURE — 87637 SARSCOV2&INF A&B&RSV AMP PRB: CPT

## 2022-07-25 PROCEDURE — 82330 ASSAY OF CALCIUM: CPT

## 2022-07-25 PROCEDURE — 85025 COMPLETE CBC W/AUTO DIFF WBC: CPT

## 2022-07-25 PROCEDURE — 83036 HEMOGLOBIN GLYCOSYLATED A1C: CPT

## 2022-07-25 PROCEDURE — 84295 ASSAY OF SERUM SODIUM: CPT

## 2022-07-25 PROCEDURE — 99285 EMERGENCY DEPT VISIT HI MDM: CPT | Mod: 25

## 2022-07-25 PROCEDURE — 71045 X-RAY EXAM CHEST 1 VIEW: CPT

## 2022-07-25 PROCEDURE — 80048 BASIC METABOLIC PNL TOTAL CA: CPT

## 2022-07-25 PROCEDURE — 93005 ELECTROCARDIOGRAM TRACING: CPT

## 2022-07-25 PROCEDURE — 82947 ASSAY GLUCOSE BLOOD QUANT: CPT

## 2022-07-25 PROCEDURE — 93306 TTE W/DOPPLER COMPLETE: CPT

## 2022-07-25 PROCEDURE — 80053 COMPREHEN METABOLIC PANEL: CPT

## 2022-07-25 PROCEDURE — 85014 HEMATOCRIT: CPT

## 2022-07-25 PROCEDURE — 97116 GAIT TRAINING THERAPY: CPT

## 2022-07-25 PROCEDURE — 84443 ASSAY THYROID STIM HORMONE: CPT

## 2022-07-25 PROCEDURE — 85018 HEMOGLOBIN: CPT

## 2022-07-25 PROCEDURE — 82962 GLUCOSE BLOOD TEST: CPT

## 2022-07-25 PROCEDURE — 81001 URINALYSIS AUTO W/SCOPE: CPT

## 2022-07-25 PROCEDURE — 71250 CT THORAX DX C-: CPT

## 2022-07-25 PROCEDURE — 84132 ASSAY OF SERUM POTASSIUM: CPT

## 2022-07-25 PROCEDURE — 83605 ASSAY OF LACTIC ACID: CPT

## 2022-07-25 PROCEDURE — 97161 PT EVAL LOW COMPLEX 20 MIN: CPT

## 2022-07-25 PROCEDURE — 82803 BLOOD GASES ANY COMBINATION: CPT

## 2022-07-25 PROCEDURE — 84100 ASSAY OF PHOSPHORUS: CPT

## 2022-07-25 PROCEDURE — 82565 ASSAY OF CREATININE: CPT

## 2022-07-25 PROCEDURE — 36600 WITHDRAWAL OF ARTERIAL BLOOD: CPT

## 2022-07-25 PROCEDURE — 36415 COLL VENOUS BLD VENIPUNCTURE: CPT

## 2022-07-25 PROCEDURE — 97530 THERAPEUTIC ACTIVITIES: CPT

## 2022-07-25 PROCEDURE — 83735 ASSAY OF MAGNESIUM: CPT

## 2022-07-25 PROCEDURE — 84484 ASSAY OF TROPONIN QUANT: CPT

## 2022-07-25 PROCEDURE — 87086 URINE CULTURE/COLONY COUNT: CPT

## 2022-07-25 RX ORDER — INSULIN GLARGINE 100 [IU]/ML
14 INJECTION, SOLUTION SUBCUTANEOUS EVERY MORNING
Refills: 0 | Status: DISCONTINUED | OUTPATIENT
Start: 2022-07-26 | End: 2022-07-25

## 2022-07-25 RX ORDER — HYDRALAZINE HCL 50 MG
1 TABLET ORAL
Qty: 90 | Refills: 0
Start: 2022-07-25 | End: 2022-08-23

## 2022-07-25 RX ORDER — INSULIN LISPRO 100/ML
9 VIAL (ML) SUBCUTANEOUS
Refills: 0 | Status: DISCONTINUED | OUTPATIENT
Start: 2022-07-25 | End: 2022-07-25

## 2022-07-25 RX ORDER — HYDRALAZINE HCL 50 MG
50 TABLET ORAL THREE TIMES A DAY
Refills: 0 | Status: DISCONTINUED | OUTPATIENT
Start: 2022-07-25 | End: 2022-07-25

## 2022-07-25 RX ADMIN — Medication 4: at 11:37

## 2022-07-25 RX ADMIN — Medication 35 MILLIGRAM(S): at 05:16

## 2022-07-25 RX ADMIN — Medication 8 UNIT(S): at 11:38

## 2022-07-25 RX ADMIN — Medication 8 UNIT(S): at 07:58

## 2022-07-25 RX ADMIN — INSULIN GLARGINE 12 UNIT(S): 100 INJECTION, SOLUTION SUBCUTANEOUS at 07:58

## 2022-07-25 RX ADMIN — AMLODIPINE BESYLATE 10 MILLIGRAM(S): 2.5 TABLET ORAL at 05:15

## 2022-07-25 RX ADMIN — HEPARIN SODIUM 5000 UNIT(S): 5000 INJECTION INTRAVENOUS; SUBCUTANEOUS at 14:21

## 2022-07-25 RX ADMIN — HEPARIN SODIUM 5000 UNIT(S): 5000 INJECTION INTRAVENOUS; SUBCUTANEOUS at 05:15

## 2022-07-25 RX ADMIN — CHLORHEXIDINE GLUCONATE 1 APPLICATION(S): 213 SOLUTION TOPICAL at 05:20

## 2022-07-25 RX ADMIN — Medication 1: at 16:58

## 2022-07-25 RX ADMIN — Medication 650 MILLIGRAM(S): at 06:00

## 2022-07-25 RX ADMIN — Medication 9 UNIT(S): at 16:59

## 2022-07-25 RX ADMIN — Medication 2: at 07:57

## 2022-07-25 RX ADMIN — Medication 50 MILLIGRAM(S): at 14:21

## 2022-07-25 NOTE — PROGRESS NOTE ADULT - REASON FOR ADMISSION
concern for presyncope

## 2022-07-25 NOTE — PROGRESS NOTE ADULT - PROBLEM SELECTOR PLAN 2
euthyroid, workup in progress, stable, monitored.

## 2022-07-25 NOTE — PROGRESS NOTE ADULT - ASSESSMENT
Assessment  DMT2: 76y Male with DM T2 with hyperglycemia, A1C 7.6%, was on oral meds and Victoza at home, now on basal bolus insulin, increased dose yesterday, blood sugars are fluctuating/running high and not at target, no hypoglycemic episodes, eating meals, NAD, DC planning.  Presyncope: euthyroid, workup in progress, stable, monitored.  HTN: Controlled,  on antihypertensive medications.  CKD: Monitor labs/BMP,       Pro Alexey FLOR  [phone]

## 2022-07-25 NOTE — PROGRESS NOTE ADULT - SUBJECTIVE AND OBJECTIVE BOX
CARDIOLOGY     PROGRESS  NOTE   ________________________________________________    CHIEF COMPLAINT:Patient is a 76y old  Male who presents with a chief complaint of concern for presyncope (21 Jul 2022 09:36)    	  REVIEW OF SYSTEMS:  CONSTITUTIONAL: No fever, weight loss, or fatigue  EYES: No eye pain, visual disturbances, or discharge  ENT:  No difficulty hearing, tinnitus, vertigo; No sinus or throat pain  NECK: No pain or stiffness  RESPIRATORY: No cough, wheezing, chills or hemoptysis; No Shortness of Breath  CARDIOVASCULAR: No chest pain, palpitations, passing out, dizziness, or leg swelling  GASTROINTESTINAL: No abdominal or epigastric pain. No nausea, vomiting, or hematemesis; No diarrhea or constipation. No melena or hematochezia.  GENITOURINARY: No dysuria, frequency, hematuria, or incontinence  NEUROLOGICAL: No headaches, memory loss, loss of strength, numbness, or tremors  SKIN: No itching, burning, rashes, or lesions   LYMPH Nodes: No enlarged glands  ENDOCRINE: No heat or cold intolerance; No hair loss  MUSCULOSKELETAL: No joint pain or swelling; No muscle, back, or extremity pain  PSYCHIATRIC: No depression, anxiety, mood swings, or difficulty sleeping  HEME/LYMPH: No easy bruising, or bleeding gums  ALLERGY AND IMMUNOLOGIC: No hives or eczema	    [ ] All others negative	  [ ] Unable to obtain    PHYSICAL EXAM:  T(C): 36.7 (07-22-22 @ 04:26), Max: 36.7 (07-21-22 @ 20:45)  HR: 62 (07-22-22 @ 04:26) (59 - 62)  BP: 160/83 (07-22-22 @ 04:26) (160/83 - 167/83)  RR: 18 (07-22-22 @ 04:26) (17 - 18)  SpO2: 97% (07-22-22 @ 04:26) (95% - 97%)  Wt(kg): --  I&O's Summary    21 Jul 2022 07:01  -  22 Jul 2022 07:00  --------------------------------------------------------  IN: 0 mL / OUT: 200 mL / NET: -200 mL    22 Jul 2022 07:01  -  22 Jul 2022 09:01  --------------------------------------------------------  IN: 120 mL / OUT: 0 mL / NET: 120 mL        Appearance: Normal	  HEENT:   Normal oral mucosa, PERRL, EOMI	  Lymphatic: No lymphadenopathy  Cardiovascular: Normal S1 S2, No JVD, No murmurs, No edema  Respiratory: Lungs clear to auscultation	  Psychiatry: A & O x 3, Mood & affect appropriate  Gastrointestinal:  Soft, Non-tender, + BS	  Skin: No rashes, No ecchymoses, No cyanosis	  Neurologic: Non-focal  Extremities: Normal range of motion, No clubbing, cyanosis or edema  Vascular: Peripheral pulses palpable 2+ bilaterally    MEDICATIONS  (STANDING):  amLODIPine   Tablet 10 milliGRAM(s) Oral daily  dextrose 5%. 1000 milliLiter(s) (50 mL/Hr) IV Continuous <Continuous>  dextrose 5%. 1000 milliLiter(s) (100 mL/Hr) IV Continuous <Continuous>  dextrose 50% Injectable 25 Gram(s) IV Push once  dextrose 50% Injectable 12.5 Gram(s) IV Push once  dextrose 50% Injectable 25 Gram(s) IV Push once  glucagon  Injectable 1 milliGRAM(s) IntraMuscular once  heparin   Injectable 5000 Unit(s) SubCutaneous every 8 hours  hydrALAZINE 10 milliGRAM(s) Oral three times a day  insulin lispro (ADMELOG) corrective regimen sliding scale   SubCutaneous three times a day before meals  insulin lispro (ADMELOG) corrective regimen sliding scale   SubCutaneous at bedtime  sodium chloride 0.9%. 1000 milliLiter(s) (75 mL/Hr) IV Continuous <Continuous>      TELEMETRY: 	    ECG:  	  RADIOLOGY:  OTHER: 	  	  LABS:	 	    CARDIAC MARKERS:                                10.1   5.45  )-----------( 197      ( 22 Jul 2022 06:42 )             31.4     07-22    146<H>  |  111<H>  |  38<H>  ----------------------------<  139<H>  3.7   |  19<L>  |  1.52<H>    Ca    8.7      22 Jul 2022 06:42  Phos  3.0     07-21  Mg     1.8     07-21    TPro  7.8  /  Alb  4.7  /  TBili  0.3  /  DBili  x   /  AST  20  /  ALT  36  /  AlkPhos  53  07-20    proBNP:   Lipid Profile:   HgA1c:   TSH: Thyroid Stimulating Hormone, Serum: 3.15 uIU/mL (07-21 @ 06:40)    < from: Transthoracic Echocardiogram (07.21.22 @ 10:00) >  Mitral Valve: Normal mitral valve.  Aortic Valve/Aorta: Normal trileaflet aortic valve. Peak  transaortic valve gradient equals 8 mm Hg. Minimal aortic  regurgitation.  Peak left ventricular outflow tract  gradient equals 3 mm Hg, mean gradientis equal to 2 mm Hg,  LVOT velocity time integral equals 19 cm.  Aortic Root: 4 cm.  LVOT diameter: 2.4 cm.  Left Atrium: Normal left atrium.  Left Ventricle: Hyperdynamic left ventricular systolic  function. Normal left ventricular internal dimensions and  wall thicknesses. Normal diastolic function  Right Heart: Normal right atrium. Normal right ventricular  size and systolic function. Normal tricuspid valve. Normal  pulmonic valve.  Pericardium/Pleura: Normal pericardium with no pericardial  effusion.  ------------------------------------------------------------------------  Conclusions:  1. Normal left ventricular internal dimensions and wall  thicknesses.  2. Hyperdynamic left ventricular systolic function.  3. Normal diastolic function  4. Normal right ventricular size and systolic function.  < from: CT Chest No Cont (07.21.22 @ 09:02) >  LYMPH NODES: No lymphadenopathy.    HEART/VASCULATURE: Heart size is within normal limits. No pericardial   effusion.  Coronary artery and aortic calcifications.    AIRWAYS/LUNGS/PLEURA: Central airways are patent. Bibasilar subsegmental   atelectasis. No pneumothorax or pleural effusion.  Bibasilar subsegmental atelectasis.    UPPER ABDOMEN: Within normal limits.    BONES/SOFT TISSUES: Degenerative changes of the spine.    IMPRESSION:    No pneumonia.    < from: 12 Lead ECG (07.20.22 @ 18:32) >  Diagnosis Line NORMAL SINUS RHYTHM  LEFT AXIS DEVIATION  RIGHT BUNDLE BRANCH BLOCK  MINIMAL VOLTAGE CRITERIA FOR LVH, MAY BE NORMAL VARIANT  ABNORMAL ECG  WHEN COMPARED WITH ECG OF 04-OCT-2021 17:52,  NO SIGNIFICANT CHANGE WAS FOUND        Assessment and plan  ---------------------------  76 year old male with a PMHx of HLD, HTN, DM2, CKD stage III, CVA and BPH presents with an episode of dizziness and severe anxiety. Pt was in his usual state of health when he suddenly developed severe anxiety associated with dizziness and nausea as well as sensation of being about to pass out. Episode occurred while the patient was out eating lunch at a restaurant. There was no known provoking factor. At present, the patient feels improved and has no dizziness, no anxiety.   Vitals: afebrile, HR 68, /82, SpO2 94% on room air.  Labs: normocytic anemia hgb 10.5 (at baseline). CMP shows BUN/Cr of 40/1.46 (at baseline). Lactate 4.1. U/A negative for UTI.  CXR: (prelim read by radiology) bilateral patchy opacities.  pt is known to me from the last admission with hx of htn, DM and ckd with dizziness and near syncope  check orthostatic ?vasovagal  repeat echo noted , hyperdynamic LV  ct head, noted  abnormal chest x ray repeat ct chest, noted  pre syncope ?hyperventilation sec to anxiety???  dvt prophylaxis  asa daily  lipid panel  continue Norvasc for bp control, awaiting oprthostatic      	        
           CARDIOLOGY     PROGRESS  NOTE   ________________________________________________    CHIEF COMPLAINT:Patient is a 76y old  Male who presents with a chief complaint of concern for presyncope (23 Jul 2022 08:12)  no complain.  	  REVIEW OF SYSTEMS:  CONSTITUTIONAL: No fever, weight loss, or fatigue  EYES: No eye pain, visual disturbances, or discharge  ENT:  No difficulty hearing, tinnitus, vertigo; No sinus or throat pain  NECK: No pain or stiffness  RESPIRATORY: No cough, wheezing, chills or hemoptysis; No Shortness of Breath  CARDIOVASCULAR: No chest pain, palpitations, passing out, dizziness, or leg swelling  GASTROINTESTINAL: No abdominal or epigastric pain. No nausea, vomiting, or hematemesis; No diarrhea or constipation. No melena or hematochezia.  GENITOURINARY: No dysuria, frequency, hematuria, or incontinence  NEUROLOGICAL: No headaches, memory loss, loss of strength, numbness, or tremors  SKIN: No itching, burning, rashes, or lesions   LYMPH Nodes: No enlarged glands  ENDOCRINE: No heat or cold intolerance; No hair loss  MUSCULOSKELETAL: No joint pain or swelling; No muscle, back, or extremity pain  PSYCHIATRIC: No depression, anxiety, mood swings, or difficulty sleeping  HEME/LYMPH: No easy bruising, or bleeding gums  ALLERGY AND IMMUNOLOGIC: No hives or eczema	    [ ] All others negative	  [ ] Unable to obtain    PHYSICAL EXAM:  T(C): 36.4 (07-23-22 @ 04:59), Max: 36.6 (07-22-22 @ 20:27)  HR: 60 (07-23-22 @ 04:59) (60 - 69)  BP: 180/92 (07-23-22 @ 05:11) (152/72 - 191/94)  RR: 18 (07-23-22 @ 04:59) (18 - 18)  SpO2: 95% (07-23-22 @ 04:59) (95% - 95%)  Wt(kg): --  I&O's Summary    22 Jul 2022 07:01  -  23 Jul 2022 07:00  --------------------------------------------------------  IN: 480 mL / OUT: 0 mL / NET: 480 mL    23 Jul 2022 07:01  -  23 Jul 2022 09:37  --------------------------------------------------------  IN: 240 mL / OUT: 0 mL / NET: 240 mL        Appearance: Normal	  HEENT:   Normal oral mucosa, PERRL, EOMI	  Lymphatic: No lymphadenopathy  Cardiovascular: Normal S1 S2, No JVD, + murmurs, No edema  Respiratory: rhonchi  Psychiatry: A & O x 3, Mood & affect appropriate  Gastrointestinal:  Soft, Non-tender, + BS	  Skin: No rashes, No ecchymoses, No cyanosis	  Neurologic: Non-focal  Extremities: Normal range of motion, No clubbing, cyanosis or edema  Vascular: Peripheral pulses palpable 2+ bilaterally    MEDICATIONS  (STANDING):  amLODIPine   Tablet 10 milliGRAM(s) Oral daily  chlorhexidine 2% Cloths 1 Application(s) Topical <User Schedule>  dextrose 5% + sodium chloride 0.45%. 1000 milliLiter(s) (50 mL/Hr) IV Continuous <Continuous>  dextrose 5%. 1000 milliLiter(s) (50 mL/Hr) IV Continuous <Continuous>  dextrose 5%. 1000 milliLiter(s) (100 mL/Hr) IV Continuous <Continuous>  dextrose 50% Injectable 25 Gram(s) IV Push once  dextrose 50% Injectable 12.5 Gram(s) IV Push once  dextrose 50% Injectable 25 Gram(s) IV Push once  glucagon  Injectable 1 milliGRAM(s) IntraMuscular once  heparin   Injectable 5000 Unit(s) SubCutaneous every 8 hours  hydrALAZINE 25 milliGRAM(s) Oral two times a day  insulin glargine Injectable (LANTUS) 7 Unit(s) SubCutaneous every morning  insulin lispro (ADMELOG) corrective regimen sliding scale   SubCutaneous three times a day before meals  insulin lispro (ADMELOG) corrective regimen sliding scale   SubCutaneous at bedtime  insulin lispro Injectable (ADMELOG) 3 Unit(s) SubCutaneous three times a day before meals      TELEMETRY: 	    ECG:  	  RADIOLOGY:  OTHER: 	  	  LABS:	 	    CARDIAC MARKERS:                                10.5   5.41  )-----------( 187      ( 23 Jul 2022 05:16 )             32.8     07-23    143  |  108  |  31<H>  ----------------------------<  200<H>  3.5   |  21<L>  |  1.23    Ca    8.7      23 Jul 2022 05:20  Phos  2.8     07-23  Mg     1.8     07-23      proBNP:   Lipid Profile:   HgA1c:   TSH: Thyroid Stimulating Hormone, Serum: 3.15 uIU/mL (07-21 @ 06:40)      < from: Transthoracic Echocardiogram (07.21.22 @ 10:00) >  1. Normal left ventricular internal dimensions and wall  thicknesses.  2. Hyperdynamic left ventricular systolic function.  3. Normal diastolic function  4. Normal right ventricular size and systolic function.        Assessment and plan  ---------------------------  76 year old male with a PMHx of HLD, HTN, DM2, CKD stage III, CVA and BPH presents with an episode of dizziness and severe anxiety. Pt was in his usual state of health when he suddenly developed severe anxiety associated with dizziness and nausea as well as sensation of being about to pass out. Episode occurred while the patient was out eating lunch at a restaurant. There was no known provoking factor. At present, the patient feels improved and has no dizziness, no anxiety.   Vitals: afebrile, HR 68, /82, SpO2 94% on room air.  Labs: normocytic anemia hgb 10.5 (at baseline). CMP shows BUN/Cr of 40/1.46 (at baseline). Lactate 4.1. U/A negative for UTI.  CXR: (prelim read by radiology) bilateral patchy opacities.  pt is known to me from the last admission with hx of htn, DM and ckd with dizziness and near syncope  check orthostatic ?vasovagal  repeat echo noted , hyperdynamic LV  ct head, noted  abnormal chest x ray repeat ct chest, noted  pre syncope ?hyperventilation sec to anxiety???  dvt prophylaxis  asa daily  lipid panel  continue Norvasc for bp control  negative orthosttaic  will adjust bp meds    	        
    afberile  REVIEW OF SYSTEMS:  GEN: no fever,    no chills  RESP: no SOB,   no cough  CVS: no chest pain,   no palpitations  GI: no abdominal pain,   no nausea,   no vomiting,   no constipation,   no diarrhea  : no dysuria,   no frequency  NEURO: no headache,   no dizziness  PSYCH: no depression,   not anxious  Derm : no rash    MEDICATIONS  (STANDING):  amLODIPine   Tablet 10 milliGRAM(s) Oral daily  chlorhexidine 2% Cloths 1 Application(s) Topical <User Schedule>  dextrose 5%. 1000 milliLiter(s) (50 mL/Hr) IV Continuous <Continuous>  dextrose 5%. 1000 milliLiter(s) (100 mL/Hr) IV Continuous <Continuous>  dextrose 50% Injectable 25 Gram(s) IV Push once  dextrose 50% Injectable 12.5 Gram(s) IV Push once  dextrose 50% Injectable 25 Gram(s) IV Push once  glucagon  Injectable 1 milliGRAM(s) IntraMuscular once  heparin   Injectable 5000 Unit(s) SubCutaneous every 8 hours  hydrALAZINE 25 milliGRAM(s) Oral three times a day  insulin glargine Injectable (LANTUS) 9 Unit(s) SubCutaneous every morning  insulin lispro (ADMELOG) corrective regimen sliding scale   SubCutaneous three times a day before meals  insulin lispro (ADMELOG) corrective regimen sliding scale   SubCutaneous at bedtime  insulin lispro Injectable (ADMELOG) 6 Unit(s) SubCutaneous three times a day before meals    MEDICATIONS  (PRN):  acetaminophen     Tablet .. 650 milliGRAM(s) Oral every 6 hours PRN Temp greater or equal to 38C (100.4F), Mild Pain (1 - 3)  dextrose Oral Gel 15 Gram(s) Oral once PRN Blood Glucose LESS THAN 70 milliGRAM(s)/deciliter  melatonin 3 milliGRAM(s) Oral at bedtime PRN Insomnia      Vital Signs Last 24 Hrs  T(C): 36.3 (24 Jul 2022 05:39), Max: 36.5 (23 Jul 2022 21:44)  T(F): 97.3 (24 Jul 2022 05:39), Max: 97.7 (23 Jul 2022 21:44)  HR: 66 (24 Jul 2022 05:39) (61 - 67)  BP: 183/100 (24 Jul 2022 05:39) (164/83 - 183/100)  BP(mean): --  RR: 18 (24 Jul 2022 05:39) (18 - 18)  SpO2: 99% (24 Jul 2022 05:39) (97% - 99%)    Parameters below as of 24 Jul 2022 05:39  Patient On (Oxygen Delivery Method): room air      CAPILLARY BLOOD GLUCOSE      POCT Blood Glucose.: 177 mg/dL (24 Jul 2022 07:53)  POCT Blood Glucose.: 182 mg/dL (23 Jul 2022 21:52)  POCT Blood Glucose.: 200 mg/dL (23 Jul 2022 16:42)  POCT Blood Glucose.: 244 mg/dL (23 Jul 2022 12:15)    I&O's Summary    23 Jul 2022 07:01  -  24 Jul 2022 07:00  --------------------------------------------------------  IN: 840 mL / OUT: 700 mL / NET: 140 mL    24 Jul 2022 07:01  -  24 Jul 2022 08:11  --------------------------------------------------------  IN: 240 mL / OUT: 100 mL / NET: 140 mL        PHYSICAL EXAM:  HEAD:  Atraumatic, Normocephalic  NECK: Supple, No   JVD  CHEST/LUNG:   no     rales,     no,    rhonchi  HEART: Regular rate and rhythm;         murmur  ABDOMEN: Soft, Nontender, ;   EXTREMITIES:    no    edema  NEUROLOGY:  alert    LABS:                        10.5   5.41  )-----------( 187      ( 23 Jul 2022 05:16 )             32.8     07-23    143  |  108  |  31<H>  ----------------------------<  200<H>  3.5   |  21<L>  |  1.23    Ca    8.7      23 Jul 2022 05:20  Phos  2.8     07-23  Mg     1.8     07-23                      Thyroid Stimulating Hormone, Serum: 3.15 uIU/mL (07-21 @ 06:40)          Consultant(s) Notes Reviewed:      Care Discussed with Consultants/Other Providers:    
  Endocrinology Attending Covering for Dr. Bridges      Chief complaint  Patient is a 76y old  Male who presents with a chief complaint of concern for presyncope (23 Jul 2022 09:37)   Review of systems  Patient in bed, looks comfortable, no fever,  had no hypoglycemia.    Labs and Fingersticks  CAPILLARY BLOOD GLUCOSE      POCT Blood Glucose.: 195 mg/dL (23 Jul 2022 08:02)  POCT Blood Glucose.: 219 mg/dL (22 Jul 2022 21:06)  POCT Blood Glucose.: 182 mg/dL (22 Jul 2022 17:02)  POCT Blood Glucose.: 219 mg/dL (22 Jul 2022 12:52)  POCT Blood Glucose.: 275 mg/dL (22 Jul 2022 11:44)      Anion Gap, Serum: 14 (07-23 @ 05:20)  Anion Gap, Serum: 16 (07-22 @ 06:42)      Calcium, Total Serum: 8.7 (07-23 @ 05:20)  Calcium, Total Serum: 8.7 (07-22 @ 06:42)          07-23    143  |  108  |  31<H>  ----------------------------<  200<H>  3.5   |  21<L>  |  1.23    Ca    8.7      23 Jul 2022 05:20  Phos  2.8     07-23  Mg     1.8     07-23                          10.5   5.41  )-----------( 187      ( 23 Jul 2022 05:16 )             32.8     Medications  MEDICATIONS  (STANDING):  amLODIPine   Tablet 10 milliGRAM(s) Oral daily  chlorhexidine 2% Cloths 1 Application(s) Topical <User Schedule>  dextrose 5% + sodium chloride 0.45%. 1000 milliLiter(s) (50 mL/Hr) IV Continuous <Continuous>  dextrose 5%. 1000 milliLiter(s) (50 mL/Hr) IV Continuous <Continuous>  dextrose 5%. 1000 milliLiter(s) (100 mL/Hr) IV Continuous <Continuous>  dextrose 50% Injectable 25 Gram(s) IV Push once  dextrose 50% Injectable 12.5 Gram(s) IV Push once  dextrose 50% Injectable 25 Gram(s) IV Push once  glucagon  Injectable 1 milliGRAM(s) IntraMuscular once  heparin   Injectable 5000 Unit(s) SubCutaneous every 8 hours  hydrALAZINE 25 milliGRAM(s) Oral three times a day  insulin glargine Injectable (LANTUS) 7 Unit(s) SubCutaneous every morning  insulin lispro (ADMELOG) corrective regimen sliding scale   SubCutaneous three times a day before meals  insulin lispro (ADMELOG) corrective regimen sliding scale   SubCutaneous at bedtime  insulin lispro Injectable (ADMELOG) 3 Unit(s) SubCutaneous three times a day before meals      Physical Exam  General: Patient comfortable in bed  Vital Signs Last 12 Hrs  T(F): 97.6 (07-23-22 @ 04:59), Max: 97.6 (07-23-22 @ 04:59)  HR: 60 (07-23-22 @ 04:59) (60 - 60)  BP: 180/92 (07-23-22 @ 05:11) (180/92 - 191/94)  BP(mean): --  RR: 18 (07-23-22 @ 04:59) (18 - 18)  SpO2: 95% (07-23-22 @ 04:59) (95% - 95%)  Neck: No palpable thyroid nodules.  CVS: S1S2, No murmurs  Respiratory: No wheezing, no crepitations  GI: Abdomen soft, bowel sounds positive  Musculoskeletal:  edema lower extremities.   Skin: No skin rashes, no ecchymosis    Diagnostics          
  afberile    REVIEW OF SYSTEMS:  GEN: no fever,    no chills  RESP: no SOB,   no cough  CVS: no chest pain,   no palpitations  GI: no abdominal pain,   no nausea,   no vomiting,   no constipation,   no diarrhea  : no dysuria,   no frequency  NEURO: no headache,   no dizziness  PSYCH: no depression,   not anxious  Derm : no rash    MEDICATIONS  (STANDING):  amLODIPine   Tablet 10 milliGRAM(s) Oral daily  chlorhexidine 2% Cloths 1 Application(s) Topical <User Schedule>  dextrose 5% + sodium chloride 0.45%. 1000 milliLiter(s) (50 mL/Hr) IV Continuous <Continuous>  dextrose 5%. 1000 milliLiter(s) (50 mL/Hr) IV Continuous <Continuous>  dextrose 5%. 1000 milliLiter(s) (100 mL/Hr) IV Continuous <Continuous>  dextrose 50% Injectable 25 Gram(s) IV Push once  dextrose 50% Injectable 12.5 Gram(s) IV Push once  dextrose 50% Injectable 25 Gram(s) IV Push once  glucagon  Injectable 1 milliGRAM(s) IntraMuscular once  heparin   Injectable 5000 Unit(s) SubCutaneous every 8 hours  hydrALAZINE 25 milliGRAM(s) Oral two times a day  insulin glargine Injectable (LANTUS) 7 Unit(s) SubCutaneous every morning  insulin lispro (ADMELOG) corrective regimen sliding scale   SubCutaneous three times a day before meals  insulin lispro (ADMELOG) corrective regimen sliding scale   SubCutaneous at bedtime  insulin lispro Injectable (ADMELOG) 3 Unit(s) SubCutaneous three times a day before meals    MEDICATIONS  (PRN):  acetaminophen     Tablet .. 650 milliGRAM(s) Oral every 6 hours PRN Temp greater or equal to 38C (100.4F), Mild Pain (1 - 3)  dextrose Oral Gel 15 Gram(s) Oral once PRN Blood Glucose LESS THAN 70 milliGRAM(s)/deciliter  melatonin 3 milliGRAM(s) Oral at bedtime PRN Insomnia      Vital Signs Last 24 Hrs  T(C): 36.4 (23 Jul 2022 04:59), Max: 36.6 (22 Jul 2022 20:27)  T(F): 97.6 (23 Jul 2022 04:59), Max: 97.9 (22 Jul 2022 20:27)  HR: 60 (23 Jul 2022 04:59) (60 - 69)  BP: 180/92 (23 Jul 2022 05:11) (152/72 - 191/94)  BP(mean): --  RR: 18 (23 Jul 2022 04:59) (18 - 18)  SpO2: 95% (23 Jul 2022 04:59) (95% - 95%)    Parameters below as of 23 Jul 2022 04:59  Patient On (Oxygen Delivery Method): room air      CAPILLARY BLOOD GLUCOSE      POCT Blood Glucose.: 195 mg/dL (23 Jul 2022 08:02)  POCT Blood Glucose.: 219 mg/dL (22 Jul 2022 21:06)  POCT Blood Glucose.: 182 mg/dL (22 Jul 2022 17:02)  POCT Blood Glucose.: 219 mg/dL (22 Jul 2022 12:52)  POCT Blood Glucose.: 275 mg/dL (22 Jul 2022 11:44)    I&O's Summary    22 Jul 2022 07:01  -  23 Jul 2022 07:00  --------------------------------------------------------  IN: 480 mL / OUT: 0 mL / NET: 480 mL        PHYSICAL EXAM:  HEAD:  Atraumatic, Normocephalic  NECK: Supple, No   JVD  CHEST/LUNG:   no     rales,     no,    rhonchi  HEART: Regular rate and rhythm;         murmur  ABDOMEN: Soft, Nontender, ;   EXTREMITIES:    no    edema  NEUROLOGY:  alert    LABS:                        10.5   5.41  )-----------( 187      ( 23 Jul 2022 05:16 )             32.8     07-23    143  |  108  |  31<H>  ----------------------------<  200<H>  3.5   |  21<L>  |  1.23    Ca    8.7      23 Jul 2022 05:20  Phos  2.8     07-23  Mg     1.8     07-23              Lactate, Blood: 1.3 mmol/L (07-21 @ 12:35)    ABG - ( 22 Jul 2022 06:05 )  pH, Arterial: 7.41  pH, Blood: x     /  pCO2: 34    /  pO2: 83    / HCO3: 22    / Base Excess: -2.5  /  SaO2: 96.2                  Thyroid Stimulating Hormone, Serum: 3.15 uIU/mL (07-21 @ 06:40)          Consultant(s) Notes Reviewed:      Care Discussed with Consultants/Other Providers:    
           CARDIOLOGY     PROGRESS  NOTE   ________________________________________________    CHIEF COMPLAINT:Patient is a 76y old  Male who presents with a chief complaint of concern for presyncope (24 Jul 2022 10:09)  no complain.  	  REVIEW OF SYSTEMS:  CONSTITUTIONAL: No fever, weight loss, or fatigue  EYES: No eye pain, visual disturbances, or discharge  ENT:  No difficulty hearing, tinnitus, vertigo; No sinus or throat pain  NECK: No pain or stiffness  RESPIRATORY: No cough, wheezing, chills or hemoptysis; No Shortness of Breath  CARDIOVASCULAR: No chest pain, palpitations, passing out, dizziness, or leg swelling  GASTROINTESTINAL: No abdominal or epigastric pain. No nausea, vomiting, or hematemesis; No diarrhea or constipation. No melena or hematochezia.  GENITOURINARY: No dysuria, frequency, hematuria, or incontinence  NEUROLOGICAL: No headaches, memory loss, loss of strength, numbness, or tremors  SKIN: No itching, burning, rashes, or lesions   LYMPH Nodes: No enlarged glands  ENDOCRINE: No heat or cold intolerance; No hair loss  MUSCULOSKELETAL: No joint pain or swelling; No muscle, back, or extremity pain  PSYCHIATRIC: No depression, anxiety, mood swings, or difficulty sleeping  HEME/LYMPH: No easy bruising, or bleeding gums  ALLERGY AND IMMUNOLOGIC: No hives or eczema	    [ ] All others negative	  [ ] Unable to obtain    PHYSICAL EXAM:  T(C): 36.3 (07-24-22 @ 05:39), Max: 36.5 (07-23-22 @ 21:44)  HR: 66 (07-24-22 @ 05:39) (61 - 67)  BP: 183/100 (07-24-22 @ 05:39) (164/83 - 183/100)  RR: 18 (07-24-22 @ 05:39) (18 - 18)  SpO2: 99% (07-24-22 @ 05:39) (97% - 99%)  Wt(kg): --  I&O's Summary    23 Jul 2022 07:01  -  24 Jul 2022 07:00  --------------------------------------------------------  IN: 840 mL / OUT: 700 mL / NET: 140 mL    24 Jul 2022 07:01  -  24 Jul 2022 10:20  --------------------------------------------------------  IN: 240 mL / OUT: 100 mL / NET: 140 mL        Appearance: Normal	  HEENT:   Normal oral mucosa, PERRL, EOMI	  Lymphatic: No lymphadenopathy  Cardiovascular: Normal S1 S2, No JVD, + murmurs, No edema  Respiratory: rhonchi  Psychiatry: A & O x 3, Mood & affect appropriate  Gastrointestinal:  Soft, Non-tender, + BS	  Skin: No rashes, No ecchymoses, No cyanosis	  Extremities: Normal range of motion, No clubbing, cyanosis or edema  Vascular: Peripheral pulses palpable 2+ bilaterally    MEDICATIONS  (STANDING):  amLODIPine   Tablet 10 milliGRAM(s) Oral daily  chlorhexidine 2% Cloths 1 Application(s) Topical <User Schedule>  dextrose 5%. 1000 milliLiter(s) (50 mL/Hr) IV Continuous <Continuous>  dextrose 5%. 1000 milliLiter(s) (100 mL/Hr) IV Continuous <Continuous>  dextrose 50% Injectable 25 Gram(s) IV Push once  dextrose 50% Injectable 12.5 Gram(s) IV Push once  dextrose 50% Injectable 25 Gram(s) IV Push once  glucagon  Injectable 1 milliGRAM(s) IntraMuscular once  heparin   Injectable 5000 Unit(s) SubCutaneous every 8 hours  hydrALAZINE 25 milliGRAM(s) Oral three times a day  insulin glargine Injectable (LANTUS) 9 Unit(s) SubCutaneous every morning  insulin lispro (ADMELOG) corrective regimen sliding scale   SubCutaneous three times a day before meals  insulin lispro (ADMELOG) corrective regimen sliding scale   SubCutaneous at bedtime  insulin lispro Injectable (ADMELOG) 6 Unit(s) SubCutaneous three times a day before meals      TELEMETRY: 	    ECG:  	  RADIOLOGY:  OTHER: 	  	  LABS:	 	    CARDIAC MARKERS:                                10.5   5.41  )-----------( 187      ( 23 Jul 2022 05:16 )             32.8     07-23    143  |  108  |  31<H>  ----------------------------<  200<H>  3.5   |  21<L>  |  1.23    Ca    8.7      23 Jul 2022 05:20  Phos  2.8     07-23  Mg     1.8     07-23      proBNP:   Lipid Profile:   HgA1c:   TSH: Thyroid Stimulating Hormone, Serum: 3.15 uIU/mL (07-21 @ 06:40)    < from: Transthoracic Echocardiogram (07.21.22 @ 10:00) >  1. Normal left ventricular internal dimensions and wall  thicknesses.  2. Hyperdynamic left ventricular systolic function.  3. Normal diastolic function  4. Normal right ventricular size and systolic function.        Assessment and plan  ---------------------------  76 year old male with a PMHx of HLD, HTN, DM2, CKD stage III, CVA and BPH presents with an episode of dizziness and severe anxiety. Pt was in his usual state of health when he suddenly developed severe anxiety associated with dizziness and nausea as well as sensation of being about to pass out. Episode occurred while the patient was out eating lunch at a restaurant. There was no known provoking factor. At present, the patient feels improved and has no dizziness, no anxiety.   Vitals: afebrile, HR 68, /82, SpO2 94% on room air.  Labs: normocytic anemia hgb 10.5 (at baseline). CMP shows BUN/Cr of 40/1.46 (at baseline). Lactate 4.1. U/A negative for UTI.  CXR: (prelim read by radiology) bilateral patchy opacities.  pt is known to me from the last admission with hx of htn, DM and ckd with dizziness and near syncope  check orthostatic ?vasovagal  repeat echo noted , hyperdynamic LV  ct head, noted  abnormal chest x ray repeat ct chest, noted  pre syncope ?hyperventilation sec to anxiety???  dvt prophylaxis  asa daily  lipid panel  continue Norvasc for bp control  negative orthosttaic  will adjust bp meds  increase hydralazine for bp control    	        
           CARDIOLOGY     PROGRESS  NOTE   ________________________________________________    CHIEF COMPLAINT:Patient is a 76y old  Male who presents with a chief complaint of concern for presyncope (24 Jul 2022 10:19)  no complain.  	  REVIEW OF SYSTEMS:  CONSTITUTIONAL: No fever, weight loss, or fatigue  EYES: No eye pain, visual disturbances, or discharge  ENT:  No difficulty hearing, tinnitus, vertigo; No sinus or throat pain  NECK: No pain or stiffness  RESPIRATORY: No cough, wheezing, chills or hemoptysis; No Shortness of Breath  CARDIOVASCULAR: No chest pain, palpitations, passing out, dizziness, or leg swelling  GASTROINTESTINAL: No abdominal or epigastric pain. No nausea, vomiting, or hematemesis; No diarrhea or constipation. No melena or hematochezia.  GENITOURINARY: No dysuria, frequency, hematuria, or incontinence  NEUROLOGICAL: No headaches, memory loss, loss of strength, numbness, or tremors  SKIN: No itching, burning, rashes, or lesions   LYMPH Nodes: No enlarged glands  ENDOCRINE: No heat or cold intolerance; No hair loss  MUSCULOSKELETAL: No joint pain or swelling; No muscle, back, or extremity pain  PSYCHIATRIC: No depression, anxiety, mood swings, or difficulty sleeping  HEME/LYMPH: No easy bruising, or bleeding gums  ALLERGY AND IMMUNOLOGIC: No hives or eczema	    [ ] All others negative	  [ ] Unable to obtain    PHYSICAL EXAM:  T(C): 36.3 (07-25-22 @ 04:56), Max: 36.7 (07-24-22 @ 20:27)  HR: 67 (07-25-22 @ 05:15) (61 - 77)  BP: 179/84 (07-25-22 @ 05:15) (154/77 - 182/85)  RR: 18 (07-25-22 @ 04:56) (18 - 18)  SpO2: 97% (07-25-22 @ 04:56) (95% - 97%)  Wt(kg): --  I&O's Summary    24 Jul 2022 07:01  -  25 Jul 2022 07:00  --------------------------------------------------------  IN: 240 mL / OUT: 100 mL / NET: 140 mL        Appearance: Normal	  HEENT:   Normal oral mucosa, PERRL, EOMI	  Lymphatic: No lymphadenopathy  Cardiovascular: Normal S1 S2, No JVD, +murmurs, No edema  Respiratory: rhonchi  Gastrointestinal:  Soft, Non-tender, + BS	  Skin: No rashes, No ecchymoses, No cyanosis	  Neurologic: Non-focal  Extremities: Normal range of motion, No clubbing, cyanosis or edema  Vascular: Peripheral pulses palpable 2+ bilaterally    MEDICATIONS  (STANDING):  amLODIPine   Tablet 10 milliGRAM(s) Oral daily  chlorhexidine 2% Cloths 1 Application(s) Topical <User Schedule>  dextrose 5%. 1000 milliLiter(s) (50 mL/Hr) IV Continuous <Continuous>  dextrose 5%. 1000 milliLiter(s) (100 mL/Hr) IV Continuous <Continuous>  dextrose 50% Injectable 25 Gram(s) IV Push once  dextrose 50% Injectable 12.5 Gram(s) IV Push once  dextrose 50% Injectable 25 Gram(s) IV Push once  glucagon  Injectable 1 milliGRAM(s) IntraMuscular once  heparin   Injectable 5000 Unit(s) SubCutaneous every 8 hours  hydrALAZINE 35 milliGRAM(s) Oral three times a day  insulin glargine Injectable (LANTUS) 12 Unit(s) SubCutaneous every morning  insulin lispro (ADMELOG) corrective regimen sliding scale   SubCutaneous three times a day before meals  insulin lispro (ADMELOG) corrective regimen sliding scale   SubCutaneous at bedtime  insulin lispro Injectable (ADMELOG) 8 Unit(s) SubCutaneous three times a day before meals      TELEMETRY: 	    ECG:  	  RADIOLOGY:  OTHER: 	  	  LABS:	 	    CARDIAC MARKERS:    < from: Transthoracic Echocardiogram (07.21.22 @ 10:00) >  1. Normal left ventricular internal dimensions and wall  thicknesses.  2. Hyperdynamic left ventricular systolic function.  3. Normal diastolic function  4. Normal right ventricular size and systolic function.  *** Compared with echocardiogram of 12/18/2019, no  significant changes noted.      proBNP:   Lipid Profile:   HgA1c:   TSH: Thyroid Stimulating Hormone, Serum: 3.15 uIU/mL (07-21 @ 06:40)          Assessment and plan  ---------------------------  76 year old male with a PMHx of HLD, HTN, DM2, CKD stage III, CVA and BPH presents with an episode of dizziness and severe anxiety. Pt was in his usual state of health when he suddenly developed severe anxiety associated with dizziness and nausea as well as sensation of being about to pass out. Episode occurred while the patient was out eating lunch at a restaurant. There was no known provoking factor. At present, the patient feels improved and has no dizziness, no anxiety.   Vitals: afebrile, HR 68, /82, SpO2 94% on room air.  Labs: normocytic anemia hgb 10.5 (at baseline). CMP shows BUN/Cr of 40/1.46 (at baseline). Lactate 4.1. U/A negative for UTI.  CXR: (prelim read by radiology) bilateral patchy opacities.  pt is known to me from the last admission with hx of htn, DM and ckd with dizziness and near syncope  check orthostatic ?vasovagal  repeat echo noted , hyperdynamic LV  ct head, noted  abnormal chest x ray repeat ct chest, noted  pre syncope ?hyperventilation sec to anxiety???  dvt prophylaxis  asa daily  lipid panel  continue Norvasc for bp control  negative orthosttaic  will adjust bp meds  increase hydralazine for bp control to 50 mg TID/ no ace sec to increase renal function      	        
    afberile  REVIEW OF SYSTEMS:  GEN: no fever,    no chills  RESP: no SOB,   no cough  CVS: no chest pain,   no palpitations  GI: no abdominal pain,   no nausea,   no vomiting,   no constipation,   no diarrhea  : no dysuria,   no frequency  NEURO: no headache,   no dizziness  PSYCH: no depression,   not anxious  Derm : no rash    MEDICATIONS  (STANDING):  amLODIPine   Tablet 10 milliGRAM(s) Oral daily  chlorhexidine 2% Cloths 1 Application(s) Topical <User Schedule>  dextrose 5%. 1000 milliLiter(s) (50 mL/Hr) IV Continuous <Continuous>  dextrose 5%. 1000 milliLiter(s) (100 mL/Hr) IV Continuous <Continuous>  dextrose 50% Injectable 25 Gram(s) IV Push once  dextrose 50% Injectable 12.5 Gram(s) IV Push once  dextrose 50% Injectable 25 Gram(s) IV Push once  glucagon  Injectable 1 milliGRAM(s) IntraMuscular once  heparin   Injectable 5000 Unit(s) SubCutaneous every 8 hours  hydrALAZINE 50 milliGRAM(s) Oral three times a day  insulin glargine Injectable (LANTUS) 12 Unit(s) SubCutaneous every morning  insulin lispro (ADMELOG) corrective regimen sliding scale   SubCutaneous three times a day before meals  insulin lispro (ADMELOG) corrective regimen sliding scale   SubCutaneous at bedtime  insulin lispro Injectable (ADMELOG) 8 Unit(s) SubCutaneous three times a day before meals    MEDICATIONS  (PRN):  acetaminophen     Tablet .. 650 milliGRAM(s) Oral every 6 hours PRN Temp greater or equal to 38C (100.4F), Mild Pain (1 - 3)  dextrose Oral Gel 15 Gram(s) Oral once PRN Blood Glucose LESS THAN 70 milliGRAM(s)/deciliter  melatonin 3 milliGRAM(s) Oral at bedtime PRN Insomnia      Vital Signs Last 24 Hrs  T(C): 36.3 (25 Jul 2022 04:56), Max: 36.7 (24 Jul 2022 20:27)  T(F): 97.4 (25 Jul 2022 04:56), Max: 98.1 (24 Jul 2022 20:27)  HR: 67 (25 Jul 2022 05:15) (61 - 77)  BP: 179/84 (25 Jul 2022 05:15) (154/77 - 182/85)  BP(mean): --  RR: 18 (25 Jul 2022 04:56) (18 - 18)  SpO2: 97% (25 Jul 2022 04:56) (95% - 97%)    Parameters below as of 25 Jul 2022 04:56  Patient On (Oxygen Delivery Method): room air      CAPILLARY BLOOD GLUCOSE      POCT Blood Glucose.: 205 mg/dL (25 Jul 2022 07:40)  POCT Blood Glucose.: 186 mg/dL (24 Jul 2022 21:26)  POCT Blood Glucose.: 224 mg/dL (24 Jul 2022 16:37)  POCT Blood Glucose.: 201 mg/dL (24 Jul 2022 11:59)    I&O's Summary    24 Jul 2022 07:01  -  25 Jul 2022 07:00  --------------------------------------------------------  IN: 240 mL / OUT: 100 mL / NET: 140 mL    25 Jul 2022 07:01  -  25 Jul 2022 09:33  --------------------------------------------------------  IN: 120 mL / OUT: 0 mL / NET: 120 mL        PHYSICAL EXAM:  HEAD:  Atraumatic, Normocephalic  NECK: Supple, No   JVD  CHEST/LUNG:   no     rales,     no,    rhonchi  HEART: Regular rate and rhythm;         murmur  ABDOMEN: Soft, Nontender, ;   EXTREMITIES:  no      edema  NEUROLOGY:  alert    LABS:                          Thyroid Stimulating Hormone, Serum: 3.15 uIU/mL (07-21 @ 06:40)          Consultant(s) Notes Reviewed:      Care Discussed with Consultants/Other Providers:    
    afberile  REVIEW OF SYSTEMS:  GEN: no fever,    no chills  RESP: no SOB,   no cough  CVS: no chest pain,   no palpitations  GI: no abdominal pain,   no nausea,   no vomiting,   no constipation,   no diarrhea  : no dysuria,   no frequency  NEURO: no headache,   no dizziness  PSYCH: no depression,   not anxious  Derm : no rash    MEDICATIONS  (STANDING):  amLODIPine   Tablet 10 milliGRAM(s) Oral daily  dextrose 5%. 1000 milliLiter(s) (50 mL/Hr) IV Continuous <Continuous>  dextrose 5%. 1000 milliLiter(s) (100 mL/Hr) IV Continuous <Continuous>  dextrose 50% Injectable 25 Gram(s) IV Push once  dextrose 50% Injectable 12.5 Gram(s) IV Push once  dextrose 50% Injectable 25 Gram(s) IV Push once  glucagon  Injectable 1 milliGRAM(s) IntraMuscular once  heparin   Injectable 5000 Unit(s) SubCutaneous every 8 hours  hydrALAZINE 25 milliGRAM(s) Oral two times a day  insulin lispro (ADMELOG) corrective regimen sliding scale   SubCutaneous three times a day before meals  insulin lispro (ADMELOG) corrective regimen sliding scale   SubCutaneous at bedtime  sodium chloride 0.9%. 1000 milliLiter(s) (75 mL/Hr) IV Continuous <Continuous>    MEDICATIONS  (PRN):  acetaminophen     Tablet .. 650 milliGRAM(s) Oral every 6 hours PRN Temp greater or equal to 38C (100.4F), Mild Pain (1 - 3)  dextrose Oral Gel 15 Gram(s) Oral once PRN Blood Glucose LESS THAN 70 milliGRAM(s)/deciliter  melatonin 3 milliGRAM(s) Oral at bedtime PRN Insomnia      Vital Signs Last 24 Hrs  T(C): 36.7 (2022 04:26), Max: 36.7 (2022 20:45)  T(F): 98.1 (2022 04:26), Max: 98.1 (2022 04:26)  HR: 68 (2022 09:04) (59 - 68)  BP: 156/90 (2022 09:04) (156/90 - 167/83)  BP(mean): --  RR: 18 (2022 04:26) (17 - 18)  SpO2: 97% (2022 04:26) (95% - 97%)    Parameters below as of 2022 04:26  Patient On (Oxygen Delivery Method): room air      CAPILLARY BLOOD GLUCOSE      POCT Blood Glucose.: 162 mg/dL (2022 07:36)  POCT Blood Glucose.: 230 mg/dL (2022 21:15)  POCT Blood Glucose.: 229 mg/dL (2022 17:43)  POCT Blood Glucose.: 185 mg/dL (2022 12:42)    I&O's Summary    2022 07:01  -  2022 07:00  --------------------------------------------------------  IN: 0 mL / OUT: 200 mL / NET: -200 mL    2022 07:01  -  2022 09:55  --------------------------------------------------------  IN: 120 mL / OUT: 0 mL / NET: 120 mL        PHYSICAL EXAM:  HEAD:  Atraumatic, Normocephalic  NECK: Supple, No   JVD  CHEST/LUNG:   no     rales,     no,    rhonchi  HEART: Regular rate and rhythm;         murmur  ABDOMEN: Soft, Nontender, ;   EXTREMITIES:     no   edema  NEUROLOGY:  alert    LABS:                        10.1   5.45  )-----------( 197      ( 2022 06:42 )             31.4     07-22    146<H>  |  111<H>  |  38<H>  ----------------------------<  139<H>  3.7   |  19<L>  |  1.52<H>    Ca    8.7      2022 06:42  Phos  3.0     07-21  Mg     1.8     -    TPro  7.8  /  Alb  4.7  /  TBili  0.3  /  DBili  x   /  AST  20  /  ALT  36  /  AlkPhos  53  07-20          Urinalysis Basic - ( 2022 19:31 )    Color: Yellow / Appearance: Clear / S.021 / pH: x  Gluc: x / Ketone: Negative  / Bili: Negative / Urobili: Negative   Blood: x / Protein: 100 mg/dL / Nitrite: Negative   Leuk Esterase: Negative / RBC: 0 /hpf / WBC 1 /HPF   Sq Epi: x / Non Sq Epi: 1 / Bacteria: Occasional      Lactate, Blood: 1.3 mmol/L ( @ 12:35)    ABG - ( 2022 06:05 )  pH, Arterial: 7.41  pH, Blood: x     /  pCO2: 34    /  pO2: 83    / HCO3: 22    / Base Excess: -2.5  /  SaO2: 96.2               @ 19:16  4.8  63      Thyroid Stimulating Hormone, Serum: 3.15 uIU/mL ( @ 06:40)          Consultant(s) Notes Reviewed:      Care Discussed with Consultants/Other Providers:    
    afberile  REVIEW OF SYSTEMS:  GEN: no fever,    no chills  RESP: no SOB,   no cough  CVS: no chest pain,   no palpitations  GI: no abdominal pain,   no nausea,   no vomiting,   no constipation,   no diarrhea  : no dysuria,   no frequency  NEURO: no headache,   no dizziness  PSYCH: no depression,   not anxious  Derm : no rash    MEDICATIONS  (STANDING):  amLODIPine   Tablet 10 milliGRAM(s) Oral daily  dextrose 5%. 1000 milliLiter(s) (50 mL/Hr) IV Continuous <Continuous>  dextrose 5%. 1000 milliLiter(s) (100 mL/Hr) IV Continuous <Continuous>  dextrose 50% Injectable 25 Gram(s) IV Push once  dextrose 50% Injectable 12.5 Gram(s) IV Push once  dextrose 50% Injectable 25 Gram(s) IV Push once  glucagon  Injectable 1 milliGRAM(s) IntraMuscular once  heparin   Injectable 5000 Unit(s) SubCutaneous every 8 hours  insulin lispro (ADMELOG) corrective regimen sliding scale   SubCutaneous three times a day before meals  insulin lispro (ADMELOG) corrective regimen sliding scale   SubCutaneous at bedtime    MEDICATIONS  (PRN):  acetaminophen     Tablet .. 650 milliGRAM(s) Oral every 6 hours PRN Temp greater or equal to 38C (100.4F), Mild Pain (1 - 3)  aluminum hydroxide/magnesium hydroxide/simethicone Suspension 30 milliLiter(s) Oral every 4 hours PRN Dyspepsia  dextrose Oral Gel 15 Gram(s) Oral once PRN Blood Glucose LESS THAN 70 milliGRAM(s)/deciliter  melatonin 3 milliGRAM(s) Oral at bedtime PRN Insomnia  ondansetron Injectable 4 milliGRAM(s) IV Push every 8 hours PRN Nausea and/or Vomiting      Vital Signs Last 24 Hrs  T(C): 36.5 (2022 04:40), Max: 36.7 (2022 21:00)  T(F): 97.7 (2022 04:40), Max: 98.1 (2022 23:00)  HR: 58 (2022 04:40) (58 - 80)  BP: 197/89 (2022 04:40) (156/76 - 197/89)  BP(mean): 112 (2022 23:00) (107 - 112)  RR: 17 (2022 04:40) (16 - 18)  SpO2: 96% (2022 04:40) (94% - 97%)    Parameters below as of 2022 04:40  Patient On (Oxygen Delivery Method): room air      CAPILLARY BLOOD GLUCOSE      POCT Blood Glucose.: 201 mg/dL (2022 17:24)    I&O's Summary      PHYSICAL EXAM:  HEAD:  Atraumatic, Normocephalic  NECK: Supple, No   JVD  CHEST/LUNG:   no     rales,     no,    rhonchi  HEART: Regular rate and rhythm;         murmur  ABDOMEN: Soft, Nontender, ;   EXTREMITIES:        edema  NEUROLOGY:  alert    LABS:                        10.6   6.41  )-----------( 214      ( 2022 17:49 )             33.2     07-20    142  |  105  |  40<H>  ----------------------------<  199<H>  4.0   |  20<L>  |  1.46<H>    Ca    9.3      2022 17:49    TPro  7.8  /  Alb  4.7  /  TBili  0.3  /  DBili  x   /  AST  20  /  ALT  36  /  AlkPhos  53  07-20          Urinalysis Basic - ( 2022 19:31 )    Color: Yellow / Appearance: Clear / S.021 / pH: x  Gluc: x / Ketone: Negative  / Bili: Negative / Urobili: Negative   Blood: x / Protein: 100 mg/dL / Nitrite: Negative   Leuk Esterase: Negative / RBC: 0 /hpf / WBC 1 /HPF   Sq Epi: x / Non Sq Epi: 1 / Bacteria: Occasional          -20 @ 19:16  4.8  63              Consultant(s) Notes Reviewed:      Care Discussed with Consultants/Other Providers:    
  Endocrinology Attending Covering for Dr. Bridges      Chief complaint  Patient is a 76y old  Male who presents with a chief complaint of concern for presyncope (24 Jul 2022 08:11)   Review of systems  Patient in bed, looks comfortable, no fever,  had no hypoglycemia.    Labs and Fingersticks  CAPILLARY BLOOD GLUCOSE      POCT Blood Glucose.: 177 mg/dL (24 Jul 2022 07:53)  POCT Blood Glucose.: 182 mg/dL (23 Jul 2022 21:52)  POCT Blood Glucose.: 200 mg/dL (23 Jul 2022 16:42)  POCT Blood Glucose.: 244 mg/dL (23 Jul 2022 12:15)      Anion Gap, Serum: 14 (07-23 @ 05:20)      Calcium, Total Serum: 8.7 (07-23 @ 05:20)          07-23    143  |  108  |  31<H>  ----------------------------<  200<H>  3.5   |  21<L>  |  1.23    Ca    8.7      23 Jul 2022 05:20  Phos  2.8     07-23  Mg     1.8     07-23                          10.5   5.41  )-----------( 187      ( 23 Jul 2022 05:16 )             32.8     Medications  MEDICATIONS  (STANDING):  amLODIPine   Tablet 10 milliGRAM(s) Oral daily  chlorhexidine 2% Cloths 1 Application(s) Topical <User Schedule>  dextrose 5%. 1000 milliLiter(s) (50 mL/Hr) IV Continuous <Continuous>  dextrose 5%. 1000 milliLiter(s) (100 mL/Hr) IV Continuous <Continuous>  dextrose 50% Injectable 25 Gram(s) IV Push once  dextrose 50% Injectable 12.5 Gram(s) IV Push once  dextrose 50% Injectable 25 Gram(s) IV Push once  glucagon  Injectable 1 milliGRAM(s) IntraMuscular once  heparin   Injectable 5000 Unit(s) SubCutaneous every 8 hours  hydrALAZINE 25 milliGRAM(s) Oral three times a day  insulin glargine Injectable (LANTUS) 9 Unit(s) SubCutaneous every morning  insulin lispro (ADMELOG) corrective regimen sliding scale   SubCutaneous three times a day before meals  insulin lispro (ADMELOG) corrective regimen sliding scale   SubCutaneous at bedtime  insulin lispro Injectable (ADMELOG) 6 Unit(s) SubCutaneous three times a day before meals      Physical Exam  General: Patient comfortable in bed  Vital Signs Last 12 Hrs  T(F): 97.3 (07-24-22 @ 05:39), Max: 97.3 (07-24-22 @ 05:39)  HR: 66 (07-24-22 @ 05:39) (66 - 66)  BP: 183/100 (07-24-22 @ 05:39) (183/100 - 183/100)  BP(mean): --  RR: 18 (07-24-22 @ 05:39) (18 - 18)  SpO2: 99% (07-24-22 @ 05:39) (99% - 99%)  Neck: No palpable thyroid nodules.  CVS: S1S2, No murmurs  Respiratory: No wheezing, no crepitations  GI: Abdomen soft, bowel sounds positive  Musculoskeletal:  edema lower extremities.   Skin: No skin rashes, no ecchymosis    Diagnostics          
    Chief complaint  Patient is a 76y old  Male who presents with a chief complaint of concern for presyncope (25 Jul 2022 09:33)   Review of systems  Patient in bed, looks comfortable, no hypoglycemic episodes.    Labs and Fingersticks  CAPILLARY BLOOD GLUCOSE      POCT Blood Glucose.: 312 mg/dL (25 Jul 2022 11:34)  POCT Blood Glucose.: 205 mg/dL (25 Jul 2022 07:40)  POCT Blood Glucose.: 186 mg/dL (24 Jul 2022 21:26)  POCT Blood Glucose.: 224 mg/dL (24 Jul 2022 16:37)                        Medications  MEDICATIONS  (STANDING):  amLODIPine   Tablet 10 milliGRAM(s) Oral daily  chlorhexidine 2% Cloths 1 Application(s) Topical <User Schedule>  dextrose 5%. 1000 milliLiter(s) (50 mL/Hr) IV Continuous <Continuous>  dextrose 5%. 1000 milliLiter(s) (100 mL/Hr) IV Continuous <Continuous>  dextrose 50% Injectable 25 Gram(s) IV Push once  dextrose 50% Injectable 12.5 Gram(s) IV Push once  dextrose 50% Injectable 25 Gram(s) IV Push once  glucagon  Injectable 1 milliGRAM(s) IntraMuscular once  heparin   Injectable 5000 Unit(s) SubCutaneous every 8 hours  hydrALAZINE 50 milliGRAM(s) Oral three times a day  insulin lispro (ADMELOG) corrective regimen sliding scale   SubCutaneous three times a day before meals  insulin lispro (ADMELOG) corrective regimen sliding scale   SubCutaneous at bedtime  insulin lispro Injectable (ADMELOG) 9 Unit(s) SubCutaneous three times a day before meals      Physical Exam  General: Patient comfortable in bed  Vital Signs Last 12 Hrs  T(F): 97.5 (07-25-22 @ 10:55), Max: 97.5 (07-25-22 @ 10:55)  HR: 73 (07-25-22 @ 10:55) (61 - 73)  BP: 144/82 (07-25-22 @ 10:55) (144/82 - 182/85)  BP(mean): --  RR: 18 (07-25-22 @ 10:55) (18 - 18)  SpO2: 97% (07-25-22 @ 10:55) (97% - 97%)  Neck: No palpable thyroid nodules.  CVS: S1S2, No murmurs  Respiratory: No wheezing, no crepitations  GI: Abdomen soft, bowel sounds positive  Musculoskeletal:  edema lower extremities.   Skin: No skin rashes, no ecchymosis    Diagnostics    A1C with Estimated Average Glucose: Routine (07-22 @ 14:25)  A1C with Estimated Average Glucose: AM Sched. Collection: 23-Jul-2022 06:00 (07-22 @ 13:22)  A1C with Estimated Average Glucose: Routine (07-22 @ 12:46)

## 2022-07-25 NOTE — PROGRESS NOTE ADULT - PROVIDER SPECIALTY LIST ADULT
Cardiology
Internal Medicine
Endocrinology
Endocrinology
Internal Medicine
Endocrinology

## 2022-07-25 NOTE — PROGRESS NOTE ADULT - PROBLEM SELECTOR PLAN 1
increase  Lantus 12 units Qam  Increase Admolog to 8 units pre each meal  FS pre meals with supplemental insulin coverage  Plan for DC on home meds
Will increase Lantus to 14 units Qam.  Will increase Admolog to 9 units pre each meal as well as coverage scale. Will continue monitoring FS and FU.  Plan for DC on home meds, Reports having an appointment with his Endo Dr Argueta next week.   for compliance with consistent low-carb diet.
increase  Lantus 9 units Qam  Increase Admolog to 6 units pre each meal  FS pre meals with supplemental insulin coverage  Plan for DC on home meds

## 2022-07-25 NOTE — PROGRESS NOTE ADULT - NS ATTEND AMEND GEN_ALL_CORE FT
I have made amendments to the documentation where necessary. Additional comments: I have made amendments to the documentation where necessary. Additional comments: I have made amendments to the documentation where necessary. Additional comments: I have made amendments to the documentation where necessary. Additional comments: I attest my time as attending is greater than 50% of the total combined time spent on qualifying patient care activities by the PA/NP and attending.

## 2022-07-25 NOTE — PROGRESS NOTE ADULT - ASSESSMENT
76 year old male     h/o    HLD, HTN, DM2, CKD stage III, CVA and BPH  h/o   bladder calculi, 2mm right ureteral calculus with forniceal rupture., urologist Dr. Rae       presents with presyncope.   pt  with  anxiety   panic attack,  felt  like  he  was  going  to  pass  out   tele.  card  d r carmelo     HYN/  HLD   was on  amlodipine, metoprolol succinate,   losartan  normal  ef     Lactic acidosis, noted.,  not form  sepsis   no  co/abd  pain. dysuria    DM,  follow  fs   CKD   c/c  anemia   ct chest, no pna/ normal lactic  acid now/ per  house ID,  no ab   echo, normal   ef    mild   hypernatremia and  karon. on iv  fluids,   resolved    sbp  noted today/  Hydralazine  increased  yesterday  card following  bp  med s epr  acrd     spoke  to  brother.   d/c  planning  in progress       from: CT Abdomen and Pelvis No Cont (01.19.19 @ 09:03) >  IMPRESSION: A punctate 2 mm proximal right ureteral calculus with mild   hydronephrosis. Right perinephric fluid compatible with forniceal rupture.  Subcentimeter bladder calculi  < end of copied text >

## 2022-07-25 NOTE — PROGRESS NOTE ADULT - PROBLEM SELECTOR PLAN 3
Controlled,  on antihypertensive medications, Monitor BP

## 2022-07-27 NOTE — CHART NOTE - NSCHARTNOTEFT_GEN_A_CORE
Patient medically cleared per Dr. Mckinney.   D/C paperwork completed.   VSS. No recent labs.  BP improved after increase in hydralazine.   Left VM for brother to  patient today.   CM aware.   Meds sent to [facility] per patient preference.
Left 1 message for the patient in regards to follow up care with callback information.
Left a message for patient in regards to follow up care with callback information.

## 2022-08-19 NOTE — ED ADULT NURSE NOTE - PRIMARY CARE PROVIDER
Discharge Summary/Instructions after an Endoscopic Procedure  Patient Name: Jerry Turner  Patient MRN: [id number]  Patient YOB: 1964 Friday, August 19, 2022  Ning Sam MD  Dear patient,  As a result of recent federal legislation (The Federal Cures Act), you may   receive lab or pathology results from your procedure in your [facility]   account before your physician is able to contact you. Your physician or   their representative will relay the results to you with their   recommendations at their soonest availability.  Thank you,  RESTRICTIONS:  During your procedure today, you received medications for sedation.  These   medications may affect your judgment, balance and coordination.  Therefore,   for 24 hours, you have the following restrictions:   - DO NOT drive a car, operate machinery, make legal/financial decisions,   sign important papers or drink alcohol.    ACTIVITY:  Today: no heavy lifting, straining or running due to procedural   sedation/anesthesia.  The following day: return to full activity including work.  DIET:  Eat and drink normally unless instructed otherwise.     TREATMENT FOR COMMON SIDE EFFECTS:  - Mild abdominal pain, nausea, belching, bloating or excessive gas:  rest,   eat lightly and use a heating pad.  - Sore Throat: treat with throat lozenges and/or gargle with warm salt   water.  - Because air was used during the procedure, expelling large amounts of air   from your rectum or belching is normal.  - If a bowel prep was taken, you may not have a bowel movement for 1-3 days.    This is normal.  SYMPTOMS TO WATCH FOR AND REPORT TO YOUR PHYSICIAN:  1. Abdominal pain or bloating, other than gas cramps.  2. Chest pain.  3. Back pain.  4. Signs of infection such as: chills or fever occurring within 24 hours   after the procedure.  5. Rectal bleeding, which would show as bright red, maroon, or black stools.   (A tablespoon of blood from the rectum is not serious, especially if    hemorrhoids are present.)  6. Vomiting.  7. Weakness or dizziness.  GO DIRECTLY TO THE NEAREST EMERGENCY ROOM IF YOU HAVE ANY OF THE FOLLOWING:      Difficulty breathing              Chills and/or fever over 101 F   Persistent vomiting and/or vomiting blood   Severe abdominal pain   Severe chest pain   Black, tarry stools   Bleeding- more than one tablespoon   Any other symptom or condition that you feel may need urgent attention  Your doctor recommends these additional instructions:  If any biopsies were taken, your doctors clinic will contact you in 1 to 2   weeks with any results.  - Discharge patient to home (ambulatory).   - Resume regular diet.   - Continue present medications.   - Await pathology results.   - Repeat colonoscopy in 3 years for surveillance.  For questions, problems or results please call your physician - Ning Sam MD at Work:  [phone].  [facility] [place], EMERGENCY ROOM PHONE NUMBER: [phone]  IF A COMPLICATION OR EMERGENCY SITUATION ARISES AND YOU ARE UNABLE TO REACH   YOUR PHYSICIAN - GO DIRECTLY TO THE EMERGENCY ROOM.  Ning Sam MD  8/19/2022 8:49:30 AM  This report has been verified and signed electronically.  Dear patient,  As a result of recent federal legislation (The Federal Cures Act), you may   receive lab or pathology results from your procedure in your [facility]   account before your physician is able to contact you. Your physician or   their representative will relay the results to you with their   recommendations at their soonest availability.  Thank you,  PROVATION   Dr. Sun

## 2022-08-22 NOTE — ED ADULT NURSE NOTE - NS ED NURSE LEVEL OF CONSCIOUSNESS AFFECT
Calm Ketoconazole Counseling:   Patient counseled regarding improving absorption with orange juice.  Adverse effects include but are not limited to breast enlargement, headache, diarrhea, nausea, upset stomach, liver function test abnormalities, taste disturbance, and stomach pain.  There is a rare possibility of liver failure that can occur when taking ketoconazole. The patient understands that monitoring of LFTs may be required, especially at baseline. The patient verbalized understanding of the proper use and possible adverse effects of ketoconazole.  All of the patient's questions and concerns were addressed.

## 2022-09-23 NOTE — SWALLOW VFSS/MBS ASSESSMENT ADULT - ORAL PHASE COMMENTS
no
On successive cup sips, moderate silent aspiration over the laryngeal surface of the epiglottis; incomplete retrieval despite cued cough/throat clear

## 2022-09-25 ENCOUNTER — INPATIENT (INPATIENT)
Facility: HOSPITAL | Age: 77
LOS: 2 days | Discharge: HOME | DRG: 384 | End: 2022-09-28
Attending: FAMILY MEDICINE | Admitting: FAMILY MEDICINE
Payer: MEDICARE

## 2022-09-25 VITALS
WEIGHT: 169.98 LBS | TEMPERATURE: 98 F | HEIGHT: 68 IN | RESPIRATION RATE: 16 BRPM | SYSTOLIC BLOOD PRESSURE: 178 MMHG | HEART RATE: 72 BPM | DIASTOLIC BLOOD PRESSURE: 88 MMHG | OXYGEN SATURATION: 99 %

## 2022-09-25 DIAGNOSIS — Z98.890 OTHER SPECIFIED POSTPROCEDURAL STATES: Chronic | ICD-10-CM

## 2022-09-25 DIAGNOSIS — K27.9 PEPTIC ULCER, SITE UNSPECIFIED, UNSPECIFIED AS ACUTE OR CHRONIC, WITHOUT HEMORRHAGE OR PERFORATION: ICD-10-CM

## 2022-09-25 DIAGNOSIS — Z90.49 ACQUIRED ABSENCE OF OTHER SPECIFIED PARTS OF DIGESTIVE TRACT: Chronic | ICD-10-CM

## 2022-09-25 LAB
ALBUMIN SERPL ELPH-MCNC: 4.7 G/DL — SIGNIFICANT CHANGE UP (ref 3.3–5)
ALP SERPL-CCNC: 63 U/L — SIGNIFICANT CHANGE UP (ref 40–120)
ALT FLD-CCNC: 22 U/L — SIGNIFICANT CHANGE UP (ref 10–45)
ANION GAP SERPL CALC-SCNC: 12 MMOL/L — SIGNIFICANT CHANGE UP (ref 5–17)
APPEARANCE UR: CLEAR — SIGNIFICANT CHANGE UP
APTT BLD: 32.9 SEC — SIGNIFICANT CHANGE UP (ref 27.5–35.5)
AST SERPL-CCNC: 16 U/L — SIGNIFICANT CHANGE UP (ref 10–40)
BACTERIA # UR AUTO: NEGATIVE — SIGNIFICANT CHANGE UP
BASOPHILS # BLD AUTO: 0.04 K/UL — SIGNIFICANT CHANGE UP (ref 0–0.2)
BASOPHILS NFR BLD AUTO: 0.4 % — SIGNIFICANT CHANGE UP (ref 0–2)
BILIRUB SERPL-MCNC: 0.3 MG/DL — SIGNIFICANT CHANGE UP (ref 0.2–1.2)
BILIRUB UR-MCNC: NEGATIVE — SIGNIFICANT CHANGE UP
BUN SERPL-MCNC: 23 MG/DL — SIGNIFICANT CHANGE UP (ref 7–23)
CALCIUM SERPL-MCNC: 9.4 MG/DL — SIGNIFICANT CHANGE UP (ref 8.4–10.5)
CHLORIDE SERPL-SCNC: 105 MMOL/L — SIGNIFICANT CHANGE UP (ref 96–108)
CO2 SERPL-SCNC: 25 MMOL/L — SIGNIFICANT CHANGE UP (ref 22–31)
COLOR SPEC: SIGNIFICANT CHANGE UP
CREAT SERPL-MCNC: 1.12 MG/DL — SIGNIFICANT CHANGE UP (ref 0.5–1.3)
DIFF PNL FLD: NEGATIVE — SIGNIFICANT CHANGE UP
EGFR: 68 ML/MIN/1.73M2 — SIGNIFICANT CHANGE UP
EOSINOPHIL # BLD AUTO: 0.26 K/UL — SIGNIFICANT CHANGE UP (ref 0–0.5)
EOSINOPHIL NFR BLD AUTO: 2.9 % — SIGNIFICANT CHANGE UP (ref 0–6)
EPI CELLS # UR: 1 /HPF — SIGNIFICANT CHANGE UP
GLUCOSE BLDC GLUCOMTR-MCNC: 140 MG/DL — HIGH (ref 70–99)
GLUCOSE SERPL-MCNC: 159 MG/DL — HIGH (ref 70–99)
GLUCOSE UR QL: NEGATIVE — SIGNIFICANT CHANGE UP
HCT VFR BLD CALC: 29 % — LOW (ref 39–50)
HGB BLD-MCNC: 9.3 G/DL — LOW (ref 13–17)
HYALINE CASTS # UR AUTO: 1 /LPF — SIGNIFICANT CHANGE UP (ref 0–2)
IMM GRANULOCYTES NFR BLD AUTO: 0.2 % — SIGNIFICANT CHANGE UP (ref 0–0.9)
INR BLD: 1.04 RATIO — SIGNIFICANT CHANGE UP (ref 0.88–1.16)
KETONES UR-MCNC: ABNORMAL
LEUKOCYTE ESTERASE UR-ACNC: NEGATIVE — SIGNIFICANT CHANGE UP
LIDOCAIN IGE QN: 33 U/L — SIGNIFICANT CHANGE UP (ref 7–60)
LYMPHOCYTES # BLD AUTO: 1.23 K/UL — SIGNIFICANT CHANGE UP (ref 1–3.3)
LYMPHOCYTES # BLD AUTO: 13.5 % — SIGNIFICANT CHANGE UP (ref 13–44)
MCHC RBC-ENTMCNC: 29.2 PG — SIGNIFICANT CHANGE UP (ref 27–34)
MCHC RBC-ENTMCNC: 32.1 GM/DL — SIGNIFICANT CHANGE UP (ref 32–36)
MCV RBC AUTO: 91.2 FL — SIGNIFICANT CHANGE UP (ref 80–100)
MONOCYTES # BLD AUTO: 0.67 K/UL — SIGNIFICANT CHANGE UP (ref 0–0.9)
MONOCYTES NFR BLD AUTO: 7.4 % — SIGNIFICANT CHANGE UP (ref 2–14)
NEUTROPHILS # BLD AUTO: 6.88 K/UL — SIGNIFICANT CHANGE UP (ref 1.8–7.4)
NEUTROPHILS NFR BLD AUTO: 75.6 % — SIGNIFICANT CHANGE UP (ref 43–77)
NITRITE UR-MCNC: NEGATIVE — SIGNIFICANT CHANGE UP
NRBC # BLD: 0 /100 WBCS — SIGNIFICANT CHANGE UP (ref 0–0)
PH UR: 6 — SIGNIFICANT CHANGE UP (ref 5–8)
PLATELET # BLD AUTO: 295 K/UL — SIGNIFICANT CHANGE UP (ref 150–400)
POTASSIUM SERPL-MCNC: 4 MMOL/L — SIGNIFICANT CHANGE UP (ref 3.5–5.3)
POTASSIUM SERPL-SCNC: 4 MMOL/L — SIGNIFICANT CHANGE UP (ref 3.5–5.3)
PROT SERPL-MCNC: 7.9 G/DL — SIGNIFICANT CHANGE UP (ref 6–8.3)
PROT UR-MCNC: ABNORMAL
PROTHROM AB SERPL-ACNC: 12.1 SEC — SIGNIFICANT CHANGE UP (ref 10.5–13.4)
RBC # BLD: 3.18 M/UL — LOW (ref 4.2–5.8)
RBC # FLD: 13.8 % — SIGNIFICANT CHANGE UP (ref 10.3–14.5)
RBC CASTS # UR COMP ASSIST: 3 /HPF — SIGNIFICANT CHANGE UP (ref 0–4)
SARS-COV-2 RNA SPEC QL NAA+PROBE: SIGNIFICANT CHANGE UP
SODIUM SERPL-SCNC: 142 MMOL/L — SIGNIFICANT CHANGE UP (ref 135–145)
SP GR SPEC: 1.02 — SIGNIFICANT CHANGE UP (ref 1.01–1.02)
UROBILINOGEN FLD QL: NEGATIVE — SIGNIFICANT CHANGE UP
WBC # BLD: 9.1 K/UL — SIGNIFICANT CHANGE UP (ref 3.8–10.5)
WBC # FLD AUTO: 9.1 K/UL — SIGNIFICANT CHANGE UP (ref 3.8–10.5)
WBC UR QL: 0 /HPF — SIGNIFICANT CHANGE UP (ref 0–5)

## 2022-09-25 PROCEDURE — 71260 CT THORAX DX C+: CPT | Mod: 26,MA

## 2022-09-25 PROCEDURE — 72125 CT NECK SPINE W/O DYE: CPT | Mod: 26,MA

## 2022-09-25 PROCEDURE — 99285 EMERGENCY DEPT VISIT HI MDM: CPT | Mod: FS,25

## 2022-09-25 PROCEDURE — 99222 1ST HOSP IP/OBS MODERATE 55: CPT

## 2022-09-25 PROCEDURE — 93010 ELECTROCARDIOGRAM REPORT: CPT

## 2022-09-25 PROCEDURE — 74177 CT ABD & PELVIS W/CONTRAST: CPT | Mod: 26,MA

## 2022-09-25 PROCEDURE — 70450 CT HEAD/BRAIN W/O DYE: CPT | Mod: 26,MA

## 2022-09-25 RX ORDER — HEPARIN SODIUM 5000 [USP'U]/ML
5000 INJECTION INTRAVENOUS; SUBCUTANEOUS EVERY 12 HOURS
Refills: 0 | Status: DISCONTINUED | OUTPATIENT
Start: 2022-09-25 | End: 2022-09-28

## 2022-09-25 RX ORDER — HYDRALAZINE HCL 50 MG
50 TABLET ORAL THREE TIMES A DAY
Refills: 0 | Status: DISCONTINUED | OUTPATIENT
Start: 2022-09-25 | End: 2022-09-25

## 2022-09-25 RX ORDER — PANTOPRAZOLE SODIUM 20 MG/1
40 TABLET, DELAYED RELEASE ORAL ONCE
Refills: 0 | Status: DISCONTINUED | OUTPATIENT
Start: 2022-09-25 | End: 2022-09-25

## 2022-09-25 RX ORDER — SODIUM CHLORIDE 9 MG/ML
1000 INJECTION, SOLUTION INTRAVENOUS
Refills: 0 | Status: DISCONTINUED | OUTPATIENT
Start: 2022-09-25 | End: 2022-09-27

## 2022-09-25 RX ORDER — ATORVASTATIN CALCIUM 80 MG/1
80 TABLET, FILM COATED ORAL AT BEDTIME
Refills: 0 | Status: DISCONTINUED | OUTPATIENT
Start: 2022-09-25 | End: 2022-09-28

## 2022-09-25 RX ORDER — PANTOPRAZOLE SODIUM 20 MG/1
40 TABLET, DELAYED RELEASE ORAL ONCE
Refills: 0 | Status: COMPLETED | OUTPATIENT
Start: 2022-09-25 | End: 2022-09-25

## 2022-09-25 RX ORDER — HYDRALAZINE HCL 50 MG
10 TABLET ORAL THREE TIMES A DAY
Refills: 0 | Status: DISCONTINUED | OUTPATIENT
Start: 2022-09-25 | End: 2022-09-26

## 2022-09-25 RX ORDER — ACETAMINOPHEN 500 MG
1000 TABLET ORAL ONCE
Refills: 0 | Status: COMPLETED | OUTPATIENT
Start: 2022-09-25 | End: 2022-09-25

## 2022-09-25 RX ORDER — AMLODIPINE BESYLATE 2.5 MG/1
10 TABLET ORAL DAILY
Refills: 0 | Status: DISCONTINUED | OUTPATIENT
Start: 2022-09-25 | End: 2022-09-28

## 2022-09-25 RX ORDER — TAMSULOSIN HYDROCHLORIDE 0.4 MG/1
0.4 CAPSULE ORAL AT BEDTIME
Refills: 0 | Status: DISCONTINUED | OUTPATIENT
Start: 2022-09-25 | End: 2022-09-28

## 2022-09-25 RX ADMIN — Medication 400 MILLIGRAM(S): at 10:52

## 2022-09-25 RX ADMIN — Medication 10 MILLIGRAM(S): at 22:55

## 2022-09-25 RX ADMIN — Medication 10 MILLIGRAM(S): at 19:37

## 2022-09-25 RX ADMIN — PANTOPRAZOLE SODIUM 40 MILLIGRAM(S): 20 TABLET, DELAYED RELEASE ORAL at 17:21

## 2022-09-25 RX ADMIN — SODIUM CHLORIDE 70 MILLILITER(S): 9 INJECTION, SOLUTION INTRAVENOUS at 17:24

## 2022-09-25 RX ADMIN — SODIUM CHLORIDE 70 MILLILITER(S): 9 INJECTION, SOLUTION INTRAVENOUS at 18:52

## 2022-09-25 RX ADMIN — HEPARIN SODIUM 5000 UNIT(S): 5000 INJECTION INTRAVENOUS; SUBCUTANEOUS at 18:50

## 2022-09-25 RX ADMIN — TAMSULOSIN HYDROCHLORIDE 0.4 MILLIGRAM(S): 0.4 CAPSULE ORAL at 22:55

## 2022-09-25 RX ADMIN — ATORVASTATIN CALCIUM 80 MILLIGRAM(S): 80 TABLET, FILM COATED ORAL at 22:55

## 2022-09-25 RX ADMIN — SODIUM CHLORIDE 70 MILLILITER(S): 9 INJECTION, SOLUTION INTRAVENOUS at 22:57

## 2022-09-25 RX ADMIN — AMLODIPINE BESYLATE 10 MILLIGRAM(S): 2.5 TABLET ORAL at 14:15

## 2022-09-25 NOTE — H&P ADULT - ASSESSMENT
76 year old male     h/o    HLD, HTN, DM2, CKD stage III, CVA and BPH  h/o   bladder calculi, 2mm right ureteral calculus with forniceal rupture., on ct  2019,  urologist Dr. Rae     h/o    anxiety   panic  attacks,   with  dizziness,   card  d r carmelo       now  admitted  s/p fall at home, now  has  abd  and back pain    CT  A/p,  gastric pylorus, wall thickening, with ne w penetrating ulcer.  bladder stones. Fx  R  10th  rib   hold  asa.  gi  dr coy.  follow hb/ on protonix   no mention of  free  air on imaging    HTN/  HLD    on  amlodipine,  hydralazine      h/o normal  ef   DM,  follow  fs   CKD / now crt is  1.1   c/c  anemia   R ureteral calculus /  ct 2019    remains  a fall risk. with  gait  imbalance/   aging   on dvt  ppx        rad< from: CT Chest w/ IV Cont (09.25.22 @ 12:51) >  IMPRESSION:  1. Concentric wall thickening of the gastric pylorus with a new   penetrating ulcer. Recommend GI consultation.  2. Layering bladder stones.  3. Nondisplaced fracture of the distal right 10th rib.  4. No evidence of visceral organ injury.  5. Results discussed with Dr. oTrres at time of interpretation.  --- End of Report ---

## 2022-09-25 NOTE — H&P ADULT - NSHPPHYSICALEXAM_GEN_ALL_CORE
PHYSICAL EXAMINATION:  Vital Signs Last 24 Hrs  T(C): 36.6 (25 Sep 2022 10:05), Max: 36.6 (25 Sep 2022 10:05)  T(F): 97.9 (25 Sep 2022 10:05), Max: 97.9 (25 Sep 2022 10:05)  HR: 82 (25 Sep 2022 10:18) (72 - 82)  BP: 156/89 (25 Sep 2022 10:18) (156/89 - 178/88)  BP(mean): --  RR: 16 (25 Sep 2022 10:18) (16 - 16)  SpO2: 97% (25 Sep 2022 10:18) (97% - 99%)    Parameters below as of 25 Sep 2022 10:18  Patient On (Oxygen Delivery Method): room air      CAPILLARY BLOOD GLUCOSE            GENERAL: NAD, well-groomed,  HEAD:  atraumatic, normocephalic  EYES: sclera anicteric  ENMT: mucous membranes moist  NECK: supple, No JVD  CHEST/LUNG: clear to auscultation bilaterally;    no      rales   ,   no rhonchi,   HEART: normal S1, S2  ABDOMEN: BS+, soft, ND,  mild  abd discomfort/ no guarding/ rebound  tenderness  EXTREMITIES:    no    edema    b/l LEs  NEURO: awake, ,     moves all extremities  SKIN: no     rash

## 2022-09-25 NOTE — ED PROVIDER NOTE - OBJECTIVE STATEMENT
76y Male PMHx of HLD, HTN, DM2, CKD stage III, CVA and BPH brought in via EMS for abdominal pain and back pain x2 weeks. Patient reported he has intermittent abdominal pain at the umbilicus and was told by PMD to take tylenol and had some improvement of symptoms. Patient reported lower back pain since his fall two weeks ago.

## 2022-09-25 NOTE — CONSULT NOTE ADULT - ATTENDING COMMENTS
seen and examined 09-25-22 @ 1345    fell out of bed a few weeks ago  c/o right shoulder pain  no melena    no right-sided chest wall tenderness, crepitus or ecchymosis  soft / NT / ND    hgb - 9.3 g/dL    CT head / c-spine - no intracranial hemorrhage or c-spine fracture  CT chest / abd/pelvis w/ contrast - right 10th lateral nondisplaced rib fracture. mural thickening of duodenal bulb, but no pneumoperitoneum.      duodenal ulcer w/o hemorrhage or perforation  -PPI  -care as per GI    subacute right 10th lateral nondisplaced rib fracture  -history of fall from bed a few weeks ago with no newer traumatic mechanism, and lack of tenderness suggest that this rib fracture is not acute  -no intervention indicated    reconsult acute care surgery PRN      I reviewed his labs and radiologic images.  care discussed with his brother at bedside in the ER

## 2022-09-25 NOTE — ED ADULT NURSE NOTE - NSIMPLEMENTINTERV_GEN_ALL_ED
Implemented All Fall Risk Interventions:  [place] to call system. Call bell, personal items and telephone within reach. Instruct patient to call for assistance. Room bathroom lighting operational. Non-slip footwear when patient is off stretcher. Physically safe environment: no spills, clutter or unnecessary equipment. Stretcher in lowest position, wheels locked, appropriate side rails in place. Provide visual cue, wrist band, yellow gown, etc. Monitor gait and stability. Monitor for mental status changes and reorient to person, place, and time. Review medications for side effects contributing to fall risk. Reinforce activity limits and safety measures with patient and family.

## 2022-09-25 NOTE — H&P ADULT - NSHPLABSRESULTS_GEN_ALL_CORE
LABS:                        9.3    9.10  )-----------( 295      ( 25 Sep 2022 10:31 )             29.0         142  |  105  |  23  ----------------------------<  159<H>  4.0   |  25  |  1.12    Ca    9.4      25 Sep 2022 10:31    TPro  7.9  /  Alb  4.7  /  TBili  0.3  /  DBili  x   /  AST  16  /  ALT  22  /  AlkPhos  63      PT/INR - ( 25 Sep 2022 10:31 )   PT: 12.1 sec;   INR: 1.04 ratio         PTT - ( 25 Sep 2022 10:31 )  PTT:32.9 sec      Urinalysis Basic - ( 25 Sep 2022 12:18 )    Color: Light Yellow / Appearance: Clear / S.017 / pH: x  Gluc: x / Ketone: Small  / Bili: Negative / Urobili: Negative   Blood: x / Protein: 30 mg/dL / Nitrite: Negative   Leuk Esterase: Negative / RBC: 3 /hpf / WBC 0 /HPF   Sq Epi: x / Non Sq Epi: 1 /hpf / Bacteria: Negative           @ 10:24  4.1  28      Thyroid Stimulating Hormone, Serum: 3.15 uIU/mL ( @ 06:40)

## 2022-09-25 NOTE — CONSULT NOTE ADULT - ASSESSMENT
gastric ulcer  abdominal pain    clear liquid diet  iv proton pump inhibitor bid  npo p mn for possible  upper gastrointestinal endoscopy tommorow pending endoscopy availability  patient and brother aware of potential risk of perforation requiring surgery given ct findings and agree to proceed

## 2022-09-25 NOTE — PATIENT PROFILE ADULT - NSPROPTRIGHTNOTIFY_GEN_A_NUR
"INFECTIOUS DISEASE FOLLOW UP NOTE      ASSESSMENT:  1. Large right thigh abscess, due to Group B strep. Percutaneous drain placed 9/3. Gram stain GPC, culture Group B strep. Blood culture negative. Sepsis resolved now off pressors. Drain check 9/8 with minimal residual fluid, drain upsized. CT 9/4 still showed large collection the day after drain placement. CRP is normal 0.4.   2. Urinary retention. Low suspicion for urinary source. Mixed tulio in UC likely colonization. UA without pyuria.   3. Paraplegia   4. Decubitus wounds --  Large sacral looks clean.   5. Penicillin and levofloxacin allergies. Tolerating meropenem, and doses of cefepime. Received ceftriaxone while inpatient in April 2022 without sign of adverse reaction per notes.   6. H/o MRSA in remote past. Not currently seen on clinical specimens.   7. Thrombocytopenia.     PLAN:  Ceftriaxone once daily. Would be on IV after discharge if there is still significant fluid collection. If infection much better could complete course with oral cefuroxime for 5-7 days after drain removal.   Duration of treatment will depend on resolution of fluid collection on imaging.   Likely CT R thigh early next week to assess adequacy of percutaneous drainage.   Dr. Hunter following in case this would need debridement.   Please call if questions over the weekend. We will check in next week.       Mamadou Artis MD  [facility]  [phone] [facility]    ______________________________________________________________________    SUBJECTIVE / INTERVAL HISTORY: wants to go home. Pain controlled. Tolerating antibiotics.  Wants urinary catheter out     ROS: paraplegia. All other systems negative except as listed above.        OBJECTIVE:  BP (!) 88/51 (BP Location: Left arm)   Pulse 88   Temp 98  F (36.7  C) (Oral)   Resp 16   Ht 1.626 m (5' 4\")   Wt 58.2 kg (128 lb 4 oz)   LMP  (LMP Unknown)   SpO2 98%   BMI 22.01 kg/m     "           GENERAL:  In no acute distress. Room air.   EYES: No conjunctival injection  HEAD, EARS, NOSE, MOUTH, AND THROAT: Nontraumatic, mouth without oral ulcers.   RESPIRATORY: Clear anterior  CARDIOVASCULAR: Regular rate and rhythm, normal S1 and S2, no murmurs, rubs, or gallops.  ABDOMEN: Soft, nontender, colostomy, urine tube from abdomen.  Normal bowel sounds.  MUSCULOSKELETAL: No synovitis. DENIA R thigh with serous fluid  SKIN/HAIR/NAILS: No rashes, no signs of peripheral emboli. Wound photo noted  NEUROLOGIC: paraplegia   PICC R UE        Antibiotics:  Ceftriaxone 9/6-    Previous  Cefepime 9/3-4  Flagyl 9/3-4  Clindamycin 9/2-3  meropenem 9/4-6  Vancomycin 9/3-6    Pertinent labs:    Recent Labs   Lab 09/09/22  0637 09/08/22  0349 09/07/22  0411   WBC 6.4 2.4* 4.7   HGB 8.0* 7.4* 7.9*   HCT 26.1* 23.2* 23.9*   PLT 58* 48* 57*        Recent Labs   Lab 09/09/22  0637 09/08/22  0349 09/07/22  0411    137 140   CO2 25 24 24   BUN 18 18 15        Lab Results   Component Value Date    CRP 0.4 09/08/2022    CRP 98.6 (H) 12/12/2018    CRP 6.1 01/20/2015         Lab Results   Component Value Date    ALT <9 09/09/2022    AST 12 09/09/2022    ALKPHOS 88 09/09/2022         MICROBIOLOGY DATA:  9/3 abscess gram stain GPC, PMNs, culture Group B strep  9/2 blood negative     RADIOLOGY:  CT Abdomen Peritonium Abscess Drainage    Result Date: 9/3/2022  EXAM: 1. PERCUTANEOUS DRAIN PLACEMENT RIGHT UPPER THIGH/GROIN COLLECTION 2. CT GUIDANCE 3. CONSCIOUS SEDATION LOCATION: [facility] DATE/TIME: 9/3/2022 12:19 PM INDICATION: right hip drain placement TECHNIQUE: Dose reduction techniques were used. PROCEDURE: Informed consent obtained. Site marked. Prior images reviewed. Required items made available. Patient identity confirmed verbally and with arm band. Patient reevaluated immediately before administering sedation. Universal protocol was followed. Time out performed. The site was prepped and draped  in sterile fashion. 10 mL of 1% lidocaine was infused into the local soft tissues. Using standard technique and under direct CT guidance, a 12 Puerto Rican drainage catheter was inserted into the fluid collection.  SPECIMEN: 150 mL of purulent fluid was aspirated and sent to lab for cultures and Gram stain. BLOOD LOSS: Minimal. The patient tolerated the procedure well. No immediate complications. SEDATION: Versed 0 mg. Fentanyl 50 mcg. The procedure was performed with administration intravenous conscious sedation with appropriate preoperative, intraoperative, and postoperative evaluation. 15 minutes of supervised face to face conscious sedation time was provided by a radiology nurse under my direct supervision.     IMPRESSION: 1.  Successful CT-guided drain placement into a right upper thigh/groin abscess.     XR Chest Port 1 View    Result Date: 9/3/2022  EXAM: XR CHEST PORTABLE 1 VIEW LOCATION: [facility] DATE/TIME: 09/03/2022, 6:08 AM INDICATION: Status post unsuccessful left IJ placement, rule out pneumothorax. COMPARISON: 04/22/2022.     IMPRESSION: Negative for pneumothorax. Normal heart size and pulmonary vascularity. Lungs are clear. Generalized osteopenia. Posterior idalia and pedicle screw fixation lower thoracic and lumbar spine.     IR Abscess Tube Change    Result Date: 9/8/2022  LOCATION: [facility] DATE: 9/8/2022 PROCEDURE: ABSCESS TUBE CHECK AND EXCHANGE INTERVENTIONAL RADIOLOGIST: Fer Gutierrez MD INDICATION: Right hip drain placement on 9/3/2022. Plan for exchange/upsize. CONSENT: The risks, benefits and alternatives of an abscess tube check with possible exchange, upsizing and/or removal were discussed with the patient or representative in detail. All questions were answered. Informed consent was given to proceed with the procedure. MODERATE SEDATION: None. CONTRAST: 25 cc Omnipaque ANTIBIOTICS: None. ADDITIONAL MEDICATIONS: None. FLUOROSCOPIC TIME:  0.5 minutes. RADIATION DOSE: Air Kerma: 3 mGy. COMPLICATIONS: No immediate complications. UNIVERSAL PROTOCOL: The operative site was marked and any prior imaging was reviewed. Required items including blood products, implants, devices and special equipment was made available. Patient identity was confirmed either verbally, with demographic information, hospital assigned identification or other identification markers. A timeout was performed immediately prior to the procedure. STERILE BARRIER TECHNIQUE: Maximum sterile barrier technique was used. Cutaneous antisepsis was performed at the operative site with application of 2% chlorhexidine and large sterile drape. Prior to the procedure, the  and assistant performed hand hygiene and wore hat, mask, sterile gown, and sterile gloves during the entire procedure. PROCEDURE:  Multiprojectional  images were obtained. The previous drainage catheter was injected with a small amount of contrast, and multiple images were obtained. Based on the results of the study, the drain was exchanged over a guidewire for a 16 Belgian Fred drainage. The cavity was aggressively irrigated with saline. FINDINGS: Abscessogram demonstrates minimal residual abscess cavity. After exchange, the drain is appropriately positioned.     IMPRESSION:  Right hip drain check demonstrates appropriate positioning of the drain. There is minimal residual fluid. The drain was exchanged/upsized for a 16 Belgian Fred drainage. Irrigation of the cavity reveals serosanguineous fluid.    CT ABDOMEN PELVIS W CONTRAST    Result Date: 9/2/2022  EXAM: CT ABDOMEN PELVIS W CONTRAST LOCATION: [facility] DATE/TIME: 9/2/2022 7:16 PM INDICATION: Abdominal distension paraplegia difficulty passing catheter to neobladder COMPARISON: 12/18/2018. TECHNIQUE: CT scan of the abdomen and pelvis was performed following injection of IV contrast. Multiplanar reformats were obtained. Dose  reduction techniques were used. CONTRAST: 50ml isovue 370 FINDINGS: LOWER CHEST: Atelectasis. 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 Nonocclusive thrombus in the proximal superior mesenteric vein. Multiple perigastric collateral vessels. PANCREAS: Normal. SPLEEN: Normal. ADRENAL GLANDS: Normal. KIDNEYS/BLADDER: There is mild right-sided pyelocaliectasis with normal caliber ureter. Dependent stone within the right renal pelvis. BOWEL: Descending colostomy with Franklin pouch. LYMPH NODES: Normal. VASCULATURE: Unremarkable. PELVIC ORGANS: Neobladder which is quite distended measuring up 24 cm in greatest dimension. MUSCULOSKELETAL: There is a very large complex peripherally enhancing fluid collection within the right buttock extending from the iliac crest down into the hip joints and into the right thigh. This is not completely imaged extending further into the thigh than is seen on the CT scan. Destructive changes in both hips. This fluid collection measures at least 20 x 14 cm scoliosis.     IMPRESSION: 1.  Significant distention of the neobladder which measures up 24 cm and extends into the right lower abdomen. 2.  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 Multiple perigastric venous collaterals. Nonocclusive thrombus in the proximal superior mesenteric vein. 3.  Franklin pouch with descending colostomy. 4.  Destructive changes both hips. There is a massive peripherally enhancing fluid collection in the right buttock, hip and thigh extending into the distal thigh. The distal extent of the fluid collection is not imaged with CT. This collection measures at least 20 x 14 cm. 5.  There is mild dilatation of the right intrarenal collecting system with normal caliber ureter and an element of UPJ obstruction is possible. Dependent stone in the right renal pelvis. NOTE: ABNORMAL REPORT 1.  THE DICTATION ABOVE DESCRIBES AN  ABNORMALITY FOR WHICH FOLLOW-UP IS NEEDED.     CT Abdomen Pelvis w/o Contrast    Result Date: 9/4/2022  EXAM: CT ABDOMEN PELVIS W/O CONTRAST LOCATION: [facility] DATE/TIME: 9/4/2022 9:27 AM INDICATION: F U abscess with drain placement COMPARISON: 09/02/2022 TECHNIQUE: CT scan of the abdomen and pelvis was performed without IV contrast. Multiplanar reformats were obtained. Dose reduction techniques were used. CONTRAST: None. FINDINGS: LOWER CHEST: New small bilateral pleural effusions. HEPATOBILIARY: No significant mass or bile duct dilatation. Portal vein thrombus not visualized on the current examination without IV contrast, although periportal collateral vessels are noted. Cholecystectomy. PANCREAS: Normal. SPLEEN: Normal. ADRENAL GLANDS: Normal. KIDNEYS/BLADDER: Mild fullness of the renal pelves bilaterally, decreased compared to 09/02/2022. Unchanged nonobstructing calculus in the lower pole right kidney and in the dependent portion of the right renal pelvis. Urinary diversion in the right lower quadrant, which is no longer distended. Ostomy extends to the skin surface. BOWEL: Descending colostomy with Franklin pouch. LYMPH NODES: Normal. VASCULATURE: Unremarkable. PELVIC ORGANS: Unremarkable MUSCULOSKELETAL: Interval placement of percutaneous pigtail drain in the subcutaneous right hip/upper thigh fluid collection. Reliable size comparison limited due to noncontrast technique on the current examination, but the collection has decreased in size. Largest axial component currently 11.7 x 7.4 cm, previously 20 x 14 cm. Osseous destruction in both hips. Thoracolumbar fusion. Diffuse muscular atrophy and osteopenia.     IMPRESSION: 1.  Decreased size of right buttock/thigh fluid collection following percutaneous drain placement. Largest remaining component measures 11.7 x 7.4 cm axially. 2.  New small bilateral pleural effusions. 3.  Neobladder is no longer distended.    Head CT w/o  contrast    Result Date: 9/2/2022  EXAM: CT HEAD W/O CONTRAST LOCATION: [facility] DATE/TIME: 9/2/2022 7:16 PM INDICATION: ams COMPARISON: Head CT angiogram brain MRI 01/06/2020 TECHNIQUE: Routine CT Head without IV contrast. Multiplanar reformats. Dose reduction techniques were used. FINDINGS: INTRACRANIAL CONTENTS: No intracranial hemorrhage, extraaxial collection, or mass effect.  No CT evidence of acute infarct. Normal parenchymal attenuation. Normal ventricles and sulci. VISUALIZED ORBITS/SINUSES/MASTOIDS: No intraorbital abnormality. Moderate chronic mucosal thickening of the left maxillary sinus with associated frothy debris. Mild chronic mucosal thickening of the left sphenoid sinus. No middle ear or mastoid effusion. BONES/SOFT TISSUES: No acute abnormality. Chronic right medial orbital wall fracture.     IMPRESSION: 1.  No intracranial hemorrhage, mass lesions, hydrocephalus or CT evidence for an acute infarct. 2.  Chronic right medial orbital wall fracture.    CT Femur Thigh Bilateral wo Contrast    Result Date: 9/4/2022  EXAM: CT FEMUR THIGH BILATERAL WITHOUT CONTRAST LOCATION: [facility] DATE/TIME: 09/04/2022, 9:28 AM INDICATION: 56-year-old patient with a right hip-buttock abscess. COMPARISON: 09/04/2022 CT abdomen and pelvis. 09/02/2022 CT abdomen and pelvis. TECHNIQUE: Noncontrast. Axial, sagittal and coronal thin-section reconstruction. Dose reduction techniques were used. FINDINGS: BONES AND JOINTS: -Advanced osteopenia. -Resection or resorption of the right medial ilium. Dysplastic right acetabulum. Resorption or resection of the right femoral head and greater trochanter. External rotation of the right femur. -Dysplastic left acetabulum with probable ankylosis of the left femoral head. Old fracture or osteotomy of the left femoral neck. Old healed fracture of the left femur proximal diaphysis. MUSCLES AND SOFT TISSUES: -Subcutaneous right  hip-buttock fluid collection, with percutaneous pigtail catheter drainage. This collection measures 12 x 7 cm in greatest axial dimensions. -Fluid attenuation in the expected regions of the right vastus lateralis and adductor celia regions. The proximal margin of these collections have decreased in size since the 09/02/2022 exam. These collections both extend to the distal margin of the thigh, measuring up to 25 cm in length. -Advanced muscle fatty atrophy. OTHER: -See the dedicated abdomen-pelvis CT for further information.     IMPRESSION: 1.  Decreased size of the right hip-buttock fluid collection, status post percutaneous drain placement. This collection measures 12 x 7 cm in greatest axial dimensions. 2.  Decreased size of fluid collections (likely contiguous with the above) in the residual right vastus lateralis and adductor celia regions. These collections extend through the distal thigh, and measure roughly 25 cm in length.     IR PICC Vascular    Result Date: 9/6/2022  LOCATION: [facility] DATE: 9/6/2022 PROCEDURE: PERIPHERALLY INSERTED CENTRAL CATHETER (PICC) PLACEMENT. INTERVENTIONAL RADIOLOGIST: Hugo Michael MD. INDICATION: Right thigh abscess. Paraplegia. Decubitus wounds. Unsuccessful bedside PICC placement CONSENT: The procedure, risks and benefits of PICC placement were discussed with the patient  in detail. All questions were answered. Informed consent was given to proceed with the procedure. MODERATE SEDATION: None CONTRAST: Omnipaque 350: 5 cc ANTIBIOTICS: None. ADDITIONAL MEDICATIONS: None. FLUOROSCOPIC TIME: 6 minutes RADIATION DOSE: Air Kerma: 19 mGy COMPLICATIONS: No immediate complications. STERILE BARRIER TECHNIQUE: Maximum sterile barrier technique was used. Cutaneous antisepsis was performed at the operative site with application of 2% chlorhexidine and a full body sterile drape. Prior to the procedure, the  and assistant performed hand hygiene and wore  hat, mask, sterile gown, and sterile gloves during the entire procedure. Ultrasound was prepped with a sterile probe cover and sterile gel was used. PROCEDURE/TECHNIQUE:   Using local anesthesia and real-time ultrasound guidance, the right brachial vein was accessed. An 018 guidewire could not be advanced beyond the axillary vein. Over the guidewire the dilator/peel-away sheath of the Bard PICC set were advanced into the brachial vein. A 5 Nigerien KMP catheter was advanced to the axillary vein. A central venogram was obtained. Using the KMP catheter, the 018 guidewires manipulated down to the superior vena cava. The 5 Nigerien PICC could not be advanced due to the irregular  subclavian stenosis. Through the KMP catheter, the guidewire was exchanged for an 035 Lobo guidewire. A 5 Nigerien, 25 cm sheath was advanced and placed with the tip at the axillary vein. The right subclavian vein was angioplastied using a 5 mm x 40 mm and less balloon. The 5 Nigerien Kumpe catheter was used to exchange the guidewire for the 018 guidewire. Sheath was exchanged for the 5 Nigerien peel-away sheath. Over the guidewire a 5 Nigerien, dual lumen Bard power PICC was advanced until tip was at the right atrium. PICC was cut to 41 cm. The lumens aspirated and flushed adequately. FINDINGS: Ultrasound demonstrates a patent and compressible right brachial vein. Images were recorded. Right central venogram demonstrates diffuse, irregular moderate stenosis throughout the right subclavian vein. The completion fluoroscopic demonstrates a right upper extremity PICC with its tip at the right atrium.     IMPRESSION:  1.  Successful right upper extremity CT injectable PICC placement. 2.  Diffuse right subclavian vein stenosis. Angioplastied to 5 mm to allow PICC placement..     KUB XR    Result Date: 9/3/2022  EXAM: XR KUB LOCATION: [facility] DATE/TIME: 9/3/2022 5:59 AM INDICATION: s p femoral line placement COMPARISON:  01/06/2020     IMPRESSION: Left-sided femoral catheter has been placed terminating in the midline at the lumbosacral junction. Visualized bowel gas pattern is nonobstructive. Postoperative changes in the thoracolumbar spine. Prior right hip resection. Diffuse osteopenia. Right lower quadrant bowel anastomotic suture line. Multiple clips over the pelvis.      declines

## 2022-09-25 NOTE — ED ADULT NURSE REASSESSMENT NOTE - NS ED NURSE REASSESS COMMENT FT1
Pt taken to bathroom in wheelchair. Brother at bedside. Pt / 95. MD Christie aware of elevated BP. Pt given amlodipine as per orders. Pt taken to bathroom in wheelchair. Brother at bedside.

## 2022-09-25 NOTE — CONSULT NOTE ADULT - ASSESSMENT
76y Male PMHx of HLD, HTN, DM2, CKD stage III, CVA and BPH brought in via EMS for abdominal pain and back pain x2 weeks. Patient reported he has intermittent abdominal pain at the umbilicus and was told by PMD to take tylenol and had some improvement of symptoms. Patient reported lower back pain since his fall two weeks ago.  pt is well known to me with hx of htn, ckd, cva who was recently dc d from the hospital after syncopal episode.  pt apparently s/p fall ?syncope 2 weeks ago ?syncope  ct scan back/ abdomen and pelvis  continue bp meds  check orthostatic  ?need of ILR  no need to repeat echo

## 2022-09-25 NOTE — ED ADULT NURSE REASSESSMENT NOTE - NS ED NURSE REASSESS COMMENT FT1
FRANCESCA Jorgensen aware of patients blood pressure. States she will consult with Dr. Vásquez regarding blood pressure management and will place an order shortly.

## 2022-09-25 NOTE — ED PROVIDER NOTE - PROGRESS NOTE DETAILS
Attending MD Torres: CT findings appreciated.  Most notable for penetrating duodenal ulcer that is at high risk for perforation as per radiology.  General surgery contacted and will see patient.  I received a call from Dr. Vásquez who will be admitting the patient on Dr. Sun's behalf.  He states she will contact gastroenterology for me.  IV PPI initiated.

## 2022-09-25 NOTE — ED PROVIDER NOTE - ATTENDING APP SHARED VISIT CONTRIBUTION OF CARE
Attending MD Torres:   I personally have seen and examined this patient. I have performed a substantive portion of the visit including all aspects of the medical decision making.   Physician assistant note reviewed and agree on plan of care and except where noted.            76 year old male with a PMHx of HLD, HTN, DM2, CKD stage III, CVA and BPH       *The above represents an initial assessment/impression. Please refer to progress notes for potential changes in patient clinical course* Attending MD Torres:   I personally have seen and examined this patient. I have performed a substantive portion of the visit including all aspects of the medical decision making.   Physician assistant note reviewed and agree on plan of care and except where noted.            76 year old male with a PMHx of HLD, HTN, DM2, CKD stage III, CVA and BPH Presenting for evaluation of abdominal pain and back pain for 2 weeks.  The patient is a very limited historian overall which is likely secondary to baseline speech difficulty.  He states may be started after a fall 2 weeks ago in which he rolled out of bed.  He cannot localize the back pain, the abdominal pain is in the mid abdomen.  He is taking Tylenol and aspirin for the pain.     On exam, no apparent external evidence of trauma.  No chest wall tenderness, no midline spinal tenderness.  No focal abdominal tenderness.  Extremities atraumatic.  Patient hypertensive to the 190s otherwise unrevealing vital signs.  Given medical complexity and difficulty assessing patient due to baseline speech difficulty, will obtain pan CT scan to rule out traumatic injury from fall.  Screening labs to rule out metabolic derangements and will reassess.      *The above represents an initial assessment/impression. Please refer to progress notes for potential changes in patient clinical course*

## 2022-09-25 NOTE — ED ADULT NURSE NOTE - OBJECTIVE STATEMENT
Patient is a 76 year old male [facility] complaining of back and abd pain s/p fall x2wks ago. Pt limited historian. Pt reports fall out of bed 2wks ago. Pt reports he followed up with PCP after the fall and was ordered to take tylenol and aspirin for pain. Pt reports pain 7/10. Patient has history of HTN, HLD, DM2, CKD, CVA, BPH. Patient is A&O x3. Denies complaints of chest pain, sob, fevers, chills, n/v/d, headache, syncope, burning urination, blood in urine, blood in stool. Skin is warm and dry. Color is consistent with ethnicity. VS documented. Safety and comfort maintained.

## 2022-09-25 NOTE — PATIENT PROFILE ADULT - FALL HARM RISK - HARM RISK INTERVENTIONS

## 2022-09-25 NOTE — CONSULT NOTE ADULT - ASSESSMENT
76y Male PMHx of HLD, HTN, DM2, CKD stage III, CVA and BPH, brought in via EMS for abdominal pain and back pain x2 weeks Patient reported he has intermittent abdominal pain at the umbilicus and was told by PMD to take tylenol and had some improvement of symptoms. Patient reported lower back pain since his fall two weeks ago.  In ER, patient is HDS. WBC and lactate WNL. CTAP showed Concentric wall thickening of the gastric pylorus with a new penetrating ulcer and nondisplaced fracture of the distal right 10th rib.    Surgery is consulted to eval the pylorus ulcer and right 10th rib fx.    Plan;  76y Male PMHx of HLD, HTN, DM2, CKD stage III, CVA and BPH, brought in via EMS for abdominal pain and back pain x2 weeks Patient reported he has intermittent abdominal pain at the umbilicus and was told by PMD to take tylenol and had some improvement of symptoms. Patient reported lower back pain since his fall two weeks ago.  In ER, patient is HDS. WBC and lactate WNL. CTAP showed Concentric wall thickening of the gastric pylorus with a new penetrating ulcer and nondisplaced fracture of the distal right 10th rib.    Surgery is consulted to eval the pylorus ulcer and right 10th rib fx.    Plan;   - subacute right 10th rib fx nondisplaced, no surgical intervention  - pylorus ulcer w/o sign of perforation, no acute surgical intervention  - f/u GI recs  - care per primary team  - surgery will f/u    Jhonny Barry PGY-2  ACS/Trauma Surgery  [id number]

## 2022-09-25 NOTE — H&P ADULT - HISTORY OF PRESENT ILLNESS
76y Male   PMHx of HLD, HTN, DM2, CKD stage III,    CVA and BPH      brought in via EMS for abdominal pain and back pain x2 weeks    Patient reported he has intermittent abdominal pain at the umbilicus and was told by PMD to take tylenol and had some improvement of symptoms.    Patient reported lower back pain since his fall two weeks ago

## 2022-09-25 NOTE — CONSULT NOTE ADULT - SUBJECTIVE AND OBJECTIVE BOX
CHIEF COMPLAINT:Patient is a 76y old  Male who presents with a chief complaint of     HPI:  76y Male PMHx of HLD, HTN, DM2, CKD stage III, CVA and BPH brought in via EMS for abdominal pain and back pain x2 weeks. Patient reported he has intermittent abdominal pain at the umbilicus and was told by PMD to take tylenol and had some improvement of symptoms. Patient reported lower back pain since his fall two weeks ago    PAST MEDICAL & SURGICAL HISTORY:  Diabetes mellitus  Type 2      High cholesterol      HTN (hypertension)      Cerebrovascular accident (CVA), unspecified mechanism  2 years ago      High triglycerides      Bladder calculus      BPH with obstruction/lower urinary tract symptoms      Ureteral calculus, left      History of appendectomy      H/O cystoscopy  and left  stent placement      H/O myringotomy  right ear in 2017          MEDICATIONS  (STANDING):  pantoprazole  Injectable 40 milliGRAM(s) IV Push Once    MEDICATIONS  (PRN):  hydrALAZINE 50 mg oral tablet: 1 tab(s) orally 3 times a day  · 	Shower Chair:   · 	amLODIPine 10 mg oral tablet: 1 tab(s) orally once a day  · 	aspirin 81 mg oral delayed release tablet: 1 tab(s) orally once a day  · 	metFORMIN 500 mg oral tablet, extended release: 4 tab(s) orally once a day with dinner  · 	Victoza 18 mg/3 mL subcutaneous solution: 1.8 milligram(s) subcutaneous once a day  · 	tamsulosin 0.4 mg oral capsule: 1 cap(s) orally once a day  · 	rosuvastatin 20 mg oral tablet: 1 tab(s) orally once a day (at bedtime)  · 	glimepiride 4 mg oral tablet: 2 tab(s) orally once a day filled 6/28/22 x 90 days  	    FAMILY HISTORY:  Family history of hypertension        SOCIAL HISTORY:    [ ] Non-smoker  [ ] Smoker  [ ] Alcohol    Allergies    No Known Allergies    Intolerances    	    REVIEW OF SYSTEMS:  CONSTITUTIONAL: No fever, weight loss, or fatigue  EYES: No eye pain, visual disturbances, or discharge  ENT:  No difficulty hearing, tinnitus, vertigo; No sinus or throat pain  NECK: No pain or stiffness  RESPIRATORY: No cough, wheezing, chills or hemoptysis; No Shortness of Breath  CARDIOVASCULAR: No chest pain, palpitations, passing out, dizziness, or leg swelling  GASTROINTESTINAL: + abdominal or epigastric pain. No nausea, vomiting, or hematemesis; No diarrhea or constipation. No melena or hematochezia.  GENITOURINARY: No dysuria, frequency, hematuria, or incontinence  NEUROLOGICAL: No headaches, memory loss, loss of strength, numbness, or tremors  SKIN: No itching, burning, rashes, or lesions   LYMPH Nodes: No enlarged glands  ENDOCRINE: No heat or cold intolerance; No hair loss  MUSCULOSKELETAL: No joint pain or swelling; No muscle, + back pain  PSYCHIATRIC: No depression, anxiety, mood swings, or difficulty sleeping  HEME/LYMPH: No easy bruising, or bleeding gums  ALLERGY AND IMMUNOLOGIC: No hives or eczema	    [x ] All others negative	  [ ] Unable to obtain    PHYSICAL EXAM:  T(C): 36.6 (09-25-22 @ 10:05), Max: 36.6 (09-25-22 @ 10:05)  HR: 82 (09-25-22 @ 10:18) (72 - 82)  BP: 156/89 (09-25-22 @ 10:18) (156/89 - 178/88)  RR: 16 (09-25-22 @ 10:18) (16 - 16)  SpO2: 97% (09-25-22 @ 10:18) (97% - 99%)  Wt(kg): --  I&O's Summary      Appearance: Normal	  HEENT:   Normal oral mucosa, PERRL, EOMI	  Lymphatic: No lymphadenopathy  Cardiovascular: Normal S1 S2, No JVD, + murmurs, No edema  Respiratory: Lungs clear to auscultation	  Gastrointestinal:  Soft, + mildly tender, + BS	  Skin: No rashes, No ecchymoses, No cyanosis	  Neurologic: speech dificulty  Extremities: Normal range of motion, No clubbing, cyanosis or edema  Vascular: Peripheral pulses palpable 2+ bilaterally    TELEMETRY: 	    ECG:  	  RADIOLOGY:  OTHER: 	  	  LABS:	 	    CARDIAC MARKERS:                              9.3    9.10  )-----------( 295      ( 25 Sep 2022 10:31 )             29.0     09-25    142  |  105  |  23  ----------------------------<  159<H>  4.0   |  25  |  1.12    Ca    9.4      25 Sep 2022 10:31    TPro  7.9  /  Alb  4.7  /  TBili  0.3  /  DBili  x   /  AST  16  /  ALT  22  /  AlkPhos  63  09-25    proBNP:   Lipid Profile:   HgA1c:   TSH:   PT/INR - ( 25 Sep 2022 10:31 )   PT: 12.1 sec;   INR: 1.04 ratio         PTT - ( 25 Sep 2022 10:31 )  PTT:32.9 sec  · EKG Date/Time: 25-Sep-2022 10:24  · Rate: 66  · Interpretation: normal sinus rhythm  · Axis: Left Deviation  · NV: 164  · QRS: 160 RBBB  · ST/T Wave: no overt acute ischemic changes  · Prior EKG Status: Unchanged when compared to ECG from 7/2022    < from: Transthoracic Echocardiogram (07.21.22 @ 10:00) >  1. Normal left ventricular internal dimensions and wall  thicknesses.  2. Hyperdynamic left ventricular systolic function.  3. Normal diastolic function  4. Normal right ventricular size and systolic function.  *** Compared with echocardiogram of 12/18/2019, no  significant changes noted.      
GENERAL SURGERY CONSULT NOTE  --------------------------------------------------------------------------------------------    Patient is a 76y old  Male who presents with a chief complaint of abd pain (25 Sep 2022 14:44)      HPI:   76y Male PMHx of HLD, HTN, DM2, CKD stage III, CVA and BPH, brought in via EMS for abdominal pain and back pain x2 weeks Patient reported he has intermittent abdominal pain at the umbilicus and was told by PMD to take tylenol and had some improvement of symptoms. Patient reported lower back pain since his fall two weeks ago.  In ER, patient is HDS. WBC and lactate WNL. CTAP showed Concentric wall thickening of the gastric pylorus with a new penetrating ulcer and nondisplaced fracture of the distal right 10th rib.    Surgery is consulted to eval the pylorus ulcer and right 10th rib fx.        ROS: 10-system review is otherwise negative except HPI above.      PAST MEDICAL & SURGICAL HISTORY:  Diabetes mellitus  Type 2      High cholesterol      HTN (hypertension)      Cerebrovascular accident (CVA), unspecified mechanism  2 years ago      High triglycerides      Bladder calculus      BPH with obstruction/lower urinary tract symptoms      Ureteral calculus, left      History of appendectomy      H/O cystoscopy  and left  stent placement      H/O myringotomy  right ear in 2017        FAMILY HISTORY:  Family history of hypertension      [] Family history not pertinent as reviewed with the patient and family    SOCIAL HISTORY:  nonsmoker, denies alcohol use    ALLERGIES: No Known Allergies      HOME MEDICATIONS:   · 	hydrALAZINE 50 mg oral tablet: 1 tab(s) orally 3 times a day  · 	Shower Chair:   · 	amLODIPine 10 mg oral tablet: 1 tab(s) orally once a day  · 	aspirin 81 mg oral delayed release tablet: 1 tab(s) orally once a day  · 	metFORMIN 500 mg oral tablet, extended release: 4 tab(s) orally once a day with dinner  · 	Victoza 18 mg/3 mL subcutaneous solution: 1.8 milligram(s) subcutaneous once a day  · 	tamsulosin 0.4 mg oral capsule: 1 cap(s) orally once a day  · 	rosuvastatin 20 mg oral tablet: 1 tab(s) orally once a day (at bedtime)  · 	glimepiride 4 mg oral tablet: 2 tab(s) orally once a day filled 6/28/22 x 90 days    CURRENT MEDICATIONS  MEDICATIONS (STANDING): amLODIPine   Tablet 10 milliGRAM(s) Oral daily  atorvastatin 80 milliGRAM(s) Oral at bedtime  dextrose 5% + sodium chloride 0.9%. 1000 milliLiter(s) IV Continuous <Continuous>  heparin   Injectable 5000 Unit(s) SubCutaneous every 12 hours  hydrALAZINE 50 milliGRAM(s) Oral three times a day  pantoprazole  Injectable 40 milliGRAM(s) IV Push once  tamsulosin 0.4 milliGRAM(s) Oral at bedtime    MEDICATIONS (PRN):  --------------------------------------------------------------------------------------------    Vitals:   T(C): 36.6 (22 @ 10:05), Max: 36.6 (22 @ 10:05)  HR: 95 (22 @ 14:00) (72 - 95)  BP: 199/95 (22 @ 14:00) (156/89 - 199/95)  RR: 16 (22 @ 14:00) (16 - 16)  SpO2: 96% (22 @ 14:00) (96% - 99%)  CAPILLARY BLOOD GLUCOSE        CAPILLARY BLOOD GLUCOSE          Height (cm): 172.7 ( @ 10:05)  Weight (kg): 77.1 ( @ 10:05)  BMI (kg/m2): 25.9 ( @ 10:05)  BSA (m2): 1.91 ( @ 10:05)    PHYSICAL EXAM:   General: NAD, Lying in bed in no distress  Neuro: A+Ox3  HEENT: NC/AT, EOMI  Neck: Soft, supple, nontender in c/t/l spine  Resp: Good effort, CTA b/l  Thorax: No chest wall tenderness  Breast: No lesions/masses, no drainage  GI/Abd: Soft, NT/ND, no rebound/guarding, no masses palpated  Vascular: All 4 extremities warm.  Skin: Intact, no breakdown  Musculoskeletal: All 4 extremities moving spontaneously, no limitations  --------------------------------------------------------------------------------------------    LABS  CBC ( @ 10:31)                              9.3<L>                         9.10    )----------------(  295        75.6  % Neutrophils, 13.5  % Lymphocytes, ANC: 6.88                                29.0<L>    BMP ( @ 10:31)             142     |  105     |  23    		Ca++ --      Ca 9.4                ---------------------------------( 159<H>		Mg --                 4.0     |  25      |  1.12  			Ph --        LFTs ( @ 10:31)      TPro 7.9 / Alb 4.7 / TBili 0.3 / DBili -- / AST 16 / ALT 22 / AlkPhos 63    Coags ( @ 10:31)  aPTT 32.9 / INR 1.04 / PT 12.1        VBG ( @ 10:24)     7.40 / 44 / 28 / 27 / 2.2 / 40.5<L>%     Lactate: 1.1    --------------------------------------------------------------------------------------------    MICROBIOLOGY  Urinalysis ( @ 12:18):     Color: Light Yellow / Appearance: Clear / S.017 / pH: 6.0 / Gluc: Negative / Ketones: Small<!> / Bili: Negative / Urobili: Negative / Protein :30 mg/dL<!> / Nitrites: Negative / Leuk.Est: Negative / RBC: 3 / WBC: 0 / Sq Epi:  / Non Sq Epi: 1 / Bacteria Negative         --------------------------------------------------------------------------------------------    IMAGING  
[facility]  Felix Avila PA-C  [address] [place] [id number]  [phone]      Chief Complaint:  Patient is a 76y old  Male who presents with a chief complaint of abdominal / back pain/Syncope (25 Sep 2022 13:28)      HPI:76y Male PMHx of HLD, HTN, DM2, CKD stage III, CVA and BPH brought in via EMS for abdominal pain and back pain x2 weeks. Patient reported he has intermittent abdominal pain at the umbilicus and was told by PMD to take tylenol and had some improvement of symptoms. Patient reported lower back pain since his fall two weeks ago.    he states he has been taking 2 full dose asa every 4-6 hours for pain    Allergies:  No Known Allergies      Medications:  amLODIPine   Tablet 10 milliGRAM(s) Oral daily  atorvastatin 80 milliGRAM(s) Oral at bedtime  dextrose 5% + sodium chloride 0.9%. 1000 milliLiter(s) IV Continuous <Continuous>  heparin   Injectable 5000 Unit(s) SubCutaneous every 12 hours  hydrALAZINE 50 milliGRAM(s) Oral three times a day  pantoprazole  Injectable 40 milliGRAM(s) IV Push once  tamsulosin 0.4 milliGRAM(s) Oral at bedtime      PMHX/PSHX:  Diabetes mellitus    High cholesterol    HTN (hypertension)    Cerebrovascular accident (CVA), unspecified mechanism    High triglycerides    Bladder calculus    BPH with obstruction/lower urinary tract symptoms    Ureteral calculus, left    No significant past surgical history    History of appendectomy    H/O cystoscopy    H/O myringotomy        Family history:  No pertinent family history in first degree relatives    Family history of hypertension        Social History:     ROS:     General:  No wt loss, fevers, chills, night sweats, fatigue,   Eyes:  Good vision, no reported pain  ENT:  No sore throat, pain, runny nose, dysphagia  CV:  No pain, palpitations, hypo/hypertension  Resp:  No dyspnea, cough, tachypnea, wheezing  GI:  No pain, No nausea, No vomiting, No diarrhea, No constipation, No weight loss, No fever, No pruritis, No rectal bleeding, No tarry stools, No dysphagia,  :  No pain, bleeding, incontinence, nocturia  Muscle:  No pain, weakness  Neuro:  No weakness, tingling, memory problems  Psych:  No fatigue, insomnia, mood problems, depression  Endocrine:  No polyuria, polydipsia, cold/heat intolerance  Heme:  No petechiae, ecchymosis, easy bruisability  Skin:  No rash, tattoos, scars, edema      PHYSICAL EXAM:   Vital Signs:  Vital Signs Last 24 Hrs  T(C): 36.6 (25 Sep 2022 10:05), Max: 36.6 (25 Sep 2022 10:05)  T(F): 97.9 (25 Sep 2022 10:05), Max: 97.9 (25 Sep 2022 10:05)  HR: 95 (25 Sep 2022 14:00) (72 - 95)  BP: 199/95 (25 Sep 2022 14:00) (156/89 - 199/95)  BP(mean): --  RR: 16 (25 Sep 2022 14:00) (16 - 16)  SpO2: 96% (25 Sep 2022 14:00) (96% - 99%)    Parameters below as of 25 Sep 2022 14:00  Patient On (Oxygen Delivery Method): room air      Daily Height in cm: 172.72 (25 Sep 2022 10:05)    Daily     GENERAL:  Appears stated age,   HEENT:  NC/AT,    CHEST:  Full & symmetric excursion,   HEART:  Regular rhythm  ABDOMEN:  Soft, non-tender, non-distended,   EXTEREMITIES:  no cyanosis,clubbing or edema  SKIN:  No rash  NEURO:  Alert,    LABS:                        9.3    9.10  )-----------( 295      ( 25 Sep 2022 10:31 )             29.0     09-25    142  |  105  |  23  ----------------------------<  159<H>  4.0   |  25  |  1.12    Ca    9.4      25 Sep 2022 10:31    TPro  7.9  /  Alb  4.7  /  TBili  0.3  /  DBili  x   /  AST  16  /  ALT  22  /  AlkPhos  63  09-25    LIVER FUNCTIONS - ( 25 Sep 2022 10:31 )  Alb: 4.7 g/dL / Pro: 7.9 g/dL / ALK PHOS: 63 U/L / ALT: 22 U/L / AST: 16 U/L / GGT: x           PT/INR - ( 25 Sep 2022 10:31 )   PT: 12.1 sec;   INR: 1.04 ratio         PTT - ( 25 Sep 2022 10:31 )  PTT:32.9 sec  Urinalysis Basic - ( 25 Sep 2022 12:18 )    Color: Light Yellow / Appearance: Clear / S.017 / pH: x  Gluc: x / Ketone: Small  / Bili: Negative / Urobili: Negative   Blood: x / Protein: 30 mg/dL / Nitrite: Negative   Leuk Esterase: Negative / RBC: 3 /hpf / WBC 0 /HPF   Sq Epi: x / Non Sq Epi: 1 /hpf / Bacteria: Negative      Amylase Serum--      Lipase serum33       Ammonia--      Imaging:

## 2022-09-25 NOTE — ED ADULT NURSE REASSESSMENT NOTE - GENERAL PATIENT STATE
Hi!  Please call this patient to set up an appointment with Viviane Reinoso either this week or next week. Evenings work best for her. Also, please set up an appointment for her to see me in two weeks after her appt with Viviane Reinoso.  I would like her to have blood tests comfortable appearance

## 2022-09-26 ENCOUNTER — RESULT REVIEW (OUTPATIENT)
Age: 77
End: 2022-09-26

## 2022-09-26 LAB
ANION GAP SERPL CALC-SCNC: 16 MMOL/L — SIGNIFICANT CHANGE UP (ref 5–17)
BUN SERPL-MCNC: 17 MG/DL — SIGNIFICANT CHANGE UP (ref 7–23)
CALCIUM SERPL-MCNC: 9.4 MG/DL — SIGNIFICANT CHANGE UP (ref 8.4–10.5)
CHLORIDE SERPL-SCNC: 107 MMOL/L — SIGNIFICANT CHANGE UP (ref 96–108)
CO2 SERPL-SCNC: 16 MMOL/L — LOW (ref 22–31)
CREAT SERPL-MCNC: 1.14 MG/DL — SIGNIFICANT CHANGE UP (ref 0.5–1.3)
EGFR: 66 ML/MIN/1.73M2 — SIGNIFICANT CHANGE UP
GLUCOSE SERPL-MCNC: 138 MG/DL — HIGH (ref 70–99)
HCT VFR BLD CALC: 25.7 % — LOW (ref 39–50)
HCT VFR BLD CALC: 29.4 % — LOW (ref 39–50)
HGB BLD-MCNC: 8.4 G/DL — LOW (ref 13–17)
HGB BLD-MCNC: 9.4 G/DL — LOW (ref 13–17)
MCHC RBC-ENTMCNC: 28.7 PG — SIGNIFICANT CHANGE UP (ref 27–34)
MCHC RBC-ENTMCNC: 29.5 PG — SIGNIFICANT CHANGE UP (ref 27–34)
MCHC RBC-ENTMCNC: 32 GM/DL — SIGNIFICANT CHANGE UP (ref 32–36)
MCHC RBC-ENTMCNC: 32.7 GM/DL — SIGNIFICANT CHANGE UP (ref 32–36)
MCV RBC AUTO: 89.9 FL — SIGNIFICANT CHANGE UP (ref 80–100)
MCV RBC AUTO: 90.2 FL — SIGNIFICANT CHANGE UP (ref 80–100)
NRBC # BLD: 0 /100 WBCS — SIGNIFICANT CHANGE UP (ref 0–0)
NRBC # BLD: 0 /100 WBCS — SIGNIFICANT CHANGE UP (ref 0–0)
PLATELET # BLD AUTO: 262 K/UL — SIGNIFICANT CHANGE UP (ref 150–400)
PLATELET # BLD AUTO: 264 K/UL — SIGNIFICANT CHANGE UP (ref 150–400)
POTASSIUM SERPL-MCNC: 4.6 MMOL/L — SIGNIFICANT CHANGE UP (ref 3.5–5.3)
POTASSIUM SERPL-SCNC: 4.6 MMOL/L — SIGNIFICANT CHANGE UP (ref 3.5–5.3)
RBC # BLD: 2.85 M/UL — LOW (ref 4.2–5.8)
RBC # BLD: 3.27 M/UL — LOW (ref 4.2–5.8)
RBC # FLD: 14.1 % — SIGNIFICANT CHANGE UP (ref 10.3–14.5)
RBC # FLD: 14.8 % — HIGH (ref 10.3–14.5)
SODIUM SERPL-SCNC: 139 MMOL/L — SIGNIFICANT CHANGE UP (ref 135–145)
WBC # BLD: 5.91 K/UL — SIGNIFICANT CHANGE UP (ref 3.8–10.5)
WBC # BLD: 5.97 K/UL — SIGNIFICANT CHANGE UP (ref 3.8–10.5)
WBC # FLD AUTO: 5.91 K/UL — SIGNIFICANT CHANGE UP (ref 3.8–10.5)
WBC # FLD AUTO: 5.97 K/UL — SIGNIFICANT CHANGE UP (ref 3.8–10.5)

## 2022-09-26 PROCEDURE — 88305 TISSUE EXAM BY PATHOLOGIST: CPT | Mod: 26

## 2022-09-26 RX ORDER — PANTOPRAZOLE SODIUM 20 MG/1
40 TABLET, DELAYED RELEASE ORAL
Refills: 0 | Status: DISCONTINUED | OUTPATIENT
Start: 2022-09-26 | End: 2022-09-28

## 2022-09-26 RX ORDER — INFLUENZA VIRUS VACCINE 15; 15; 15; 15 UG/.5ML; UG/.5ML; UG/.5ML; UG/.5ML
0.7 SUSPENSION INTRAMUSCULAR ONCE
Refills: 0 | Status: COMPLETED | OUTPATIENT
Start: 2022-09-26 | End: 2022-09-28

## 2022-09-26 RX ORDER — LIDOCAINE HCL 20 MG/ML
4 VIAL (ML) INJECTION ONCE
Refills: 0 | Status: DISCONTINUED | OUTPATIENT
Start: 2022-09-26 | End: 2022-09-28

## 2022-09-26 RX ADMIN — SODIUM CHLORIDE 70 MILLILITER(S): 9 INJECTION, SOLUTION INTRAVENOUS at 22:14

## 2022-09-26 RX ADMIN — AMLODIPINE BESYLATE 10 MILLIGRAM(S): 2.5 TABLET ORAL at 05:33

## 2022-09-26 RX ADMIN — HEPARIN SODIUM 5000 UNIT(S): 5000 INJECTION INTRAVENOUS; SUBCUTANEOUS at 18:26

## 2022-09-26 RX ADMIN — HEPARIN SODIUM 5000 UNIT(S): 5000 INJECTION INTRAVENOUS; SUBCUTANEOUS at 05:33

## 2022-09-26 RX ADMIN — Medication 10 MILLIGRAM(S): at 05:33

## 2022-09-26 RX ADMIN — PANTOPRAZOLE SODIUM 40 MILLIGRAM(S): 20 TABLET, DELAYED RELEASE ORAL at 18:25

## 2022-09-26 RX ADMIN — ATORVASTATIN CALCIUM 80 MILLIGRAM(S): 80 TABLET, FILM COATED ORAL at 21:35

## 2022-09-26 RX ADMIN — TAMSULOSIN HYDROCHLORIDE 0.4 MILLIGRAM(S): 0.4 CAPSULE ORAL at 21:36

## 2022-09-26 RX ADMIN — Medication 10 MILLIGRAM(S): at 13:19

## 2022-09-26 NOTE — PROVIDER CONTACT NOTE (OTHER) - ACTION/TREATMENT ORDERED:
15:26PM: To give blood but need T/s first. T/s taken and sent to lab. Awaiting results but pt got bed at [address]. Updated to Gilda Bragg RN.

## 2022-09-26 NOTE — PRE-OP CHECKLIST - AS BP NONINV SITE
right upper arm BIBA had seizure lasting 2-3 minutes, hx: seizures. He is a&o x 3 and interactive. Currently on Depakote and Keppra. Last seizure a month ago.

## 2022-09-26 NOTE — PRE-ANESTHESIA EVALUATION ADULT - NSANTHOSAYNRD_GEN_A_CORE
No. ANEESH screening performed.  STOP BANG Legend: 0-2 = LOW Risk; 3-4 = INTERMEDIATE Risk; 5-8 = HIGH Risk

## 2022-09-26 NOTE — PRE-ANESTHESIA EVALUATION ADULT - NSANTHAPLANRD_GEN_ALL_CORE
monitored anesthesia care (MAC)
3.7    5.55  )-----------( 166      ( 20 Jan 2020 12:30 )             14.0       01-20    137  |  98  |  17  ----------------------------<  238<H>  3.7   |  25  |  0.9    Ca    8.7      20 Jan 2020 12:30    TPro  6.5  /  Alb  3.8  /  TBili  0.5  /  DBili  x   /  AST  27  /  ALT  10  /  AlkPhos  76  01-20            PT/INR - ( 20 Jan 2020 12:30 )   PT: 14.30 sec;   INR: 1.25 ratio         PTT - ( 20 Jan 2020 12:30 )  PTT:27.2 sec    Lactate Trend

## 2022-09-27 LAB
ANION GAP SERPL CALC-SCNC: 12 MMOL/L — SIGNIFICANT CHANGE UP (ref 5–17)
BUN SERPL-MCNC: 20 MG/DL — SIGNIFICANT CHANGE UP (ref 7–23)
CALCIUM SERPL-MCNC: 8.9 MG/DL — SIGNIFICANT CHANGE UP (ref 8.4–10.5)
CHLORIDE SERPL-SCNC: 110 MMOL/L — HIGH (ref 96–108)
CO2 SERPL-SCNC: 21 MMOL/L — LOW (ref 22–31)
CREAT SERPL-MCNC: 1.27 MG/DL — SIGNIFICANT CHANGE UP (ref 0.5–1.3)
EGFR: 58 ML/MIN/1.73M2 — LOW
GLUCOSE SERPL-MCNC: 126 MG/DL — HIGH (ref 70–99)
HCT VFR BLD CALC: 29.6 % — LOW (ref 39–50)
HGB BLD-MCNC: 9.4 G/DL — LOW (ref 13–17)
MCHC RBC-ENTMCNC: 28.8 PG — SIGNIFICANT CHANGE UP (ref 27–34)
MCHC RBC-ENTMCNC: 31.8 GM/DL — LOW (ref 32–36)
MCV RBC AUTO: 90.8 FL — SIGNIFICANT CHANGE UP (ref 80–100)
NRBC # BLD: 0 /100 WBCS — SIGNIFICANT CHANGE UP (ref 0–0)
PLATELET # BLD AUTO: 261 K/UL — SIGNIFICANT CHANGE UP (ref 150–400)
POTASSIUM SERPL-MCNC: 3.5 MMOL/L — SIGNIFICANT CHANGE UP (ref 3.5–5.3)
POTASSIUM SERPL-SCNC: 3.5 MMOL/L — SIGNIFICANT CHANGE UP (ref 3.5–5.3)
RBC # BLD: 3.26 M/UL — LOW (ref 4.2–5.8)
RBC # FLD: 15.2 % — HIGH (ref 10.3–14.5)
SODIUM SERPL-SCNC: 143 MMOL/L — SIGNIFICANT CHANGE UP (ref 135–145)
SURGICAL PATHOLOGY STUDY: SIGNIFICANT CHANGE UP
WBC # BLD: 6.58 K/UL — SIGNIFICANT CHANGE UP (ref 3.8–10.5)
WBC # FLD AUTO: 6.58 K/UL — SIGNIFICANT CHANGE UP (ref 3.8–10.5)

## 2022-09-27 RX ADMIN — ATORVASTATIN CALCIUM 80 MILLIGRAM(S): 80 TABLET, FILM COATED ORAL at 22:34

## 2022-09-27 RX ADMIN — AMLODIPINE BESYLATE 10 MILLIGRAM(S): 2.5 TABLET ORAL at 05:20

## 2022-09-27 RX ADMIN — PANTOPRAZOLE SODIUM 40 MILLIGRAM(S): 20 TABLET, DELAYED RELEASE ORAL at 17:38

## 2022-09-27 RX ADMIN — HEPARIN SODIUM 5000 UNIT(S): 5000 INJECTION INTRAVENOUS; SUBCUTANEOUS at 17:38

## 2022-09-27 RX ADMIN — TAMSULOSIN HYDROCHLORIDE 0.4 MILLIGRAM(S): 0.4 CAPSULE ORAL at 22:34

## 2022-09-27 RX ADMIN — PANTOPRAZOLE SODIUM 40 MILLIGRAM(S): 20 TABLET, DELAYED RELEASE ORAL at 05:21

## 2022-09-27 RX ADMIN — HEPARIN SODIUM 5000 UNIT(S): 5000 INJECTION INTRAVENOUS; SUBCUTANEOUS at 05:22

## 2022-09-27 NOTE — PHYSICAL THERAPY INITIAL EVALUATION ADULT - ADDITIONAL COMMENTS
Pt lives with his brother in a 3rd floor apt with +elevator. Pt uses no AD indoors however uses a cane outdoors for ambulation PTA. Pt states his brother is available to assist when needed. +drives. +eyeglasses.

## 2022-09-27 NOTE — PHYSICAL THERAPY INITIAL EVALUATION ADULT - PERTINENT HX OF CURRENT PROBLEM, REHAB EVAL
Pt is a 76y Male PMHx of HLD, HTN, DM2, CKD stage III, CVA and BPH brought in via EMS for abdominal pain and back pain x2 weeks. Patient reported he has intermittent abdominal pain at the umbilicus and was told by PMD to take tylenol and had some improvement of symptoms. Patient reported lower back pain since his fall two weeks ago. (-) CTH/C-spine CT 9/25/22. Chest/Abdomen/Pelvis CT 9/25/22: Concentric wall thickening of the gastric pylorus with a new penetrating ulcer. Layering bladder stones. Nondisplaced fracture of the distal right 10th rib. No evidence of visceral organ injury.

## 2022-09-28 ENCOUNTER — TRANSCRIPTION ENCOUNTER (OUTPATIENT)
Age: 77
End: 2022-09-28

## 2022-09-28 VITALS
OXYGEN SATURATION: 95 % | HEART RATE: 78 BPM | TEMPERATURE: 98 F | RESPIRATION RATE: 18 BRPM | SYSTOLIC BLOOD PRESSURE: 161 MMHG | DIASTOLIC BLOOD PRESSURE: 82 MMHG

## 2022-09-28 LAB
ANION GAP SERPL CALC-SCNC: 12 MMOL/L — SIGNIFICANT CHANGE UP (ref 5–17)
BUN SERPL-MCNC: 27 MG/DL — HIGH (ref 7–23)
CALCIUM SERPL-MCNC: 9 MG/DL — SIGNIFICANT CHANGE UP (ref 8.4–10.5)
CHLORIDE SERPL-SCNC: 107 MMOL/L — SIGNIFICANT CHANGE UP (ref 96–108)
CO2 SERPL-SCNC: 20 MMOL/L — LOW (ref 22–31)
CREAT SERPL-MCNC: 1.38 MG/DL — HIGH (ref 0.5–1.3)
EGFR: 53 ML/MIN/1.73M2 — LOW
GLUCOSE SERPL-MCNC: 170 MG/DL — HIGH (ref 70–99)
HCT VFR BLD CALC: 28.1 % — LOW (ref 39–50)
HGB BLD-MCNC: 9.1 G/DL — LOW (ref 13–17)
MCHC RBC-ENTMCNC: 28.7 PG — SIGNIFICANT CHANGE UP (ref 27–34)
MCHC RBC-ENTMCNC: 32.4 GM/DL — SIGNIFICANT CHANGE UP (ref 32–36)
MCV RBC AUTO: 88.6 FL — SIGNIFICANT CHANGE UP (ref 80–100)
NRBC # BLD: 0 /100 WBCS — SIGNIFICANT CHANGE UP (ref 0–0)
PLATELET # BLD AUTO: 282 K/UL — SIGNIFICANT CHANGE UP (ref 150–400)
POTASSIUM SERPL-MCNC: 3.8 MMOL/L — SIGNIFICANT CHANGE UP (ref 3.5–5.3)
POTASSIUM SERPL-SCNC: 3.8 MMOL/L — SIGNIFICANT CHANGE UP (ref 3.5–5.3)
RBC # BLD: 3.17 M/UL — LOW (ref 4.2–5.8)
RBC # FLD: 14.9 % — HIGH (ref 10.3–14.5)
SODIUM SERPL-SCNC: 139 MMOL/L — SIGNIFICANT CHANGE UP (ref 135–145)
WBC # BLD: 5.43 K/UL — SIGNIFICANT CHANGE UP (ref 3.8–10.5)
WBC # FLD AUTO: 5.43 K/UL — SIGNIFICANT CHANGE UP (ref 3.8–10.5)

## 2022-09-28 PROCEDURE — P9016: CPT

## 2022-09-28 PROCEDURE — 96374 THER/PROPH/DIAG INJ IV PUSH: CPT

## 2022-09-28 PROCEDURE — 83605 ASSAY OF LACTIC ACID: CPT

## 2022-09-28 PROCEDURE — 90662 IIV NO PRSV INCREASED AG IM: CPT

## 2022-09-28 PROCEDURE — 81001 URINALYSIS AUTO W/SCOPE: CPT

## 2022-09-28 PROCEDURE — 84132 ASSAY OF SERUM POTASSIUM: CPT

## 2022-09-28 PROCEDURE — 82330 ASSAY OF CALCIUM: CPT

## 2022-09-28 PROCEDURE — 72125 CT NECK SPINE W/O DYE: CPT | Mod: MA

## 2022-09-28 PROCEDURE — 85018 HEMOGLOBIN: CPT

## 2022-09-28 PROCEDURE — 97161 PT EVAL LOW COMPLEX 20 MIN: CPT

## 2022-09-28 PROCEDURE — 36415 COLL VENOUS BLD VENIPUNCTURE: CPT

## 2022-09-28 PROCEDURE — 82962 GLUCOSE BLOOD TEST: CPT

## 2022-09-28 PROCEDURE — 82435 ASSAY OF BLOOD CHLORIDE: CPT

## 2022-09-28 PROCEDURE — 82947 ASSAY GLUCOSE BLOOD QUANT: CPT

## 2022-09-28 PROCEDURE — 86850 RBC ANTIBODY SCREEN: CPT

## 2022-09-28 PROCEDURE — 99285 EMERGENCY DEPT VISIT HI MDM: CPT | Mod: 25

## 2022-09-28 PROCEDURE — 86923 COMPATIBILITY TEST ELECTRIC: CPT

## 2022-09-28 PROCEDURE — 84295 ASSAY OF SERUM SODIUM: CPT

## 2022-09-28 PROCEDURE — 80053 COMPREHEN METABOLIC PANEL: CPT

## 2022-09-28 PROCEDURE — 88305 TISSUE EXAM BY PATHOLOGIST: CPT

## 2022-09-28 PROCEDURE — 85730 THROMBOPLASTIN TIME PARTIAL: CPT

## 2022-09-28 PROCEDURE — 70450 CT HEAD/BRAIN W/O DYE: CPT | Mod: MA

## 2022-09-28 PROCEDURE — 85014 HEMATOCRIT: CPT

## 2022-09-28 PROCEDURE — 86901 BLOOD TYPING SEROLOGIC RH(D): CPT

## 2022-09-28 PROCEDURE — 85025 COMPLETE CBC W/AUTO DIFF WBC: CPT

## 2022-09-28 PROCEDURE — 85610 PROTHROMBIN TIME: CPT

## 2022-09-28 PROCEDURE — 85027 COMPLETE CBC AUTOMATED: CPT

## 2022-09-28 PROCEDURE — 36430 TRANSFUSION BLD/BLD COMPNT: CPT

## 2022-09-28 PROCEDURE — U0003: CPT

## 2022-09-28 PROCEDURE — 74177 CT ABD & PELVIS W/CONTRAST: CPT | Mod: MA

## 2022-09-28 PROCEDURE — 80048 BASIC METABOLIC PNL TOTAL CA: CPT

## 2022-09-28 PROCEDURE — 82803 BLOOD GASES ANY COMBINATION: CPT

## 2022-09-28 PROCEDURE — 83690 ASSAY OF LIPASE: CPT

## 2022-09-28 PROCEDURE — 71260 CT THORAX DX C+: CPT | Mod: MA

## 2022-09-28 PROCEDURE — 82565 ASSAY OF CREATININE: CPT

## 2022-09-28 PROCEDURE — 86900 BLOOD TYPING SEROLOGIC ABO: CPT

## 2022-09-28 PROCEDURE — U0005: CPT

## 2022-09-28 RX ORDER — PANTOPRAZOLE SODIUM 20 MG/1
1 TABLET, DELAYED RELEASE ORAL
Qty: 60 | Refills: 0
Start: 2022-09-28 | End: 2022-10-27

## 2022-09-28 RX ADMIN — AMLODIPINE BESYLATE 10 MILLIGRAM(S): 2.5 TABLET ORAL at 05:21

## 2022-09-28 RX ADMIN — PANTOPRAZOLE SODIUM 40 MILLIGRAM(S): 20 TABLET, DELAYED RELEASE ORAL at 05:22

## 2022-09-28 RX ADMIN — INFLUENZA VIRUS VACCINE 0.7 MILLILITER(S): 15; 15; 15; 15 SUSPENSION INTRAMUSCULAR at 15:51

## 2022-09-28 RX ADMIN — HEPARIN SODIUM 5000 UNIT(S): 5000 INJECTION INTRAVENOUS; SUBCUTANEOUS at 05:21

## 2022-09-28 NOTE — DISCHARGE NOTE PROVIDER - NSDCCPCAREPLAN_GEN_ALL_CORE_FT
PRINCIPAL DISCHARGE DIAGNOSIS  Diagnosis: Peptic ulcer  Assessment and Plan of Treatment: You had and EGD - non-bleeding duodenal ulcer with no stigmata of bleeding. GI docotor recommended:   absoluetley no nsaid or asa                       - repeat EGD in 8 weeks to ensure healing      SECONDARY DISCHARGE DIAGNOSES  Diagnosis: Benign essential HTN  Assessment and Plan of Treatment: Low salt diet  Activity as tolerated.  Take all medication as prescribed.  Follow up with your medical doctor for routine blood pressure monitoring at your next visit.  Notify your doctor if you have any of the following symptoms:   Dizziness, Lightheadedness, Blurry vision, Headache, Chest pain, Shortness of breath      Diagnosis: Diabetes  Assessment and Plan of Treatment:   Make sure you get your HgA1c checked every three months.  If you take oral diabetes medications, check your blood glucose two times a day.  If you take insulin, check your blood glucose before meals and at bedtime.  It's important not to skip any meals.  Keep a log of your blood glucose results and always take it with you to your doctor appointments.  Keep a list of your current medications including injectables and over the counter medications and bring this medication list with you to all your doctor appointments.  If you have not seen your ophthalmologist this year call for appointment.  Check your feet daily for redness, sores, or openings. Do not self treat. If no improvement in two days call your primary care physician for an appointment.  Low blood sugar (hypoglycemia) is a blood sugar below 70mg/dl. Check your blood sugar if you feel signs/symptoms of hypoglycemia. If your blood sugar is below 70 take 15 grams of carbohydrates (ex 4 oz of apple juice, 3-4 glucose tablets, or 4-6 oz of regular soda) wait 15 minutes and repeat blood sugar to make sure it comes up above 70.  If your blood sugar is above 70 and you are due for a meal, have a meal.  If you are not due for a meal have a snack.  This snack helps keeps your blood sugar at a safe range.      Diagnosis: Fall  Assessment and Plan of Treatment: Imaging were negative for any fractures  except : Nondisplaced fracture of the distal right 10th rib no suurgical intervention   Causes of fall may be due to illness, change in the medicines you take, unsafe or unfamiliar setting and conditions that affect eyesight, hearing, muscle strength, or balance.                                                            Certain medicines used for sleep, anxiety, or depression can cause falls. Adding new medicines, or changing doses of some medicines, can also affect your risk of falling. Your doctor might switch you to a different medicine.                                        Prevent falls by make your home safer – To avoid falling at home, get rid of things that might make you trip or slip. This might include furniture, electrical cords, clutter, and loose rugs. Keep your home well lit to avoid falls or accidents. Avoid storing things in high places so you don't have to reach or climb.                             Wear sturdy shoes that fit well – Wearing shoes with high heels or slippery soles, or shoes that are too loose, can lead to falls.                                                                                                                       Take vitamin D pills which might lower the risk of falls in older people. This is because vitamin D helps make bones and muscles stronger.                                                                                                                   Use a cane, walker, and other safety devices as these devices help you avoid falling, too. These include grab bars or a sturdy seat for the shower, non-slip bath mats, and hand rails or treads for the stairs (to prevent slipping). If you worry that you could fall, there are also alarm buttons that let you call for help if you fall and can't get up.   It is important to tell your doctor about any times you have fallen or almost fallen.  Seek immediate help for pain or injury after a fall.

## 2022-09-28 NOTE — DISCHARGE NOTE PROVIDER - NSDCFUSCHEDAPPT_GEN_ALL_CORE_FT
[facility]  Urology [address] [place] [address] R  Scheduled Appointment: 10/20/2022    Troy Rae  [facility]  Urology [address] [place] [address] R  Scheduled Appointment: 10/20/2022

## 2022-09-28 NOTE — PROGRESS NOTE ADULT - ASSESSMENT
76 year old male     h/o    HLD, HTN, DM2, CKD stage III, CVA and BPH  h/o   bladder calculi, 2mm right ureteral calculus with forniceal rupture., on ct  2019,  urologist Dr. Rae     h/o    anxiety   panic  attacks,   with  dizziness,   card  d r carmelo       now  admitted  s/p fall at home, now  has  abd  and back pain , has  resolved   CT  A/p,  gastric pylorus, wall thickening, with ne w penetrating ulcer.  bladder stones. Fx  R  10th  rib/  no perforation   hold  asa.  gi  dr hanley,   on protonix   HTN/  HLD    on  amlodipine,  hydralazine      h/o normal  ef   DM,  follow  fs   CKD / now crt is  1.1   c/c  anemia   R ureteral calculus /  ct 2019    remains  a fall risk. with  gait  imbalance/   aging  HTN,   norvasc,  hydralazine  EGD,  non bleeding  DU    on clears, ,  follow  hb/ protonix        rad< from: CT Chest w/ IV Cont (09.25.22 @ 12:51) >  IMPRESSION:  1. Concentric wall thickening of the gastric pylorus with a new   penetrating ulcer. Recommend GI consultation.  2. Layering bladder stones.  3. Nondisplaced fracture of the distal right 10th rib.  4. No evidence of visceral organ injury.  5. Results discussed with Dr. Torres at time of interpretation.  --- End of Report ---                  
labs are ok, see meds see notes disc c pt, called brother, wants to go home, no fuurther wuin hosp so he can go and fu c me in office  fall - can get hoime PT  Fx R 10th rib, no asa no nsaids, tylenol ok - he knows - I told him  abd paiun res - 2/2 asa?  Anemia - acute blood loss biut not severe  Duopd ulcer - PPI  Hx GERD  ASHD HTN - has meds  HLD - statin  DMII has  medas - well enuf controlled, A1c ok July does not need repeat now  BPH  Bladderstones - can fu uro outpt  Hx Anxiety  Hx DepRession - situational  Hx Dev D, stutters but cogn intact - gets help - lives c brother  will dc  >45 min spentv onthis encounter and will still have to complete the process  
   	  76 year old male     h/o    HLD, HTN, DM2, CKD stage III, CVA and BPH  h/o   bladder calculi, 2mm right ureteral calculus with forniceal rupture., on ct  2019,  urologist Dr. Rae     h/o    anxiety   panic  attacks,   with  dizziness,   card  d r carmelo       now  admitted  s/p fall at home, now  has  abd  and back pain , has  resolved   CT  A/p,  gastric pylorus, wall thickening, with ne w penetrating ulcer.  bladder stones. Fx  R  10th  rib/  no perforation   hold  asa.  gi  dr hanley,   on protonix   HTN/  HLD    on  amlodipine,  hydralazine      h/o normal  ef   DM,  follow  fs   CKD / now crt is  1.1   c/c  anemia   R ureteral calculus /  ct 2019    remains  a fall risk. with  gait  imbalance/   aging   on dvt  ppx   follow  hb/  awaiting   egd/  npo  HTN,  iv hydralazine        rad< from: CT Chest w/ IV Cont (09.25.22 @ 12:51) >  IMPRESSION:  1. Concentric wall thickening of the gastric pylorus with a new   penetrating ulcer. Recommend GI consultation.  2. Layering bladder stones.  3. Nondisplaced fracture of the distal right 10th rib.  4. No evidence of visceral organ injury.  5. Results discussed with Dr. Torres at time of interpretation.  --- End of Report ---                  
can be dc home c home care to start PT the fu outpt as needed, will fu c me in office  Fall - needs PT  Fx R 10th ribb - see notes - ok  abd pain - res  GIB - duid ulcer - 2/2 ASA - knows to avoid NSAIDS now  Hx GERD Hx DD  anemia stable  ASHD HTN HLD - see meds  DMII hosp protocol , controlled - fu outpt - sees endo also  BPH - can fu Uro outpt - does  Hx Anx Depr (sit) panic attacks (rare)  stutters - some Hx mild DEv D  > 45 min spent on this encounter and still will hav to complete DC nltes etc  see Provider DC note also  
duodenal ulcer    Advance diet as tolerated  proton pump inhibitor bid  repeat  upper gastrointestinal endoscopy in 8 weeks  dc planning per primary  d/w patient  d/w family    I reviewed the overnight course of events on the unit, re-confirming the patient history. I discussed the care with the patient and their family  The plan of care was discussed with the physician assistant and modifications were made to the notation where appropriate.   Differential diagnosis and plan of care discussed with patient after the evaluation  35 minutes spent on total encounter of which more than fifty percent of the encounter was spent counseling and/or coordinating care by the attending physician.  Advanced care planning was discussed with patient and family.  Advanced care planning forms were reviewed and discussed.  Risks, benefits and alternatives of gastroenterologic procedures were discussed in detail and all questions were answered.  
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    
76y Male PMHx of HLD, HTN, DM2, CKD stage III, CVA and BPH, brought in via EMS for abdominal pain and back pain x2 weeks Patient reported he has intermittent abdominal pain at the umbilicus and was told by PMD to take tylenol and had some improvement of symptoms. Patient reported lower back pain since his fall two weeks ago.  In ER, patient is HDS. WBC and lactate WNL. CTAP showed Concentric wall thickening of the gastric pylorus with a new penetrating ulcer and nondisplaced fracture of the distal right 10th rib.    Plan;   - subacute right 10th rib fx nondisplaced, no surgical intervention  - pylorus ulcer w/o sign of perforation, no acute surgical intervention  - f/u GI recs  - care per primary team    Contact surgery with any questions    Trauma Surgery Team  [phone]    
duodenal ulcer    Advance diet as tolerated  proton pump inhibitor bid  repeat  upper gastrointestinal endoscopy in 8 weeks  dc planning per primary  d/w patient  d/w family    I reviewed the overnight course of events on the unit, re-confirming the patient history. I discussed the care with the patient and their family  The plan of care was discussed with the physician assistant and modifications were made to the notation where appropriate.   Differential diagnosis and plan of care discussed with patient after the evaluation  35 minutes spent on total encounter of which more than fifty percent of the encounter was spent counseling and/or coordinating care by the attending physician.  Advanced care planning was discussed with patient and family.  Advanced care planning forms were reviewed and discussed.  Risks, benefits and alternatives of gastroenterologic procedures were discussed in detail and all questions were answered.

## 2022-09-28 NOTE — PROGRESS NOTE ADULT - PROVIDER SPECIALTY LIST ADULT
Cardiology
Family Medicine
Family Medicine
Gastroenterology
Gastroenterology
Cardiology
Cardiology
Family Medicine
Trauma Surgery
Internal Medicine
Internal Medicine

## 2022-09-28 NOTE — DISCHARGE NOTE PROVIDER - HOSPITAL COURSE
76 year old male     h/o    HLD, HTN, DM2, CKD stage III, CVA and BPH  h/o   bladder calculi, 2mm right ureteral calculus with forniceal rupture., on ct  2019,  urologist Dr. Rae     h/o    anxiety   panic  attacks,   with  dizziness,   card  d r carmelo       now  admitted  s/p fall at home, now  has  abd  and back pain , has  resolved   CT  A/p,  gastric pylorus, wall thickening, with ne w penetrating ulcer.  bladder stones. Fx  R  10th  rib/  no perforation   hold  asa.  gi  dr hanley,   on protonix   HTN/  HLD    on  amlodipine,  hydralazine      h/o normal  ef   DM,  follow  fs   CKD / now crt is  1.1   c/c  anemia   R ureteral calculus /  ct 2019    remains  a fall risk. with  gait  imbalance/   aging  HTN,   norvasc,  hydralazine  EGD,  non bleeding  DU      Patient tolerating PO intake, hgb stable; Dr. Sun has medically cleared pt to be discharge home with follow-up as advised.    76 year old male     h/o    HLD, HTN, DM2, CKD stage III, CVA and BPH, bladder calculi, 2mm right ureteral calculus with forniceal rupture., on ct  2019,  urologist Dr. Rae,    anxiety   panic  attacks,   with  dizziness->   ards Dr. De La Fuente.  Admitted  s/p fall at home, now  has  abd  and back pain .      Adnominal Pain     CT  A/p,  gastric pylorus, wall thickening, with new penetrating ulcer.  bladder stones. Fx  R  10th  rib/  no perforation   GI - see n by Dr. Grider - rec ->hold  asa. on protonix  s/p EGD,    Normal esophagus.                       - Gastritis. Biopsied.                       - One non-bleeding duodenal ulcer with no stigmata of bleeding.  Recommendation:      - [facility].                       - Full liquid diet- now advanced to regular diet                        - IV ppi bid                       - absoluetley no nsaid or asa                       - repeat EGD in 8 weeks to ensure healing      HTN/  HLD  on  amlodipine,  hydralazine    h/o normal  ef     DM,    follow  fs- continue with oral meds upon discharge      CKD   stable     Anemia   stable     Fall   imaging negative for fracture , except ->Nondisplaced fracture of the distal right 10th rib.  PT rec outpt PT         Patient tolerating PO intake, hgb stable; Dr. Sun has medically cleared pt to be discharge home with follow-up as advised.    76 year old male     h/o    HLD, HTN, DM2, CKD stage III, CVA and BPH, bladder calculi, 2mm right ureteral calculus with forniceal rupture., on ct  2019,  urologist Dr. Rae,    anxiety   panic  attacks,   with  dizziness->   cards Dr. De La Fuente.  Admitted  s/p fall at home, now  has  abd  and back pain .      Adnominal Pain     CT  A/p,  gastric pylorus, wall thickening, with new penetrating ulcer.  bladder stones. Fx  R  10th  rib/  no perforation   GI - see n by Dr. Grider - rec ->hold  asa. on protonix  s/p EGD,    Normal esophagus.                       - Gastritis. Biopsied.                       - One non-bleeding duodenal ulcer with no stigmata of bleeding.  Recommendation:      - [facility] to [facility] for ongoing care.                       - Full liquid diet- now advanced to regular diet                        - IV ppi bid                       - absoluetley no nsaid or asa                       - repeat EGD in 8 weeks to ensure healing      HTN/  HLD  on  amlodipine,  hydralazine    h/o normal  ef     DM,    follow  fs- continue with oral meds upon discharge      CKD   stable     Anemia   stable     Fall   imaging negative for fracture , except ->Nondisplaced fracture of the distal right 10th rib.  PT rec outpt PT         Patient tolerating PO intake, hgb stable; Dr. Sun has medically cleared pt to be discharge home with follow-up as advised.    76 year old male     h/o    HLD, HTN, DM2, CKD stage III, CVA and BPH, bladder calculi, 2mm right ureteral calculus with forniceal rupture., on ct  2019,  urologist Dr. Rae,    anxiety   panic  attacks,   with  dizziness->   cards Dr. De La Fuente.  Admitted  s/p fall at home, now  has  abd  and back pain .      Adnominal Pain     CT  A/p,  gastric pylorus, wall thickening, with new penetrating ulcer.  bladder stones. Fx  R  10th  rib/  no perforation   GI - see n by Dr. Grider - rec ->hold  asa. on protonix  s/p EGD,    Normal esophagus.                       - Gastritis. Biopsied.                       - One non-bleeding duodenal ulcer with no stigmata of bleeding.  - absoluetley no nsaid or asa                       - repeat EGD in 8 weeks to ensure healing      HTN/  HLD  on  amlodipine,  hydralazine    h/o normal  ef     DM,    follow  fs- continue with oral meds upon discharge      CKD   stable     Anemia   stable     Fall   imaging negative for fracture , except ->Nondisplaced fracture of the distal right 10th rib.  PT rec outpt PT         Patient tolerating PO intake, hgb stable; Dr. Sun has medically cleared pt to be discharge home with follow-up as advised.    76 year old male     h/o    HLD, HTN, DM2, CKD stage III, CVA and BPH, bladder calculi, 2mm right ureteral calculus with forniceal rupture., on ct  2019,  urologist Dr. Rae,    anxiety   panic  attacks,   with  dizziness->   cards Dr. De La Fuente.  Admitted  s/p fall at home, now  has  abd  and back pain .      Adnominal Pain     CT  A/p,  gastric pylorus, wall thickening, with new penetrating ulcer.  bladder stones. Fx  R  10th  rib/  no perforation   GI - see n by Dr. Grider - rec ->hold  asa. on protonix  s/p EGD,    Normal esophagus.                       - Gastritis. Biopsied.                       - One non-bleeding duodenal ulcer with no stigmata of bleeding.  - absoluetley no nsaid or asa                       - repeat EGD in 8 weeks to ensure healing      HTN/  HLD  on  amlodipine,  hydralazine    h/o normal  ef     DM,    follow  fs- continue with oral meds upon discharge      CKD   stable     Anemia   stable     Fall   imaging negative for fracture , except ->Nondisplaced fracture of the distal right 10th rib.  PT rec outpt PT         Patient tolerating PO intake, hgb stable; Dr. Sun has medically cleared pt to be discharge home with follow-up as advised.   disc c NP Betsy and pt, will fu in office c/in 7 days. Home PT to start

## 2022-09-28 NOTE — DISCHARGE NOTE NURSING/CASE MANAGEMENT/SOCIAL WORK - PATIENT PORTAL LINK FT
You can access the FollowMyHealth Patient Portal offered by [facility] by registering at the following website: [email or web address]. By joining [facility]’s FollowMyHealth portal, you will also be able to view your health information using other applications (apps) compatible with our system.

## 2022-09-28 NOTE — DISCHARGE NOTE PROVIDER - PROVIDER TOKENS
PROVIDER:[TOKEN:[[id number]]],PROVIDER:[TOKEN:[[id number]]] PROVIDER:[TOKEN:[[id number]]],PROVIDER:[TOKEN:[[id number]]],PROVIDER:[TOKEN:[[id number]]]

## 2022-09-28 NOTE — DISCHARGE NOTE PROVIDER - NSDCMRMEDTOKEN_GEN_ALL_CORE_FT
amLODIPine 10 mg oral tablet: 1 tab(s) orally once a day  aspirin 81 mg oral delayed release tablet: 1 tab(s) orally once a day  glimepiride 4 mg oral tablet: 2 tab(s) orally once a day filled 6/28/22 x 90 days    NOTE: [facility] ALSO FILLED 2MG 3 TABS AT BEDTIME ON 5/29/22 X 90 DAYS       hydrALAZINE 50 mg oral tablet: 1 tab(s) orally 3 times a day  metFORMIN 500 mg oral tablet, extended release: 4 tab(s) orally once a day with dinner  rosuvastatin 20 mg oral tablet: 1 tab(s) orally once a day (at bedtime)  Shower Chair:   tamsulosin 0.4 mg oral capsule: 1 cap(s) orally once a day  Victoza 18 mg/3 mL subcutaneous solution: 1.8 milligram(s) subcutaneous once a day   amLODIPine 10 mg oral tablet: 1 tab(s) orally once a day  glimepiride 4 mg oral tablet: 2 tab(s) orally once a day filled 6/28/22 x 90 days    NOTE: [facility]SO FILLED 2MG 3 TABS AT BEDTIME ON 5/29/22 X 90 DAYS       metFORMIN 500 mg oral tablet, extended release: 4 tab(s) orally once a day with dinner  Protonix 40 mg oral delayed release tablet: 1 tab(s) orally 2 times a day   rosuvastatin 20 mg oral tablet: 1 tab(s) orally once a day (at bedtime)  Shower Chair:   tamsulosin 0.4 mg oral capsule: 1 cap(s) orally once a day  Victoza 18 mg/3 mL subcutaneous solution: 1.8 milligram(s) subcutaneous once a day   amLODIPine 10 mg oral tablet: 1 tab(s) orally once a day  glimepiride 4 mg oral tablet: 2 tab(s) orally once a day filled 6/28/22 x 90 days    NOTE: [facility]SO FILLED 2MG 3 TABS AT BEDTIME ON 5/29/22 X 90 DAYS       metFORMIN 500 mg oral tablet, extended release: 4 tab(s) orally once a day with dinner  outpatient physical therapy:   Protonix 40 mg oral delayed release tablet: 1 tab(s) orally 2 times a day   rosuvastatin 20 mg oral tablet: 1 tab(s) orally once a day (at bedtime)  Shower Chair:   tamsulosin 0.4 mg oral capsule: 1 cap(s) orally once a day  Victoza 18 mg/3 mL subcutaneous solution: 1.8 milligram(s) subcutaneous once a day

## 2022-09-28 NOTE — GOALS OF CARE CONVERSATION - ADVANCED CARE PLANNING - CONVERSATION DETAILS
This alert and oriented x 4 patient was seen today for goals of care planning and conversation. Introduced self and role of PCE. Patient states understanding of health status  and prognosis. Patient states he  lives with his brother Adam  and has been independent with all aspects of ADLs prior to admission. Patient educated on choosing a Health Care Agent, Patient watched informational video on choosing a health care agent. Patient states his brother Adam  would probably be the best decision maker however he has several niecies and nephews and would like to discuss with them prior to completing form. HCP form given to patient.    CPR , intubation, artificial nutrition  procedures and possible complications explained. Patient reports that he would like to be Full Code. Patient states he would like to receive CPR/Intubation as well as artificial nutrition.  Patient aware he will remain FULL CODE     Contact information for further needs provided.  No Anabaptism exemptions noted.      Sol Stewart  MSN -ED RN OCN     [phone] This alert and oriented x 4 patient was seen today for goals of care planning and conversation. Introduced self and role of PCE. Patient states understanding of health status  and prognosis. Patient states he  lives with his brother Adam  and has been independent with all aspects of ADLs prior to admission. Patient educated on choosing a Health Care Agent, Patient watched informational video on choosing a health care agent. Patient states his brother Adam  would probably be the best decision maker however he has several niecies and nephews and would like to discuss with them prior to completing form. HCP form given to patient.    CPR , intubation, artificial nutrition  procedures and possible complications explained. Patient reports that he would like to be Full Code. Patient states he would like to receive CPR/Intubation as well as artificial nutrition. Explained hospital base Full Code policy  Patient aware he will remain FULL CODE     Contact information for further needs provided.  No Yarsanism exemptions noted.      Sol Stewart  MSN -ED RN OCN     [phone]

## 2022-09-28 NOTE — PROGRESS NOTE ADULT - SUBJECTIVE AND OBJECTIVE BOX
CARDIOLOGY     PROGRESS  NOTE   ________________________________________________    CHIEF COMPLAINT:Patient is a 76y old  Male who presents with a chief complaint of abd pain (27 Sep 2022 06:48)  no complain.  	  REVIEW OF SYSTEMS:  CONSTITUTIONAL: No fever, weight loss, or fatigue  EYES: No eye pain, visual disturbances, or discharge  ENT:  No difficulty hearing, tinnitus, vertigo; No sinus or throat pain  NECK: No pain or stiffness  RESPIRATORY: No cough, wheezing, chills or hemoptysis; no Shortness of Breath  CARDIOVASCULAR: No chest pain, palpitations, passing out, dizziness, or leg swelling  GASTROINTESTINAL: No abdominal or epigastric pain. No nausea, vomiting, or hematemesis; No diarrhea or constipation. No melena or hematochezia.  GENITOURINARY: No dysuria, frequency, hematuria, or incontinence  NEUROLOGICAL: No headaches, memory loss, loss of strength, numbness, or tremors  SKIN: No itching, burning, rashes, or lesions   LYMPH Nodes: No enlarged glands  ENDOCRINE: No heat or cold intolerance; No hair loss  MUSCULOSKELETAL: No joint pain or swelling; No muscle, back, or extremity pain  PSYCHIATRIC: No depression, anxiety, mood swings, or difficulty sleeping  HEME/LYMPH: No easy bruising, or bleeding gums  ALLERGY AND IMMUNOLOGIC: No hives or eczema	    [ ] All others negative	  [ ] Unable to obtain    PHYSICAL EXAM:  T(C): 36.3 (09-27-22 @ 09:42), Max: 36.8 (09-26-22 @ 11:17)  HR: 69 (09-27-22 @ 09:42) (66 - 94)  BP: 159/80 (09-27-22 @ 09:42) (84/59 - 159/80)  RR: 18 (09-27-22 @ 09:42) (16 - 20)  SpO2: 98% (09-27-22 @ 09:42) (95% - 100%)  Wt(kg): --  I&O's Summary      Appearance: Normal	  HEENT:   Normal oral mucosa, PERRL, EOMI	  Lymphatic: No lymphadenopathy  Cardiovascular: Normal S1 S2, No JVD, + murmurs, No edema  Respiratory: rhonchi  Gastrointestinal:  Soft, Non-tender, + BS	  Skin: No rashes, No ecchymoses, No cyanosis	  Neurologic: Non-focal  Extremities: Normal range of motion, No clubbing, cyanosis or edema  Vascular: Peripheral pulses palpable 2+ bilaterally    MEDICATIONS  (STANDING):  amLODIPine   Tablet 10 milliGRAM(s) Oral daily  atorvastatin 80 milliGRAM(s) Oral at bedtime  heparin   Injectable 5000 Unit(s) SubCutaneous every 12 hours  influenza  Vaccine (HIGH DOSE) 0.7 milliLiter(s) IntraMuscular once  lidocaine 4% Injection for Nebulization 4 milliLiter(s) Nebulizer once  pantoprazole  Injectable 40 milliGRAM(s) IV Push two times a day  tamsulosin 0.4 milliGRAM(s) Oral at bedtime      TELEMETRY: 	    ECG:  	  RADIOLOGY:  OTHER: 	  	  LABS:	 	    CARDIAC MARKERS:                                9.4    6.58  )-----------( 261      ( 27 Sep 2022 07:18 )             29.6     09-27    143  |  110<H>  |  20  ----------------------------<  126<H>  3.5   |  21<L>  |  1.27    Ca    8.9      27 Sep 2022 07:18      proBNP:   Lipid Profile:   HgA1c:   TSH:     gastric ulcer  abdominal pain    clear liquid diet  iv proton pump inhibitor bid  npo p mn for possible  upper gastrointestinal endoscopy tommorow pending endoscopy availability  patient and brother aware of potential risk of perforation requiring surgery given ct findings and agree to proceed    Assessment and plan  ---------------------------  76y Male PMHx of HLD, HTN, DM2, CKD stage III, CVA and BPH brought in via EMS for abdominal pain and back pain x2 weeks. Patient reported he has intermittent abdominal pain at the umbilicus and was told by PMD to take tylenol and had some improvement of symptoms. Patient reported lower back pain since his fall two weeks ago.  pt is well known to me with hx of htn, ckd, cva who was recently dc d from the hospital after syncopal episode.  pt apparently s/p fall ?syncope 2 weeks ago ?syncope  ct scan back/ abdomen and pelvis  continue bp meds  check orthostatic, awaiting  no need to repeat echo  dc hydralazine  decrease hgb , gi noted     	        
           CARDIOLOGY     PROGRESS  NOTE   ________________________________________________    CHIEF COMPLAINT:Patient is a 76y old  Male who presents with a chief complaint of abd pain (28 Sep 2022 09:52)  no complain.  	  REVIEW OF SYSTEMS:  CONSTITUTIONAL: No fever, weight loss, or fatigue  EYES: No eye pain, visual disturbances, or discharge  ENT:  No difficulty hearing, tinnitus, vertigo; No sinus or throat pain  NECK: No pain or stiffness  RESPIRATORY: No cough, wheezing, chills or hemoptysis; No Shortness of Breath  CARDIOVASCULAR: No chest pain, palpitations, passing out, dizziness, or leg swelling  GASTROINTESTINAL: No abdominal or epigastric pain. No nausea, vomiting, or hematemesis; No diarrhea or constipation. No melena or hematochezia.  GENITOURINARY: No dysuria, frequency, hematuria, or incontinence  NEUROLOGICAL: No headaches, memory loss, loss of strength, numbness, or tremors  SKIN: No itching, burning, rashes, or lesions   LYMPH Nodes: No enlarged glands  ENDOCRINE: No heat or cold intolerance; No hair loss  MUSCULOSKELETAL: No joint pain or swelling; No muscle, back, or extremity pain  PSYCHIATRIC: No depression, anxiety, mood swings, or difficulty sleeping  HEME/LYMPH: No easy bruising, or bleeding gums  ALLERGY AND IMMUNOLOGIC: No hives or eczema	    [x ] All others negative	  [ ] Unable to obtain    PHYSICAL EXAM:  T(C): 37.2 (09-28-22 @ 09:06), Max: 37.2 (09-28-22 @ 09:06)  HR: 77 (09-28-22 @ 09:06) (70 - 77)  BP: 146/76 (09-28-22 @ 09:06) (143/68 - 163/77)  RR: 18 (09-28-22 @ 09:06) (18 - 18)  SpO2: 97% (09-28-22 @ 09:06) (96% - 100%)  Wt(kg): --  I&O's Summary    27 Sep 2022 07:01  -  28 Sep 2022 07:00  --------------------------------------------------------  IN: 0 mL / OUT: 2 mL / NET: -2 mL    28 Sep 2022 07:01  -  28 Sep 2022 10:36  --------------------------------------------------------  IN: 240 mL / OUT: 0 mL / NET: 240 mL        Appearance: Normal	  HEENT:   Normal oral mucosa, PERRL, EOMI	  Lymphatic: No lymphadenopathy  Cardiovascular: Normal S1 S2, No JVD, + murmurs, No edema  Respiratory: Lungs clear to auscultation	  Psychiatry: A & O x 3, Mood & affect appropriate  Gastrointestinal:  Soft, Non-tender, + BS	  Skin: No rashes, No ecchymoses, No cyanosis	  Neurologic: Non-focal  Extremities: Normal range of motion, No clubbing, cyanosis or edema  Vascular: Peripheral pulses palpable 2+ bilaterally    MEDICATIONS  (STANDING):  amLODIPine   Tablet 10 milliGRAM(s) Oral daily  atorvastatin 80 milliGRAM(s) Oral at bedtime  heparin   Injectable 5000 Unit(s) SubCutaneous every 12 hours  influenza  Vaccine (HIGH DOSE) 0.7 milliLiter(s) IntraMuscular once  lidocaine 4% Injection for Nebulization 4 milliLiter(s) Nebulizer once  pantoprazole  Injectable 40 milliGRAM(s) IV Push two times a day  tamsulosin 0.4 milliGRAM(s) Oral at bedtime      TELEMETRY: 	    ECG:  	  RADIOLOGY:  OTHER: 	  	  LABS:	 	    CARDIAC MARKERS:                                9.1    5.43  )-----------( 282      ( 28 Sep 2022 06:52 )             28.1     09-28    139  |  107  |  27<H>  ----------------------------<  170<H>  3.8   |  20<L>  |  1.38<H>    Ca    9.0      28 Sep 2022 06:52      proBNP:   Lipid Profile:   HgA1c:   TSH:   proton pump inhibitor bid  repeat  upper gastrointestinal endoscopy in 8 weeks  dc planning per primary  d/w patient  d/w family      Assessment and plan  ---------------------------  76y Male PMHx of HLD, HTN, DM2, CKD stage III, CVA and BPH brought in via EMS for abdominal pain and back pain x2 weeks. Patient reported he has intermittent abdominal pain at the umbilicus and was told by PMD to take tylenol and had some improvement of symptoms. Patient reported lower back pain since his fall two weeks ago.  pt is well known to me with hx of htn, ckd, cva who was recently dc d from the hospital after syncopal episode.  pt apparently s/p fall ?syncope 2 weeks ago ?syncope  ct scan back/ abdomen and pelvis  continue bp meds  check orthostatic, awaiting  no need to repeat echo  dc hydralazine  decrease hgb , gi noted   fu hgb    	        
  [facility]    REVIEW OF SYSTEMS:  GEN: no fever,    no chills  RESP: no SOB,   no cough  CVS: no chest pain,   no palpitations  GI: no abdominal pain,   no nausea,   no vomiting,   no constipation,   no diarrhea  : no dysuria,   no frequency  NEURO: no headache,   no dizziness  PSYCH: no depression,   not anxious  Derm : no rash    MEDICATIONS  (STANDING):  amLODIPine   Tablet 10 milliGRAM(s) Oral daily  atorvastatin 80 milliGRAM(s) Oral at bedtime  dextrose 5% + sodium chloride 0.9%. 1000 milliLiter(s) (70 mL/Hr) IV Continuous <Continuous>  heparin   Injectable 5000 Unit(s) SubCutaneous every 12 hours  hydrALAZINE Injectable 10 milliGRAM(s) IV Push three times a day  tamsulosin 0.4 milliGRAM(s) Oral at bedtime    MEDICATIONS  (PRN):      Vital Signs Last 24 Hrs  T(C): 36.6 (26 Sep 2022 04:20), Max: 36.8 (25 Sep 2022 18:48)  T(F): 97.8 (26 Sep 2022 04:20), Max: 98.2 (25 Sep 2022 18:48)  HR: 74 (26 Sep 2022 04:20) (67 - 95)  BP: 148/79 (26 Sep 2022 04:20) (148/79 - 199/95)  BP(mean): --  RR: 18 (26 Sep 2022 04:20) (16 - 18)  SpO2: 97% (26 Sep 2022 04:20) (95% - 99%)    Parameters below as of 26 Sep 2022 04:20  Patient On (Oxygen Delivery Method): room air      CAPILLARY BLOOD GLUCOSE      POCT Blood Glucose.: 140 mg/dL (25 Sep 2022 17:20)    I&O's Summary      PHYSICAL EXAM:  HEAD:  Atraumatic, Normocephalic  NECK: Supple, No   JVD  CHEST/LUNG:   no     rales,     no,    rhonchi  HEART: Regular rate and rhythm;         murmur  ABDOMEN: Soft, Nontender, ;   EXTREMITIES:        edema  NEUROLOGY:  alert    LABS:                        9.3    9.10  )-----------( 295      ( 25 Sep 2022 10:31 )             29.0     09-25    142  |  105  |  23  ----------------------------<  159<H>  4.0   |  25  |  1.12    Ca    9.4      25 Sep 2022 10:31    TPro  7.9  /  Alb  4.7  /  TBili  0.3  /  DBili  x   /  AST  16  /  ALT  22  /  AlkPhos  63      PT/INR - ( 25 Sep 2022 10:31 )   PT: 12.1 sec;   INR: 1.04 ratio         PTT - ( 25 Sep 2022 10:31 )  PTT:32.9 sec      Urinalysis Basic - ( 25 Sep 2022 12:18 )    Color: Light Yellow / Appearance: Clear / S.017 / pH: x  Gluc: x / Ketone: Small  / Bili: Negative / Urobili: Negative   Blood: x / Protein: 30 mg/dL / Nitrite: Negative   Leuk Esterase: Negative / RBC: 3 /hpf / WBC 0 /HPF   Sq Epi: x / Non Sq Epi: 1 /hpf / Bacteria: Negative           @ 10:24  4.1  28      Thyroid Stimulating Hormone, Serum: 3.15 uIU/mL ( @ 06:40)          Consultant(s) Notes Reviewed:      Care Discussed with Consultants/Other Providers:    
  [facility]    REVIEW OF SYSTEMS:  GEN: no fever,    no chills  RESP: no SOB,   no cough  CVS: no chest pain,   no palpitations  GI: no abdominal pain,   no nausea,   no vomiting,   no constipation,   no diarrhea  : no dysuria,   no frequency  NEURO: no headache,   no dizziness  PSYCH: no depression,   not anxious  Derm : no rash    MEDICATIONS  (STANDING):  amLODIPine   Tablet 10 milliGRAM(s) Oral daily  atorvastatin 80 milliGRAM(s) Oral at bedtime  dextrose 5% + sodium chloride 0.9%. 1000 milliLiter(s) (70 mL/Hr) IV Continuous <Continuous>  heparin   Injectable 5000 Unit(s) SubCutaneous every 12 hours  influenza  Vaccine (HIGH DOSE) 0.7 milliLiter(s) IntraMuscular once  lidocaine 4% Injection for Nebulization 4 milliLiter(s) Nebulizer once  pantoprazole  Injectable 40 milliGRAM(s) IV Push two times a day  tamsulosin 0.4 milliGRAM(s) Oral at bedtime    MEDICATIONS  (PRN):      Vital Signs Last 24 Hrs  T(C): 36.5 (26 Sep 2022 23:40), Max: 36.8 (26 Sep 2022 11:17)  T(F): 97.7 (26 Sep 2022 23:40), Max: 98.3 (26 Sep 2022 11:17)  HR: 72 (26 Sep 2022 23:40) (66 - 94)  BP: 144/79 (26 Sep 2022 23:40) (84/59 - 152/74)  BP(mean): --  RR: 18 (26 Sep 2022 23:40) (16 - 20)  SpO2: 98% (26 Sep 2022 23:40) (95% - 100%)    Parameters below as of 26 Sep 2022 23:40  Patient On (Oxygen Delivery Method): room air      CAPILLARY BLOOD GLUCOSE        I&O's Summary      PHYSICAL EXAM:  HEAD:  Atraumatic, Normocephalic  NECK: Supple, No   JVD  CHEST/LUNG:   no     rales,     no,    rhonchi  HEART: Regular rate and rhythm;         murmur  ABDOMEN: Soft, Nontender, ;   EXTREMITIES:   mild     edema  NEUROLOGY:  alert    LABS:                        9.4    5.97  )-----------( 264      ( 26 Sep 2022 22:11 )             29.4     -    139  |  107  |  17  ----------------------------<  138<H>  4.6   |  16<L>  |  1.14    Ca    9.4      26 Sep 2022 07:21    TPro  7.9  /  Alb  4.7  /  TBili  0.3  /  DBili  x   /  AST  16  /  ALT  22  /  AlkPhos  63      PT/INR - ( 25 Sep 2022 10:31 )   PT: 12.1 sec;   INR: 1.04 ratio         PTT - ( 25 Sep 2022 10:31 )  PTT:32.9 sec      Urinalysis Basic - ( 25 Sep 2022 12:18 )    Color: Light Yellow / Appearance: Clear / S.017 / pH: x  Gluc: x / Ketone: Small  / Bili: Negative / Urobili: Negative   Blood: x / Protein: 30 mg/dL / Nitrite: Negative   Leuk Esterase: Negative / RBC: 3 /hpf / WBC 0 /HPF   Sq Epi: x / Non Sq Epi: 1 /hpf / Bacteria: Negative           @ 10:24  4.1  28      Thyroid Stimulating Hormone, Serum: 3.15 uIU/mL ( @ 06:40)          Consultant(s) Notes Reviewed:      Care Discussed with Consultants/Other Providers:    
[facility]  Felix Avila PA-C  [address] [place] [address]  [phone]      INTERVAL HPI/OVERNIGHT EVENTS:    tolerating fulls  denies abdominal pain     MEDICATIONS  (STANDING):  amLODIPine   Tablet 10 milliGRAM(s) Oral daily  atorvastatin 80 milliGRAM(s) Oral at bedtime  heparin   Injectable 5000 Unit(s) SubCutaneous every 12 hours  influenza  Vaccine (HIGH DOSE) 0.7 milliLiter(s) IntraMuscular once  lidocaine 4% Injection for Nebulization 4 milliLiter(s) Nebulizer once  pantoprazole  Injectable 40 milliGRAM(s) IV Push two times a day  tamsulosin 0.4 milliGRAM(s) Oral at bedtime    MEDICATIONS  (PRN):      Allergies    No Known Allergies    Intolerances        ROS:   General:  No wt loss, fevers, chills, night sweats, fatigue,   Eyes:  Good vision, no reported pain  ENT:  No sore throat, pain, runny nose, dysphagia  CV:  No pain, palpitations, hypo/hypertension  Resp:  No dyspnea, cough, tachypnea, wheezing  GI:  No pain, No nausea, No vomiting, No diarrhea, No constipation, No weight loss, No fever, No pruritis, No rectal bleeding, No tarry stools, No dysphagia,  :  No pain, bleeding, incontinence, nocturia  Muscle:  No pain, weakness  Neuro:  No weakness, tingling, memory problems  Psych:  No fatigue, insomnia, mood problems, depression  Endocrine:  No polyuria, polydipsia, cold/heat intolerance  Heme:  No petechiae, ecchymosis, easy bruisability  Skin:  No rash, tattoos, scars, edema      PHYSICAL EXAM:   Vital Signs:  Vital Signs Last 24 Hrs  T(C): 36.3 (27 Sep 2022 09:42), Max: 36.7 (26 Sep 2022 15:52)  T(F): 97.4 (27 Sep 2022 09:42), Max: 98 (26 Sep 2022 21:42)  HR: 74 (27 Sep 2022 12:19) (66 - 81)  BP: 152/71 (27 Sep 2022 12:19) (84/59 - 159/80)  BP(mean): --  RR: 18 (27 Sep 2022 09:42) (16 - 20)  SpO2: 98% (27 Sep 2022 12:19) (95% - 100%)    Parameters below as of 27 Sep 2022 12:19  Patient On (Oxygen Delivery Method): room air      Daily Height in cm: 172.72 (26 Sep 2022 15:52)    Daily Weight in k.4 (26 Sep 2022 17:59)    GENERAL:  Appears stated age,   HEENT:  NC/AT,    CHEST:  Full & symmetric excursion,   HEART:  Regular rhythm,  ABDOMEN:  Soft, non-tender, non-distended,  EXTEREMITIES:  no cyanosis  SKIN:  No rash  NEURO:  Alert,       LABS:                        9.4    6.58  )-----------( 261      ( 27 Sep 2022 07:18 )             29.6     09-    143  |  110<H>  |  20  ----------------------------<  126<H>  3.5   |  21<L>  |  1.27    Ca    8.9      27 Sep 2022 07:18            RADIOLOGY & ADDITIONAL TESTS:  
           CARDIOLOGY     PROGRESS  NOTE   ________________________________________________    CHIEF COMPLAINT:Patient is a 76y old  Male who presents with a chief complaint of abd pain (26 Sep 2022 10:14)  no complain.  	  REVIEW OF SYSTEMS:  CONSTITUTIONAL: No fever, weight loss, or fatigue  EYES: No eye pain, visual disturbances, or discharge  ENT:  No difficulty hearing, tinnitus, vertigo; No sinus or throat pain  NECK: No pain or stiffness  RESPIRATORY: No cough, wheezing, chills or hemoptysis; No Shortness of Breath  CARDIOVASCULAR: No chest pain, palpitations, passing out, dizziness, or leg swelling  GASTROINTESTINAL: No abdominal or epigastric pain. No nausea, vomiting, or hematemesis; No diarrhea or constipation. No melena or hematochezia.  GENITOURINARY: No dysuria, frequency, hematuria, or incontinence  NEUROLOGICAL: No headaches, memory loss, loss of strength, numbness, or tremors  SKIN: No itching, burning, rashes, or lesions   LYMPH Nodes: No enlarged glands  ENDOCRINE: No heat or cold intolerance; No hair loss  MUSCULOSKELETAL: No joint pain or swelling; No muscle, back, or extremity pain  PSYCHIATRIC: No depression, anxiety, mood swings, or difficulty sleeping  HEME/LYMPH: No easy bruising, or bleeding gums  ALLERGY AND IMMUNOLOGIC: No hives or eczema	    [ ] All others negative	  [x ] Unable to obtain    PHYSICAL EXAM:  T(C): 36.8 (09-26-22 @ 11:17), Max: 36.8 (09-25-22 @ 18:48)  HR: 94 (09-26-22 @ 11:17) (67 - 95)  BP: 116/70 (09-26-22 @ 11:17) (116/70 - 199/95)  RR: 18 (09-26-22 @ 11:17) (16 - 18)  SpO2: 96% (09-26-22 @ 11:17) (95% - 97%)  Wt(kg): --  I&O's Summary      Appearance: Normal	  HEENT:   Normal oral mucosa, PERRL, EOMI	  Lymphatic: No lymphadenopathy  Cardiovascular: Normal S1 S2, No JVD, + murmurs, No edema  Respiratory: rhonchi  Psychiatry: A & O x 3, Mood & affect appropriate  Gastrointestinal:  Soft, Non-tender, + BS	  Skin: No rashes, No ecchymoses, No cyanosis	  Neurologic: Non-focal  Extremities: Normal range of motion, No clubbing, cyanosis or edema  Vascular: Peripheral pulses palpable 2+ bilaterally    MEDICATIONS  (STANDING):  amLODIPine   Tablet 10 milliGRAM(s) Oral daily  atorvastatin 80 milliGRAM(s) Oral at bedtime  dextrose 5% + sodium chloride 0.9%. 1000 milliLiter(s) (70 mL/Hr) IV Continuous <Continuous>  heparin   Injectable 5000 Unit(s) SubCutaneous every 12 hours  hydrALAZINE Injectable 10 milliGRAM(s) IV Push three times a day  pantoprazole  Injectable 40 milliGRAM(s) IV Push two times a day  tamsulosin 0.4 milliGRAM(s) Oral at bedtime      TELEMETRY: 	    ECG:  	  RADIOLOGY:  OTHER: 	  	  LABS:	 	    CARDIAC MARKERS:                                8.4    5.91  )-----------( 262      ( 26 Sep 2022 09:23 )             25.7     09-26    139  |  107  |  17  ----------------------------<  138<H>  4.6   |  16<L>  |  1.14    Ca    9.4      26 Sep 2022 07:21    TPro  7.9  /  Alb  4.7  /  TBili  0.3  /  DBili  x   /  AST  16  /  ALT  22  /  AlkPhos  63  09-25    proBNP:   Lipid Profile:   HgA1c:   TSH:   PT/INR - ( 25 Sep 2022 10:31 )   PT: 12.1 sec;   INR: 1.04 ratio         PTT - ( 25 Sep 2022 10:31 )  PTT:32.9 sec      Assessment and plan  ---------------------------  76y Male PMHx of HLD, HTN, DM2, CKD stage III, CVA and BPH brought in via EMS for abdominal pain and back pain x2 weeks. Patient reported he has intermittent abdominal pain at the umbilicus and was told by PMD to take tylenol and had some improvement of symptoms. Patient reported lower back pain since his fall two weeks ago.  pt is well known to me with hx of htn, ckd, cva who was recently dc d from the hospital after syncopal episode.  pt apparently s/p fall ?syncope 2 weeks ago ?syncope  ct scan back/ abdomen and pelvis  continue bp meds  check orthostatic  ?need of ILR  no need to repeat echo  dc hydralazine    	        
DATE OF SERVICE: 09-28-22 @ 09:52    HPI:  76y Male   PMHx of HLD, HTN, DM2, CKD stage III,    CVA and BPH      brought in via EMS for abdominal pain and back pain x2 weeks    Patient reported he has intermittent abdominal pain at the umbilicus and was told by PMD to take tylenol and had some improvement of symptoms.    Patient reported lower back pain since his fall two weeks ago (25 Sep 2022 13:25)      Interval Events  wasnt dc home yesterday, unclear why, pt doesnt know either. can go home today needs home care for PT  labs are ok  p Fall had PT home pta  adm c abd pain - like;y 2/2  duod ulcer - see GI and EGD reports , this am path neg, abd pain res   BP labile but "better at home "  see notes cardio ., and IM GI ( no obj) etc see labs orders etc see updated -today again  - dc note also called brother Bird again. disc c PCA bedside too    MEDICATIONS  (STANDING):  amLODIPine   Tablet 10 milliGRAM(s) Oral daily  atorvastatin 80 milliGRAM(s) Oral at bedtime  heparin   Injectable 5000 Unit(s) SubCutaneous every 12 hours  influenza  Vaccine (HIGH DOSE) 0.7 milliLiter(s) IntraMuscular once  lidocaine 4% Injection for Nebulization 4 milliLiter(s) Nebulizer once  pantoprazole  Injectable 40 milliGRAM(s) IV Push two times a day  tamsulosin 0.4 milliGRAM(s) Oral at bedtime    MEDICATIONS  (PRN):      Patient is a 76y old  Male who presents with a chief complaint of abd pain (28 Sep 2022 07:20)      REVIEW OF SYSTEMS    General:nad no co wants to go home  Skin/Breast:no co no ch  Ophthalmologic:no co no ch  ENMT:	no co no ch ate drank ,  Respiratory and Thorax:no cough nom sp no sob  Cardiovascular:no cp no apalp  Gastrointestinal:no nvcd no pain  Genitourinary:no fdi  Musculoskeletal:	no anop  Neurological:no f co no ch	  Psychiatric:bno co no ch not anx not depressed	  Hematology/Lymphatics:	  Endocrine:	no polyudd  Allergic/Immunologic:  AOSN	y      Vital Signs Last 24 Hrs  T(C): 37.2 (28 Sep 2022 09:06), Max: 37.2 (28 Sep 2022 09:06)  T(F): 98.9 (28 Sep 2022 09:06), Max: 98.9 (28 Sep 2022 09:06)  HR: 77 (28 Sep 2022 09:06) (70 - 77)  BP: 146/76 (28 Sep 2022 09:06) (143/68 - 163/77)  BP(mean): --  RR: 18 (28 Sep 2022 09:06) (18 - 18)  SpO2: 97% (28 Sep 2022 09:06) (96% - 100%)    Parameters below as of 28 Sep 2022 09:06  Patient On (Oxygen Delivery Method): room air        PHYSICAL EXAM:    Constitutional: vs noted  nad no co wants to go  H+N ncat  Eyes:lyudmila cwnl  ENMT:mmm hears swallows eats speaks ok - stutters no ch  Neck:no cb no tm  Breasts:  Back:  Respiratory:ctab no rrw  Cardiovascular:rrr  Gastrointestinal:soft nt   Genitourinary:  Rectal:  Extremities:no cce  Vascular:hm- vv-  Neurological:nfatmae in bed ambulated, stutters no ch  Skin:cdi  Lymph Nodes:  Musculoskeletal:from  no prob  Psychiatric:calm coop clear     LABS  CBC Full  -  ( 28 Sep 2022 06:52 )  WBC Count : 5.43 K/uL  RBC Count : 3.17 M/uL  Hemoglobin : 9.1 g/dL  Hematocrit : 28.1 %  Platelet Count - Automated : 282 K/uL  Mean Cell Volume : 88.6 fl  Mean Cell Hemoglobin : 28.7 pg  Mean Cell Hemoglobin Concentration : 32.4 gm/dL  Auto Neutrophil # : x  Auto Lymphocyte # : x  Auto Monocyte # : x  Auto Eosinophil # : x  Auto Basophil # : x  Auto Neutrophil % : x  Auto Lymphocyte % : x  Auto Monocyte % : x  Auto Eosinophil % : x  Auto Basophil % : x      09-28    139  |  107  |  27<H>  ----------------------------<  170<H>  3.8   |  20<L>  |  1.38<H>    Ca    9.0      28 Sep 2022 06:52            Imaging:    Xray:    Echo:    CT:    MRI:    Tele:    Orders:    ALONZO Sun [phone]
GENERAL SURGERY PROGRESS NOTE    SUBJECTIVE  Patient seen and examined. No acute events overnight.         OBJECTIVE    PHYSICAL EXAM  General: Appears well, NAD  CHEST: breathing comfortably  CV: appears well perfused  Abdomen: soft, nontender, nondistended, no rebound or guarding  Extremities: Grossly symmetric    T(C): 36.6 (22 @ 04:20), Max: 36.8 (22 @ 18:48)  HR: 74 (22 @ 04:20) (67 - 95)  BP: 148/79 (22 @ 04:20) (148/79 - 199/95)  RR: 18 (22 @ 04:20) (16 - 18)  SpO2: 97% (22 @ 04:20) (95% - 97%)      MEDICATIONS  amLODIPine   Tablet 10 milliGRAM(s) Oral daily  atorvastatin 80 milliGRAM(s) Oral at bedtime  dextrose 5% + sodium chloride 0.9%. 1000 milliLiter(s) IV Continuous <Continuous>  heparin   Injectable 5000 Unit(s) SubCutaneous every 12 hours  hydrALAZINE Injectable 10 milliGRAM(s) IV Push three times a day  tamsulosin 0.4 milliGRAM(s) Oral at bedtime      LABS                        8.4    5.91  )-----------( 262      ( 26 Sep 2022 09:23 )             25.7         139  |  107  |  17  ----------------------------<  138<H>  4.6   |  16<L>  |  1.14    Ca    9.4      26 Sep 2022 07:21    TPro  7.9  /  Alb  4.7  /  TBili  0.3  /  DBili  x   /  AST  16  /  ALT  22  /  AlkPhos  63  09-    PT/INR - ( 25 Sep 2022 10:31 )   PT: 12.1 sec;   INR: 1.04 ratio         PTT - ( 25 Sep 2022 10:31 )  PTT:32.9 sec  Urinalysis Basic - ( 25 Sep 2022 12:18 )    Color: Light Yellow / Appearance: Clear / S.017 / pH: x  Gluc: x / Ketone: Small  / Bili: Negative / Urobili: Negative   Blood: x / Protein: 30 mg/dL / Nitrite: Negative   Leuk Esterase: Negative / RBC: 3 /hpf / WBC 0 /HPF   Sq Epi: x / Non Sq Epi: 1 /hpf / Bacteria: Negative        RADIOLOGY & ADDITIONAL STUDIES
DATE OF SERVICE: 09-26-22 @ 17:17    HPI:  76y Male   PMHx of HLD, HTN, DM2, CKD stage III,    CVA and BPH      brought in via EMS for abdominal pain and back pain x2 weeks    Patient reported he has intermittent abdominal pain at the umbilicus and was told by PMD to take tylenol and had some improvement of symptoms.    Patient reported lower back pain since his fall two weeks ago (25 Sep 2022 13:25)      Interval Eventsfell 2 wks ago "went to [facility] to buy tylenol couldnt find it so i bought saulo" took asa 4-6/ for pain, wound up c gastric ulcer.  unlear what doctor told him to take tylenol but was  pain - Dx Fr R 10th Rib. ED did not call me re Adm , coverage Dr NISHA Burks appreciated, we spoke yesterday and today re course and spoke c Dr LYMAN Cardio, pt seen now in Endoscopy suite, p EGD report p, see ;labs see CT reports see notes, all reviewed. disc c pt, he uindersatnds cond but has mild cogn isssues poss congenital. see meds Hx below    MEDICATIONS  (STANDING):  amLODIPine   Tablet 10 milliGRAM(s) Oral daily  atorvastatin 80 milliGRAM(s) Oral at bedtime  dextrose 5% + sodium chloride 0.9%. 1000 milliLiter(s) (70 mL/Hr) IV Continuous <Continuous>  heparin   Injectable 5000 Unit(s) SubCutaneous every 12 hours  lidocaine 4% Injection for Nebulization 4 milliLiter(s) Nebulizer once  pantoprazole  Injectable 40 milliGRAM(s) IV Push two times a day  tamsulosin 0.4 milliGRAM(s) Oral at bedtime    MEDICATIONS  (PRN):      Patient is a 76y old  Male who presents with a chief complaint of abd pain (26 Sep 2022 13:47)      REVIEW OF SYSTEMS    General:feel elza "better" nad, rec me  Skin/Breast:no co no ch  Ophthalmologic:no co no ch   ENMT:	hears ok swallows ok  no co no ch  Respiratory and Thorax:no cough no sp no sob  Cardiovascular:npom cp no palp  Gastrointestinal:mno nvcd no pain  Genitourinary:no fdi  Musculoskeletal:	no a no p no co  Neurological:	no f co no ch  Psychiatric:	no co no ch  Hematology/Lymphatics:	  Endocrine:	no p[olyudd  Allergic/Immunologic:  AOSN	y      Vital Signs Last 24 Hrs  T(C): 36.7 (26 Sep 2022 15:52), Max: 36.8 (25 Sep 2022 18:48)  T(F): 98 (26 Sep 2022 15:46), Max: 98.3 (26 Sep 2022 11:17)  HR: 66 (26 Sep 2022 16:56) (66 - 94)  BP: 105/61 (26 Sep 2022 16:56) (84/59 - 170/78)  BP(mean): --  RR: 16 (26 Sep 2022 16:56) (16 - 20)  SpO2: 96% (26 Sep 2022 16:56) (95% - 99%)    Parameters below as of 26 Sep 2022 16:56  Patient On (Oxygen Delivery Method): room air        PHYSICAL EXAM:    Constitutional:vss nad   H+N ncat hair very short   Eyes:lyudmila cwnl  ENMT:hears swallows ok stutters - not new  Neck:no cb no tm  Breasts:  Back:  Respiratory:tab norrw  Cardiovascular:rrrbs+ wnl soft nt no g no r  Genitourinary:  Rectal:  Extremities:no cce  Vascular:hm- vv-  Neurological:nfatmae in beds stutters  Skin:cdi  Lymph Nodes:  Musculoskeletal:  Psychiatric:calm coop nt anx nt d    LABS  CBC Full  -  ( 26 Sep 2022 09:23 )  WBC Count : 5.91 K/uL  RBC Count : 2.85 M/uL  Hemoglobin : 8.4 g/dL  Hematocrit : 25.7 %  Platelet Count - Automated : 262 K/uL  Mean Cell Volume : 90.2 fl  Mean Cell Hemoglobin : 29.5 pg  Mean Cell Hemoglobin Concentration : 32.7 gm/dL  Auto Neutrophil # : x  Auto Lymphocyte # : x  Auto Monocyte # : x  Auto Eosinophil # : x  Auto Basophil # : x  Auto Neutrophil % : x  Auto Lymphocyte % : x  Auto Monocyte % : x  Auto Eosinophil % : x  Auto Basophil % : x      09-26    139  |  107  |  17  ----------------------------<  138<H>  4.6   |  16<L>  |  1.14    Ca    9.4      26 Sep 2022 07:21    TPro  7.9  /  Alb  4.7  /  TBili  0.3  /  DBili  x   /  AST  16  /  ALT  22  /  AlkPhos  63  09-25      PT/INR - ( 25 Sep 2022 10:31 )   PT: 12.1 sec;   INR: 1.04 ratio         PTT - ( 25 Sep 2022 10:31 )  PTT:32.9 sec    Imaging:    Xray:    Echo:    CT:    MRI:    Tele:    Orders:    ALONZO Sun [phone]
DATE OF SERVICE: 09-27-22 @ 14:08    HPI:  76y Male   PMHx of HLD, HTN, DM2, CKD stage III,    CVA and BPH      brought in via EMS for abdominal pain and back pain x2 weeks    Patient reported he has intermittent abdominal pain at the umbilicus and was told by PMD to take tylenol and had some improvement of symptoms.    Patient reported lower back pain since his fall two weeks ago (25 Sep 2022 13:25)      Interval Events  EGD =Duod ulcer, H/H ok, diet advanced , ate tolerated  saw PT amb15'x2 rec jhome and pt wants to go. on PPI BP sl elev but he saYS bp ALWAYS HIGHER IN THE HOSPITAL AND WILL BE LOWER HOME,  see notes, labs orders etc    MEDICATIONS  (STANDING):  amLODIPine   Tablet 10 milliGRAM(s) Oral daily  atorvastatin 80 milliGRAM(s) Oral at bedtime  heparin   Injectable 5000 Unit(s) SubCutaneous every 12 hours  influenza  Vaccine (HIGH DOSE) 0.7 milliLiter(s) IntraMuscular once  lidocaine 4% Injection for Nebulization 4 milliLiter(s) Nebulizer once  pantoprazole  Injectable 40 milliGRAM(s) IV Push two times a day  tamsulosin 0.4 milliGRAM(s) Oral at bedtime    MEDICATIONS  (PRN):      Patient is a 76y old  Male who presents with a chief complaint of abd pain (27 Sep 2022 10:49)      REVIEW OF SYSTEMS    General:nad feels ok  Skin/Breast:no co no ch  Ophthalmologic:no co no ch  ENMT:	no  no ch ate   Respiratory and Thorax:no cough no sp no sob  Cardiovascular:no cp no palp  Gastrointestinal:no pain no nvcd had bm  Genitourinary:no fdi  Musculoskeletal:	no a no p  Neurological:	no co no ch no f co  Psychiatric:	no co no ch   Hematology/Lymphatics:	  Endocrine:	no polyudd  Allergic/Immunologic:  AOSN	y      Vital Signs Last 24 Hrs  T(C): 36.3 (27 Sep 2022 09:42), Max: 36.8 (26 Sep 2022 15:11)  T(F): 97.4 (27 Sep 2022 09:42), Max: 98.2 (26 Sep 2022 15:11)  HR: 74 (27 Sep 2022 12:19) (66 - 81)  BP: 152/71 (27 Sep 2022 12:19) (84/59 - 159/80)  BP(mean): --  RR: 18 (27 Sep 2022 09:42) (16 - 20)  SpO2: 98% (27 Sep 2022 12:19) (95% - 100%)    Parameters below as of 27 Sep 2022 12:19  Patient On (Oxygen Delivery Method): room air        PHYSICAL EXAM:    Constitutional:nad vss BP ok for now  H+N ncat  Eyes:lyudmila cwnl  ENMT:mmm hears swallows ok  Neck:no cb no tm  Breasts:  Back:  Respiratory:ctab no rrw  Cardiovascular:rrr  Gastrointestinal:soft nt  Genitourinary:  Rectal:  Extremities:no cce  Vascular:hm- vv-  Neurological:nfatmae in bed, ambulated  Skin:cdi  Lymph Nodes:  Musculoskeletal:  Psychiatric:calm coop clear     LABS  CBC Full  -  ( 27 Sep 2022 07:18 )  WBC Count : 6.58 K/uL  RBC Count : 3.26 M/uL  Hemoglobin : 9.4 g/dL  Hematocrit : 29.6 %  Platelet Count - Automated : 261 K/uL  Mean Cell Volume : 90.8 fl  Mean Cell Hemoglobin : 28.8 pg  Mean Cell Hemoglobin Concentration : 31.8 gm/dL  Auto Neutrophil # : x  Auto Lymphocyte # : x  Auto Monocyte # : x  Auto Eosinophil # : x  Auto Basophil # : x  Auto Neutrophil % : x  Auto Lymphocyte % : x  Auto Monocyte % : x  Auto Eosinophil % : x  Auto Basophil % : x      09-27    143  |  110<H>  |  20  ----------------------------<  126<H>  3.5   |  21<L>  |  1.27    Ca    8.9      27 Sep 2022 07:18            Imaging:    Xray:    Echo:    CT:    MRI:    Tele:    Orders:    ALONZO Sun [phone]
[facility]  Felix Avila PA-C  [address] [place] [address]  [phone]      INTERVAL HPI/OVERNIGHT EVENTS:    on reg diet  +bm    MEDICATIONS  (STANDING):  amLODIPine   Tablet 10 milliGRAM(s) Oral daily  atorvastatin 80 milliGRAM(s) Oral at bedtime  heparin   Injectable 5000 Unit(s) SubCutaneous every 12 hours  influenza  Vaccine (HIGH DOSE) 0.7 milliLiter(s) IntraMuscular once  lidocaine 4% Injection for Nebulization 4 milliLiter(s) Nebulizer once  pantoprazole  Injectable 40 milliGRAM(s) IV Push two times a day  tamsulosin 0.4 milliGRAM(s) Oral at bedtime    MEDICATIONS  (PRN):      Allergies    No Known Allergies    Intolerances        ROS:   General:  No wt loss, fevers, chills, night sweats, fatigue,   Eyes:  Good vision, no reported pain  ENT:  No sore throat, pain, runny nose, dysphagia  CV:  No pain, palpitations, hypo/hypertension  Resp:  No dyspnea, cough, tachypnea, wheezing  GI:  No pain, No nausea, No vomiting, No diarrhea, No constipation, No weight loss, No fever, No pruritis, No rectal bleeding, No tarry stools, No dysphagia,  :  No pain, bleeding, incontinence, nocturia  Muscle:  No pain, weakness  Neuro:  No weakness, tingling, memory problems  Psych:  No fatigue, insomnia, mood problems, depression  Endocrine:  No polyuria, polydipsia, cold/heat intolerance  Heme:  No petechiae, ecchymosis, easy bruisability  Skin:  No rash, tattoos, scars, edema      PHYSICAL EXAM:   Vital Signs:  Vital Signs Last 24 Hrs  T(C): 36.3 (27 Sep 2022 09:42), Max: 36.7 (26 Sep 2022 15:52)  T(F): 97.4 (27 Sep 2022 09:42), Max: 98 (26 Sep 2022 21:42)  HR: 74 (27 Sep 2022 12:19) (66 - 81)  BP: 152/71 (27 Sep 2022 12:19) (84/59 - 159/80)  BP(mean): --  RR: 18 (27 Sep 2022 09:42) (16 - 20)  SpO2: 98% (27 Sep 2022 12:19) (95% - 100%)    Parameters below as of 27 Sep 2022 12:19  Patient On (Oxygen Delivery Method): room air      Daily Height in cm: 172.72 (26 Sep 2022 15:52)    Daily Weight in k.4 (26 Sep 2022 17:59)    GENERAL:  Appears stated age,   HEENT:  NC/AT,    CHEST:  Full & symmetric excursion,   HEART:  Regular rhythm,  ABDOMEN:  Soft, non-tender, non-distended,  EXTEREMITIES:  no cyanosis  SKIN:  No rash  NEURO:  Alert,       LABS:                        9.4    6.58  )-----------( 261      ( 27 Sep 2022 07:18 )             29.6     09-    143  |  110<H>  |  20  ----------------------------<  126<H>  3.5   |  21<L>  |  1.27    Ca    8.9      27 Sep 2022 07:18            RADIOLOGY & ADDITIONAL TESTS:

## 2022-09-28 NOTE — DISCHARGE NOTE PROVIDER - NSDCFUADDAPPT_GEN_ALL_CORE_FT
APPTS ARE READY TO BE MADE: [ ] YES    Best Family or Patient Contact (if needed):    Additional Information about above appointments (if needed):    1: Dr Grider for repeat EGD  2:   3:     Other comments or requests:

## 2022-09-28 NOTE — DISCHARGE NOTE PROVIDER - CARE PROVIDER_API CALL
Dallas Sun)  Family Medicine  [address] [place] [address]  Phone: [phone]  Fax: [phone]  Follow Up Time:     Micha Grider (DO)  Gastroenterology; Internal Medicine  [address] [place] [address]  Phone: [phone]  Fax: [phone]  Follow Up Time:    Dallas Sun)  Family Medicine  [address] [place] [address]  Phone: [phone]  Fax: [phone]  Follow Up Time:     Micha Grider (DO)  Gastroenterology; Internal Medicine  [address] [place] [address]  Phone: [phone]  Fax: [phone]  Follow Up Time:     Salvatore De La Fuente  CARDIOVASCULAR DISEASE  [address] [place] [address], [facility] [address]  [place] [address]  Phone: [phone]  Fax: [phone]  Follow Up Time:

## 2022-10-12 NOTE — ED PROVIDER NOTE - DISPOSITION TYPE
DISCHARGE ADMIT Xeljanz Counseling: I discussed with the patient the risks of Xeljanz therapy including increased risk of infection, liver issues, headache, diarrhea, or cold symptoms. Live vaccines should be avoided. They were instructed to call if they have any problems.

## 2022-10-13 NOTE — ASU PATIENT PROFILE, ADULT - HEALTHCARE QUESTIONS, PROFILE

## 2022-10-20 ENCOUNTER — APPOINTMENT (OUTPATIENT)
Dept: UROLOGY | Facility: CLINIC | Age: 77
End: 2022-10-20

## 2022-10-31 ENCOUNTER — RX RENEWAL (OUTPATIENT)
Age: 77
End: 2022-10-31

## 2022-11-29 ENCOUNTER — OUTPATIENT (OUTPATIENT)
Dept: OUTPATIENT SERVICES | Facility: HOSPITAL | Age: 77
LOS: 1 days | End: 2022-11-29
Payer: MEDICARE

## 2022-11-29 ENCOUNTER — APPOINTMENT (OUTPATIENT)
Dept: UROLOGY | Facility: CLINIC | Age: 77
End: 2022-11-29

## 2022-11-29 VITALS
RESPIRATION RATE: 16 BRPM | WEIGHT: 166 LBS | HEART RATE: 72 BPM | SYSTOLIC BLOOD PRESSURE: 131 MMHG | DIASTOLIC BLOOD PRESSURE: 83 MMHG | HEIGHT: 68 IN | BODY MASS INDEX: 25.16 KG/M2

## 2022-11-29 DIAGNOSIS — Z98.890 OTHER SPECIFIED POSTPROCEDURAL STATES: Chronic | ICD-10-CM

## 2022-11-29 DIAGNOSIS — Z90.49 ACQUIRED ABSENCE OF OTHER SPECIFIED PARTS OF DIGESTIVE TRACT: Chronic | ICD-10-CM

## 2022-11-29 DIAGNOSIS — R35.0 FREQUENCY OF MICTURITION: ICD-10-CM

## 2022-11-29 PROCEDURE — 76775 US EXAM ABDO BACK WALL LIM: CPT

## 2022-11-29 PROCEDURE — 99213 OFFICE O/P EST LOW 20 MIN: CPT

## 2022-11-29 PROCEDURE — 76775 US EXAM ABDO BACK WALL LIM: CPT | Mod: 26

## 2022-12-01 NOTE — ASSESSMENT
[FreeTextEntry1] : US results reviewed.\par Continue tamsulosin 0.4 mg once daily.\par Last PSA 0.57 ng/mL.\par Follow up in one year.

## 2022-12-01 NOTE — HISTORY OF PRESENT ILLNESS
[FreeTextEntry1] : Here for one year follow up visit.\par Here with his brother.\par \par Office Renal bladder US today:  No stones visualized today.  Simple renal cyst, right kidney, left kidney.\par No hydronephrosis.  \par \par Since his last visit, no flank pain.  Denies abdominal pain, flank pain.\par Remains on tamsulosin.  Urinary function excellent.  No hematuria, dysuria.\par \par s/p Left Ureteroscopy with stone extraction Jan 2018.\par Ureteral stent was removed on 2/22/18. \par

## 2022-12-02 DIAGNOSIS — N40.1 BENIGN PROSTATIC HYPERPLASIA WITH LOWER URINARY TRACT SYMPTOMS: ICD-10-CM

## 2022-12-02 DIAGNOSIS — N20.1 CALCULUS OF URETER: ICD-10-CM

## 2023-01-09 NOTE — PHYSICAL THERAPY INITIAL EVALUATION ADULT - LEVEL OF CONSCIOUSNESS, REHAB EVAL
Called patient at this time to offer medication follow up. Patient agreeable. Scheduled patient in 1020 slot.    alert

## 2023-01-25 NOTE — PRE-OP CHECKLIST - ASSESSMENT, HISTORY & PHYSICAL COMPLETED AND ON MEDICAL RECORD
Pt presents after fall down 6 steps on her back. hit back of head, believes she had very brief LOC. Neuro intact. Since still having HA will need CT head to r/o tbi, as well as CT Cspine given midline ttp. No radiculopathy. Given pain at midline L spine and ecchymosis, will also need imaging of L spine. Pt declined pain control in ED. Will reassess done

## 2023-02-14 NOTE — PROGRESS NOTE ADULT - PROBLEM SELECTOR PLAN 6
Patient was a no show for today's appointment. VM left asking that he call back to reschedule.
Hx / asymp now, ua neg
se meds
se meds
se meds. Add Amlodipine 5 mg daily- 1st dose now.
statibn
statin
statin

## 2023-02-22 NOTE — ED ADULT NURSE NOTE - PRO INTERPRETER NEED 2
Jarrell Bell is a 76 year old male patient being seen in office for Medtronic Dual ICD and he is on Amiodarone and Quinadine. In the interim Quinidine was stopped due to polymorphic Ventricular Tachycardia and prolonged QT interval.    LAST OV and ASSESSMENT/ PLAN was on: 11/28/2022 Dr. Gleason:  continue current therapy  • Follow up in 6 months  • Advised to exercise as tolerated. Warm up prior to working out and cool down after.     HOSPITAL/ ER VISIT since last OV: None    Misc. Other Pertinent Information:  01/04/2023 Marcell  BMP shows elevated potassium as well as slightly elevated creatinine since starting Entresto.  Please make sure the patient also did stop his lisinopril.  Make sure he is not taking any potassium supplements.     Repeat BMP in 2 weeks if potassium remains elevated will need to consider discontinuation of Entresto     Writer marvin roca per pt he has his \"normal dizziness/lightheadedness\"  Which is typically in the mornings but can be sporadic throughout the day ever since he had neck surgery years ago.  No worsening of these s/s. Denies sob, tach, palpitations.  Pt saw dr gleason today & was dx with chana per pt. Started on eliquis today.  Dr gleason will be checking what pts heart surgeries in past entailed to see if Watchman would be an option.  Pt has fup with dr gleason 2-13-23 12/02/2022 Dr. Faith  Coronary artery disease status post remote repeat bypass surgery: denies chest pain, he stopped taking his rosuvastatin 04/2021 due to intolerance, he remains on Zetia. He continues to refuse stress testing for surveillance bc he went into [facility] after his last stress test requiring the ICD.  Ischemic Cardiomyopathy s/p AICD- Will obtain CPK and will stop lisinopril and start Entresto.  Sleep Apnea: refuses eval and/or consideration for CPAP.  Patient will Return in about 3 months (around 3/2/2023). or sooner if problems arise.    11/28/2022 Rosibel (tele)  Per Dr. Gleason,  patient to stop Quinidine d/t polymorphic VT and prolonged QT.  Message left on patient's voicemail for him to call back to confirm instructions:     1.  Stop quinidine.  2.  Schedule a nurse visit before the end of the year on a day Dr. Gleason is in clinic to have ICD checked and EKG taken.     PMH: ICMP, VT -> ICD, HFrEF (EF 42%), CAD-> 4V CABG x 2, Congenital Heart Defect, HLD, CKD, MILES, Bell's Palsy, Malignant Lymphoma, MILES    Family Hx: Mother-heart disease, Father-heart disease, Brother x2-heart disease    MEDS: Furosemide 20 mg; Mag Ox 400 mg; ASA 81mg; Coreg 25mg BID; Entresto 24-26 mg BID; Amlodipine 5 mg,  mg   Antiarrhythmics: AMIODarone - 200 MG   Anticoag: apixaBAN Tab - 5 MG    Atrial Fibrillation: Unknown (Diagnosed: unknown)   [id number] = 4 (CAD, Age1,CHF)   Anticoagulation: apixaBAN Tab - 5 MG             Increased thromboembolic stroke risk   Antiarrhythmic: AMIODarone - 200 MG   Previous therapy attempts: Quinidine 2013 -11/28/2022 d/t polymorphic VT and prolonged QT, Warfarin 03/03/2016 - therapy completed  Procedures: 05/09/2011: Ablation for recurrent VT and AICD discharges   Echocardiogram: 11/23/2022        EF (ejection fraction) 44%        LA (left atrium) volume size 78.5 mL/m2        LV IVS thickness 0.5 cm        LV PW thickness 0.7 cm  08/26/2021 EF 42%  08/14/2020 EF 42%   Coronary status: NM Stress Test 02/12/2007  The patient’s exercise myocardial perfusion scan is abnormal but appears to demonstrate some improvement in comparison to a prior adenosine myocardial perfusion scan dated 12/4/03. This study reveals: The left ventricle remains moderately dilated and exhibits an ejection fraction of 45%, which is significantly improved from the patient’s prior study, at which time it exhibited an ejection fraction of 35%. Marked scarring is again noted to involve the proximal 60% of the inferior wall with extension into the inferolateral wall, which is unchanged. No reversible  ischemic changes are seen.   Cardiac Catheterization 12/01/2003 at [facility]  1. Left ventricular end diastolic pressure at the upper limits of normal.  2. No gradient found across the aortic valve.  3. Aortic root angiography demonstrates no aortic insufficiency and no significant dilatation with a single vein graft.  4. Occluded right coronary artery.  5. Occluded circumflex.  6. Subtotally occluded left anterior descending.  7. Patent left internal mammary artery to the left anterior descending.  8. Patent vein graft to the posterior descending artery.  9. Occlusion of all other vein grafts noted in previous operative reports and occlusion of the free right internal mammary artery.   1998, 1991 CABG x 4     MILES: Unable to wear a mask d/t Bell's Palsey             CXR done: 12/09/2022 No acute chest finding with stable cardiac enlargement. No  acute lung finding.              PFT done: 11/23/2022 Very discrete restrictive changes, very discrete impairment in diffusion capacity.  Clinical radiographic correlation are needed if amiodarone induced lung toxicity are suspected     DEVICE TYPE:  Manufacture: DUAL ICD Manufacture: Medtronic   Implanted on 06/08/2020 GEN Change by Dr. Paolo Stevens for ICMP and VT  10/08/2012 ICD Generator Replacement   1998: Initial ICD Implant    DEVICE ALERTS: None    CARDIAC EVENT MONITOR: None    OTHER CARDIAC TESTS: 02/21/2023 CT Heart Structure:LEFT heart enlargement including LEFT ventricle and atrium. LEFT atrial appendage is opacified without thrombus. Pulmonary venous anatomy as described above. On the basis of the coronal reformatted images, it appears that the LEFT superior and inferior pulmonary veins arise from a large common ostium measuring up to 35 mm, refer to coronal images. Thickened sclerotic appearance of the aortic valve associated calcification. Fibrotic changes throughout the lungs.    EKG done: 01/09/2023  Rhythm: V-paced rhythm with occasional AV dual-paced complexes    Rate: 63 bpm  QTC: 571    Eye exam done for Amiodarone monitoring: N/A    LABS:   Recent Labs   Lab 01/19/23  1126 01/03/23  1128 11/22/22  0846 05/18/22  0928 03/14/22  0756   Potassium 5.1   < > 4.8   < > 4.7   Creatinine 1.67*   < > 1.51*   < > 1.51*   Glomerular Filtration Rate 42*   < > 48*   < > 44*   GOT/AST  --   --  21   < > 14   GPT  --   --  25   < > 22   TSH  --   --   --   --  1.580    < > = values in this interval not displayed.           English

## 2023-03-23 NOTE — PHYSICAL THERAPY INITIAL EVALUATION ADULT - DIAGNOSIS, PT EVAL
----- Message from Piyush Stephenson DO sent at 3/21/2023  5:03 PM EDT -----  Unfortunately Kavon was not able to be processed    I would recommend that she will have to do another sample and we will make recommendations after that    I guess the actual [employer] will reach out to her in the near future   Decreased functional mobility due to decrease strength and aerobic capacity

## 2023-06-01 NOTE — ED ADULT NURSE NOTE - NS ED NURSE RECORD ANOTHER VITAL SIGN
-Take 40 mg Protonix (pantoprazole) in morning DAILY 1 hour before breakfast.  -Start conservative management lifestyle modification strategies for reflux treatment including:      -Avoidance of late night eating and lying down soon after eating. Consider lying down and sleeping in a reclined position or even raising the head of the bed up a little bit as to reduce the gravity effects of acid reflux.        -Avoidance of trigger foods that could worsen reflux including alcohol, excessively salty foods, spicy foods, acidic foods, chocolate, peppermint, fatty foods, coffee.
Yes

## 2023-10-04 NOTE — ED ADULT NURSE NOTE - CAS TRG GEN SKIN CONDITION
Ms. Chaves is a 59-year-old woman with a history of small bowel, colonic and perianal Crohn's disease status post ileocecal resection on Humira 40 mg weekly presenting for follow up after colonoscopy.  A colonoscopy was performed on 8/3/2023 for the evaluation of Crohn's colitis with recent exacerbation of symptoms including perianal fistula drainage.  The findings were notable for perianal fistula and perianal skin tags without signs of infection and anal canal stenosis, diverticulosis of the sigmoid colon, descending colon and transverse colon, moderate stenosis with ulceration at the ileocolonic anastomosis that could not be traversed status post biopsy showing chronic active inflammation consistent with anastomosis related changes negative for dysplasia, mild vascular changes of the right colon and biopsies of the right colon, left colon and rectum showing no significant abnormality.  She reports since the colonoscopy, her perianal drainage is over all been better using combination of ciprofloxacin 500 mg twice daily and topical castor oil.  She continues to have waxing waning right-sided and epigastric abdominal pain.  No NSAID use.  Her stool is generally loose in consistency with variable frequency.  She occasionally will see blood or mucus in the stool.  She received an iron infusion x 2 with Dr. Crisostomo in July/August.  Labs on 6/13/2023 show normal CBC, normal basic metabolic panel, normal liver function tests, iron saturation 7%, TIBC 482, iron 35, ferritin 11; CRP 2; negative QuantiFERON-TB gold plus, vitamin D 25-hydroxy 36.7, vitamin B12 507 Warm Tazorac Pregnancy And Lactation Text: This medication is not safe during pregnancy. It is unknown if this medication is excreted in breast milk.

## 2023-11-12 ENCOUNTER — RX RENEWAL (OUTPATIENT)
Age: 78
End: 2023-11-12

## 2023-11-12 RX ORDER — TAMSULOSIN HYDROCHLORIDE 0.4 MG/1
0.4 CAPSULE ORAL
Qty: 90 | Refills: 3 | Status: ACTIVE | COMMUNITY
Start: 2020-10-21 | End: 1900-01-01

## 2023-11-27 ENCOUNTER — APPOINTMENT (OUTPATIENT)
Dept: UROLOGY | Facility: CLINIC | Age: 78
End: 2023-11-27
Payer: MEDICARE

## 2023-11-27 ENCOUNTER — OUTPATIENT (OUTPATIENT)
Dept: OUTPATIENT SERVICES | Facility: HOSPITAL | Age: 78
LOS: 1 days | End: 2023-11-27
Payer: MEDICARE

## 2023-11-27 DIAGNOSIS — Z98.890 OTHER SPECIFIED POSTPROCEDURAL STATES: Chronic | ICD-10-CM

## 2023-11-27 DIAGNOSIS — R35.0 FREQUENCY OF MICTURITION: ICD-10-CM

## 2023-11-27 DIAGNOSIS — N20.1 CALCULUS OF URETER: ICD-10-CM

## 2023-11-27 DIAGNOSIS — Z90.49 ACQUIRED ABSENCE OF OTHER SPECIFIED PARTS OF DIGESTIVE TRACT: Chronic | ICD-10-CM

## 2023-11-27 DIAGNOSIS — N40.1 BENIGN PROSTATIC HYPERPLASIA WITH LOWER URINARY TRACT SYMPMS: ICD-10-CM

## 2023-11-27 DIAGNOSIS — N13.8 BENIGN PROSTATIC HYPERPLASIA WITH LOWER URINARY TRACT SYMPMS: ICD-10-CM

## 2023-11-27 PROCEDURE — 76775 US EXAM ABDO BACK WALL LIM: CPT

## 2023-11-27 PROCEDURE — 76775 US EXAM ABDO BACK WALL LIM: CPT | Mod: 26

## 2023-11-27 PROCEDURE — 99212 OFFICE O/P EST SF 10 MIN: CPT

## 2023-11-27 RX ORDER — LOSARTAN POTASSIUM 50 MG/1
50 TABLET, FILM COATED ORAL
Refills: 0 | Status: DISCONTINUED | COMMUNITY
End: 2023-11-27

## 2023-11-27 RX ORDER — AMLODIPINE BESYLATE 5 MG/1
TABLET ORAL
Refills: 0 | Status: ACTIVE | COMMUNITY

## 2023-11-27 RX ORDER — PSYLLIUM HUSK 0.4 G
CAPSULE ORAL
Refills: 0 | Status: DISCONTINUED | COMMUNITY
End: 2023-11-27

## 2023-12-07 DIAGNOSIS — N40.1 BENIGN PROSTATIC HYPERPLASIA WITH LOWER URINARY TRACT SYMPTOMS: ICD-10-CM

## 2023-12-07 DIAGNOSIS — N20.1 CALCULUS OF URETER: ICD-10-CM

## 2023-12-19 ENCOUNTER — EMERGENCY (EMERGENCY)
Facility: HOSPITAL | Age: 78
LOS: 1 days | Discharge: HOME | End: 2023-12-19
Attending: EMERGENCY MEDICINE
Payer: MEDICARE

## 2023-12-19 VITALS
HEART RATE: 72 BPM | OXYGEN SATURATION: 98 % | RESPIRATION RATE: 16 BRPM | TEMPERATURE: 99 F | SYSTOLIC BLOOD PRESSURE: 150 MMHG | DIASTOLIC BLOOD PRESSURE: 85 MMHG

## 2023-12-19 VITALS
OXYGEN SATURATION: 99 % | HEART RATE: 70 BPM | RESPIRATION RATE: 18 BRPM | WEIGHT: 166.89 LBS | HEIGHT: 68 IN | SYSTOLIC BLOOD PRESSURE: 127 MMHG | DIASTOLIC BLOOD PRESSURE: 71 MMHG | TEMPERATURE: 98 F

## 2023-12-19 DIAGNOSIS — Z90.49 ACQUIRED ABSENCE OF OTHER SPECIFIED PARTS OF DIGESTIVE TRACT: Chronic | ICD-10-CM

## 2023-12-19 DIAGNOSIS — Z98.890 OTHER SPECIFIED POSTPROCEDURAL STATES: Chronic | ICD-10-CM

## 2023-12-19 LAB
ALBUMIN SERPL ELPH-MCNC: 4.4 G/DL — SIGNIFICANT CHANGE UP (ref 3.3–5)
ALBUMIN SERPL ELPH-MCNC: 4.4 G/DL — SIGNIFICANT CHANGE UP (ref 3.3–5)
ALP SERPL-CCNC: 60 U/L — SIGNIFICANT CHANGE UP (ref 40–120)
ALP SERPL-CCNC: 60 U/L — SIGNIFICANT CHANGE UP (ref 40–120)
ALT FLD-CCNC: 37 U/L — SIGNIFICANT CHANGE UP (ref 10–45)
ALT FLD-CCNC: 37 U/L — SIGNIFICANT CHANGE UP (ref 10–45)
ANION GAP SERPL CALC-SCNC: 14 MMOL/L — SIGNIFICANT CHANGE UP (ref 5–17)
ANION GAP SERPL CALC-SCNC: 14 MMOL/L — SIGNIFICANT CHANGE UP (ref 5–17)
APPEARANCE UR: CLEAR — SIGNIFICANT CHANGE UP
APPEARANCE UR: CLEAR — SIGNIFICANT CHANGE UP
APTT BLD: 29.8 SEC — SIGNIFICANT CHANGE UP (ref 24.5–35.6)
APTT BLD: 29.8 SEC — SIGNIFICANT CHANGE UP (ref 24.5–35.6)
AST SERPL-CCNC: 29 U/L — SIGNIFICANT CHANGE UP (ref 10–40)
AST SERPL-CCNC: 29 U/L — SIGNIFICANT CHANGE UP (ref 10–40)
BACTERIA # UR AUTO: NEGATIVE /HPF — SIGNIFICANT CHANGE UP
BACTERIA # UR AUTO: NEGATIVE /HPF — SIGNIFICANT CHANGE UP
BASOPHILS # BLD AUTO: 0.04 K/UL — SIGNIFICANT CHANGE UP (ref 0–0.2)
BASOPHILS # BLD AUTO: 0.04 K/UL — SIGNIFICANT CHANGE UP (ref 0–0.2)
BASOPHILS NFR BLD AUTO: 0.7 % — SIGNIFICANT CHANGE UP (ref 0–2)
BASOPHILS NFR BLD AUTO: 0.7 % — SIGNIFICANT CHANGE UP (ref 0–2)
BILIRUB SERPL-MCNC: 0.4 MG/DL — SIGNIFICANT CHANGE UP (ref 0.2–1.2)
BILIRUB SERPL-MCNC: 0.4 MG/DL — SIGNIFICANT CHANGE UP (ref 0.2–1.2)
BILIRUB UR-MCNC: NEGATIVE — SIGNIFICANT CHANGE UP
BILIRUB UR-MCNC: NEGATIVE — SIGNIFICANT CHANGE UP
BUN SERPL-MCNC: 40 MG/DL — HIGH (ref 7–23)
BUN SERPL-MCNC: 40 MG/DL — HIGH (ref 7–23)
CALCIUM SERPL-MCNC: 9.3 MG/DL — SIGNIFICANT CHANGE UP (ref 8.4–10.5)
CALCIUM SERPL-MCNC: 9.3 MG/DL — SIGNIFICANT CHANGE UP (ref 8.4–10.5)
CAST: 3 /LPF — SIGNIFICANT CHANGE UP (ref 0–4)
CAST: 3 /LPF — SIGNIFICANT CHANGE UP (ref 0–4)
CHLORIDE SERPL-SCNC: 107 MMOL/L — SIGNIFICANT CHANGE UP (ref 96–108)
CHLORIDE SERPL-SCNC: 107 MMOL/L — SIGNIFICANT CHANGE UP (ref 96–108)
CO2 SERPL-SCNC: 21 MMOL/L — LOW (ref 22–31)
CO2 SERPL-SCNC: 21 MMOL/L — LOW (ref 22–31)
COLOR SPEC: YELLOW — SIGNIFICANT CHANGE UP
COLOR SPEC: YELLOW — SIGNIFICANT CHANGE UP
CREAT SERPL-MCNC: 1.53 MG/DL — HIGH (ref 0.5–1.3)
CREAT SERPL-MCNC: 1.53 MG/DL — HIGH (ref 0.5–1.3)
DIFF PNL FLD: NEGATIVE — SIGNIFICANT CHANGE UP
DIFF PNL FLD: NEGATIVE — SIGNIFICANT CHANGE UP
EGFR: 46 ML/MIN/1.73M2 — LOW
EGFR: 46 ML/MIN/1.73M2 — LOW
EOSINOPHIL # BLD AUTO: 0.12 K/UL — SIGNIFICANT CHANGE UP (ref 0–0.5)
EOSINOPHIL # BLD AUTO: 0.12 K/UL — SIGNIFICANT CHANGE UP (ref 0–0.5)
EOSINOPHIL NFR BLD AUTO: 2 % — SIGNIFICANT CHANGE UP (ref 0–6)
EOSINOPHIL NFR BLD AUTO: 2 % — SIGNIFICANT CHANGE UP (ref 0–6)
GLUCOSE SERPL-MCNC: 198 MG/DL — HIGH (ref 70–99)
GLUCOSE SERPL-MCNC: 198 MG/DL — HIGH (ref 70–99)
GLUCOSE UR QL: 100 MG/DL
GLUCOSE UR QL: 100 MG/DL
HCT VFR BLD CALC: 30.3 % — LOW (ref 39–50)
HCT VFR BLD CALC: 30.3 % — LOW (ref 39–50)
HGB BLD-MCNC: 9.7 G/DL — LOW (ref 13–17)
HGB BLD-MCNC: 9.7 G/DL — LOW (ref 13–17)
IMM GRANULOCYTES NFR BLD AUTO: 0.8 % — SIGNIFICANT CHANGE UP (ref 0–0.9)
IMM GRANULOCYTES NFR BLD AUTO: 0.8 % — SIGNIFICANT CHANGE UP (ref 0–0.9)
INR BLD: 1.04 RATIO — SIGNIFICANT CHANGE UP (ref 0.85–1.18)
INR BLD: 1.04 RATIO — SIGNIFICANT CHANGE UP (ref 0.85–1.18)
KETONES UR-MCNC: NEGATIVE MG/DL — SIGNIFICANT CHANGE UP
KETONES UR-MCNC: NEGATIVE MG/DL — SIGNIFICANT CHANGE UP
LEUKOCYTE ESTERASE UR-ACNC: NEGATIVE — SIGNIFICANT CHANGE UP
LEUKOCYTE ESTERASE UR-ACNC: NEGATIVE — SIGNIFICANT CHANGE UP
LYMPHOCYTES # BLD AUTO: 1.06 K/UL — SIGNIFICANT CHANGE UP (ref 1–3.3)
LYMPHOCYTES # BLD AUTO: 1.06 K/UL — SIGNIFICANT CHANGE UP (ref 1–3.3)
LYMPHOCYTES # BLD AUTO: 17.6 % — SIGNIFICANT CHANGE UP (ref 13–44)
LYMPHOCYTES # BLD AUTO: 17.6 % — SIGNIFICANT CHANGE UP (ref 13–44)
MCHC RBC-ENTMCNC: 29 PG — SIGNIFICANT CHANGE UP (ref 27–34)
MCHC RBC-ENTMCNC: 29 PG — SIGNIFICANT CHANGE UP (ref 27–34)
MCHC RBC-ENTMCNC: 32 GM/DL — SIGNIFICANT CHANGE UP (ref 32–36)
MCHC RBC-ENTMCNC: 32 GM/DL — SIGNIFICANT CHANGE UP (ref 32–36)
MCV RBC AUTO: 90.7 FL — SIGNIFICANT CHANGE UP (ref 80–100)
MCV RBC AUTO: 90.7 FL — SIGNIFICANT CHANGE UP (ref 80–100)
MONOCYTES # BLD AUTO: 0.46 K/UL — SIGNIFICANT CHANGE UP (ref 0–0.9)
MONOCYTES # BLD AUTO: 0.46 K/UL — SIGNIFICANT CHANGE UP (ref 0–0.9)
MONOCYTES NFR BLD AUTO: 7.6 % — SIGNIFICANT CHANGE UP (ref 2–14)
MONOCYTES NFR BLD AUTO: 7.6 % — SIGNIFICANT CHANGE UP (ref 2–14)
NEUTROPHILS # BLD AUTO: 4.29 K/UL — SIGNIFICANT CHANGE UP (ref 1.8–7.4)
NEUTROPHILS # BLD AUTO: 4.29 K/UL — SIGNIFICANT CHANGE UP (ref 1.8–7.4)
NEUTROPHILS NFR BLD AUTO: 71.3 % — SIGNIFICANT CHANGE UP (ref 43–77)
NEUTROPHILS NFR BLD AUTO: 71.3 % — SIGNIFICANT CHANGE UP (ref 43–77)
NITRITE UR-MCNC: NEGATIVE — SIGNIFICANT CHANGE UP
NITRITE UR-MCNC: NEGATIVE — SIGNIFICANT CHANGE UP
NRBC # BLD: 0 /100 WBCS — SIGNIFICANT CHANGE UP (ref 0–0)
NRBC # BLD: 0 /100 WBCS — SIGNIFICANT CHANGE UP (ref 0–0)
PH UR: 7 — SIGNIFICANT CHANGE UP (ref 5–8)
PH UR: 7 — SIGNIFICANT CHANGE UP (ref 5–8)
PLATELET # BLD AUTO: 193 K/UL — SIGNIFICANT CHANGE UP (ref 150–400)
PLATELET # BLD AUTO: 193 K/UL — SIGNIFICANT CHANGE UP (ref 150–400)
POTASSIUM SERPL-MCNC: 4.7 MMOL/L — SIGNIFICANT CHANGE UP (ref 3.5–5.3)
POTASSIUM SERPL-MCNC: 4.7 MMOL/L — SIGNIFICANT CHANGE UP (ref 3.5–5.3)
POTASSIUM SERPL-SCNC: 4.7 MMOL/L — SIGNIFICANT CHANGE UP (ref 3.5–5.3)
POTASSIUM SERPL-SCNC: 4.7 MMOL/L — SIGNIFICANT CHANGE UP (ref 3.5–5.3)
PROT SERPL-MCNC: 7.3 G/DL — SIGNIFICANT CHANGE UP (ref 6–8.3)
PROT SERPL-MCNC: 7.3 G/DL — SIGNIFICANT CHANGE UP (ref 6–8.3)
PROT UR-MCNC: SIGNIFICANT CHANGE UP MG/DL
PROT UR-MCNC: SIGNIFICANT CHANGE UP MG/DL
PROTHROM AB SERPL-ACNC: 11.4 SEC — SIGNIFICANT CHANGE UP (ref 9.5–13)
PROTHROM AB SERPL-ACNC: 11.4 SEC — SIGNIFICANT CHANGE UP (ref 9.5–13)
RBC # BLD: 3.34 M/UL — LOW (ref 4.2–5.8)
RBC # BLD: 3.34 M/UL — LOW (ref 4.2–5.8)
RBC # FLD: 14.3 % — SIGNIFICANT CHANGE UP (ref 10.3–14.5)
RBC # FLD: 14.3 % — SIGNIFICANT CHANGE UP (ref 10.3–14.5)
RBC CASTS # UR COMP ASSIST: 0 /HPF — SIGNIFICANT CHANGE UP (ref 0–4)
RBC CASTS # UR COMP ASSIST: 0 /HPF — SIGNIFICANT CHANGE UP (ref 0–4)
SODIUM SERPL-SCNC: 142 MMOL/L — SIGNIFICANT CHANGE UP (ref 135–145)
SODIUM SERPL-SCNC: 142 MMOL/L — SIGNIFICANT CHANGE UP (ref 135–145)
SP GR SPEC: 1.01 — SIGNIFICANT CHANGE UP (ref 1–1.03)
SP GR SPEC: 1.01 — SIGNIFICANT CHANGE UP (ref 1–1.03)
SQUAMOUS # UR AUTO: 0 /HPF — SIGNIFICANT CHANGE UP (ref 0–5)
SQUAMOUS # UR AUTO: 0 /HPF — SIGNIFICANT CHANGE UP (ref 0–5)
TROPONIN T, HIGH SENSITIVITY RESULT: 35 NG/L — SIGNIFICANT CHANGE UP (ref 0–51)
TROPONIN T, HIGH SENSITIVITY RESULT: 35 NG/L — SIGNIFICANT CHANGE UP (ref 0–51)
TROPONIN T, HIGH SENSITIVITY RESULT: 41 NG/L — SIGNIFICANT CHANGE UP (ref 0–51)
TROPONIN T, HIGH SENSITIVITY RESULT: 41 NG/L — SIGNIFICANT CHANGE UP (ref 0–51)
UROBILINOGEN FLD QL: 0.2 MG/DL — SIGNIFICANT CHANGE UP (ref 0.2–1)
UROBILINOGEN FLD QL: 0.2 MG/DL — SIGNIFICANT CHANGE UP (ref 0.2–1)
WBC # BLD: 6.02 K/UL — SIGNIFICANT CHANGE UP (ref 3.8–10.5)
WBC # BLD: 6.02 K/UL — SIGNIFICANT CHANGE UP (ref 3.8–10.5)
WBC # FLD AUTO: 6.02 K/UL — SIGNIFICANT CHANGE UP (ref 3.8–10.5)
WBC # FLD AUTO: 6.02 K/UL — SIGNIFICANT CHANGE UP (ref 3.8–10.5)
WBC UR QL: 0 /HPF — SIGNIFICANT CHANGE UP (ref 0–5)
WBC UR QL: 0 /HPF — SIGNIFICANT CHANGE UP (ref 0–5)

## 2023-12-19 PROCEDURE — 87086 URINE CULTURE/COLONY COUNT: CPT

## 2023-12-19 PROCEDURE — 71045 X-RAY EXAM CHEST 1 VIEW: CPT

## 2023-12-19 PROCEDURE — 81001 URINALYSIS AUTO W/SCOPE: CPT

## 2023-12-19 PROCEDURE — 71045 X-RAY EXAM CHEST 1 VIEW: CPT | Mod: 26

## 2023-12-19 PROCEDURE — 85610 PROTHROMBIN TIME: CPT

## 2023-12-19 PROCEDURE — 99284 EMERGENCY DEPT VISIT MOD MDM: CPT | Mod: FS

## 2023-12-19 PROCEDURE — 85730 THROMBOPLASTIN TIME PARTIAL: CPT

## 2023-12-19 PROCEDURE — 82962 GLUCOSE BLOOD TEST: CPT

## 2023-12-19 PROCEDURE — 80053 COMPREHEN METABOLIC PANEL: CPT

## 2023-12-19 PROCEDURE — 99285 EMERGENCY DEPT VISIT HI MDM: CPT | Mod: 25

## 2023-12-19 PROCEDURE — 93005 ELECTROCARDIOGRAM TRACING: CPT

## 2023-12-19 PROCEDURE — 84484 ASSAY OF TROPONIN QUANT: CPT

## 2023-12-19 PROCEDURE — 85025 COMPLETE CBC W/AUTO DIFF WBC: CPT

## 2023-12-19 PROCEDURE — 36415 COLL VENOUS BLD VENIPUNCTURE: CPT

## 2023-12-19 RX ORDER — SODIUM CHLORIDE 9 MG/ML
500 INJECTION INTRAMUSCULAR; INTRAVENOUS; SUBCUTANEOUS ONCE
Refills: 0 | Status: COMPLETED | OUTPATIENT
Start: 2023-12-19 | End: 2023-12-19

## 2023-12-19 RX ADMIN — SODIUM CHLORIDE 500 MILLILITER(S): 9 INJECTION INTRAMUSCULAR; INTRAVENOUS; SUBCUTANEOUS at 17:32

## 2023-12-19 NOTE — ED PROVIDER NOTE - PROGRESS NOTE DETAILS
pt tolerating PO, ambulating with steady gait, feeling well, no events on telemetry. Ok with d.c all questions answered, recommended prompt pcp f/u, return precautions given -Marely Montanez PA-C

## 2023-12-19 NOTE — ED ADULT NURSE NOTE - NSFALLRISKINTERV_ED_ALL_ED
Assistance OOB with selected safe patient handling equipment if applicable/Communicate fall risk and risk factors to all staff, patient, and family/Orthostatic vital signs/Provide visual cue: yellow wristband, yellow gown, etc/Reinforce activity limits and safety measures with patient and family/Call bell, personal items and telephone in reach/Instruct patient to call for assistance before getting out of bed/chair/stretcher/Non-slip footwear applied when patient is off stretcher/[place] to call system/Physically safe environment - no spills, clutter or unnecessary equipment/Purposeful Proactive Rounding/Room/bathroom lighting operational, light cord in reach Assistance OOB with selected safe patient handling equipment if applicable/Communicate fall risk and risk factors to all staff, patient, and family/Orthostatic vital signs/Provide visual cue: yellow wristband, yellow gown, etc/Reinforce activity limits and safety measures with patient and family/Call bell, personal items and telephone in reach/Instruct patient to call for assistance before getting out of bed/chair/stretcher/Non-slip footwear applied when patient is off stretcher/[place] to call system/Physically safe environment - no spills, clutter or unnecessary equipment/Purposeful Proactive Rounding/Room/bathroom lighting operational, light cord in reach

## 2023-12-19 NOTE — ED PROVIDER NOTE - PATIENT PORTAL LINK FT
You can access the FollowMyHealth Patient Portal offered by [facility] by registering at the following website: [email or web address]. By joining [employer]’s FollowMyHealth portal, you will also be able to view your health information using other applications (apps) compatible with our system. You can access the FollowMyHealth Patient Portal offered by [facility] by registering at the following website: [email or web address]. By joining [employer]’s FollowMyHealth portal, you will also be able to view your health information using other applications (apps) compatible with our system.

## 2023-12-19 NOTE — ED PROVIDER NOTE - OBJECTIVE STATEMENT
78-year-old male with PMH CVA in 2015, HTN, HLD, DM on oral medications, here for evaluation of an episode of presyncope after having a bowel movement.  Patient states he was in the bathroom at his podiatrist office and he was using the restroom and started to feel lightheaded and as if he might pass out.  Episode lasted for approximately 15 minutes, office staff called EMS and brought patient here.  Patient denies losing consciousness or any injury.  Denies any numbness, tingling, chest pain, shortness of breath or difficulty breathing.  Patient states he has had similar episodes in the past which he attributes to anxiety.

## 2023-12-19 NOTE — ED PROVIDER NOTE - PHYSICAL EXAMINATION
A&Ox3, NAD, well appearing  Neck supple, no LAD.   Lungs CTAB. No w/r/r  Cardiac  RRR  Abd soft, NT/ND  Extremities: no edema.   Skin without rash.   No focal Deficits,

## 2023-12-19 NOTE — ED ADULT NURSE NOTE - OBJECTIVE STATEMENT
79 yo presents to the ED from podiatrist office by EMS. A&Ox4, history of CVA in 2015, HTN, HLD, DM on oral medications. pt states he had an episode of presyncope after having a bowel movement. Patient states he was in the bathroom at his podiatrist office and he was using the restroom and started to feel lightheaded and as if he might pass out. Episode lasted for approximately 15 minutes, office staff called EMS and brought patient here. Patient denies losing consciousness or any injury. Denies any numbness, tingling, chest pain, shortness of breath or difficulty breathing. Patient states he has had similar episodes in the past which he attributes to anxiety. pt arrives with 18G in L wrist by EMS. EKG done on arrival, pt placed on CM. Patient undressed and placed into gown, call bell in hand and side rails up for safety. warm blanket provided, vital signs stable, pt in no acute distress. 77 yo presents to the ED from podiatrist office by EMS. A&Ox4, history of CVA in 2015, HTN, HLD, DM on oral medications. pt states he had an episode of presyncope after having a bowel movement. Patient states he was in the bathroom at his podiatrist office and he was using the restroom and started to feel lightheaded and as if he might pass out. Episode lasted for approximately 15 minutes, office staff called EMS and brought patient here. Patient denies losing consciousness or any injury. Denies any numbness, tingling, chest pain, shortness of breath or difficulty breathing. Patient states he has had similar episodes in the past which he attributes to anxiety. pt arrives with 18G in L wrist by EMS. EKG done on arrival, pt placed on CM. Patient undressed and placed into gown, call bell in hand and side rails up for safety. warm blanket provided, vital signs stable, pt in no acute distress.

## 2023-12-19 NOTE — ED PROVIDER NOTE - NSFOLLOWUPINSTRUCTIONS_ED_ALL_ED_FT
- stay hydrated.     -take all home medications as prescribed    - follow up with your primary care provider in 1-2 days.    - return if symptoms worsen, fever, weakness, numbness/tingling, blurred vision, difficulty ambulating and all other concerns.

## 2023-12-19 NOTE — ED PROVIDER NOTE - CLINICAL SUMMARY MEDICAL DECISION MAKING FREE TEXT BOX
Nichol: 78 year old male with pmhx of cvas, htn, hld, dm, on oral meds here for episode of presyncope after having  a BM.  was in bathroom and tehn felt lightheaded. lasted about 15 mins. bib ems from podiatrist office where it occurred.  no loc, no fall.  no cp, no sob.  has had simliar episdoes in the past. att exam: patient awake alert NAD . LUNGS CTAB no wheeze no crackle. CARD RRR no m/r/g.  Abdomen soft NT ND no rebound no guarding no CVA tenderness. EXT WWP no edema no calf tenderness CV 2+DP/PT bilaterally. neuro A&Ox3 gait normal.  skin warm and dry no rash  plan: will get labs, cxr, ekg, cardiac enzymes, electrolytes, r/o acs, r/o electroltyes, r/o infectious cause of lightheadneess. reasess.

## 2023-12-19 NOTE — ED ADULT TRIAGE NOTE - TEMPERATURE IN FAHRENHEIT (DEGREES F)
[place]  [facility] EMERGENCY DEPARTMENT  [address]  [place] [address]  Phone: [phone]    Blake Dennis was seen and treated in our emergency department on 5/8/2023.  He may return to work on 05/09/2023.         Thank you for choosing [facility].    Sebastian Aguirre MD       98.1

## 2023-12-27 NOTE — DIETITIAN INITIAL EVALUATION ADULT. - OTHER INFO
Insulin Pen Education   Patient in clinic for MFM consult.  Team recommend begin NPH inulin 10 At Bedtime      Able to demonstrate/describe:     Wash hands      Check label - verify correct medication   Gentle mix (NPH)   Attach new needle each time   Safety test (prime pen, ensure needle working properly)    Select correct dose   Self injection   Site selection & rotation   Remove needle   Insulin Storage   Needle disposal    Review Hypoglycemia    Causes   Sign & symptoms   Oral Treatment    Patient appears to have good understanding and is engaged in self-care.  Encouraged to call for questions or concerns    Given printed education materials  Plans f/u by phone 3-4 days and then weekly for BGM support.    Has log sheets and/or contact information.    **Note - sent to L&D for evaluation of elevated BP     Pertinent Information: This is a 74 year old M PMH HTN, HLD, T2DM, prior CVA w/o residual deficits, BPH, ureteral obstruction requiring prior stents, appendectomy p/w nausea and vomiting. Nausea, vomiting resolved upon admission. Found to have acute respiratory failure with hypoxia likely from aspiration pneumonitis vs pneumonia. S/p CT consistent with aspiration PNA vs pneumonitis, likely occurring due to significant vomiting. Pt seen by SLP, s/p MBS yesterday (2/3/20), recommended for Regular diet with thin liquids.     Patient reports good PO intake and appetite PTA, reports he is more of a "grazer" at home. Tries to follow a "low sodium, low cholesterol and low sugar diet" at home. Does not add salt to food but per diet recall pt does consumes high sodium foods occasionally such as soup, potato chips. Drinks water and sugar free strawberry drink, but occasionally drinks soda and juice. Pt denies chewing/swallowing difficulty, diarrhea, constipation PTA. Pt noted with T2DM, checks BG 3-4x per day, reports BG 85-150mg/dL, takes Metformin, Victoza, and Glimepiride. Reports seeing Endocrinologist and nutritionist outpatient.  HbA1c: 7.1% (1/30), previous HbA1c from 2/2018 noted as 7.4%, showing improvement in glycemic control.     Weight: pt reports weight has been stable, denies any recent changes. Reports UBW as ~ 175lbs. Weight per previous RD recorded as 169lbs (2/2018). Current dosing wt is 199.5lbs--suggesting wt gain, will continue to monitor. +1 edema noted.     Since admission pt reports overall good PO intake and appetite. Consumed 100% of breakfast this morning. Denies any nausea, emesis. Last BM 2/3. Pt amendable to brief review of diet education. Provided education on Carbohydrate Consistent diet including sources of carbohydrates, portion sizes, pairing protein with carbohydrates and importance of continuing to limit sugar sweetened beverages in diet and keeping a  consistent eating pattern to help optimize glycemic control. Also provided education on Heart-Healthy Nutrition Therapy including sources of lean protein, heart healthy fats, limiting high sodium foods and incorporating fruits, vegetables and whole grains in the diet. Patient with no nutrition-related questions at this time. Made aware RD remains available as needed. Pertinent Information: This is a 74 year old M PMH HTN, HLD, T2DM, prior CVA w/o residual deficits, BPH, ureteral obstruction requiring prior stents, appendectomy p/w nausea and vomiting. Nausea, vomiting resolved upon admission. Found to have acute respiratory failure with hypoxia likely from aspiration pneumonitis vs pneumonia. S/p CT consistent with aspiration PNA vs pneumonitis, likely occurring due to significant vomiting. Pt seen by SLP, s/p MBS yesterday (2/3/20), recommended for Regular diet with thin liquids.     Patient reports good PO intake and appetite PTA, reports he is more of a "grazer" at home. Tries to follow a "low sodium, low cholesterol and low sugar diet" at home. Does not add salt to food but per diet recall pt does consumes high sodium foods occasionally such as soup, potato chips. Drinks water and sugar free strawberry drink, but occasionally drinks soda and juice. Confirms NKFA, denies micronutrient supplement. Pt denies chewing/swallowing difficulty, diarrhea, constipation PTA. Pt noted with T2DM, checks BG 3-4x per day, reports BG 85-150mg/dL, takes Metformin, Victoza, and Glimepiride. Reports seeing Endocrinologist and nutritionist outpatient.  HbA1c: 7.1% (1/30), previous HbA1c from 2/2018 noted as 7.4%, showing improvement in glycemic control.     Weight: pt reports weight has been stable, denies any recent changes. Reports UBW as ~ 175lbs. Weight per previous RD recorded as 169lbs (2/2018). Current dosing wt is 199.5lbs--suggesting wt gain, will continue to monitor. +1 edema noted.     Since admission pt reports overall good PO intake and appetite. Consumed 100% of breakfast this morning. Denies any nausea, emesis. Last BM 2/3. Pt amendable to brief review of diet education. Provided education on Carbohydrate Consistent diet including sources of carbohydrates, portion sizes, pairing protein with carbohydrates and importance of continuing to limit sugar sweetened beverages in diet and keeping a  consistent eating pattern to help optimize glycemic control. Also provided education on Heart-Healthy Nutrition Therapy including sources of lean protein, heart healthy fats, limiting high sodium foods and incorporating fruits, vegetables and whole grains in the diet. Patient with no nutrition-related questions at this time. Made aware RD remains available as needed.

## 2024-03-04 NOTE — PHYSICAL THERAPY INITIAL EVALUATION ADULT - LEVEL OF INDEPENDENCE: SUPINE/SIT, REHAB EVAL
PTA. independent

## 2024-04-05 NOTE — DISCHARGE NOTE NURSING/CASE MANAGEMENT/SOCIAL WORK - HAS THE PATIENT USED TOBACCO IN THE PAST 30 DAYS?
-- DO NOT REPLY / DO NOT REPLY ALL --  -- Message is from Engagement Center Operations ([facility]) --    Offered Waitlist if Available for the Visit Type? No    Caller is requesting an appointment - at a sooner time than what was available.      Caller wants sooner appointment - offered other approved options    Reason for Visit: ER follow up visit due to patient was seen at the ER at 04/02 at [facility]. Parent advised that DCFS advised that patient needed to be seen 3 days after hospital visit. Please follow up with parent as soon as possible in regards of this matter.     Is the patient currently scheduled? No    Preferred time to be seen: no    Caller Information         Type Contact Phone/Fax    04/05/2024 08:08 AM CDT Phone (Incoming) Patricia Corbett (Mother) [phone] (M)            Alternative phone number: no    Can a detailed message be left? Yes    Message Turnaround:        Yes

## 2024-05-23 ENCOUNTER — INPATIENT (INPATIENT)
Facility: HOSPITAL | Age: 79
LOS: 6 days | Discharge: NURSING, REHAB OR LONG-TERM CARE | DRG: 641 | End: 2024-05-30
Attending: FAMILY MEDICINE | Admitting: FAMILY MEDICINE
Payer: MEDICARE

## 2024-05-23 VITALS
DIASTOLIC BLOOD PRESSURE: 82 MMHG | SYSTOLIC BLOOD PRESSURE: 160 MMHG | HEART RATE: 72 BPM | WEIGHT: 169.98 LBS | TEMPERATURE: 99 F | OXYGEN SATURATION: 98 % | HEIGHT: 68 IN | RESPIRATION RATE: 20 BRPM

## 2024-05-23 DIAGNOSIS — Z98.890 OTHER SPECIFIED POSTPROCEDURAL STATES: Chronic | ICD-10-CM

## 2024-05-23 DIAGNOSIS — Z90.49 ACQUIRED ABSENCE OF OTHER SPECIFIED PARTS OF DIGESTIVE TRACT: Chronic | ICD-10-CM

## 2024-05-23 DIAGNOSIS — R62.7 ADULT FAILURE TO THRIVE: ICD-10-CM

## 2024-05-23 LAB
ALBUMIN SERPL ELPH-MCNC: 4.1 G/DL — SIGNIFICANT CHANGE UP (ref 3.3–5)
ALP SERPL-CCNC: 72 U/L — SIGNIFICANT CHANGE UP (ref 40–120)
ALT FLD-CCNC: 18 U/L — SIGNIFICANT CHANGE UP (ref 10–45)
ANION GAP SERPL CALC-SCNC: 15 MMOL/L — SIGNIFICANT CHANGE UP (ref 5–17)
APPEARANCE UR: CLEAR — SIGNIFICANT CHANGE UP
AST SERPL-CCNC: 18 U/L — SIGNIFICANT CHANGE UP (ref 10–40)
BACTERIA # UR AUTO: NEGATIVE /HPF — SIGNIFICANT CHANGE UP
BASE EXCESS BLDV CALC-SCNC: -0.4 MMOL/L — SIGNIFICANT CHANGE UP (ref -2–3)
BASOPHILS # BLD AUTO: 0.05 K/UL — SIGNIFICANT CHANGE UP (ref 0–0.2)
BASOPHILS NFR BLD AUTO: 0.7 % — SIGNIFICANT CHANGE UP (ref 0–2)
BILIRUB SERPL-MCNC: 0.4 MG/DL — SIGNIFICANT CHANGE UP (ref 0.2–1.2)
BILIRUB UR-MCNC: NEGATIVE — SIGNIFICANT CHANGE UP
BUN SERPL-MCNC: 44 MG/DL — HIGH (ref 7–23)
CA-I SERPL-SCNC: 1.25 MMOL/L — SIGNIFICANT CHANGE UP (ref 1.15–1.33)
CALCIUM SERPL-MCNC: 9.4 MG/DL — SIGNIFICANT CHANGE UP (ref 8.4–10.5)
CAST: 1 /LPF — SIGNIFICANT CHANGE UP (ref 0–4)
CHLORIDE BLDV-SCNC: 111 MMOL/L — HIGH (ref 96–108)
CHLORIDE SERPL-SCNC: 108 MMOL/L — SIGNIFICANT CHANGE UP (ref 96–108)
CK SERPL-CCNC: 63 U/L — SIGNIFICANT CHANGE UP (ref 30–200)
CO2 BLDV-SCNC: 25 MMOL/L — SIGNIFICANT CHANGE UP (ref 22–26)
CO2 SERPL-SCNC: 21 MMOL/L — LOW (ref 22–31)
COLOR SPEC: YELLOW — SIGNIFICANT CHANGE UP
CREAT SERPL-MCNC: 1.43 MG/DL — HIGH (ref 0.5–1.3)
DIFF PNL FLD: NEGATIVE — SIGNIFICANT CHANGE UP
EGFR: 50 ML/MIN/1.73M2 — LOW
EOSINOPHIL # BLD AUTO: 0.15 K/UL — SIGNIFICANT CHANGE UP (ref 0–0.5)
EOSINOPHIL NFR BLD AUTO: 2.2 % — SIGNIFICANT CHANGE UP (ref 0–6)
GAS PNL BLDV: 144 MMOL/L — SIGNIFICANT CHANGE UP (ref 136–145)
GAS PNL BLDV: SIGNIFICANT CHANGE UP
GLUCOSE BLDC GLUCOMTR-MCNC: 166 MG/DL — HIGH (ref 70–99)
GLUCOSE BLDC GLUCOMTR-MCNC: 171 MG/DL — HIGH (ref 70–99)
GLUCOSE BLDV-MCNC: 186 MG/DL — HIGH (ref 70–99)
GLUCOSE SERPL-MCNC: 191 MG/DL — HIGH (ref 70–99)
GLUCOSE UR QL: NEGATIVE MG/DL — SIGNIFICANT CHANGE UP
HCO3 BLDV-SCNC: 24 MMOL/L — SIGNIFICANT CHANGE UP (ref 22–29)
HCT VFR BLD CALC: 29.4 % — LOW (ref 39–50)
HCT VFR BLDA CALC: 27 % — LOW (ref 39–51)
HGB BLD CALC-MCNC: 8.9 G/DL — LOW (ref 12.6–17.4)
HGB BLD-MCNC: 9.6 G/DL — LOW (ref 13–17)
IMM GRANULOCYTES NFR BLD AUTO: 0.3 % — SIGNIFICANT CHANGE UP (ref 0–0.9)
KETONES UR-MCNC: NEGATIVE MG/DL — SIGNIFICANT CHANGE UP
LACTATE BLDV-MCNC: 1.6 MMOL/L — SIGNIFICANT CHANGE UP (ref 0.5–2)
LEUKOCYTE ESTERASE UR-ACNC: NEGATIVE — SIGNIFICANT CHANGE UP
LYMPHOCYTES # BLD AUTO: 1.52 K/UL — SIGNIFICANT CHANGE UP (ref 1–3.3)
LYMPHOCYTES # BLD AUTO: 22.8 % — SIGNIFICANT CHANGE UP (ref 13–44)
MAGNESIUM SERPL-MCNC: 2.1 MG/DL — SIGNIFICANT CHANGE UP (ref 1.6–2.6)
MCHC RBC-ENTMCNC: 29.1 PG — SIGNIFICANT CHANGE UP (ref 27–34)
MCHC RBC-ENTMCNC: 32.7 GM/DL — SIGNIFICANT CHANGE UP (ref 32–36)
MCV RBC AUTO: 89.1 FL — SIGNIFICANT CHANGE UP (ref 80–100)
MONOCYTES # BLD AUTO: 0.68 K/UL — SIGNIFICANT CHANGE UP (ref 0–0.9)
MONOCYTES NFR BLD AUTO: 10.2 % — SIGNIFICANT CHANGE UP (ref 2–14)
NEUTROPHILS # BLD AUTO: 4.26 K/UL — SIGNIFICANT CHANGE UP (ref 1.8–7.4)
NEUTROPHILS NFR BLD AUTO: 63.8 % — SIGNIFICANT CHANGE UP (ref 43–77)
NITRITE UR-MCNC: NEGATIVE — SIGNIFICANT CHANGE UP
NRBC # BLD: 0 /100 WBCS — SIGNIFICANT CHANGE UP (ref 0–0)
PCO2 BLDV: 38 MMHG — LOW (ref 42–55)
PH BLDV: 7.41 — SIGNIFICANT CHANGE UP (ref 7.32–7.43)
PH UR: 6.5 — SIGNIFICANT CHANGE UP (ref 5–8)
PHOSPHATE SERPL-MCNC: 3 MG/DL — SIGNIFICANT CHANGE UP (ref 2.5–4.5)
PLATELET # BLD AUTO: 200 K/UL — SIGNIFICANT CHANGE UP (ref 150–400)
PO2 BLDV: 53 MMHG — HIGH (ref 25–45)
POTASSIUM BLDV-SCNC: 4.2 MMOL/L — SIGNIFICANT CHANGE UP (ref 3.5–5.1)
POTASSIUM SERPL-MCNC: 4.1 MMOL/L — SIGNIFICANT CHANGE UP (ref 3.5–5.3)
POTASSIUM SERPL-SCNC: 4.1 MMOL/L — SIGNIFICANT CHANGE UP (ref 3.5–5.3)
PROT SERPL-MCNC: 7.5 G/DL — SIGNIFICANT CHANGE UP (ref 6–8.3)
PROT UR-MCNC: SIGNIFICANT CHANGE UP MG/DL
RBC # BLD: 3.3 M/UL — LOW (ref 4.2–5.8)
RBC # FLD: 14.2 % — SIGNIFICANT CHANGE UP (ref 10.3–14.5)
RBC CASTS # UR COMP ASSIST: 0 /HPF — SIGNIFICANT CHANGE UP (ref 0–4)
SAO2 % BLDV: 83.4 % — SIGNIFICANT CHANGE UP (ref 67–88)
SODIUM SERPL-SCNC: 144 MMOL/L — SIGNIFICANT CHANGE UP (ref 135–145)
SP GR SPEC: 1.01 — SIGNIFICANT CHANGE UP (ref 1–1.03)
SQUAMOUS # UR AUTO: 0 /HPF — SIGNIFICANT CHANGE UP (ref 0–5)
UROBILINOGEN FLD QL: 0.2 MG/DL — SIGNIFICANT CHANGE UP (ref 0.2–1)
WBC # BLD: 6.68 K/UL — SIGNIFICANT CHANGE UP (ref 3.8–10.5)
WBC # FLD AUTO: 6.68 K/UL — SIGNIFICANT CHANGE UP (ref 3.8–10.5)
WBC UR QL: 0 /HPF — SIGNIFICANT CHANGE UP (ref 0–5)

## 2024-05-23 PROCEDURE — 76377 3D RENDER W/INTRP POSTPROCES: CPT | Mod: 26

## 2024-05-23 PROCEDURE — 73502 X-RAY EXAM HIP UNI 2-3 VIEWS: CPT | Mod: 26,LT

## 2024-05-23 PROCEDURE — 71045 X-RAY EXAM CHEST 1 VIEW: CPT | Mod: 26

## 2024-05-23 PROCEDURE — 99285 EMERGENCY DEPT VISIT HI MDM: CPT | Mod: FS

## 2024-05-23 PROCEDURE — 70450 CT HEAD/BRAIN W/O DYE: CPT | Mod: 26,MC

## 2024-05-23 PROCEDURE — 93971 EXTREMITY STUDY: CPT | Mod: 26,LT

## 2024-05-23 PROCEDURE — 72192 CT PELVIS W/O DYE: CPT | Mod: 26,MC

## 2024-05-23 RX ORDER — DEXTROSE 50 % IN WATER 50 %
15 SYRINGE (ML) INTRAVENOUS ONCE
Refills: 0 | Status: DISCONTINUED | OUTPATIENT
Start: 2024-05-23 | End: 2024-05-30

## 2024-05-23 RX ORDER — TRAMADOL HYDROCHLORIDE 50 MG/1
25 TABLET ORAL DAILY
Refills: 0 | Status: DISCONTINUED | OUTPATIENT
Start: 2024-05-23 | End: 2024-05-29

## 2024-05-23 RX ORDER — ATORVASTATIN CALCIUM 80 MG/1
80 TABLET, FILM COATED ORAL AT BEDTIME
Refills: 0 | Status: DISCONTINUED | OUTPATIENT
Start: 2024-05-23 | End: 2024-05-30

## 2024-05-23 RX ORDER — DEXTROSE 10 % IN WATER 10 %
125 INTRAVENOUS SOLUTION INTRAVENOUS ONCE
Refills: 0 | Status: DISCONTINUED | OUTPATIENT
Start: 2024-05-23 | End: 2024-05-30

## 2024-05-23 RX ORDER — SODIUM CHLORIDE 9 MG/ML
1000 INJECTION, SOLUTION INTRAVENOUS ONCE
Refills: 0 | Status: COMPLETED | OUTPATIENT
Start: 2024-05-23 | End: 2024-05-23

## 2024-05-23 RX ORDER — AMLODIPINE BESYLATE 2.5 MG/1
5 TABLET ORAL DAILY
Refills: 0 | Status: DISCONTINUED | OUTPATIENT
Start: 2024-05-23 | End: 2024-05-24

## 2024-05-23 RX ORDER — SODIUM CHLORIDE 9 MG/ML
1000 INJECTION, SOLUTION INTRAVENOUS
Refills: 0 | Status: DISCONTINUED | OUTPATIENT
Start: 2024-05-23 | End: 2024-05-30

## 2024-05-23 RX ORDER — HEPARIN SODIUM 5000 [USP'U]/ML
5000 INJECTION INTRAVENOUS; SUBCUTANEOUS EVERY 12 HOURS
Refills: 0 | Status: DISCONTINUED | OUTPATIENT
Start: 2024-05-23 | End: 2024-05-30

## 2024-05-23 RX ORDER — INSULIN LISPRO 100/ML
VIAL (ML) SUBCUTANEOUS
Refills: 0 | Status: DISCONTINUED | OUTPATIENT
Start: 2024-05-23 | End: 2024-05-30

## 2024-05-23 RX ORDER — DEXTROSE 50 % IN WATER 50 %
25 SYRINGE (ML) INTRAVENOUS ONCE
Refills: 0 | Status: DISCONTINUED | OUTPATIENT
Start: 2024-05-23 | End: 2024-05-30

## 2024-05-23 RX ORDER — GLUCAGON INJECTION, SOLUTION 0.5 MG/.1ML
1 INJECTION, SOLUTION SUBCUTANEOUS ONCE
Refills: 0 | Status: DISCONTINUED | OUTPATIENT
Start: 2024-05-23 | End: 2024-05-30

## 2024-05-23 RX ORDER — SENNA PLUS 8.6 MG/1
2 TABLET ORAL AT BEDTIME
Refills: 0 | Status: DISCONTINUED | OUTPATIENT
Start: 2024-05-23 | End: 2024-05-30

## 2024-05-23 RX ORDER — DEXTROSE 50 % IN WATER 50 %
12.5 SYRINGE (ML) INTRAVENOUS ONCE
Refills: 0 | Status: DISCONTINUED | OUTPATIENT
Start: 2024-05-23 | End: 2024-05-30

## 2024-05-23 RX ORDER — TAMSULOSIN HYDROCHLORIDE 0.4 MG/1
0.4 CAPSULE ORAL AT BEDTIME
Refills: 0 | Status: DISCONTINUED | OUTPATIENT
Start: 2024-05-23 | End: 2024-05-30

## 2024-05-23 RX ADMIN — SENNA PLUS 2 TABLET(S): 8.6 TABLET ORAL at 21:31

## 2024-05-23 RX ADMIN — SODIUM CHLORIDE 1000 MILLILITER(S): 9 INJECTION, SOLUTION INTRAVENOUS at 13:33

## 2024-05-23 RX ADMIN — Medication 1: at 18:53

## 2024-05-23 RX ADMIN — ATORVASTATIN CALCIUM 80 MILLIGRAM(S): 80 TABLET, FILM COATED ORAL at 21:31

## 2024-05-23 RX ADMIN — HEPARIN SODIUM 5000 UNIT(S): 5000 INJECTION INTRAVENOUS; SUBCUTANEOUS at 18:45

## 2024-05-23 RX ADMIN — TAMSULOSIN HYDROCHLORIDE 0.4 MILLIGRAM(S): 0.4 CAPSULE ORAL at 21:31

## 2024-05-23 NOTE — ED PROVIDER NOTE - OBJECTIVE STATEMENT
78-year-old male past medical history anxiety, hypertension, hyperlipidemia, diabetes, BPH, CVA with no reported AC use, presents to ED complaining of atraumatic left lower extremity pain.  Denies any falls or trauma.  Reports at baseline ambulates with cane.  While in ED patient is unable to self transition from EMS stretcher to ER stretcher.  No fevers or chills.  Patient is poorly.  Lives with his brother. 78-year-old male past medical history anxiety, hypertension, hyperlipidemia, diabetes, BPH, CVA with no reported AC use, presents to ED complaining of atraumatic left lower extremity pain x1 week.  Denies any falls or trauma.  Reports at baseline ambulates with cane.  While in ED patient is unable to self transition from EMS stretcher to ER stretcher.  No fevers or chills.  Patient is poorly.  Lives with his brother.

## 2024-05-23 NOTE — ED ADULT NURSE NOTE - OBJECTIVE STATEMENT
79 YO M brought in by EMS complaining of left leg pain. Pt has PMHx of HTN, DM, and BPH. Pt reports he has had left leg pain for a week and felt it got worse today. Pt states he may have twisted his leg while showering. Upon assessment pt is AOX3, has patent airway, equal bilateral chest rise, intact pulses in all extremities. Pt has no swelling to left leg, warm and dry skin. Pt has unsteady gait and is unable to ambulate without assistance. Pt undressed and wearing a gown, has fall risk band, all rails are locked, and stretcher is in lowered position.

## 2024-05-23 NOTE — PHYSICAL THERAPY INITIAL EVALUATION ADULT - IMPAIRMENTS CONTRIBUTING TO GAIT DEVIATIONS, PT EVAL
impaired balance/decreased flexibility/impaired motor control/narrow base of support/pain/decreased ROM/decreased strength

## 2024-05-23 NOTE — ED PROVIDER NOTE - PROGRESS NOTE DETAILS
dw with social work - pt consult placed - caleb fraire need placement after admiison pt unable to safely ambualate will admit Julien Coon PA-C: Left hip x-rays negative for fracture.  Left lower extremity ultrasound negative for DVT but does show Baker's cyst.  Lab work is nonactionable.  UA is negative.  Given patient's significant weakness in ED with inability to ambulate as well as poorly kept condition there is concern for patient's ability to care for himself at home and will likely need rehab/placement.  Patient endorsed to Dr. Sun for admission.

## 2024-05-23 NOTE — ED PROVIDER NOTE - ATTENDING APP SHARED VISIT CONTRIBUTION OF CARE
This was a shared visit with SY.  I have reviewed and discussed the case with the SY and agree with verified documentation unless otherwise documented.  I have independently spoken with and examined the patient and my documentation of history/pe and MDM are above.

## 2024-05-23 NOTE — PHYSICAL THERAPY INITIAL EVALUATION ADULT - NAME OF DISCHARGE PLANNER
[id number] - person answering to give message to Kym [id number] - person answering stated she would give message to Kym CUELLAR

## 2024-05-23 NOTE — H&P ADULT - ASSESSMENT
78-year-old male      h/o   anxiety, hypertension, hyperlipidemia, diabetes, BPH, CVA ,  no reported AC use         presents to ED complaining of atraumatic left lower extremity pain x1 week.      denies any falls or trauma./   at baseline ambulates with cane.      while in   er,  pt  noted,  to  be  is unable to self transition from EMS stretcher to   er  stretcher.        denies  fevers or chills. /cp.sob. abd  pain      pt  with  c/o  difficulty  in ambulation      ct head,  no infract/  bleed    ct    pelvis, noted,   mod  to  severe  OA  spine. /  acetabulum.  mod  PA,  b/l  hips    PT  eval   check  orthostatics   HTN/ HLD   Anxiety   BPH    CVA    on  dvt  ppx/  s/q  heparin    awiat  PT  eval/  an d if  still  has  difficulty,  may  consider  ortho  eval       rad< from: CT Pelvis Bony Only No Cont (05.23.24 @ 15:25) >  IMPRESSION:  1.  No fractures are seen.  2.  Moderate to severe spinal canal stenosis appears to be present L4-L5   and L5-S1.  3.  There is mild to moderate osteoarthritis of both hips.  4.  Prominent bilateral hamstring origin calcifications suggest   hydroxyapatite deposition.  --- End of Report ---

## 2024-05-23 NOTE — PHYSICAL THERAPY INITIAL EVALUATION ADULT - PERTINENT HX OF CURRENT PROBLEM, REHAB EVAL
78-year-old male h/o   anxiety, hypertension, hyperlipidemia, diabetes, BPH, CVA , no reported AC usepresents to ED complaining of atraumatic left lower extremity pain x1 week. denies any falls or trauma. at baseline ambulates with cane. while in  er, pt  noted, to be is unable to self transition from EMS stretcher to er stretcher. denies  fevers or chills. /cp.sob. abd  pain. pt  with  c/o  difficulty  in ambulation. ct head,  no infract/  bleed. ct pelvis, noted, mod  to  severe  OA  spine. /  acetabulum.  mod  PA,  b/l  hips.check  orthostatics  Dx: HTN/ HLD, Anxiety, BPH, CVA, on  dvt  ppx/  s/q  heparin. Await PT  eval/  and if  still has  difficulty,  may  consider  ortho  eval  CT Pelvis Bony Only No Cont  - No fractures are seen. Moderate to severe spinal canal stenosis appears to be present L4-L5   and L5-S1. There is mild to moderate osteoarthritis of both hips. Prominent bilateral hamstring origin calcifications suggest   hydroxyapatite deposition. 78-year-old male h/o anxiety, hypertension, hyperlipidemia, diabetes, BPH, CVA , no reported AC use presents to ED complaining of atraumatic left lower extremity pain x1 week. denies any falls or trauma. at baseline ambulates with cane. while in  er, pt  noted, to be is unable to self transition from EMS stretcher to er stretcher. denies  fevers or chills. /cp.sob. abd  pain. pt  with  c/o  difficulty  in ambulation. ct head,  no infract/  bleed. ct pelvis, noted, mod  to  severe  OA  spine. /  acetabulum.  mod  PA,  b/l  hips. Dx: HTN/ HLD, Anxiety, BPH, CVA, on  dvt  ppx/  s/q  heparin. Await PT  eval/  and if  still has  difficulty,  may  consider  ortho  eval. CT Pelvis Bony Only No Cont  - No fractures are seen. Moderate to severe spinal canal stenosis appears to be present L4-L5 and L5-S1. There is mild to moderate osteoarthritis of both hips. Prominent bilateral hamstring origin calcifications suggest   hydroxyapatite deposition.

## 2024-05-23 NOTE — PHYSICAL THERAPY INITIAL EVALUATION ADULT - ACTIVE RANGE OF MOTION EXAMINATION, REHAB EVAL
carlie. upper extremity Active ROM was WNL (within normal limits)/RLE Active ROM was WNL (within normal limits)/Left LE Active ROM was WFL (within functional limits)

## 2024-05-23 NOTE — ED PROVIDER NOTE - CLINICAL SUMMARY MEDICAL DECISION MAKING FREE TEXT BOX
Amos 78-year-old hypertension diabetes presents to ED by EMS for weakness of left leg pain denies any trauma patient reports that he may have twisted it with increased pain today called ambulance unable to ambulate patient history of CVA with dysarthria since on exam ANO x 3 normocephalic atraumatic GCS is 15 pupils equal round reactive neck supple positive stone face positive cogwheel rigidity positive tremor bilaterally patient denies any previous workup for Parkinson's patient unable to ambulate abdomen soft nontender lungs clear to auscultation bilaterally hemodynamically stable pelvis nontender no shortening or external rotation of the left lower extremity mild pitting edema bilaterally good distal pulses no calf tenderness no significant swelling minimal pain with logroll POCUS to rule out DVT low clinical suspicion no clinical signs of deep tissue infection there is contact dermatitis and left groin without any streaking erythema likely from soiling self patient close is foul-smelling and dirty concern for ability to take care of self and home not safe discharge will need admission and possible placement will discuss with PCP Dr. Coy who is a geriatrician imaging of brain low clinical concern for bleed fingerstick check blood glucose high suspicion for infectious or metabolic derangements will check electrolytes and urine consider CT bony pelvis to rule out occult hip injury patient will need admission patient denies any active pain in the left lower extremity currently knee full range of motion extensor mechanism intact no patella or joint line tenderness patient will need social work physical therapy and admission

## 2024-05-23 NOTE — PHYSICAL THERAPY INITIAL EVALUATION ADULT - ADDITIONAL COMMENTS
Pt states he lives in an apt with his brother. He states there are a few steps into the building and elevator to his apt. He uses a quad cane and states he was independent around the apt until approx 1 week ago and felt as if he was going to fall so he callled EMS

## 2024-05-23 NOTE — PHYSICAL THERAPY INITIAL EVALUATION ADULT - IMPAIRED TRANSFERS: SIT/STAND, REHAB EVAL
april lab
impaired balance/decreased flexibility/impaired motor control/narrow base of support/pain/decreased ROM/decreased strength

## 2024-05-23 NOTE — H&P ADULT - NSHPLABSRESULTS_GEN_ALL_CORE
LABS:                        9.6    6.68  )-----------( 200      ( 23 May 2024 13:41 )             29.4         144  |  108  |  44<H>  ----------------------------<  191<H>  4.1   |  21<L>  |  1.43<H>    Ca    9.4      23 May 2024 13:41  Phos  3.0       Mg     2.1         TPro  7.5  /  Alb  4.1  /  TBili  0.4  /  DBili  x   /  AST  18  /  ALT  18  /  AlkPhos  72        CARDIAC MARKERS ( 23 May 2024 13:41 )  x     / x     / 63 U/L / x     / x          Urinalysis Basic - ( 23 May 2024 15:22 )    Color: Yellow / Appearance: Clear / S.013 / pH: x  Gluc: x / Ketone: Negative mg/dL  / Bili: Negative / Urobili: 0.2 mg/dL   Blood: x / Protein: Trace mg/dL / Nitrite: Negative   Leuk Esterase: Negative / RBC: 0 /HPF / WBC 0 /HPF   Sq Epi: x / Non Sq Epi: 0 /HPF / Bacteria: Negative /HPF           @ 13:26  4.2  53

## 2024-05-23 NOTE — H&P ADULT - NSHPPHYSICALEXAM_GEN_ALL_CORE
T(C): 36.6 (05-23-24 @ 15:43), Max: 37 (05-23-24 @ 12:55)  HR: 64 (05-23-24 @ 15:43) (64 - 72)  BP: 166/81 (05-23-24 @ 15:43) (160/82 - 166/81)  RR: 16 (05-23-24 @ 15:43) (16 - 20)  SpO2: 95% (05-23-24 @ 15:43) (95% - 98%)  GENERAL: NAD, lying in bed comfortably  HEAD:  Atraumatic, normocephalic  EYES: EOMI, PERRLA, conjunctiva and sclera clear  NECK: Supple, trachea midline, no JVD  HEART: Regular rate and rhythm, no murmurs, rubs, or gallops  LUNGS: Unlabored respirations.  Clear to auscultation bilaterally, no crackles, wheezing, or rhonchi  ABDOMEN: Soft, nontender, nondistended, +BS  EXTREMITIES: 2+ peripheral pulses bilaterally. No clubbing, cyanosis, or edema  NERVOUS SYSTEM:  A&Ox3,  no focal deficits   SKIN: No rashes or lesions

## 2024-05-23 NOTE — ED ADULT NURSE NOTE - CHIEF COMPLAINT
Patient is a 1 day old male infant, delivered at Gestational Age: 39w5d, Vaginal, Spontaneous at 7:01 PM on 2020.    Pregnancy    Information for the patient's mother:  No Castro [[id number]]   29 year old   OB History    Para Term  AB Living   1 1 1 0 0 1   SAB TAB Ectopic Molar Multiple Live Births   0 0 0 0 0 1        Estimated Date of Delivery:   Information for the patient's mother:  No Castro [[id number]]   2020, by Ultrasound        Information for the patient's mother:  No Castro [[id number]]   No results found for: CULT     No antibiotics needed.    Information for the patient's mother:  No Castro [[id number]]     Current Facility-Administered Medications   Medication   • oxytocin (PITOCIN) 30 Units in sodium chloride 0.9% 500 mL   • varicella virus (VARIVAX) live vaccine 0.5 mL   • diphtheria-pertussis (acellular)-tetanus (BOOSTRIX) 5-2.5-18.5 LF-MCG/0.5 vaccine 0.5 mL   • benzocaine/menthol (DERMOPLAST) 20-0.5 % topical spray 1 spray   • hydroCORTisone (ANUSOL-HC) suppository 25 mg   • simethicone (MYLICON) tablet 125 mg   • calcium carbonate (TUMS) chewable tablet 500 mg   • acetaminophen (TYLENOL) tablet 650 mg   • ibuprofen (MOTRIN) tablet 600 mg        Prenatal Labs:   Information for the patient's mother:  No Castro [[id number]]     Lab Results   Component Value Date/Time    HIV Nonreactive 2020 10:09 AM    RUBEL 7.6 (L) 2020 10:09 AM    GCPT Negative 2020 12:32 PM    CHPT Negative 2020 12:32 PM      Pregnancy Complications: No    Labor and Birth  Delivery Method: Vaginal, Spontaneous  Rupture Date: 2020  Rupture Time: 6:00 PM  Time of Birth: 7:01 PM  Labor Complications: None  Additional Complications:      Presentation/Position: Vertex Right Occiput Anterior  Resuscitation: None  Cord Information:   Vessels: 3 Vessels  Complications: Nuchal   Nuchal Cord Description: Nucal Cord:Loose   Number of Loops: 2  Cord Blood Disposition: Lab  Blood  Gases Sent: No  Placenta:   Date/Time: 2020  7:06 PM  Removal: Spontaneous  Appearance: Intact   Measurements:  Weight: 6 lb 5.6 oz (2880 g)    Height: 50.8 cm   Head Circumference: 33 cm    Chest Circumference:     Observed Anomalies:   Operative Delivery:  Forceps application location:    Vacuum type:    Vacuum application location:    Shoulder Dystocia:                                     APGARS  One minute Five minutes   Skin color: 1  1    Heart rate: 2  2     Reflex: 2  2    Muscle tone: 2  2    Breathin  2    Totals:   9  9      Delivery Personnel:          Delivering clinician: SALBADOR GUERRERO [[id number]]        Other personnel: JANKI LOVELL [[id number]];LYNETTE MAI [[id number]];SAHIL SPEARS [[id number]];NOHEMI SHANKS [[id number]]      [place] is currently being fed Formula only.  I am aware that mother's feeding choice upon admission was the following:   Information for the patient's mother:  No Castro [[id number]]      There are no medical contraindications to exclusive breast milk feeding, and the benefits of exclusively breastfeeding were discussed/reinforced with the mother.    First: Urine Occurrence: 1 (20)  Stool Occurrence: 1 (20)    Hearing Exam:      [place] State Screen will be drawn after 24 hours of age per statute, results pending    [place] Screen Completed:    Immunizations:   Most Recent Immunizations   Administered Date(s) Administered   • Hep B, adolescent or pediatric 2020   Pended Date(s) Pended   • Hepatitis B Immune Globulin 2020         Bilirubin No results found for: BILIRUBIN   Transcutaneous Bilirubin Result:    Transcutaneous Bilirubin Site:         Congenital Heart Defect Screening   Screening complete:    Right hand reading %:    Foot reading %:    Congenital Heart Defect:      Cord Blood/[place] Evaluation (CRD)  No results found  Cord pH No results found    PHYSICAL EXAM    VITALS:   Visit Vitals  Pulse 144   Temp 98.2 °F  (36.8 °C) (Axillary)   Resp 32   Ht 20\" (50.8 cm) Comment: Filed from Delivery Summary   Wt 2.88 kg   HC 33 cm (12.99\") Comment: Filed from Delivery Summary   BMI 11.16 kg/m²       Weight change since birth: 0%    GENERAL: Baby Boy is an alert, vigorous male with appropriate behavior. He is in no acute distress.  SKIN: His skin is warm with normal turgor. The color of the skin is pink. There is no rash. There are no bruises or other signs of injury. No significant jaundice.  HEAD: The head is atraumatic and normocephalic. The anterior fontanel is open and flat.  EYES: The conjunctivae appear normal with neither icterus nor subconjunctival hemorrhage. Red reflexes are seen bilaterally.  EARS: Pinnae normal and external ear canal is normal.  NOSE: There is no nasal flaring, nares patent bilaterally.  THROAT:  The oropharynx is normal. There is no cleft of the palate.  NECK: Clavicles without crepitus.  TRUNK AND THORAX: There are no lesions on the trunk; there is no dimple over the presacral area. There are no retractions.  LUNGS: The lung fields are clear to auscultation.  HEART: The precordium is quiet. The heart rhythm is grossly regular. S1 and S2 are normal. There are no murmurs. Normal femoral pulses.  ABDOMEN: The umbilical cord stump is normal. There is not an umbilical hernia. The abdomen is flat and soft.   GENITALIA: normal male genitalia with bilateral descended testes  RECTAL: Anus patent.  EXTREMITIES: Moving all 4 extremities. The hip exam is normal. There are no hip clicks or clunks.    NEUROLOGIC: He displays normal tone throughout. He is not jittery and he has normal  reflexes. [place] reflex present.    ASSESSMENT: Well 1 day old male infant.  Normal growth and development.    Procedures: None    Consults: None.    PLAN: Routine  care.    Follow up: With pediatrician in 1 days.    Instructions: Discharge teaching done on sleep position, fever, feedings and jaundice.  Baby will be  The patient is a 78y Male complaining of  discharged home with mom.       Signed by: Arturo Aldana MD   2020

## 2024-05-23 NOTE — ED ADULT NURSE REASSESSMENT NOTE - NS ED NURSE REASSESS COMMENT FT1
per MD Coon ok for patient to eat and drink. provided with chicken salad sandwich and water at this time

## 2024-05-23 NOTE — ED PROVIDER NOTE - PHYSICAL EXAMINATION
GEN: Patient is poorly, smells of urine, close or soiled.  Patient alert and oriented x 3.  No acute distress.  PSYCH: Affect and mood appropriate.  EYES: Sclera white w/o injection, EOMI, PERRLA.   ENT: Neck supple. Airway patent.  RESP: CTA b/l.  CARDIAC: RRR, S1, S2, no M/G/R.  ABD: Soft, NT.  MSK: SANCHEZ spontaneously. No obvious joint deformity.  Able to range left knee.  Minimal left lower extremity pain with logroll.  NEURO: Patient has stuttering speech, tremulous upper extremities with extremity cogwheel rigidity.  Moving all extremities.  Able to stand with full assistance but unable to take steps.  VASC: Strong distal pulses. Trace lower extremity pitting edema.  SKIN: Left groin dermatitis.

## 2024-05-23 NOTE — PHYSICAL THERAPY INITIAL EVALUATION ADULT - NSPTDISCHREC_GEN_A_CORE
Subacute Rehab - Pt will benefit from subacute rehab prior to D/C home to maximize functional independence. If pt d/c home pt requires assist for all functional mobility & home PT for progressive ambulation training, transfers, bed mobs. DME: transport chair,  RW/Sub-acute Rehab

## 2024-05-23 NOTE — H&P ADULT - HISTORY OF PRESENT ILLNESS
78-year-old male      h/o   anxiety, hypertension, hyperlipidemia, diabetes, BPH, CVA ,  no reported AC use         presents to ED complaining of atraumatic left lower extremity pain x1 week.      Denies any falls or trauma.     at baseline ambulates with cane.      while in   er,  pt  noted,  to  be  is unable to self transition from EMS stretcher to   er  stretcher.        denies  fevers or chills. /cp.sob. abd  pain     love s with his  brother

## 2024-05-24 ENCOUNTER — RESULT REVIEW (OUTPATIENT)
Age: 79
End: 2024-05-24

## 2024-05-24 LAB
A1C WITH ESTIMATED AVERAGE GLUCOSE RESULT: 7 % — HIGH (ref 4–5.6)
ANION GAP SERPL CALC-SCNC: 14 MMOL/L — SIGNIFICANT CHANGE UP (ref 5–17)
BUN SERPL-MCNC: 34 MG/DL — HIGH (ref 7–23)
CALCIUM SERPL-MCNC: 9 MG/DL — SIGNIFICANT CHANGE UP (ref 8.4–10.5)
CHLORIDE SERPL-SCNC: 107 MMOL/L — SIGNIFICANT CHANGE UP (ref 96–108)
CO2 SERPL-SCNC: 23 MMOL/L — SIGNIFICANT CHANGE UP (ref 22–31)
CREAT SERPL-MCNC: 1.22 MG/DL — SIGNIFICANT CHANGE UP (ref 0.5–1.3)
CULTURE RESULTS: SIGNIFICANT CHANGE UP
D DIMER BLD IA.RAPID-MCNC: 177 NG/ML DDU — SIGNIFICANT CHANGE UP
EGFR: 60 ML/MIN/1.73M2 — SIGNIFICANT CHANGE UP
ESTIMATED AVERAGE GLUCOSE: 154 MG/DL — HIGH (ref 68–114)
GLUCOSE BLDC GLUCOMTR-MCNC: 145 MG/DL — HIGH (ref 70–99)
GLUCOSE BLDC GLUCOMTR-MCNC: 173 MG/DL — HIGH (ref 70–99)
GLUCOSE BLDC GLUCOMTR-MCNC: 190 MG/DL — HIGH (ref 70–99)
GLUCOSE BLDC GLUCOMTR-MCNC: 205 MG/DL — HIGH (ref 70–99)
GLUCOSE SERPL-MCNC: 140 MG/DL — HIGH (ref 70–99)
HCT VFR BLD CALC: 30.6 % — LOW (ref 39–50)
HGB BLD-MCNC: 9.8 G/DL — LOW (ref 13–17)
MCHC RBC-ENTMCNC: 28.7 PG — SIGNIFICANT CHANGE UP (ref 27–34)
MCHC RBC-ENTMCNC: 32 GM/DL — SIGNIFICANT CHANGE UP (ref 32–36)
MCV RBC AUTO: 89.7 FL — SIGNIFICANT CHANGE UP (ref 80–100)
NRBC # BLD: 0 /100 WBCS — SIGNIFICANT CHANGE UP (ref 0–0)
PLATELET # BLD AUTO: 236 K/UL — SIGNIFICANT CHANGE UP (ref 150–400)
POTASSIUM SERPL-MCNC: 3.7 MMOL/L — SIGNIFICANT CHANGE UP (ref 3.5–5.3)
POTASSIUM SERPL-SCNC: 3.7 MMOL/L — SIGNIFICANT CHANGE UP (ref 3.5–5.3)
RBC # BLD: 3.41 M/UL — LOW (ref 4.2–5.8)
RBC # FLD: 14.1 % — SIGNIFICANT CHANGE UP (ref 10.3–14.5)
SODIUM SERPL-SCNC: 144 MMOL/L — SIGNIFICANT CHANGE UP (ref 135–145)
SPECIMEN SOURCE: SIGNIFICANT CHANGE UP
WBC # BLD: 5.83 K/UL — SIGNIFICANT CHANGE UP (ref 3.8–10.5)
WBC # FLD AUTO: 5.83 K/UL — SIGNIFICANT CHANGE UP (ref 3.8–10.5)

## 2024-05-24 PROCEDURE — 93306 TTE W/DOPPLER COMPLETE: CPT | Mod: 26

## 2024-05-24 PROCEDURE — 93356 MYOCRD STRAIN IMG SPCKL TRCK: CPT

## 2024-05-24 RX ORDER — AMLODIPINE BESYLATE 2.5 MG/1
10 TABLET ORAL DAILY
Refills: 0 | Status: DISCONTINUED | OUTPATIENT
Start: 2024-05-25 | End: 2024-05-30

## 2024-05-24 RX ORDER — ASPIRIN/CALCIUM CARB/MAGNESIUM 324 MG
81 TABLET ORAL DAILY
Refills: 0 | Status: DISCONTINUED | OUTPATIENT
Start: 2024-05-24 | End: 2024-05-30

## 2024-05-24 RX ORDER — AMLODIPINE BESYLATE 2.5 MG/1
5 TABLET ORAL ONCE
Refills: 0 | Status: COMPLETED | OUTPATIENT
Start: 2024-05-24 | End: 2024-05-25

## 2024-05-24 RX ORDER — CHLORHEXIDINE GLUCONATE 213 G/1000ML
1 SOLUTION TOPICAL DAILY
Refills: 0 | Status: DISCONTINUED | OUTPATIENT
Start: 2024-05-24 | End: 2024-05-30

## 2024-05-24 RX ADMIN — AMLODIPINE BESYLATE 5 MILLIGRAM(S): 2.5 TABLET ORAL at 05:19

## 2024-05-24 RX ADMIN — HEPARIN SODIUM 5000 UNIT(S): 5000 INJECTION INTRAVENOUS; SUBCUTANEOUS at 17:57

## 2024-05-24 RX ADMIN — Medication 1: at 11:59

## 2024-05-24 RX ADMIN — ATORVASTATIN CALCIUM 80 MILLIGRAM(S): 80 TABLET, FILM COATED ORAL at 22:12

## 2024-05-24 RX ADMIN — TRAMADOL HYDROCHLORIDE 25 MILLIGRAM(S): 50 TABLET ORAL at 19:00

## 2024-05-24 RX ADMIN — HEPARIN SODIUM 5000 UNIT(S): 5000 INJECTION INTRAVENOUS; SUBCUTANEOUS at 05:19

## 2024-05-24 RX ADMIN — Medication 1 APPLICATION(S): at 18:01

## 2024-05-24 RX ADMIN — TAMSULOSIN HYDROCHLORIDE 0.4 MILLIGRAM(S): 0.4 CAPSULE ORAL at 22:12

## 2024-05-24 RX ADMIN — Medication 81 MILLIGRAM(S): at 11:58

## 2024-05-24 RX ADMIN — Medication 2: at 18:08

## 2024-05-24 RX ADMIN — CHLORHEXIDINE GLUCONATE 1 APPLICATION(S): 213 SOLUTION TOPICAL at 12:40

## 2024-05-24 RX ADMIN — TRAMADOL HYDROCHLORIDE 25 MILLIGRAM(S): 50 TABLET ORAL at 18:00

## 2024-05-24 NOTE — PATIENT PROFILE ADULT - FALL HARM RISK - HARM RISK INTERVENTIONS

## 2024-05-24 NOTE — PROGRESS NOTE ADULT - SUBJECTIVE AND OBJECTIVE BOX
DATE OF SERVICE: 05-24-24 @ 08:54  INITIAL VISIT - ED  - [facility]    HPI:  78-year-old male      h/o   anxiety, hypertension, hyperlipidemia, diabetes, BPH, CVA ,  no reported AC use      presents to ED complaining of atraumatic left lower extremity  L hip and L groin pain, pain x1 week. could not ambulate, lives c brother called 911      Denies any falls or trauma.     at baseline ambulates with cane.  (?)      while in   er,  pt  noted,  to  be  is unable to self transition from EMS stretcher to   er  stretcher.        denies  fevers or chills. /cp.sob. abd  pain     love s with his  brother (23 May 2024 16:30)    Interval Events  this am says having LLE pain x 1 week but also co chest pain intermittent several weeks - 1x per week  right shoulder radiating to chest but no sob no palp, so I called cardio for w/u as pt is Diabetic c HTN HLD Obesity. BP was hi  Ambulated 10' c PT here. PT rec rehab and pt is amenable, and says he feels better this am and wants to ambulate again, can reeval.  has a rash in groin L - no co  no wbc incr, no shift, CT head neg, CT pelvis neg LLE doppler neg, L hip neg CXR neg, + sacro-iliac OA and spinal stenosis L4-5-S1  disc c ED Doc, Dr HAMPTON before and after H&P, pt, Dr LYMAN and brother Adam [phone]  see orders, vs notes, results etc.    MEDICATIONS  (STANDING):  amLODIPine   Tablet 5 milliGRAM(s) Oral daily  atorvastatin 80 milliGRAM(s) Oral at bedtime  dextrose 10% Bolus 125 milliLiter(s) IV Bolus once  dextrose 5%. 1000 milliLiter(s) (100 mL/Hr) IV Continuous <Continuous>  dextrose 5%. 1000 milliLiter(s) (50 mL/Hr) IV Continuous <Continuous>  dextrose 50% Injectable 25 Gram(s) IV Push once  dextrose 50% Injectable 12.5 Gram(s) IV Push once  glucagon  Injectable 1 milliGRAM(s) IntraMuscular once  heparin   Injectable 5000 Unit(s) SubCutaneous every 12 hours  insulin lispro (ADMELOG) corrective regimen sliding scale   SubCutaneous three times a day before meals  senna 2 Tablet(s) Oral at bedtime  tamsulosin 0.4 milliGRAM(s) Oral at bedtime    MEDICATIONS  (PRN):  dextrose Oral Gel 15 Gram(s) Oral once PRN Blood Glucose LESS THAN 70 milliGRAM(s)/deciliter  traMADol 25 milliGRAM(s) Oral daily PRN Moderate Pain (4 - 6)      Patient is a 78y old  Male who presents with a chief complaint of diffusely  in  ambulation (23 May 2024 16:30)      REVIEW OF SYSTEMS    General:nad feels better  Skin/Breast: rash L groin/n  Ophthalmologic:no co no ch  ENMT:	no co no ch  Respiratory and Thorax:no cough no sp no sob  Cardiovascular:cp as noted above , no paLP   Gastrointestinal:no nvcd  Genitourinary:no fdi  Musculoskeletal:	pain a s notred above  Neurological:	no f co no ch  Psychiatric:no co	  Hematology/Lymphatics:	  Endocrine:	no polyudd  Allergic/Immunologic:  AOSN	y      Vital Signs Last 24 Hrs  T(C): 36.9 (24 May 2024 04:51), Max: 37 (23 May 2024 12:55)  T(F): 98.4 (24 May 2024 04:51), Max: 98.6 (23 May 2024 12:55)  HR: 62 (24 May 2024 04:51) (62 - 72)  BP: 180/82 (24 May 2024 04:51) (154/78 - 180/82)  BP(mean): --  RR: 18 (24 May 2024 04:51) (16 - 20)  SpO2: 95% (24 May 2024 04:51) (95% - 98%)    Parameters below as of 24 May 2024 04:51  Patient On (Oxygen Delivery Method): room air        PHYSICAL EXAM:    Constitutional:BP hi, otherwise vss nad  H+N ncat  Eyes:lyudmila cwnl  ENMT:hears stutters  Neck:no cb no tm  Breasts:  Back:  Respiratory:ctab n rrw  Cardiovascular:rrr  Gastrointestinal:soft nt  Genitourinary:  Rectal:  Extremities:no cce  Vascular:hm- vv-  Neurological:nf atmae in bed, stutters, dysarthric, MCI life long dependence, dev delay  Skin:cdi x LL groin darke rash - asympt  Lymph Nodes:  Musculoskeletal:pain as abobve noted  Psychiatric:    LABS  CBC Full  -  ( 24 May 2024 06:33 )  WBC Count : 5.83 K/uL  RBC Count : 3.41 M/uL  Hemoglobin : 9.8 g/dL  Hematocrit : 30.6 %  Platelet Count - Automated : 236 K/uL  Mean Cell Volume : 89.7 fl  Mean Cell Hemoglobin : 28.7 pg  Mean Cell Hemoglobin Concentration : 32.0 gm/dL  Auto Neutrophil # : x  Auto Lymphocyte # : x  Auto Monocyte # : x  Auto Eosinophil # : x  Auto Basophil # : x  Auto Neutrophil % : x  Auto Lymphocyte % : x  Auto Monocyte % : x  Auto Eosinophil % : x  Auto Basophil % : x      05-24    144  |  107  |  34<H>  ----------------------------<  140<H>  3.7   |  23  |  1.22    Ca    9.0      24 May 2024 06:33  Phos  3.0     05-23  Mg     2.1     05-23    TPro  7.5  /  Alb  4.1  /  TBili  0.4  /  DBili  x   /  AST  18  /  ALT  18  /  AlkPhos  72  05-23          Imaging:    Xray:    Echo:    CT:    MRI:    Tele:    Orders:    ALONZO Sun [phone]

## 2024-05-24 NOTE — CONSULT NOTE ADULT - SUBJECTIVE AND OBJECTIVE BOX
Date of Service, 05-24-24 @ 10:38  CHIEF COMPLAINT:Patient is a 78y old  Male who presents with a chief complaint of diffusely  in  ambulation (24 May 2024 08:54)      HPI:  78-year-old male past medical history anxiety, hypertension, hyperlipidemia, diabetes, BPH, CVA with no reported AC use, presents to ED complaining of atraumatic left lower extremity pain x1 week.  Denies any falls or trauma.  Reports at baseline ambulates with cane.  While in ED patient is unable to self transition from EMS stretcher to ER stretcher.  No fevers or chills.  Patient is poorly.  Lives with his brother.      PAST MEDICAL & SURGICAL HISTORY:  Diabetes mellitus  Type 2  High cholesterol  HTN (hypertension)  Cerebrovascular accident (CVA), unspecified mechanism  2 years ago  High triglycerides  Bladder calculus  BPH with obstruction/lower urinary tract symptoms  Ureteral calculus, left  History of appendectomy  H/O cystoscopy  and left  stent placement  H/O myringotomy  right ear in 2017    MEDICATIONS  (STANDING):  amLODIPine   Tablet 5 milliGRAM(s) Oral daily  atorvastatin 80 milliGRAM(s) Oral at bedtime  clotrimazole 1% Cream 1 Application(s) Topical two times a day  dextrose 10% Bolus 125 milliLiter(s) IV Bolus once  dextrose 5%. 1000 milliLiter(s) (100 mL/Hr) IV Continuous <Continuous>  dextrose 5%. 1000 milliLiter(s) (50 mL/Hr) IV Continuous <Continuous>  dextrose 50% Injectable 12.5 Gram(s) IV Push once  dextrose 50% Injectable 25 Gram(s) IV Push once  glucagon  Injectable 1 milliGRAM(s) IntraMuscular once  heparin   Injectable 5000 Unit(s) SubCutaneous every 12 hours  insulin lispro (ADMELOG) corrective regimen sliding scale   SubCutaneous three times a day before meals  senna 2 Tablet(s) Oral at bedtime  tamsulosin 0.4 milliGRAM(s) Oral at bedtime    MEDICATIONS  (PRN):  dextrose Oral Gel 15 Gram(s) Oral once PRN Blood Glucose LESS THAN 70 milliGRAM(s)/deciliter  traMADol 25 milliGRAM(s) Oral daily PRN Moderate Pain (4 - 6)      FAMILY HISTORY:  Family history of hypertension        SOCIAL HISTORY:    [ x] Non-smoker  [ ] Smoker  [ ] Alcohol    Allergies    No Known Allergies    Intolerances    	    REVIEW OF SYSTEMS:  CONSTITUTIONAL: No fever, weight loss, or fatigue  EYES: No eye pain, visual disturbances, or discharge  ENT:  No difficulty hearing, tinnitus, vertigo; No sinus or throat pain  NECK: No pain or stiffness  RESPIRATORY: No cough, wheezing, chills or hemoptysis; No Shortness of Breath  CARDIOVASCULAR: + chest pain, palpitations, passing out, dizziness, or leg swelling  GASTROINTESTINAL: No abdominal or epigastric pain. No nausea, vomiting, or hematemesis; No diarrhea or constipation. No melena or hematochezia.  GENITOURINARY: No dysuria, frequency, hematuria, or incontinence  NEUROLOGICAL: No headaches, memory loss, loss of strength, numbness, or tremors  SKIN: No itching, burning, rashes, or lesions   LYMPH Nodes: No enlarged glands  ENDOCRINE: No heat or cold intolerance; No hair loss  MUSCULOSKELETAL: No joint pain or swelling; No muscle, back, or extremity pain  PSYCHIATRIC: No depression, anxiety, mood swings, or difficulty sleeping  HEME/LYMPH: No easy bruising, or bleeding gums  ALLERGY AND IMMUNOLOGIC: No hives or eczema	    [ x] All others negative	  [ ] Unable to obtain    PHYSICAL EXAM:  T(C): 36.9 (05-24-24 @ 04:51), Max: 37 (05-23-24 @ 12:55)  HR: 62 (05-24-24 @ 04:51) (62 - 72)  BP: 180/82 (05-24-24 @ 04:51) (154/78 - 180/82)  RR: 18 (05-24-24 @ 04:51) (16 - 20)  SpO2: 95% (05-24-24 @ 04:51) (95% - 98%)  Wt(kg): --  I&O's Summary      Appearance: Normal	  HEENT:   Normal oral mucosa, PERRL, EOMI	  Lymphatic: No lymphadenopathy  Cardiovascular: Normal S1 S2, No JVD, + murmurs, No edema  Respiratory: rhonchi  Psychiatry: A & O x ?3 Mood & affect appropriate  Gastrointestinal:  Soft, Non-tender, + BS	  Skin: No rashes, No ecchymoses, No cyanosis	  Neurologic: Non-focal  Extremities: Normal range of motion, No clubbing, cyanosis or edema  Vascular: Peripheral pulses palpable 2+ bilaterally    TELEMETRY: 	    ECG:  	  RADIOLOGY:  OTHER: 	  	  LABS:	 	    CARDIAC MARKERS:  CARDIAC MARKERS ( 23 May 2024 13:41 )  x     / x     / 63 U/L / x     / x                                  9.8    5.83  )-----------( 236      ( 24 May 2024 06:33 )             30.6     05-24    144  |  107  |  34<H>  ----------------------------<  140<H>  3.7   |  23  |  1.22    Ca    9.0      24 May 2024 06:33  Phos  3.0     05-23  Mg     2.1     05-23    TPro  7.5  /  Alb  4.1  /  TBili  0.4  /  DBili  x   /  AST  18  /  ALT  18  /  AlkPhos  72  05-23    proBNP:   Lipid Profile:   HgA1c:   TSH:       PREVIOUS DIAGNOSTIC TESTING:       · EKG Date/Time: 23-May-2024 13:30  · Rate: 72   · Interpretation: normal sinus rhythm   · VA: 160  · QRS: 166  · QTC: 477  · ST/T Wave: No acute ischemic ekg changes    < from: Transthoracic Echocardiogram (07.21.22 @ 10:00) >  1. Normal left ventricular internal dimensions and wall  thicknesses.  2. Hyperdynamic left ventricular systolic function.  3. Normal diastolic function  4. Normal right ventricular size and systolic function.    < from: Xray Chest 1 View- PORTABLE-Urgent (Xray Chest 1 View- PORTABLE-Urgent .) (05.23.24 @ 14:30) >  The lungs are clear. Normal pulmonary vasculature  There is no pleural effusion or pneumothorax.  The heart is normal in size  The visualized osseous structures demonstrate no acute pathology.    IMPRESSION:  Clear lungs.    < end of copied text >

## 2024-05-24 NOTE — CONSULT NOTE ADULT - ASSESSMENT
78-year-old male past medical history anxiety, hypertension, hyperlipidemia, diabetes, BPH, CVA with no reported AC use, presents to ED complaining of atraumatic left lower extremity pain x1 week.  Denies any falls or trauma.  Reports at baseline ambulates with cane.  While in ED patient is unable to self transition from EMS stretcher to ER stretcher.  No fevers or chills.  Patient is poorly.  Lives with his brother.  pt with sig cardiac risk factor with chest pain, on and off  d dimer  repeat echo  will consider cardiac ct  adjust bp meds  asa daily

## 2024-05-24 NOTE — PATIENT PROFILE ADULT - FUNCTIONAL ASSESSMENT - BASIC MOBILITY 6.
2-calculated by average/Not able to assess (calculate score using [facility] averaging method)

## 2024-05-25 LAB
ANION GAP SERPL CALC-SCNC: 15 MMOL/L — SIGNIFICANT CHANGE UP (ref 5–17)
BUN SERPL-MCNC: 31 MG/DL — HIGH (ref 7–23)
CALCIUM SERPL-MCNC: 9 MG/DL — SIGNIFICANT CHANGE UP (ref 8.4–10.5)
CHLORIDE SERPL-SCNC: 106 MMOL/L — SIGNIFICANT CHANGE UP (ref 96–108)
CO2 SERPL-SCNC: 22 MMOL/L — SIGNIFICANT CHANGE UP (ref 22–31)
CREAT SERPL-MCNC: 1.4 MG/DL — HIGH (ref 0.5–1.3)
EGFR: 51 ML/MIN/1.73M2 — LOW
GLUCOSE BLDC GLUCOMTR-MCNC: 137 MG/DL — HIGH (ref 70–99)
GLUCOSE BLDC GLUCOMTR-MCNC: 151 MG/DL — HIGH (ref 70–99)
GLUCOSE BLDC GLUCOMTR-MCNC: 204 MG/DL — HIGH (ref 70–99)
GLUCOSE BLDC GLUCOMTR-MCNC: 216 MG/DL — HIGH (ref 70–99)
GLUCOSE BLDC GLUCOMTR-MCNC: 269 MG/DL — HIGH (ref 70–99)
GLUCOSE SERPL-MCNC: 126 MG/DL — HIGH (ref 70–99)
HCT VFR BLD CALC: 31.4 % — LOW (ref 39–50)
HGB BLD-MCNC: 10.2 G/DL — LOW (ref 13–17)
MCHC RBC-ENTMCNC: 29.1 PG — SIGNIFICANT CHANGE UP (ref 27–34)
MCHC RBC-ENTMCNC: 32.5 GM/DL — SIGNIFICANT CHANGE UP (ref 32–36)
MCV RBC AUTO: 89.5 FL — SIGNIFICANT CHANGE UP (ref 80–100)
NRBC # BLD: 0 /100 WBCS — SIGNIFICANT CHANGE UP (ref 0–0)
PLATELET # BLD AUTO: 257 K/UL — SIGNIFICANT CHANGE UP (ref 150–400)
POTASSIUM SERPL-MCNC: 3.6 MMOL/L — SIGNIFICANT CHANGE UP (ref 3.5–5.3)
POTASSIUM SERPL-SCNC: 3.6 MMOL/L — SIGNIFICANT CHANGE UP (ref 3.5–5.3)
RBC # BLD: 3.51 M/UL — LOW (ref 4.2–5.8)
RBC # FLD: 13.9 % — SIGNIFICANT CHANGE UP (ref 10.3–14.5)
SODIUM SERPL-SCNC: 143 MMOL/L — SIGNIFICANT CHANGE UP (ref 135–145)
TSH SERPL-MCNC: 5.12 UIU/ML — HIGH (ref 0.27–4.2)
WBC # BLD: 5.58 K/UL — SIGNIFICANT CHANGE UP (ref 3.8–10.5)
WBC # FLD AUTO: 5.58 K/UL — SIGNIFICANT CHANGE UP (ref 3.8–10.5)

## 2024-05-25 RX ORDER — LANOLIN ALCOHOL/MO/W.PET/CERES
5 CREAM (GRAM) TOPICAL ONCE
Refills: 0 | Status: DISCONTINUED | OUTPATIENT
Start: 2024-05-25 | End: 2024-05-30

## 2024-05-25 RX ORDER — ACETAMINOPHEN 500 MG
975 TABLET ORAL EVERY 6 HOURS
Refills: 0 | Status: COMPLETED | OUTPATIENT
Start: 2024-05-25 | End: 2024-05-27

## 2024-05-25 RX ORDER — ACETAMINOPHEN 500 MG
1300 TABLET ORAL EVERY 6 HOURS
Refills: 0 | Status: DISCONTINUED | OUTPATIENT
Start: 2024-05-25 | End: 2024-05-25

## 2024-05-25 RX ADMIN — Medication 81 MILLIGRAM(S): at 11:42

## 2024-05-25 RX ADMIN — Medication 1 APPLICATION(S): at 17:46

## 2024-05-25 RX ADMIN — SENNA PLUS 2 TABLET(S): 8.6 TABLET ORAL at 21:00

## 2024-05-25 RX ADMIN — Medication 1 APPLICATION(S): at 10:00

## 2024-05-25 RX ADMIN — Medication 2: at 17:44

## 2024-05-25 RX ADMIN — Medication 3: at 13:28

## 2024-05-25 RX ADMIN — AMLODIPINE BESYLATE 5 MILLIGRAM(S): 2.5 TABLET ORAL at 06:47

## 2024-05-25 RX ADMIN — ATORVASTATIN CALCIUM 80 MILLIGRAM(S): 80 TABLET, FILM COATED ORAL at 21:00

## 2024-05-25 RX ADMIN — AMLODIPINE BESYLATE 10 MILLIGRAM(S): 2.5 TABLET ORAL at 04:47

## 2024-05-25 RX ADMIN — HEPARIN SODIUM 5000 UNIT(S): 5000 INJECTION INTRAVENOUS; SUBCUTANEOUS at 17:45

## 2024-05-25 RX ADMIN — TRAMADOL HYDROCHLORIDE 25 MILLIGRAM(S): 50 TABLET ORAL at 10:59

## 2024-05-25 RX ADMIN — HEPARIN SODIUM 5000 UNIT(S): 5000 INJECTION INTRAVENOUS; SUBCUTANEOUS at 04:47

## 2024-05-25 RX ADMIN — TAMSULOSIN HYDROCHLORIDE 0.4 MILLIGRAM(S): 0.4 CAPSULE ORAL at 21:00

## 2024-05-25 RX ADMIN — Medication 975 MILLIGRAM(S): at 17:45

## 2024-05-25 RX ADMIN — TRAMADOL HYDROCHLORIDE 25 MILLIGRAM(S): 50 TABLET ORAL at 09:59

## 2024-05-25 RX ADMIN — CHLORHEXIDINE GLUCONATE 1 APPLICATION(S): 213 SOLUTION TOPICAL at 11:43

## 2024-05-25 NOTE — PROGRESS NOTE ADULT - SUBJECTIVE AND OBJECTIVE BOX
DATE OF SERVICE: 05-25-24 @ 09:57    HPI:  78-year-old male      h/o   anxiety, hypertension, hyperlipidemia, diabetes, BPH, CVA ,  no reported AC use         presents to ED complaining of atraumatic left lower extremity pain x1 week.      Denies any falls or trauma.     at baseline ambulates with cane.      while in   er,  pt  noted,  to  be  is unable to self transition from EMS stretcher to   er  stretcher.        denies  fevers or chills. /cp.sob. abd  pain     love s with his  brother (23 May 2024 16:30)      Interval Events  ED to [address]. Labs ok. TTE EF ok, soime wall motion abnl, disc c Dr De La Fuente cardio, may need cath re JAVON angiina  today cp pain R hip - yesterday L, but overall feels better, wants tylenol - ok - can be oob and trial ambulation - disc c RN bedside 502 old ICU  chairez Adam again  see [facility], notes results vs etc, disc c pt also      MEDICATIONS  (STANDING):  amLODIPine   Tablet 10 milliGRAM(s) Oral daily  aspirin enteric coated 81 milliGRAM(s) Oral daily  atorvastatin 80 milliGRAM(s) Oral at bedtime  chlorhexidine 2% Cloths 1 Application(s) Topical daily  clotrimazole 1% Cream 1 Application(s) Topical two times a day  dextrose 10% Bolus 125 milliLiter(s) IV Bolus once  dextrose 5%. 1000 milliLiter(s) (100 mL/Hr) IV Continuous <Continuous>  dextrose 5%. 1000 milliLiter(s) (50 mL/Hr) IV Continuous <Continuous>  dextrose 50% Injectable 25 Gram(s) IV Push once  dextrose 50% Injectable 12.5 Gram(s) IV Push once  glucagon  Injectable 1 milliGRAM(s) IntraMuscular once  heparin   Injectable 5000 Unit(s) SubCutaneous every 12 hours  insulin lispro (ADMELOG) corrective regimen sliding scale   SubCutaneous three times a day before meals  melatonin 5 milliGRAM(s) Oral once  senna 2 Tablet(s) Oral at bedtime  tamsulosin 0.4 milliGRAM(s) Oral at bedtime    MEDICATIONS  (PRN):  acetaminophen     Tablet .. 1300 milliGRAM(s) Oral every 6 hours PRN Mild Pain (1 - 3)  dextrose Oral Gel 15 Gram(s) Oral once PRN Blood Glucose LESS THAN 70 milliGRAM(s)/deciliter  traMADol 25 milliGRAM(s) Oral daily PRN Moderate Pain (4 - 6)      Patient is a 78y old  Male who presents with a chief complaint of diffusely  in  ambulation (24 May 2024 10:37)      REVIEW OF SYSTEMS    General:nad better  Skin/Breast:n/n  Ophthalmologic:n/n  ENMT:	hears  swallows ok no co  Respiratory and Thorax: no cough no sp no sob  Cardiovascular:no cp npo palp  Gastrointestinal:no nvcd  Genitourinary:no fdi  Musculoskeletal:	pain r hip now, yesterday L hip  Neurological:	no f co no ch  Psychiatric:	no co  Hematology/Lymphatics:	  Endocrine:no polyudd	  Allergic/Immunologic:  AOSN	y      Vital Signs Last 24 Hrs  T(C): 36.4 (25 May 2024 04:21), Max: 36.8 (24 May 2024 12:38)  T(F): 97.5 (25 May 2024 04:21), Max: 98.3 (24 May 2024 12:38)  HR: 62 (25 May 2024 06:45) (59 - 62)  BP: 167/68 (25 May 2024 06:45) (152/77 - 188/88)  BP(mean): --  RR: 18 (25 May 2024 04:21) (18 - 18)  SpO2: 95% (25 May 2024 04:21) (95% - 96%)    Parameters below as of 25 May 2024 04:21  Patient On (Oxygen Delivery Method): room air        PHYSICAL EXAM:    Constitutional:vss nad comfortable lying flat  H+N ncat  Eyes:lyudmila cwnl  ENMT:hears swallows ok atre, stutters  Neck:no cb no tm  Breasts:  Back:  Respiratory:ctab no rrw  Cardiovascular:rrr 60  Gastrointestinal:bs+ nl soft obese  Genitourinary:  Rectal:  Extremities:no cce  Vascular:hm- vv-  Neurological:nf, atmae in bed, stutters MCI  Skin:cdi  Lymph Nodes:  Musculoskeletal:  Psychiatric:calm coop adjusted    LABS  CBC Full  -  ( 25 May 2024 07:10 )  WBC Count : 5.58 K/uL  RBC Count : 3.51 M/uL  Hemoglobin : 10.2 g/dL  Hematocrit : 31.4 %  Platelet Count - Automated : 257 K/uL  Mean Cell Volume : 89.5 fl  Mean Cell Hemoglobin : 29.1 pg  Mean Cell Hemoglobin Concentration : 32.5 gm/dL  Auto Neutrophil # : x  Auto Lymphocyte # : x  Auto Monocyte # : x  Auto Eosinophil # : x  Auto Basophil # : x  Auto Neutrophil % : x  Auto Lymphocyte % : x  Auto Monocyte % : x  Auto Eosinophil % : x  Auto Basophil % : x      05-25    143  |  106  |  31<H>  ----------------------------<  126<H>  3.6   |  22  |  1.40<H>    Ca    9.0      25 May 2024 07:07  Phos  3.0     05-23  Mg     2.1     05-23    TPro  7.5  /  Alb  4.1  /  TBili  0.4  /  DBili  x   /  AST  18  /  ALT  18  /  AlkPhos  72  05-23          Imaging:    Xray:    Echo:    CT:    MRI:    Tele:    Orders:    ALONZO Sun [phone]

## 2024-05-25 NOTE — PROGRESS NOTE ADULT - SUBJECTIVE AND OBJECTIVE BOX
Date of Service: 05-25-24 @ 11:13           CARDIOLOGY     PROGRESS  NOTE   ________________________________________________    CHIEF COMPLAINT:Patient is a 78y old  Male who presents with a chief complaint of diffusely  in  ambulation (25 May 2024 09:57)  no complain  	  REVIEW OF SYSTEMS:  CONSTITUTIONAL: No fever, weight loss, or fatigue  EYES: No eye pain, visual disturbances, or discharge  ENT:  No difficulty hearing, tinnitus, vertigo; No sinus or throat pain  NECK: No pain or stiffness  RESPIRATORY: No cough, wheezing, chills or hemoptysis; No Shortness of Breath  CARDIOVASCULAR: No chest pain, palpitations, passing out, dizziness, or leg swelling  GASTROINTESTINAL: No abdominal or epigastric pain. No nausea, vomiting, or hematemesis; No diarrhea or constipation. No melena or hematochezia.  GENITOURINARY: No dysuria, frequency, hematuria, or incontinence  NEUROLOGICAL: No headaches, memory loss, loss of strength, numbness, or tremors  SKIN: No itching, burning, rashes, or lesions   LYMPH Nodes: No enlarged glands  ENDOCRINE: No heat or cold intolerance; No hair loss  MUSCULOSKELETAL: No joint pain or swelling; No muscle, back, or extremity pain  PSYCHIATRIC: No depression, anxiety, mood swings, or difficulty sleeping  HEME/LYMPH: No easy bruising, or bleeding gums  ALLERGY AND IMMUNOLOGIC: No hives or eczema	    [ x] All others negative	  [ ] Unable to obtain    PHYSICAL EXAM:  T(C): 36.3 (05-25-24 @ 10:54), Max: 36.8 (05-24-24 @ 12:38)  HR: 56 (05-25-24 @ 10:54) (56 - 62)  BP: 159/82 (05-25-24 @ 10:54) (152/77 - 188/88)  RR: 19 (05-25-24 @ 10:54) (18 - 19)  SpO2: 97% (05-25-24 @ 10:54) (95% - 97%)  Wt(kg): --  I&O's Summary    24 May 2024 07:01  -  25 May 2024 07:00  --------------------------------------------------------  IN: 240 mL / OUT: 0 mL / NET: 240 mL        Appearance: Normal	  HEENT:   Normal oral mucosa, PERRL, EOMI	  Lymphatic: No lymphadenopathy  Cardiovascular: Normal S1 S2, No JVD, + murmurs, No edema  Respiratory: rhonchi  Psychiatry: A & O x 3, Mood & affect appropriate  Gastrointestinal:  Soft, Non-tender, + BS	  Skin: No rashes, No ecchymoses, No cyanosis	  Extremities: Normal range of motion, No clubbing, cyanosis or edema  Vascular: Peripheral pulses palpable 2+ bilaterally    MEDICATIONS  (STANDING):  amLODIPine   Tablet 10 milliGRAM(s) Oral daily  aspirin enteric coated 81 milliGRAM(s) Oral daily  atorvastatin 80 milliGRAM(s) Oral at bedtime  chlorhexidine 2% Cloths 1 Application(s) Topical daily  clotrimazole 1% Cream 1 Application(s) Topical two times a day  dextrose 10% Bolus 125 milliLiter(s) IV Bolus once  dextrose 5%. 1000 milliLiter(s) (100 mL/Hr) IV Continuous <Continuous>  dextrose 5%. 1000 milliLiter(s) (50 mL/Hr) IV Continuous <Continuous>  dextrose 50% Injectable 25 Gram(s) IV Push once  dextrose 50% Injectable 12.5 Gram(s) IV Push once  glucagon  Injectable 1 milliGRAM(s) IntraMuscular once  heparin   Injectable 5000 Unit(s) SubCutaneous every 12 hours  insulin lispro (ADMELOG) corrective regimen sliding scale   SubCutaneous three times a day before meals  melatonin 5 milliGRAM(s) Oral once  senna 2 Tablet(s) Oral at bedtime  tamsulosin 0.4 milliGRAM(s) Oral at bedtime      TELEMETRY: 	    ECG:  	  RADIOLOGY:  OTHER: 	  	  LABS:	 	    CARDIAC MARKERS:  CARDIAC MARKERS ( 23 May 2024 13:41 )  x     / x     / 63 U/L / x     / x                            10.2   5.58  )-----------( 257      ( 25 May 2024 07:10 )             31.4     05-25    143  |  106  |  31<H>  ----------------------------<  126<H>  3.6   |  22  |  1.40<H>    Ca    9.0      25 May 2024 07:07  Phos  3.0     05-23  Mg     2.1     05-23    TPro  7.5  /  Alb  4.1  /  TBili  0.4  /  DBili  x   /  AST  18  /  ALT  18  /  AlkPhos  72  05-23    proBNP:   Lipid Profile:   HgA1c:   TSH: Thyroid Stimulating Hormone, Serum: 5.12 uIU/mL (05-25 @ 07:10)    < from: TTE W or WO Ultrasound Enhancing Agent (05.24.24 @ 14:56) >   1. Technically difficult image quality.   2. Left ventricular cavity is normal in size. Left ventricular wall thickness is normal. Left ventricular systolic function is normal with an ejection fraction of 66 % by Pink's method of disks. There are no regional wall motion abnormalities seen.   3. Normal left ventricular diastolic function, with elevated filling pressure.   4. Normal right ventricular cavity size, with normal wall thickness, and normal systolic function.  5. Normal left and right atrial size.   6. No significant valvular disease.   7. Estimated pulmonary artery systolic pressure is 22 mmHg.   8. No pericardial effusion seen.   9. No prior echocardiogram is available for comparison.      D-Dimer Assay, Quantitative (05.24.24 @ 12:19)    D-Dimer Assay, Quantitative: 177: "D-Dimer result less than or equal to 229ng/mL DDU correlates with the  absence of thrombosis in a patient with a low and moderate pre-test  probability of thrombosis. 1DDU is approximately equal to 2ng/mL FEU  (previous unit)" ng/mL DDU        Assessment and plan  ---------------------------  78-year-old male past medical history anxiety, hypertension, hyperlipidemia, diabetes, BPH, CVA with no reported AC use, presents to ED complaining of atraumatic left lower extremity pain x1 week.  Denies any falls or trauma.  Reports at baseline ambulates with cane.  While in ED patient is unable to self transition from EMS stretcher to ER stretcher.  No fevers or chills.  Patient is poorly.  Lives with his brother.  pt with sig cardiac risk factor with chest pain, on and off  d dimer, negative  repeat echo noted no regional wall motion abnormality  will consider cardiac ct  adjust bp meds  asa daily  will schedule for nuclear stress test

## 2024-05-26 LAB
GLUCOSE BLDC GLUCOMTR-MCNC: 148 MG/DL — HIGH (ref 70–99)
GLUCOSE BLDC GLUCOMTR-MCNC: 178 MG/DL — HIGH (ref 70–99)
GLUCOSE BLDC GLUCOMTR-MCNC: 202 MG/DL — HIGH (ref 70–99)
GLUCOSE BLDC GLUCOMTR-MCNC: 267 MG/DL — HIGH (ref 70–99)

## 2024-05-26 RX ORDER — HYDRALAZINE HCL 50 MG
10 TABLET ORAL THREE TIMES A DAY
Refills: 0 | Status: DISCONTINUED | OUTPATIENT
Start: 2024-05-26 | End: 2024-05-27

## 2024-05-26 RX ADMIN — TAMSULOSIN HYDROCHLORIDE 0.4 MILLIGRAM(S): 0.4 CAPSULE ORAL at 21:46

## 2024-05-26 RX ADMIN — Medication 975 MILLIGRAM(S): at 13:55

## 2024-05-26 RX ADMIN — Medication 1 APPLICATION(S): at 17:44

## 2024-05-26 RX ADMIN — SENNA PLUS 2 TABLET(S): 8.6 TABLET ORAL at 21:45

## 2024-05-26 RX ADMIN — Medication 10 MILLIGRAM(S): at 13:15

## 2024-05-26 RX ADMIN — HEPARIN SODIUM 5000 UNIT(S): 5000 INJECTION INTRAVENOUS; SUBCUTANEOUS at 17:44

## 2024-05-26 RX ADMIN — Medication 10 MILLIGRAM(S): at 21:46

## 2024-05-26 RX ADMIN — Medication 1 APPLICATION(S): at 05:34

## 2024-05-26 RX ADMIN — AMLODIPINE BESYLATE 10 MILLIGRAM(S): 2.5 TABLET ORAL at 05:34

## 2024-05-26 RX ADMIN — CHLORHEXIDINE GLUCONATE 1 APPLICATION(S): 213 SOLUTION TOPICAL at 12:21

## 2024-05-26 RX ADMIN — Medication 975 MILLIGRAM(S): at 14:25

## 2024-05-26 RX ADMIN — Medication 2: at 17:45

## 2024-05-26 RX ADMIN — ATORVASTATIN CALCIUM 80 MILLIGRAM(S): 80 TABLET, FILM COATED ORAL at 21:46

## 2024-05-26 RX ADMIN — Medication 81 MILLIGRAM(S): at 12:22

## 2024-05-26 RX ADMIN — Medication 3: at 13:14

## 2024-05-26 RX ADMIN — HEPARIN SODIUM 5000 UNIT(S): 5000 INJECTION INTRAVENOUS; SUBCUTANEOUS at 05:34

## 2024-05-26 NOTE — DIETITIAN INITIAL EVALUATION ADULT - ORAL INTAKE PTA/DIET HISTORY
Patient reports good appetite and PO intake PTA. Follows low carb/sugar diet at home for DM management and low sodium diet at home for high blood pressure. Confirms no known food allergies.

## 2024-05-26 NOTE — DIETITIAN INITIAL EVALUATION ADULT - ADD RECOMMEND
-Continue Consistent Carbohydrate diet.  -RD to add Diet Mighty Shake 2x/day (contains 400kcal, 14 Gm protein).  -Monitor PO intake, diet, weight, labs, skin, GI symptoms, and BM regularity.  -RD remains available upon request and will follow up per protocol.

## 2024-05-26 NOTE — DIETITIAN INITIAL EVALUATION ADULT - OTHER CALCULATIONS
Defer fluid needs to team.  Estimated nutrient needs based on RD obtained current weight 158.5lb, with consideration for CKD

## 2024-05-26 NOTE — DIETITIAN INITIAL EVALUATION ADULT - PERTINENT MEDS FT
MEDICATIONS  (STANDING):  amLODIPine   Tablet 10 milliGRAM(s) Oral daily  aspirin enteric coated 81 milliGRAM(s) Oral daily  atorvastatin 80 milliGRAM(s) Oral at bedtime  chlorhexidine 2% Cloths 1 Application(s) Topical daily  clotrimazole 1% Cream 1 Application(s) Topical two times a day  dextrose 10% Bolus 125 milliLiter(s) IV Bolus once  dextrose 5%. 1000 milliLiter(s) (100 mL/Hr) IV Continuous <Continuous>  dextrose 5%. 1000 milliLiter(s) (50 mL/Hr) IV Continuous <Continuous>  dextrose 50% Injectable 12.5 Gram(s) IV Push once  dextrose 50% Injectable 25 Gram(s) IV Push once  glucagon  Injectable 1 milliGRAM(s) IntraMuscular once  heparin   Injectable 5000 Unit(s) SubCutaneous every 12 hours  insulin lispro (ADMELOG) corrective regimen sliding scale   SubCutaneous three times a day before meals  melatonin 5 milliGRAM(s) Oral once  senna 2 Tablet(s) Oral at bedtime  tamsulosin 0.4 milliGRAM(s) Oral at bedtime    MEDICATIONS  (PRN):  acetaminophen     Tablet .. 975 milliGRAM(s) Oral every 6 hours PRN Mild Pain (1 - 3)  dextrose Oral Gel 15 Gram(s) Oral once PRN Blood Glucose LESS THAN 70 milliGRAM(s)/deciliter  traMADol 25 milliGRAM(s) Oral daily PRN Moderate Pain (4 - 6)

## 2024-05-26 NOTE — DIETITIAN INITIAL EVALUATION ADULT - NSFNSGIIOFT_GEN_A_CORE
Patient reports some nausea and indigestion; no diarrhea or constipation; last BM last night per patient; bowel regimen: senna

## 2024-05-26 NOTE — DIETITIAN INITIAL EVALUATION ADULT - PERTINENT LABORATORY DATA
05-25    143  |  106  |  31<H>  ----------------------------<  126<H>  3.6   |  22  |  1.40<H>    Ca    9.0      25 May 2024 07:07    POCT Blood Glucose.: 148 mg/dL (26 May 2024 07:45)  POCT Blood Glucose.: 151 mg/dL (25 May 2024 21:16)  POCT Blood Glucose.: 204 mg/dL (25 May 2024 16:51)  POCT Blood Glucose.: 269 mg/dL (25 May 2024 13:26)  POCT Blood Glucose.: 216 mg/dL (25 May 2024 12:07)    A1C with Estimated Average Glucose Result: 7.0 % (05-24-24 @ 06:33)

## 2024-05-26 NOTE — PROGRESS NOTE ADULT - SUBJECTIVE AND OBJECTIVE BOX
DATE OF SERVICE: 05-26-24 @ 09:39    HPI:  78-year-old male      h/o   anxiety, hypertension, hyperlipidemia, diabetes, BPH, CVA ,  no reported AC use         presents to ED complaining of atraumatic left lower extremity pain x1 week.      Denies any falls or trauma.     at baseline ambulates with cane.      while in   er,  pt  noted,  to  be  is unable to self transition from EMS stretcher to   er  stretcher.        denies  fevers or chills. /cp.sob. abd  pain     love s with his  brother (23 May 2024 16:30)      Interval Eventslabs are ok, x TSH sl lev, no Rx tilmafter Nuc stress - see cardio notes , Amlodipine incr alsoi for HTN, tho saida@60.   hasnt been oob, feels much better, no pain no co no cp but inacxtive  eating ok  see vs, notes, results, orders [facility]  Brother visited. called Isma again    MEDICATIONS  (STANDING):  amLODIPine   Tablet 10 milliGRAM(s) Oral daily  aspirin enteric coated 81 milliGRAM(s) Oral daily  atorvastatin 80 milliGRAM(s) Oral at bedtime  chlorhexidine 2% Cloths 1 Application(s) Topical daily  clotrimazole 1% Cream 1 Application(s) Topical two times a day  dextrose 10% Bolus 125 milliLiter(s) IV Bolus once  dextrose 5%. 1000 milliLiter(s) (100 mL/Hr) IV Continuous <Continuous>  dextrose 5%. 1000 milliLiter(s) (50 mL/Hr) IV Continuous <Continuous>  dextrose 50% Injectable 12.5 Gram(s) IV Push once  dextrose 50% Injectable 25 Gram(s) IV Push once  glucagon  Injectable 1 milliGRAM(s) IntraMuscular once  heparin   Injectable 5000 Unit(s) SubCutaneous every 12 hours  insulin lispro (ADMELOG) corrective regimen sliding scale   SubCutaneous three times a day before meals  melatonin 5 milliGRAM(s) Oral once  senna 2 Tablet(s) Oral at bedtime  tamsulosin 0.4 milliGRAM(s) Oral at bedtime    MEDICATIONS  (PRN):  acetaminophen     Tablet .. 975 milliGRAM(s) Oral every 6 hours PRN Mild Pain (1 - 3)  dextrose Oral Gel 15 Gram(s) Oral once PRN Blood Glucose LESS THAN 70 milliGRAM(s)/deciliter  traMADol 25 milliGRAM(s) Oral daily PRN Moderate Pain (4 - 6)      Patient is a 78y old  Male who presents with a chief complaint of diffusely  in  ambulation (25 May 2024 11:13)      REVIEW OF SYSTEMS    General:nad no co  Skin/Breast:n/n  Ophthalmologic:no cxo no ch  ENMT:	no co no ch  Respiratory and Thorax:no co no sp no sob  Cardiovascular:no cp no palp  Gastrointestinal:nonvcd  Genitourinary:no fdi  Musculoskeletal:	no ano p - better  Neurological:	no f co no ch, hasnt tried to amb   Psychiatric:	nmo co  Hematology/Lymphatics:	  Endocrine:	no polyudd  Allergic/Immunologic:  AOSN	y      Vital Signs Last 24 Hrs  T(C): 36.9 (26 May 2024 04:57), Max: 36.9 (26 May 2024 04:57)  T(F): 98.4 (26 May 2024 04:57), Max: 98.4 (26 May 2024 04:57)  HR: 59 (26 May 2024 04:57) (56 - 63)  BP: 180/81 (26 May 2024 04:57) (155/76 - 180/81)  BP(mean): --  RR: 18 (26 May 2024 04:57) (18 - 19)  SpO2: 96% (26 May 2024 04:57) (92% - 97%)    Parameters below as of 26 May 2024 04:57  Patient On (Oxygen Delivery Method): room air        PHYSICAL EXAM:    Constitutional:vss nad  H+Nncat  Eyes:lyudmila cwnl  ENMT:mmm swallows ok eating ok hears speaks ok  Neck:no cb no tm  Breasts:  Back:  Respiratory:no rRw  Cardiovascular:rrr  Gastrointestinal:BS + soft obese  Genitourinary:  Rectal:  Extremities:no cce  Vascular:vv- hm-  Neurological:nfatmae in bed/ stutters  Skin:cdi  Lymph Nodes:  Musculoskeletal:  Psychiatric:calm adjusted    LABS  CBC Full  -  ( 25 May 2024 07:10 )  WBC Count : 5.58 K/uL  RBC Count : 3.51 M/uL  Hemoglobin : 10.2 g/dL  Hematocrit : 31.4 %  Platelet Count - Automated : 257 K/uL  Mean Cell Volume : 89.5 fl  Mean Cell Hemoglobin : 29.1 pg  Mean Cell Hemoglobin Concentration : 32.5 gm/dL  Auto Neutrophil # : x  Auto Lymphocyte # : x  Auto Monocyte # : x  Auto Eosinophil # : x  Auto Basophil # : x  Auto Neutrophil % : x  Auto Lymphocyte % : x  Auto Monocyte % : x  Auto Eosinophil % : x  Auto Basophil % : x      05-25    143  |  106  |  31<H>  ----------------------------<  126<H>  3.6   |  22  |  1.40<H>    Ca    9.0      25 May 2024 07:07            Imaging:    Xray:    Echo:    CT:    MRI:    Tele:    Orders:    ALONZO Sun [phone]

## 2024-05-26 NOTE — PROGRESS NOTE ADULT - SUBJECTIVE AND OBJECTIVE BOX
Date of Service: 05-26-24 @ 12:36           CARDIOLOGY     PROGRESS  NOTE   ________________________________________________    CHIEF COMPLAINT:Patient is a 78y old  Male who presents with a chief complaint of Failure to thrive in adult  no complain    	  REVIEW OF SYSTEMS:  CONSTITUTIONAL: No fever, weight loss, or fatigue  EYES: No eye pain, visual disturbances, or discharge  ENT:  No difficulty hearing, tinnitus, vertigo; No sinus or throat pain  NECK: No pain or stiffness  RESPIRATORY: No cough, wheezing, chills or hemoptysis; No Shortness of Breath  CARDIOVASCULAR: No chest pain, palpitations, passing out, dizziness, or leg swelling  GASTROINTESTINAL: No abdominal or epigastric pain. No nausea, vomiting, or hematemesis; No diarrhea or constipation. No melena or hematochezia.  GENITOURINARY: No dysuria, frequency, hematuria, or incontinence  NEUROLOGICAL: No headaches, memory loss, loss of strength, numbness, or tremors  SKIN: No itching, burning, rashes, or lesions   LYMPH Nodes: No enlarged glands  ENDOCRINE: No heat or cold intolerance; No hair loss  MUSCULOSKELETAL: No joint pain or swelling; No muscle, back, or extremity pain  PSYCHIATRIC: No depression, anxiety, mood swings, or difficulty sleeping  HEME/LYMPH: No easy bruising, or bleeding gums  ALLERGY AND IMMUNOLOGIC: No hives or eczema	    [ x] All others negative	  [ ] Unable to obtain    PHYSICAL EXAM:  T(C): 36.9 (05-26-24 @ 04:57), Max: 36.9 (05-26-24 @ 04:57)  HR: 59 (05-26-24 @ 04:57) (59 - 63)  BP: 180/81 (05-26-24 @ 04:57) (155/76 - 180/81)  RR: 18 (05-26-24 @ 04:57) (18 - 18)  SpO2: 96% (05-26-24 @ 04:57) (92% - 96%)  Wt(kg): --  I&O's Summary    25 May 2024 07:01  -  26 May 2024 07:00  --------------------------------------------------------  IN: 120 mL / OUT: 0 mL / NET: 120 mL        Appearance: Normal	  HEENT:   Normal oral mucosa, PERRL, EOMI	  Lymphatic: No lymphadenopathy  Cardiovascular: Normal S1 S2, No JVD, + murmurs, No edema  Respiratory: rhonchi  Psychiatry: A & O x 3, Mood & affect appropriate  Gastrointestinal:  Soft, Non-tender, + BS	  Extremities: Normal range of motion, No clubbing, cyanosis or edema  Vascular: Peripheral pulses palpable 2+ bilaterally    MEDICATIONS  (STANDING):  amLODIPine   Tablet 10 milliGRAM(s) Oral daily  aspirin enteric coated 81 milliGRAM(s) Oral daily  atorvastatin 80 milliGRAM(s) Oral at bedtime  chlorhexidine 2% Cloths 1 Application(s) Topical daily  clotrimazole 1% Cream 1 Application(s) Topical two times a day  dextrose 10% Bolus 125 milliLiter(s) IV Bolus once  dextrose 5%. 1000 milliLiter(s) (100 mL/Hr) IV Continuous <Continuous>  dextrose 5%. 1000 milliLiter(s) (50 mL/Hr) IV Continuous <Continuous>  dextrose 50% Injectable 12.5 Gram(s) IV Push once  dextrose 50% Injectable 25 Gram(s) IV Push once  glucagon  Injectable 1 milliGRAM(s) IntraMuscular once  heparin   Injectable 5000 Unit(s) SubCutaneous every 12 hours  insulin lispro (ADMELOG) corrective regimen sliding scale   SubCutaneous three times a day before meals  melatonin 5 milliGRAM(s) Oral once  senna 2 Tablet(s) Oral at bedtime  tamsulosin 0.4 milliGRAM(s) Oral at bedtime      TELEMETRY: 	    ECG:  	  RADIOLOGY:  OTHER: 	  	  LABS:	 	    CARDIAC MARKERS:                          10.2   5.58  )-----------( 257      ( 25 May 2024 07:10 )             31.4     05-25    143  |  106  |  31<H>  ----------------------------<  126<H>  3.6   |  22  |  1.40<H>    Ca    9.0      25 May 2024 07:07      proBNP:   Lipid Profile:   HgA1c:   TSH: Thyroid Stimulating Hormone, Serum: 5.12 uIU/mL (05-25 @ 07:10)          Assessment and plan  ---------------------------  78-year-old male past medical history anxiety, hypertension, hyperlipidemia, diabetes, BPH, CVA with no reported AC use, presents to ED complaining of atraumatic left lower extremity pain x1 week.  Denies any falls or trauma.  Reports at baseline ambulates with cane.  While in ED patient is unable to self transition from EMS stretcher to ER stretcher.  No fevers or chills.  Patient is poorly.  Lives with his brother.  pt with sig cardiac risk factor with chest pain, on and off  d dimer, negative  repeat echo noted no regional wall motion abnormality  will consider cardiac ct  adjust bp meds  asa daily  will schedule for nuclear stress test, awaiting results  will adjust bp meds, no diuretics

## 2024-05-26 NOTE — DIETITIAN INITIAL EVALUATION ADULT - NSFNSADHERENCEPTAFT_GEN_A_CORE
Patient with hx DM, A1C 7.0% from 5/24/24 indicates good glycemic control PTA. Patient reports blood glucose levels are checked a few times per day at home, usual blood glucose levels are normal, sometimes high and sometimes low - patient verbalized understanding of what to do when blood glucose levels are low. Reports taking glimepiride and metformin at home for DM.

## 2024-05-26 NOTE — DIETITIAN INITIAL EVALUATION ADULT - WEIGHT (KG)
Assessment    2moM ex 34.1 weeker twin A with pmh of RSV bronchiolitis requiring PICU admission in December admitted s/p R inguinal hernia repair for apnea monitoring as per surgery.    Plan  As per surgery  Nothing to add from a pediatric stand point at this time.
71.8

## 2024-05-26 NOTE — DIETITIAN INITIAL EVALUATION ADULT - PERSON TAUGHT/METHOD
Provided recommendations to optimize PO and protein intake, recommended small frequent meals by ordering nutrient-dense snacks and leaving non-perishable food away from tray for later consumption during the day or between meals, to start with protein, and sips of supplement throughout the day; reviewed foods with protein and menu order procedures in hospital. Patient without specific food preferences for RD at this time. Patient made aware RD to remain available.   Encouraged patient to continue with good glycemic control at home via diet, checking Finger Sticks, and taking medication as prescribed./verbal instruction/patient instructed

## 2024-05-26 NOTE — DIETITIAN INITIAL EVALUATION ADULT - OTHER INFO
Patient reports UBW 170lb, unsure of changes in weight PTA.  Dosing weight: 169.9lb (5/23).  RD obtained current weight: 71.9kg/158.5lb (5/26, bed).  IBW: 154lb, %IBW: 103% based on RD obtained current weight.   Weight history per [facility]: 166.9lb (12/20/23), 166lb (11/29/22).  Weight appears fluctuating. RD to continue to monitor weight trends as available/able.     -Current insulin regimen in-house: Correctional sliding scale insulin. elevated Finger Sticks noted.  -CKD stable per Family Medicine notes.

## 2024-05-26 NOTE — DIETITIAN INITIAL EVALUATION ADULT - ENERGY INTAKE
Reports not eating as much as normal. Breakfast tray untouched, RD offered patient feeding assistance, patient declined as he did not want to eat at the moment. % meal intake per flowsheets. Fair (50-75%)

## 2024-05-27 LAB
ANION GAP SERPL CALC-SCNC: 15 MMOL/L — SIGNIFICANT CHANGE UP (ref 5–17)
BUN SERPL-MCNC: 40 MG/DL — HIGH (ref 7–23)
CALCIUM SERPL-MCNC: 9.4 MG/DL — SIGNIFICANT CHANGE UP (ref 8.4–10.5)
CHLORIDE SERPL-SCNC: 108 MMOL/L — SIGNIFICANT CHANGE UP (ref 96–108)
CO2 SERPL-SCNC: 20 MMOL/L — LOW (ref 22–31)
CREAT SERPL-MCNC: 1.64 MG/DL — HIGH (ref 0.5–1.3)
EGFR: 42 ML/MIN/1.73M2 — LOW
GLUCOSE BLDC GLUCOMTR-MCNC: 161 MG/DL — HIGH (ref 70–99)
GLUCOSE BLDC GLUCOMTR-MCNC: 254 MG/DL — HIGH (ref 70–99)
GLUCOSE BLDC GLUCOMTR-MCNC: 257 MG/DL — HIGH (ref 70–99)
GLUCOSE BLDC GLUCOMTR-MCNC: 267 MG/DL — HIGH (ref 70–99)
GLUCOSE BLDC GLUCOMTR-MCNC: 339 MG/DL — HIGH (ref 70–99)
GLUCOSE SERPL-MCNC: 151 MG/DL — HIGH (ref 70–99)
HCT VFR BLD CALC: 31.2 % — LOW (ref 39–50)
HGB BLD-MCNC: 10.2 G/DL — LOW (ref 13–17)
MCHC RBC-ENTMCNC: 29.1 PG — SIGNIFICANT CHANGE UP (ref 27–34)
MCHC RBC-ENTMCNC: 32.7 GM/DL — SIGNIFICANT CHANGE UP (ref 32–36)
MCV RBC AUTO: 88.9 FL — SIGNIFICANT CHANGE UP (ref 80–100)
MRSA PCR RESULT.: SIGNIFICANT CHANGE UP
NRBC # BLD: 0 /100 WBCS — SIGNIFICANT CHANGE UP (ref 0–0)
PLATELET # BLD AUTO: 265 K/UL — SIGNIFICANT CHANGE UP (ref 150–400)
POTASSIUM SERPL-MCNC: 3.9 MMOL/L — SIGNIFICANT CHANGE UP (ref 3.5–5.3)
POTASSIUM SERPL-SCNC: 3.9 MMOL/L — SIGNIFICANT CHANGE UP (ref 3.5–5.3)
RBC # BLD: 3.51 M/UL — LOW (ref 4.2–5.8)
RBC # FLD: 13.8 % — SIGNIFICANT CHANGE UP (ref 10.3–14.5)
S AUREUS DNA NOSE QL NAA+PROBE: SIGNIFICANT CHANGE UP
SODIUM SERPL-SCNC: 143 MMOL/L — SIGNIFICANT CHANGE UP (ref 135–145)
TROPONIN T, HIGH SENSITIVITY RESULT: 142 NG/L — HIGH (ref 0–51)
WBC # BLD: 6.51 K/UL — SIGNIFICANT CHANGE UP (ref 3.8–10.5)
WBC # FLD AUTO: 6.51 K/UL — SIGNIFICANT CHANGE UP (ref 3.8–10.5)

## 2024-05-27 RX ORDER — HYDRALAZINE HCL 50 MG
25 TABLET ORAL
Refills: 0 | Status: DISCONTINUED | OUTPATIENT
Start: 2024-05-27 | End: 2024-05-30

## 2024-05-27 RX ORDER — INSULIN LISPRO 100/ML
VIAL (ML) SUBCUTANEOUS AT BEDTIME
Refills: 0 | Status: DISCONTINUED | OUTPATIENT
Start: 2024-05-27 | End: 2024-05-30

## 2024-05-27 RX ADMIN — Medication 1 APPLICATION(S): at 17:25

## 2024-05-27 RX ADMIN — Medication 1: at 08:30

## 2024-05-27 RX ADMIN — SENNA PLUS 2 TABLET(S): 8.6 TABLET ORAL at 21:58

## 2024-05-27 RX ADMIN — Medication 81 MILLIGRAM(S): at 11:44

## 2024-05-27 RX ADMIN — Medication 3: at 12:15

## 2024-05-27 RX ADMIN — Medication 4: at 15:31

## 2024-05-27 RX ADMIN — HEPARIN SODIUM 5000 UNIT(S): 5000 INJECTION INTRAVENOUS; SUBCUTANEOUS at 04:49

## 2024-05-27 RX ADMIN — CHLORHEXIDINE GLUCONATE 1 APPLICATION(S): 213 SOLUTION TOPICAL at 11:44

## 2024-05-27 RX ADMIN — TAMSULOSIN HYDROCHLORIDE 0.4 MILLIGRAM(S): 0.4 CAPSULE ORAL at 21:57

## 2024-05-27 RX ADMIN — AMLODIPINE BESYLATE 10 MILLIGRAM(S): 2.5 TABLET ORAL at 04:50

## 2024-05-27 RX ADMIN — Medication 1 APPLICATION(S): at 04:50

## 2024-05-27 RX ADMIN — HEPARIN SODIUM 5000 UNIT(S): 5000 INJECTION INTRAVENOUS; SUBCUTANEOUS at 17:25

## 2024-05-27 RX ADMIN — Medication 975 MILLIGRAM(S): at 08:59

## 2024-05-27 RX ADMIN — ATORVASTATIN CALCIUM 80 MILLIGRAM(S): 80 TABLET, FILM COATED ORAL at 21:58

## 2024-05-27 RX ADMIN — Medication 25 MILLIGRAM(S): at 17:26

## 2024-05-27 RX ADMIN — Medication 10 MILLIGRAM(S): at 04:50

## 2024-05-27 RX ADMIN — Medication 1: at 21:58

## 2024-05-27 NOTE — PROGRESS NOTE ADULT - SUBJECTIVE AND OBJECTIVE BOX
Date of Service: 05-27-24 @ 09:37           CARDIOLOGY     PROGRESS  NOTE   ________________________________________________    CHIEF COMPLAINT:Patient is a 78y old  Male who presents with a chief complaint of diffusely  in  ambulation (26 May 2024 12:35)  no complain  	  REVIEW OF SYSTEMS:  CONSTITUTIONAL: No fever, weight loss, or fatigue  EYES: No eye pain, visual disturbances, or discharge  ENT:  No difficulty hearing, tinnitus, vertigo; No sinus or throat pain  NECK: No pain or stiffness  RESPIRATORY: No cough, wheezing, chills or hemoptysis; No Shortness of Breath  CARDIOVASCULAR: No chest pain, palpitations, passing out, dizziness, or leg swelling  GASTROINTESTINAL: No abdominal or epigastric pain. No nausea, vomiting, or hematemesis; No diarrhea or constipation. No melena or hematochezia.  GENITOURINARY: No dysuria, frequency, hematuria, or incontinence  NEUROLOGICAL: No headaches, memory loss, loss of strength, numbness, or tremors  SKIN: No itching, burning, rashes, or lesions   LYMPH Nodes: No enlarged glands  ENDOCRINE: No heat or cold intolerance; No hair loss  MUSCULOSKELETAL: No joint pain or swelling; No muscle, back, or extremity pain  PSYCHIATRIC: No depression, anxiety, mood swings, or difficulty sleeping  HEME/LYMPH: No easy bruising, or bleeding gums  ALLERGY AND IMMUNOLOGIC: No hives or eczema	    [x ] All others negative	  [ ] Unable to obtain    PHYSICAL EXAM:  T(C): 36.6 (05-27-24 @ 04:40), Max: 36.8 (05-26-24 @ 12:44)  HR: 65 (05-27-24 @ 04:40) (65 - 72)  BP: 173/81 (05-27-24 @ 04:40) (140/68 - 173/81)  RR: 18 (05-27-24 @ 04:40) (18 - 18)  SpO2: 95% (05-27-24 @ 04:40) (95% - 97%)  Wt(kg): --  I&O's Summary    26 May 2024 07:01  -  27 May 2024 07:00  --------------------------------------------------------  IN: 240 mL / OUT: 1100 mL / NET: -860 mL        Appearance: Normal	  HEENT:   Normal oral mucosa, PERRL, EOMI	  Lymphatic: No lymphadenopathy  Cardiovascular: Normal S1 S2, No JVD, + murmurs, No edema  Respiratory: rhonchi  Psychiatry: A & O x 3, Mood & affect appropriate  Gastrointestinal:  Soft, Non-tender, + BS	  Skin: No rashes, No ecchymoses, No cyanosis	  Extremities: Normal range of motion, No clubbing, cyanosis or edema  Vascular: Peripheral pulses palpable 2+ bilaterally    MEDICATIONS  (STANDING):  amLODIPine   Tablet 10 milliGRAM(s) Oral daily  aspirin enteric coated 81 milliGRAM(s) Oral daily  atorvastatin 80 milliGRAM(s) Oral at bedtime  chlorhexidine 2% Cloths 1 Application(s) Topical daily  clotrimazole 1% Cream 1 Application(s) Topical two times a day  dextrose 10% Bolus 125 milliLiter(s) IV Bolus once  dextrose 5%. 1000 milliLiter(s) (100 mL/Hr) IV Continuous <Continuous>  dextrose 5%. 1000 milliLiter(s) (50 mL/Hr) IV Continuous <Continuous>  dextrose 50% Injectable 12.5 Gram(s) IV Push once  dextrose 50% Injectable 25 Gram(s) IV Push once  glucagon  Injectable 1 milliGRAM(s) IntraMuscular once  heparin   Injectable 5000 Unit(s) SubCutaneous every 12 hours  hydrALAZINE 10 milliGRAM(s) Oral three times a day  insulin lispro (ADMELOG) corrective regimen sliding scale   SubCutaneous three times a day before meals  melatonin 5 milliGRAM(s) Oral once  senna 2 Tablet(s) Oral at bedtime  tamsulosin 0.4 milliGRAM(s) Oral at bedtime      TELEMETRY: 	    ECG:  	  RADIOLOGY:  OTHER: 	  	  LABS:	 	    CARDIAC MARKERS:                          10.2   6.51  )-----------( 265      ( 27 May 2024 07:14 )             31.2     05-27    143  |  108  |  40<H>  ----------------------------<  151<H>  3.9   |  20<L>  |  1.64<H>    Ca    9.4      27 May 2024 07:15      proBNP:   Lipid Profile:   HgA1c:   TSH: Thyroid Stimulating Hormone, Serum: 5.12 uIU/mL (05-25 @ 07:10)        Assessment and plan  ---------------------------  78-year-old male past medical history anxiety, hypertension, hyperlipidemia, diabetes, BPH, CVA with no reported AC use, presents to ED complaining of atraumatic left lower extremity pain x1 week.  Denies any falls or trauma.  Reports at baseline ambulates with cane.  While in ED patient is unable to self transition from EMS stretcher to ER stretcher.  No fevers or chills.  Patient is poorly.  Lives with his brother.  pt with sig cardiac risk factor with chest pain, on and off  d dimer, negative  repeat echo noted no regional wall motion abnormality  will consider cardiac ct  adjust bp meds  asa daily  will schedule for nuclear stress test, awaiting results  will adjust bp meds, no diuretics  increase hydralazine to 25 mg bid  no ACE/ARB sec to increase renal function  awaiting stress test

## 2024-05-27 NOTE — PROVIDER CONTACT NOTE (OTHER) - BACKGROUND
use the sliding scale insulin to cover pt. Rechecked pt's FS at dinner time (1700) and FS still high. Relayed results to provider at 1720 since pt didn't want to eat right away. Asked for a stat dose.

## 2024-05-27 NOTE — PROVIDER CONTACT NOTE (OTHER) - SITUATION
Patient stated he was not feeling well at 15:15 and felt anxious. Checked vitals & FS, VSS. FS was 339. Called provider and stated she was at lunch. Gave pt sliding scale insulin. Provider relayed to

## 2024-05-27 NOTE — PROGRESS NOTE ADULT - SUBJECTIVE AND OBJECTIVE BOX
DATE OF SERVICE: 05-27-24 @ 10:10    HPI:  78-year-old male      h/o   anxiety, hypertension, hyperlipidemia, diabetes, BPH, CVA ,  no reported AC use         presents to ED complaining of atraumatic left lower extremity pain x1 week.      Denies any falls or trauma.     at baseline ambulates with cane.      while in   er,  pt  noted,  to  be  is unable to self transition from EMS stretcher to   er  stretcher.        denies  fevers or chills. /cp.sob. abd  pain     love s with his  brother (23 May 2024 16:30)      Interval Events  no change, feels ok, has not been oob tho ordered 3 days!, eating ok, BM in Bed. No CP but inactive, no Jt pain which was reason for admission, has not had another trial ambulation either tho ordered  - Holiday weekend  see cardio notes , disc c Dr De La Fuente this am started Hydralazine for HTN, stress test ordered but today is memorial day.   feels better no co  no pain, he thinks he can walk - cant try alone  spoke c pt and Brother Bird - visited yesterday    MEDICATIONS  (STANDING):  amLODIPine   Tablet 10 milliGRAM(s) Oral daily  aspirin enteric coated 81 milliGRAM(s) Oral daily  atorvastatin 80 milliGRAM(s) Oral at bedtime  chlorhexidine 2% Cloths 1 Application(s) Topical daily  clotrimazole 1% Cream 1 Application(s) Topical two times a day  dextrose 10% Bolus 125 milliLiter(s) IV Bolus once  dextrose 5%. 1000 milliLiter(s) (100 mL/Hr) IV Continuous <Continuous>  dextrose 5%. 1000 milliLiter(s) (50 mL/Hr) IV Continuous <Continuous>  dextrose 50% Injectable 12.5 Gram(s) IV Push once  dextrose 50% Injectable 25 Gram(s) IV Push once  glucagon  Injectable 1 milliGRAM(s) IntraMuscular once  heparin   Injectable 5000 Unit(s) SubCutaneous every 12 hours  hydrALAZINE 25 milliGRAM(s) Oral two times a day  insulin lispro (ADMELOG) corrective regimen sliding scale   SubCutaneous three times a day before meals  melatonin 5 milliGRAM(s) Oral once  senna 2 Tablet(s) Oral at bedtime  tamsulosin 0.4 milliGRAM(s) Oral at bedtime    MEDICATIONS  (PRN):  dextrose Oral Gel 15 Gram(s) Oral once PRN Blood Glucose LESS THAN 70 milliGRAM(s)/deciliter  traMADol 25 milliGRAM(s) Oral daily PRN Moderate Pain (4 - 6)      Patient is a 78y old  Male who presents with a chief complaint of diffusely  in  ambulation (27 May 2024 09:37)      REVIEW OF SYSTEMS    General:no co no pain  Skin/Breast:n/n  Ophthalmologic:no co no ch  ENMT:	no co no ch  Respiratory and Thorax:no cough no sp no sob  Cardiovascular:no cp no palp  Gastrointestinal:no nvcd  Genitourinary:no fdi  Musculoskeletal:	no a no p  Neurological:	no f co althea ch  Psychiatric:	no co no ch  Hematology/Lymphatics:	  Endocrine:	no polyudd  Allergic/Immunologic:  AOSN	y      Vital Signs Last 24 Hrs  T(C): 36.6 (27 May 2024 04:40), Max: 36.8 (26 May 2024 12:44)  T(F): 97.9 (27 May 2024 04:40), Max: 98.2 (26 May 2024 12:44)  HR: 65 (27 May 2024 04:40) (65 - 72)  BP: 173/81 (27 May 2024 04:40) (140/68 - 173/81)  BP(mean): --  RR: 18 (27 May 2024 04:40) (18 - 18)  SpO2: 95% (27 May 2024 04:40) (95% - 97%)    Parameters below as of 27 May 2024 04:40  Patient On (Oxygen Delivery Method): room air        PHYSICAL EXAM:    Constitutional:vss x BP   H+N ncat  Eyes:lyudmila cwnl  ENMT: hears eats ok swallows ok mmm  Neck: no cb no tm  Breasts:  Back:  Respiratory:ctab no rrw  Cardiovascular:rrr  Gastrointestinal:bs+ obese  Genitourinary:  Rectal:  Extremities:no cce  Vascular:hm-vv-  Neurological:nfatmae in bed, stutters, mci  Skin:cdi  Lymph Nodes:  Musculoskeletal:  Psychiatric: calm coop , no issues    LABS  CBC Full  -  ( 27 May 2024 07:14 )  WBC Count : 6.51 K/uL  RBC Count : 3.51 M/uL  Hemoglobin : 10.2 g/dL  Hematocrit : 31.2 %  Platelet Count - Automated : 265 K/uL  Mean Cell Volume : 88.9 fl  Mean Cell Hemoglobin : 29.1 pg  Mean Cell Hemoglobin Concentration : 32.7 gm/dL  Auto Neutrophil # : x  Auto Lymphocyte # : x  Auto Monocyte # : x  Auto Eosinophil # : x  Auto Basophil # : x  Auto Neutrophil % : x  Auto Lymphocyte % : x  Auto Monocyte % : x  Auto Eosinophil % : x  Auto Basophil % : x      05-27    143  |  108  |  40<H>  ----------------------------<  151<H>  3.9   |  20<L>  |  1.64<H>    Ca    9.4      27 May 2024 07:15            Imaging:    Xray:    Echo:    CT:    MRI:    Tele:    Orders:    ALONZO Sun [phone]

## 2024-05-28 ENCOUNTER — RESULT REVIEW (OUTPATIENT)
Age: 79
End: 2024-05-28

## 2024-05-28 LAB
ADD ON TEST-SPECIMEN IN LAB: SIGNIFICANT CHANGE UP
ANION GAP SERPL CALC-SCNC: 13 MMOL/L — SIGNIFICANT CHANGE UP (ref 5–17)
BUN SERPL-MCNC: 42 MG/DL — HIGH (ref 7–23)
CALCIUM SERPL-MCNC: 9.4 MG/DL — SIGNIFICANT CHANGE UP (ref 8.4–10.5)
CHLORIDE SERPL-SCNC: 110 MMOL/L — HIGH (ref 96–108)
CO2 SERPL-SCNC: 21 MMOL/L — LOW (ref 22–31)
CREAT SERPL-MCNC: 1.53 MG/DL — HIGH (ref 0.5–1.3)
EGFR: 46 ML/MIN/1.73M2 — LOW
GLUCOSE BLDC GLUCOMTR-MCNC: 162 MG/DL — HIGH (ref 70–99)
GLUCOSE BLDC GLUCOMTR-MCNC: 206 MG/DL — HIGH (ref 70–99)
GLUCOSE BLDC GLUCOMTR-MCNC: 218 MG/DL — HIGH (ref 70–99)
GLUCOSE BLDC GLUCOMTR-MCNC: 232 MG/DL — HIGH (ref 70–99)
GLUCOSE SERPL-MCNC: 170 MG/DL — HIGH (ref 70–99)
HCT VFR BLD CALC: 30.2 % — LOW (ref 39–50)
HGB BLD-MCNC: 9.9 G/DL — LOW (ref 13–17)
MCHC RBC-ENTMCNC: 29.1 PG — SIGNIFICANT CHANGE UP (ref 27–34)
MCHC RBC-ENTMCNC: 32.8 GM/DL — SIGNIFICANT CHANGE UP (ref 32–36)
MCV RBC AUTO: 88.8 FL — SIGNIFICANT CHANGE UP (ref 80–100)
NRBC # BLD: 0 /100 WBCS — SIGNIFICANT CHANGE UP (ref 0–0)
PLATELET # BLD AUTO: 265 K/UL — SIGNIFICANT CHANGE UP (ref 150–400)
POTASSIUM SERPL-MCNC: 3.9 MMOL/L — SIGNIFICANT CHANGE UP (ref 3.5–5.3)
POTASSIUM SERPL-SCNC: 3.9 MMOL/L — SIGNIFICANT CHANGE UP (ref 3.5–5.3)
RBC # BLD: 3.4 M/UL — LOW (ref 4.2–5.8)
RBC # FLD: 14.3 % — SIGNIFICANT CHANGE UP (ref 10.3–14.5)
SODIUM SERPL-SCNC: 144 MMOL/L — SIGNIFICANT CHANGE UP (ref 135–145)
TROPONIN T, HIGH SENSITIVITY RESULT: 77 NG/L — HIGH (ref 0–51)
WBC # BLD: 6.55 K/UL — SIGNIFICANT CHANGE UP (ref 3.8–10.5)
WBC # FLD AUTO: 6.55 K/UL — SIGNIFICANT CHANGE UP (ref 3.8–10.5)

## 2024-05-28 PROCEDURE — 93018 CV STRESS TEST I&R ONLY: CPT

## 2024-05-28 PROCEDURE — 78452 HT MUSCLE IMAGE SPECT MULT: CPT | Mod: 26

## 2024-05-28 PROCEDURE — 93016 CV STRESS TEST SUPVJ ONLY: CPT

## 2024-05-28 RX ORDER — ACETAMINOPHEN 500 MG
650 TABLET ORAL ONCE
Refills: 0 | Status: COMPLETED | OUTPATIENT
Start: 2024-05-28 | End: 2024-05-28

## 2024-05-28 RX ADMIN — TAMSULOSIN HYDROCHLORIDE 0.4 MILLIGRAM(S): 0.4 CAPSULE ORAL at 21:08

## 2024-05-28 RX ADMIN — SENNA PLUS 2 TABLET(S): 8.6 TABLET ORAL at 21:08

## 2024-05-28 RX ADMIN — Medication 1: at 08:53

## 2024-05-28 RX ADMIN — Medication 25 MILLIGRAM(S): at 17:17

## 2024-05-28 RX ADMIN — Medication 2: at 17:16

## 2024-05-28 RX ADMIN — ATORVASTATIN CALCIUM 80 MILLIGRAM(S): 80 TABLET, FILM COATED ORAL at 21:08

## 2024-05-28 RX ADMIN — Medication 650 MILLIGRAM(S): at 10:06

## 2024-05-28 RX ADMIN — Medication 1 APPLICATION(S): at 06:09

## 2024-05-28 RX ADMIN — AMLODIPINE BESYLATE 10 MILLIGRAM(S): 2.5 TABLET ORAL at 06:05

## 2024-05-28 RX ADMIN — HEPARIN SODIUM 5000 UNIT(S): 5000 INJECTION INTRAVENOUS; SUBCUTANEOUS at 17:16

## 2024-05-28 RX ADMIN — Medication 2: at 14:21

## 2024-05-28 RX ADMIN — HEPARIN SODIUM 5000 UNIT(S): 5000 INJECTION INTRAVENOUS; SUBCUTANEOUS at 06:05

## 2024-05-28 RX ADMIN — Medication 1 APPLICATION(S): at 17:16

## 2024-05-28 RX ADMIN — Medication 81 MILLIGRAM(S): at 14:20

## 2024-05-28 RX ADMIN — Medication 25 MILLIGRAM(S): at 06:05

## 2024-05-28 NOTE — PROGRESS NOTE ADULT - SUBJECTIVE AND OBJECTIVE BOX
DATE OF SERVICE: 05-28-24 @ 08:38    HPI:  78-year-old male      h/o   anxiety, hypertension, hyperlipidemia, diabetes, BPH, CVA ,  no reported AC use         presents to ED complaining of atraumatic left lower extremity pain x1 week.      Denies any falls or trauma.     at baseline ambulates with cane.      while in   er,  pt  noted,  to  be  is unable to self transition from EMS stretcher to   er  stretcher.        denies  fevers or chills. /cp.sob. abd  pain     love s with his  brother (23 May 2024 16:30)      Interval Events  was oob yesterday - thank you, in case RN reads this.   pt wants to go home, he says he can walk, I will request anopyther PT eval  Disc c Cardio Dr De La Fuente cardio this am again - expecting Chem stress test today re chest pain  otherwise ok no co  see meds orders labs notes, vs etc  Glu was hi, see "provider" notes - hosp protocol re DM    MEDICATIONS  (STANDING):  amLODIPine   Tablet 10 milliGRAM(s) Oral daily  aspirin enteric coated 81 milliGRAM(s) Oral daily  atorvastatin 80 milliGRAM(s) Oral at bedtime  chlorhexidine 2% Cloths 1 Application(s) Topical daily  clotrimazole 1% Cream 1 Application(s) Topical two times a day  dextrose 10% Bolus 125 milliLiter(s) IV Bolus once  dextrose 5%. 1000 milliLiter(s) (100 mL/Hr) IV Continuous <Continuous>  dextrose 5%. 1000 milliLiter(s) (50 mL/Hr) IV Continuous <Continuous>  dextrose 50% Injectable 12.5 Gram(s) IV Push once  dextrose 50% Injectable 25 Gram(s) IV Push once  glucagon  Injectable 1 milliGRAM(s) IntraMuscular once  heparin   Injectable 5000 Unit(s) SubCutaneous every 12 hours  hydrALAZINE 25 milliGRAM(s) Oral two times a day  insulin lispro (ADMELOG) corrective regimen sliding scale   SubCutaneous three times a day before meals  insulin lispro (ADMELOG) corrective regimen sliding scale   SubCutaneous at bedtime  melatonin 5 milliGRAM(s) Oral once  senna 2 Tablet(s) Oral at bedtime  tamsulosin 0.4 milliGRAM(s) Oral at bedtime    MEDICATIONS  (PRN):  dextrose Oral Gel 15 Gram(s) Oral once PRN Blood Glucose LESS THAN 70 milliGRAM(s)/deciliter  traMADol 25 milliGRAM(s) Oral daily PRN Moderate Pain (4 - 6)      Patient is a 78y old  Male who presents with a chief complaint of diffusely  in  ambulation (27 May 2024 10:09)      REVIEW OF SYSTEMS    General:nad no co  Skin/Breast:n/n  Ophthalmologic:no co no ch  ENMT:	hears speaks ok no co , no bkfast yet,   Respiratory and Thorax no cough no sp no sob  Cardiovascular:no cp no palp  Gastrointestinal:no nvcd  Genitourinary:no fdi  Musculoskeletal:	no a no p  Neurological:	no f co co ch  Psychiatric:	no co  Hematology/Lymphatics:	  Endocrine:	no polyudd  Allergic/Immunologic:  AOSN	y      Vital Signs Last 24 Hrs  T(C): 36.8 (28 May 2024 04:40), Max: 36.9 (27 May 2024 21:17)  T(F): 98.2 (28 May 2024 04:40), Max: 98.4 (27 May 2024 21:17)  HR: 67 (28 May 2024 04:40) (66 - 71)  BP: 180/80 (28 May 2024 04:40) (111/68 - 180/80)  BP(mean): --  RR: 18 (28 May 2024 04:40) (18 - 18)  SpO2: 94% (28 May 2024 04:40) (94% - 96%)    Parameters below as of 28 May 2024 04:40  Patient On (Oxygen Delivery Method): room air        PHYSICAL EXAM:    Constitutional: nad, vss nad wants to go home  H+N ncat  Eyes:lyudmila cwnl  ENMT:hears speaks stutters swallows ok mmm  Neck:no cb no tm  Breasts:  Back:  Respiratory:ctab no rrw  Cardiovascular:rrr  Gastrointestinal:soft nt  Genitourinary:condom cath  Rectal:  Extremities:no cce  Vascular:hm- vv-  Neurological:nfatmae , stuters,   Skin:cdi  Lymph Nodes:  Musculoskeletal:  Psychiatric:calm coop    LABS  CBC Full  -  ( 28 May 2024 06:48 )  WBC Count : 6.55 K/uL  RBC Count : 3.40 M/uL  Hemoglobin : 9.9 g/dL  Hematocrit : 30.2 %  Platelet Count - Automated : 265 K/uL  Mean Cell Volume : 88.8 fl  Mean Cell Hemoglobin : 29.1 pg  Mean Cell Hemoglobin Concentration : 32.8 gm/dL  Auto Neutrophil # : x  Auto Lymphocyte # : x  Auto Monocyte # : x  Auto Eosinophil # : x  Auto Basophil # : x  Auto Neutrophil % : x  Auto Lymphocyte % : x  Auto Monocyte % : x  Auto Eosinophil % : x  Auto Basophil % : x      05-28    144  |  110<H>  |  42<H>  ----------------------------<  170<H>  3.9   |  21<L>  |  1.53<H>    Ca    9.4      28 May 2024 06:47            Imaging:    Xray:    Echo:    CT:    MRI:    Tele:    Orders:    ALONZO Sun [phone]

## 2024-05-28 NOTE — PROVIDER CONTACT NOTE (CRITICAL VALUE NOTIFICATION) - ASSESSMENT
Patient alert & oriented x4. Patient denies any chest pain, dizziness, or SOB.
Patient alert & oriented x4. Pt denies chest pain and SOB. All VSS.

## 2024-05-28 NOTE — PROGRESS NOTE ADULT - SUBJECTIVE AND OBJECTIVE BOX
Date of Service: 05-28-24 @ 09:24           CARDIOLOGY     PROGRESS  NOTE   ________________________________________________    CHIEF COMPLAINT:Patient is a 78y old  Male who presents with a chief complaint of diffusely  in  ambulation (28 May 2024 08:38)  no complain  	  REVIEW OF SYSTEMS:  CONSTITUTIONAL: No fever, weight loss, or fatigue  EYES: No eye pain, visual disturbances, or discharge  ENT:  No difficulty hearing, tinnitus, vertigo; No sinus or throat pain  NECK: No pain or stiffness  RESPIRATORY: No cough, wheezing, chills or hemoptysis; No Shortness of Breath  CARDIOVASCULAR: No chest pain, palpitations, passing out, dizziness, or leg swelling  GASTROINTESTINAL: No abdominal or epigastric pain. No nausea, vomiting, or hematemesis; No diarrhea or constipation. No melena or hematochezia.  GENITOURINARY: No dysuria, frequency, hematuria, or incontinence  NEUROLOGICAL: No headaches, memory loss, loss of strength, numbness, or tremors  SKIN: No itching, burning, rashes, or lesions   LYMPH Nodes: No enlarged glands  ENDOCRINE: No heat or cold intolerance; No hair loss  MUSCULOSKELETAL: No joint pain or swelling; No muscle, back, or extremity pain  PSYCHIATRIC: No depression, anxiety, mood swings, or difficulty sleeping  HEME/LYMPH: No easy bruising, or bleeding gums  ALLERGY AND IMMUNOLOGIC: No hives or eczema	    [ x] All others negative	  [ ] Unable to obtain    PHYSICAL EXAM:  T(C): 36.8 (05-28-24 @ 04:40), Max: 36.9 (05-27-24 @ 21:17)  HR: 67 (05-28-24 @ 04:40) (66 - 71)  BP: 180/80 (05-28-24 @ 04:40) (111/68 - 180/80)  RR: 18 (05-28-24 @ 04:40) (18 - 18)  SpO2: 94% (05-28-24 @ 04:40) (94% - 96%)  Wt(kg): --  I&O's Summary    27 May 2024 07:01  -  28 May 2024 07:00  --------------------------------------------------------  IN: 0 mL / OUT: 1100 mL / NET: -1100 mL        Appearance: Normal	  HEENT:   Normal oral mucosa, PERRL, EOMI	  Lymphatic: No lymphadenopathy  Cardiovascular: Normal S1 S2, No JVD, + murmurs, No edema  Respiratory: rhonchi  Psychiatry: A & O x 3, Mood & affect appropriate  Gastrointestinal:  Soft, Non-tender, + BS	  Skin: No rashes, No ecchymoses, No cyanosis	  Extremities: Normal range of motion, No clubbing, cyanosis or edema  Vascular: Peripheral pulses palpable 2+ bilaterally    MEDICATIONS  (STANDING):  amLODIPine   Tablet 10 milliGRAM(s) Oral daily  aspirin enteric coated 81 milliGRAM(s) Oral daily  atorvastatin 80 milliGRAM(s) Oral at bedtime  chlorhexidine 2% Cloths 1 Application(s) Topical daily  clotrimazole 1% Cream 1 Application(s) Topical two times a day  dextrose 10% Bolus 125 milliLiter(s) IV Bolus once  dextrose 5%. 1000 milliLiter(s) (100 mL/Hr) IV Continuous <Continuous>  dextrose 5%. 1000 milliLiter(s) (50 mL/Hr) IV Continuous <Continuous>  dextrose 50% Injectable 25 Gram(s) IV Push once  dextrose 50% Injectable 12.5 Gram(s) IV Push once  glucagon  Injectable 1 milliGRAM(s) IntraMuscular once  heparin   Injectable 5000 Unit(s) SubCutaneous every 12 hours  hydrALAZINE 25 milliGRAM(s) Oral two times a day  insulin lispro (ADMELOG) corrective regimen sliding scale   SubCutaneous three times a day before meals  insulin lispro (ADMELOG) corrective regimen sliding scale   SubCutaneous at bedtime  melatonin 5 milliGRAM(s) Oral once  senna 2 Tablet(s) Oral at bedtime  tamsulosin 0.4 milliGRAM(s) Oral at bedtime      TELEMETRY: 	    ECG:  	  RADIOLOGY:  OTHER: 	  	  LABS:	 	    CARDIAC MARKERS:                          9.9    6.55  )-----------( 265      ( 28 May 2024 06:48 )             30.2     05-28    144  |  110<H>  |  42<H>  ----------------------------<  170<H>  3.9   |  21<L>  |  1.53<H>    Ca    9.4      28 May 2024 06:47      proBNP:   Lipid Profile:   HgA1c:   TSH: Thyroid Stimulating Hormone, Serum: 5.12 uIU/mL (05-25 @ 07:10)      < from: TTE W or WO Ultrasound Enhancing Agent (05.24.24 @ 14:56) >   1. Technically difficult image quality.   2. Left ventricular cavity is normal in size. Left ventricular wall thickness is normal. Left ventricular systolic function is normal with an ejection fraction of 66 % by Pink's method of disks. There are no regional wall motion abnormalities seen.   3. Normal left ventricular diastolic function, with elevated filling pressure.   4. Normal right ventricular cavity size, with normal wall thickness, and normal systolic function.  5. Normal left and right atrial size.   6. No significant valvular disease.   7. Estimated pulmonary artery systolic pressure is 22 mmHg.   8. No pericardial effusion seen.   9. No prior echocardiogram is available for comparison.        Assessment and plan  ---------------------------  78-year-old male past medical history anxiety, hypertension, hyperlipidemia, diabetes, BPH, CVA with no reported AC use, presents to ED complaining of atraumatic left lower extremity pain x1 week.  Denies any falls or trauma.  Reports at baseline ambulates with cane.  While in ED patient is unable to self transition from EMS stretcher to ER stretcher.  No fevers or chills.  Patient is poorly.  Lives with his brother.  pt with sig cardiac risk factor with chest pain, on and off  d dimer, negative  repeat echo noted no regional wall motion abnormality  will consider cardiac ct  adjust bp meds  asa daily  will schedule for nuclear stress test, awaiting results  will adjust bp meds, no diuretics  increase hydralazine to 25 mg bid  no ACE/ARB sec to increase renal function  awaiting stress test  echo noral EF, will adjust bp meds

## 2024-05-29 LAB
ANION GAP SERPL CALC-SCNC: 18 MMOL/L — HIGH (ref 5–17)
BUN SERPL-MCNC: 40 MG/DL — HIGH (ref 7–23)
CALCIUM SERPL-MCNC: 9.5 MG/DL — SIGNIFICANT CHANGE UP (ref 8.4–10.5)
CHLORIDE SERPL-SCNC: 107 MMOL/L — SIGNIFICANT CHANGE UP (ref 96–108)
CO2 SERPL-SCNC: 18 MMOL/L — LOW (ref 22–31)
CREAT SERPL-MCNC: 1.48 MG/DL — HIGH (ref 0.5–1.3)
CULTURE RESULTS: SIGNIFICANT CHANGE UP
CULTURE RESULTS: SIGNIFICANT CHANGE UP
EGFR: 48 ML/MIN/1.73M2 — LOW
GLUCOSE BLDC GLUCOMTR-MCNC: 162 MG/DL — HIGH (ref 70–99)
GLUCOSE BLDC GLUCOMTR-MCNC: 228 MG/DL — HIGH (ref 70–99)
GLUCOSE BLDC GLUCOMTR-MCNC: 251 MG/DL — HIGH (ref 70–99)
GLUCOSE SERPL-MCNC: 170 MG/DL — HIGH (ref 70–99)
HCT VFR BLD CALC: 31 % — LOW (ref 39–50)
HGB BLD-MCNC: 10.1 G/DL — LOW (ref 13–17)
MCHC RBC-ENTMCNC: 29.4 PG — SIGNIFICANT CHANGE UP (ref 27–34)
MCHC RBC-ENTMCNC: 32.6 GM/DL — SIGNIFICANT CHANGE UP (ref 32–36)
MCV RBC AUTO: 90.4 FL — SIGNIFICANT CHANGE UP (ref 80–100)
NRBC # BLD: 0 /100 WBCS — SIGNIFICANT CHANGE UP (ref 0–0)
PLATELET # BLD AUTO: 273 K/UL — SIGNIFICANT CHANGE UP (ref 150–400)
POTASSIUM SERPL-MCNC: 4.1 MMOL/L — SIGNIFICANT CHANGE UP (ref 3.5–5.3)
POTASSIUM SERPL-SCNC: 4.1 MMOL/L — SIGNIFICANT CHANGE UP (ref 3.5–5.3)
RBC # BLD: 3.43 M/UL — LOW (ref 4.2–5.8)
RBC # FLD: 14.4 % — SIGNIFICANT CHANGE UP (ref 10.3–14.5)
SODIUM SERPL-SCNC: 143 MMOL/L — SIGNIFICANT CHANGE UP (ref 135–145)
SPECIMEN SOURCE: SIGNIFICANT CHANGE UP
SPECIMEN SOURCE: SIGNIFICANT CHANGE UP
WBC # BLD: 5.79 K/UL — SIGNIFICANT CHANGE UP (ref 3.8–10.5)
WBC # FLD AUTO: 5.79 K/UL — SIGNIFICANT CHANGE UP (ref 3.8–10.5)

## 2024-05-29 PROCEDURE — 99152 MOD SED SAME PHYS/QHP 5/>YRS: CPT

## 2024-05-29 PROCEDURE — 93454 CORONARY ARTERY ANGIO S&I: CPT | Mod: 26

## 2024-05-29 PROCEDURE — 93010 ELECTROCARDIOGRAM REPORT: CPT

## 2024-05-29 RX ORDER — SODIUM CHLORIDE 9 MG/ML
500 INJECTION INTRAMUSCULAR; INTRAVENOUS; SUBCUTANEOUS
Refills: 0 | Status: DISCONTINUED | OUTPATIENT
Start: 2024-05-29 | End: 2024-05-30

## 2024-05-29 RX ADMIN — SENNA PLUS 2 TABLET(S): 8.6 TABLET ORAL at 22:50

## 2024-05-29 RX ADMIN — Medication 1: at 08:28

## 2024-05-29 RX ADMIN — Medication 81 MILLIGRAM(S): at 12:19

## 2024-05-29 RX ADMIN — Medication 2: at 12:20

## 2024-05-29 RX ADMIN — TRAMADOL HYDROCHLORIDE 25 MILLIGRAM(S): 50 TABLET ORAL at 08:39

## 2024-05-29 RX ADMIN — TRAMADOL HYDROCHLORIDE 25 MILLIGRAM(S): 50 TABLET ORAL at 09:09

## 2024-05-29 RX ADMIN — TAMSULOSIN HYDROCHLORIDE 0.4 MILLIGRAM(S): 0.4 CAPSULE ORAL at 22:49

## 2024-05-29 RX ADMIN — Medication 1 APPLICATION(S): at 05:22

## 2024-05-29 RX ADMIN — Medication 25 MILLIGRAM(S): at 05:21

## 2024-05-29 RX ADMIN — AMLODIPINE BESYLATE 10 MILLIGRAM(S): 2.5 TABLET ORAL at 05:22

## 2024-05-29 RX ADMIN — HEPARIN SODIUM 5000 UNIT(S): 5000 INJECTION INTRAVENOUS; SUBCUTANEOUS at 05:22

## 2024-05-29 RX ADMIN — ATORVASTATIN CALCIUM 80 MILLIGRAM(S): 80 TABLET, FILM COATED ORAL at 22:50

## 2024-05-29 RX ADMIN — CHLORHEXIDINE GLUCONATE 1 APPLICATION(S): 213 SOLUTION TOPICAL at 12:19

## 2024-05-29 NOTE — PROGRESS NOTE ADULT - SUBJECTIVE AND OBJECTIVE BOX
Date of Service: 05-29-24 @ 08:51           CARDIOLOGY     PROGRESS  NOTE   ________________________________________________    CHIEF COMPLAINT:Patient is a 78y old  Male who presents with a chief complaint of diffusely  in  ambulation (28 May 2024 09:24)  no complain  	  REVIEW OF SYSTEMS:  CONSTITUTIONAL: No fever, weight loss, or fatigue  EYES: No eye pain, visual disturbances, or discharge  ENT:  No difficulty hearing, tinnitus, vertigo; No sinus or throat pain  NECK: No pain or stiffness  RESPIRATORY: No cough, wheezing, chills or hemoptysis; No Shortness of Breath  CARDIOVASCULAR: No chest pain, palpitations, passing out, dizziness, or leg swelling  GASTROINTESTINAL: No abdominal or epigastric pain. No nausea, vomiting, or hematemesis; No diarrhea or constipation. No melena or hematochezia.  GENITOURINARY: No dysuria, frequency, hematuria, or incontinence  NEUROLOGICAL: No headaches, memory loss, loss of strength, numbness, or tremors  SKIN: No itching, burning, rashes, or lesions   LYMPH Nodes: No enlarged glands  ENDOCRINE: No heat or cold intolerance; No hair loss  MUSCULOSKELETAL: No joint pain or swelling; No muscle, back, or extremity pain  PSYCHIATRIC: No depression, anxiety, mood swings, or difficulty sleeping  HEME/LYMPH: No easy bruising, or bleeding gums  ALLERGY AND IMMUNOLOGIC: No hives or eczema	    [ x] All others negative	  [ ] Unable to obtain    PHYSICAL EXAM:  T(C): 36.4 (05-29-24 @ 05:07), Max: 36.4 (05-28-24 @ 14:21)  HR: 61 (05-29-24 @ 05:07) (61 - 97)  BP: 154/85 (05-29-24 @ 05:07) (124/77 - 159/79)  RR: 18 (05-29-24 @ 05:07) (18 - 18)  SpO2: 97% (05-29-24 @ 05:07) (96% - 97%)  Wt(kg): --  I&O's Summary    28 May 2024 07:01  -  29 May 2024 07:00  --------------------------------------------------------  IN: 250 mL / OUT: 500 mL / NET: -250 mL        Appearance: Normal	  HEENT:   Normal oral mucosa, PERRL, EOMI	  Lymphatic: No lymphadenopathy  Cardiovascular: Normal S1 S2, No JVD, + murmurs, No edema  Respiratory:  rhonchi  Psychiatry: A & O x 3, Mood & affect appropriate  Gastrointestinal:  Soft, Non-tender, + BS	  Skin: No rashes, No ecchymoses, No cyanosis	  Extremities: Normal range of motion, No clubbing, cyanosis or edema  Vascular: Peripheral pulses palpable 2+ bilaterally    MEDICATIONS  (STANDING):  amLODIPine   Tablet 10 milliGRAM(s) Oral daily  aspirin enteric coated 81 milliGRAM(s) Oral daily  atorvastatin 80 milliGRAM(s) Oral at bedtime  chlorhexidine 2% Cloths 1 Application(s) Topical daily  clotrimazole 1% Cream 1 Application(s) Topical two times a day  dextrose 10% Bolus 125 milliLiter(s) IV Bolus once  dextrose 5%. 1000 milliLiter(s) (100 mL/Hr) IV Continuous <Continuous>  dextrose 5%. 1000 milliLiter(s) (50 mL/Hr) IV Continuous <Continuous>  dextrose 50% Injectable 12.5 Gram(s) IV Push once  dextrose 50% Injectable 25 Gram(s) IV Push once  glucagon  Injectable 1 milliGRAM(s) IntraMuscular once  heparin   Injectable 5000 Unit(s) SubCutaneous every 12 hours  hydrALAZINE 25 milliGRAM(s) Oral two times a day  insulin lispro (ADMELOG) corrective regimen sliding scale   SubCutaneous at bedtime  insulin lispro (ADMELOG) corrective regimen sliding scale   SubCutaneous three times a day before meals  melatonin 5 milliGRAM(s) Oral once  senna 2 Tablet(s) Oral at bedtime  tamsulosin 0.4 milliGRAM(s) Oral at bedtime      TELEMETRY: 	    ECG:  	  RADIOLOGY:  OTHER: 	  	  LABS:	 	    CARDIAC MARKERS:                                10.1   5.79  )-----------( 273      ( 29 May 2024 07:19 )             31.0     05-29    143  |  107  |  40<H>  ----------------------------<  170<H>  4.1   |  18<L>  |  1.48<H>    Ca    9.5      29 May 2024 07:22      proBNP:   Lipid Profile:   HgA1c:   TSH: Thyroid Stimulating Hormone, Serum: 5.12 uIU/mL (05-25 @ 07:10)      < from: Nuclear Stress Test-Pharmacologic.. (05.28.24 @ 11:18) >   1. Myocardial Perfusion: Abnormal.   2. Qualitative Perfusion:      - medium-sized, moderate defect(s) in the inferior and inferoapical and inferoseptal walls that are reversible consistent with ischemia. - small-sized, moderate defect(s) in the inferolateral wall that is partially reversible consistent with an infarction with moderate shanthi-infarct ischemia.   3. The left ventricle is normal in function and normal in size. The post stress left ventricular EF is 66 %. The stress end diastolic volume is 100 ml and systolic volume is 34 ml.        Assessment and plan  ---------------------------  78-year-old male past medical history anxiety, hypertension, hyperlipidemia, diabetes, BPH, CVA with no reported AC use, presents to ED complaining of atraumatic left lower extremity pain x1 week.  Denies any falls or trauma.  Reports at baseline ambulates with cane.  While in ED patient is unable to self transition from EMS stretcher to ER stretcher.  No fevers or chills.  Patient is poorly.  Lives with his brother.  pt with sig cardiac risk factor with chest pain, on and off  d dimer, negative  repeat echo noted no regional wall motion abnormality  will consider cardiac ct  adjust bp meds  asa daily  will schedule for nuclear stress test, awaiting results  will adjust bp meds, no diuretics  increase hydralazine to 25 mg bid  no ACE/ARB sec to increase renal function  awaiting stress test  echo noral EF, will adjust bp meds  stress test noted, plan for cardiac cath  iv fluid prior to cath

## 2024-05-29 NOTE — PROGRESS NOTE ADULT - SUBJECTIVE AND OBJECTIVE BOX
DATE OF SERVICE: 05-29-24 @ 09:16    HPI:  78-year-old male      h/o   anxiety, hypertension, hyperlipidemia, diabetes, BPH, CVA ,  no reported AC use         presents to ED complaining of atraumatic left lower extremity pain x1 week.      Denies any falls or trauma.     at baseline ambulates with cane.      while in   er,  pt  noted,  to  be  is unable to self transition from EMS stretcher to   er  stretcher.        denies  fevers or chills. /cp.sob. abd  pain     love s with his  brother (23 May 2024 16:30)      Interval Events  pain gone, was oob yesterday, feels better and wants to go home but chem stress abnl, disc c caRDIO dR Lopez, NEEDS Cath  disc c Brother Bird, HCPxy and next of Kin - agrees c plan and says pt was ataxic before admission and should go to rehab before home  see orders meds, results notes and vs etc   disc c pt, dr LYMAN and Isma  hi cardiac risk : DM, HTN, Abdirahman, HLD, obese and has been having CP    MEDICATIONS  (STANDING):  amLODIPine   Tablet 10 milliGRAM(s) Oral daily  aspirin enteric coated 81 milliGRAM(s) Oral daily  atorvastatin 80 milliGRAM(s) Oral at bedtime  chlorhexidine 2% Cloths 1 Application(s) Topical daily  clotrimazole 1% Cream 1 Application(s) Topical two times a day  dextrose 10% Bolus 125 milliLiter(s) IV Bolus once  dextrose 5%. 1000 milliLiter(s) (100 mL/Hr) IV Continuous <Continuous>  dextrose 5%. 1000 milliLiter(s) (50 mL/Hr) IV Continuous <Continuous>  dextrose 50% Injectable 12.5 Gram(s) IV Push once  dextrose 50% Injectable 25 Gram(s) IV Push once  glucagon  Injectable 1 milliGRAM(s) IntraMuscular once  heparin   Injectable 5000 Unit(s) SubCutaneous every 12 hours  hydrALAZINE 25 milliGRAM(s) Oral two times a day  insulin lispro (ADMELOG) corrective regimen sliding scale   SubCutaneous at bedtime  insulin lispro (ADMELOG) corrective regimen sliding scale   SubCutaneous three times a day before meals  melatonin 5 milliGRAM(s) Oral once  senna 2 Tablet(s) Oral at bedtime  sodium chloride 0.9%. 500 milliLiter(s) (60 mL/Hr) IV Continuous <Continuous>  tamsulosin 0.4 milliGRAM(s) Oral at bedtime    MEDICATIONS  (PRN):  dextrose Oral Gel 15 Gram(s) Oral once PRN Blood Glucose LESS THAN 70 milliGRAM(s)/deciliter  traMADol 25 milliGRAM(s) Oral daily PRN Moderate Pain (4 - 6)      Patient is a 78y old  Male who presents with a chief complaint of diffusely  in  ambulation (29 May 2024 08:51)      REVIEW OF SYSTEMS    General:nad eating ok no co no pain  Skin/Breast:n/n  Ophthalmologic:no co althea ch  ENMT:	no co no ch  Respiratory and Thorax:no cough no sp no sob  Cardiovascular:no cp since adm but has been inactoive  Gastrointestinal:no nvcd  Genitourinary:no fd  Musculoskeletal:	no a no pain hips no longer hurt, some back paIN BUT NOT CO NOT ASLKIUNG FOR MED  Neurological:	 no f co no ch  Psychiatric:	no co  Hematology/Lymphatics:	  Endocrine:	no polyudd  Allergic/Immunologic:  AOSN	y      Vital Signs Last 24 Hrs  T(C): 36.4 (29 May 2024 05:07), Max: 36.4 (28 May 2024 14:21)  T(F): 97.5 (29 May 2024 05:07), Max: 97.6 (28 May 2024 14:21)  HR: 61 (29 May 2024 05:07) (61 - 97)  BP: 154/85 (29 May 2024 05:07) (124/77 - 159/79)  BP(mean): --  RR: 18 (29 May 2024 05:07) (18 - 18)  SpO2: 97% (29 May 2024 05:07) (96% - 97%)    Parameters below as of 29 May 2024 05:07  Patient On (Oxygen Delivery Method): room air        PHYSICAL EXAM:    Constitutional:nad better vss borderline abdirahman off BBLkr  H+N nccat  Eyes:lyudmila cwnl  ENMT:hears speaks stutters swallow okeats wel  Neck:no cb no tm  Breasts:  Back:  Respiratory:ctab no rrw  Cardiovascular:rrr  Gastrointestinal:soft nt  Genitourinary:  Rectal:  Extremities:no cce  Vascular:hm-vv-  Neurological:nf atmae in bed, stutters , MCI -lifelong Hx Dev delay, lives c Brother  Skin:cdi  Lymph Nodes:  Musculoskeletal:  Psychiatric:no issues, calm coop, understands    LABS  CBC Full  -  ( 29 May 2024 07:19 )  WBC Count : 5.79 K/uL  RBC Count : 3.43 M/uL  Hemoglobin : 10.1 g/dL  Hematocrit : 31.0 %  Platelet Count - Automated : 273 K/uL  Mean Cell Volume : 90.4 fl  Mean Cell Hemoglobin : 29.4 pg  Mean Cell Hemoglobin Concentration : 32.6 gm/dL  Auto Neutrophil # : x  Auto Lymphocyte # : x  Auto Monocyte # : x  Auto Eosinophil # : x  Auto Basophil # : x  Auto Neutrophil % : x  Auto Lymphocyte % : x  Auto Monocyte % : x  Auto Eosinophil % : x  Auto Basophil % : x      05-29    143  |  107  |  40<H>  ----------------------------<  170<H>  4.1   |  18<L>  |  1.48<H>    Ca    9.5      29 May 2024 07:22            Imaging:    Xray:    Echo:    CT:    MRI:    Tele:    Orders:    ALONZO Sun [phone]

## 2024-05-30 ENCOUNTER — TRANSCRIPTION ENCOUNTER (OUTPATIENT)
Age: 79
End: 2024-05-30

## 2024-05-30 VITALS
RESPIRATION RATE: 18 BRPM | OXYGEN SATURATION: 96 % | SYSTOLIC BLOOD PRESSURE: 158 MMHG | DIASTOLIC BLOOD PRESSURE: 73 MMHG | TEMPERATURE: 98 F | HEART RATE: 78 BPM

## 2024-05-30 LAB
ANION GAP SERPL CALC-SCNC: 15 MMOL/L — SIGNIFICANT CHANGE UP (ref 5–17)
BUN SERPL-MCNC: 37 MG/DL — HIGH (ref 7–23)
CALCIUM SERPL-MCNC: 9.8 MG/DL — SIGNIFICANT CHANGE UP (ref 8.4–10.5)
CHLORIDE SERPL-SCNC: 108 MMOL/L — SIGNIFICANT CHANGE UP (ref 96–108)
CO2 SERPL-SCNC: 20 MMOL/L — LOW (ref 22–31)
CREAT SERPL-MCNC: 1.34 MG/DL — HIGH (ref 0.5–1.3)
EGFR: 54 ML/MIN/1.73M2 — LOW
GLUCOSE BLDC GLUCOMTR-MCNC: 172 MG/DL — HIGH (ref 70–99)
GLUCOSE BLDC GLUCOMTR-MCNC: 318 MG/DL — HIGH (ref 70–99)
GLUCOSE SERPL-MCNC: 166 MG/DL — HIGH (ref 70–99)
HCT VFR BLD CALC: 31.3 % — LOW (ref 39–50)
HGB BLD-MCNC: 10.2 G/DL — LOW (ref 13–17)
MCHC RBC-ENTMCNC: 28.7 PG — SIGNIFICANT CHANGE UP (ref 27–34)
MCHC RBC-ENTMCNC: 32.6 GM/DL — SIGNIFICANT CHANGE UP (ref 32–36)
MCV RBC AUTO: 88.2 FL — SIGNIFICANT CHANGE UP (ref 80–100)
NRBC # BLD: 0 /100 WBCS — SIGNIFICANT CHANGE UP (ref 0–0)
PLATELET # BLD AUTO: 303 K/UL — SIGNIFICANT CHANGE UP (ref 150–400)
POTASSIUM SERPL-MCNC: 3.7 MMOL/L — SIGNIFICANT CHANGE UP (ref 3.5–5.3)
POTASSIUM SERPL-SCNC: 3.7 MMOL/L — SIGNIFICANT CHANGE UP (ref 3.5–5.3)
RBC # BLD: 3.55 M/UL — LOW (ref 4.2–5.8)
RBC # FLD: 14.6 % — HIGH (ref 10.3–14.5)
SODIUM SERPL-SCNC: 143 MMOL/L — SIGNIFICANT CHANGE UP (ref 135–145)
WBC # BLD: 4.83 K/UL — SIGNIFICANT CHANGE UP (ref 3.8–10.5)
WBC # FLD AUTO: 4.83 K/UL — SIGNIFICANT CHANGE UP (ref 3.8–10.5)

## 2024-05-30 PROCEDURE — 36415 COLL VENOUS BLD VENIPUNCTURE: CPT

## 2024-05-30 PROCEDURE — 87040 BLOOD CULTURE FOR BACTERIA: CPT

## 2024-05-30 PROCEDURE — 70450 CT HEAD/BRAIN W/O DYE: CPT | Mod: MC

## 2024-05-30 PROCEDURE — 99285 EMERGENCY DEPT VISIT HI MDM: CPT

## 2024-05-30 PROCEDURE — 85027 COMPLETE CBC AUTOMATED: CPT

## 2024-05-30 PROCEDURE — C1887: CPT

## 2024-05-30 PROCEDURE — 82435 ASSAY OF BLOOD CHLORIDE: CPT

## 2024-05-30 PROCEDURE — 82550 ASSAY OF CK (CPK): CPT

## 2024-05-30 PROCEDURE — 93971 EXTREMITY STUDY: CPT

## 2024-05-30 PROCEDURE — 93005 ELECTROCARDIOGRAM TRACING: CPT

## 2024-05-30 PROCEDURE — 82330 ASSAY OF CALCIUM: CPT

## 2024-05-30 PROCEDURE — 81001 URINALYSIS AUTO W/SCOPE: CPT

## 2024-05-30 PROCEDURE — 82803 BLOOD GASES ANY COMBINATION: CPT

## 2024-05-30 PROCEDURE — 97530 THERAPEUTIC ACTIVITIES: CPT

## 2024-05-30 PROCEDURE — 93017 CV STRESS TEST TRACING ONLY: CPT

## 2024-05-30 PROCEDURE — 82962 GLUCOSE BLOOD TEST: CPT

## 2024-05-30 PROCEDURE — 87086 URINE CULTURE/COLONY COUNT: CPT

## 2024-05-30 PROCEDURE — C1894: CPT

## 2024-05-30 PROCEDURE — 80048 BASIC METABOLIC PNL TOTAL CA: CPT

## 2024-05-30 PROCEDURE — 85025 COMPLETE CBC W/AUTO DIFF WBC: CPT

## 2024-05-30 PROCEDURE — 78452 HT MUSCLE IMAGE SPECT MULT: CPT | Mod: MC

## 2024-05-30 PROCEDURE — 71045 X-RAY EXAM CHEST 1 VIEW: CPT

## 2024-05-30 PROCEDURE — 85014 HEMATOCRIT: CPT

## 2024-05-30 PROCEDURE — 85379 FIBRIN DEGRADATION QUANT: CPT

## 2024-05-30 PROCEDURE — 93356 MYOCRD STRAIN IMG SPCKL TRCK: CPT

## 2024-05-30 PROCEDURE — 83036 HEMOGLOBIN GLYCOSYLATED A1C: CPT

## 2024-05-30 PROCEDURE — 80053 COMPREHEN METABOLIC PANEL: CPT

## 2024-05-30 PROCEDURE — 83735 ASSAY OF MAGNESIUM: CPT

## 2024-05-30 PROCEDURE — 76377 3D RENDER W/INTRP POSTPROCES: CPT

## 2024-05-30 PROCEDURE — 84443 ASSAY THYROID STIM HORMONE: CPT

## 2024-05-30 PROCEDURE — 84132 ASSAY OF SERUM POTASSIUM: CPT

## 2024-05-30 PROCEDURE — 84484 ASSAY OF TROPONIN QUANT: CPT

## 2024-05-30 PROCEDURE — C1769: CPT

## 2024-05-30 PROCEDURE — 85018 HEMOGLOBIN: CPT

## 2024-05-30 PROCEDURE — 84100 ASSAY OF PHOSPHORUS: CPT

## 2024-05-30 PROCEDURE — 82947 ASSAY GLUCOSE BLOOD QUANT: CPT

## 2024-05-30 PROCEDURE — 87641 MR-STAPH DNA AMP PROBE: CPT

## 2024-05-30 PROCEDURE — 87640 STAPH A DNA AMP PROBE: CPT

## 2024-05-30 PROCEDURE — 93454 CORONARY ARTERY ANGIO S&I: CPT

## 2024-05-30 PROCEDURE — 97162 PT EVAL MOD COMPLEX 30 MIN: CPT

## 2024-05-30 PROCEDURE — 72192 CT PELVIS W/O DYE: CPT | Mod: MC

## 2024-05-30 PROCEDURE — A9500: CPT

## 2024-05-30 PROCEDURE — 93306 TTE W/DOPPLER COMPLETE: CPT

## 2024-05-30 PROCEDURE — 84295 ASSAY OF SERUM SODIUM: CPT

## 2024-05-30 PROCEDURE — 73502 X-RAY EXAM HIP UNI 2-3 VIEWS: CPT

## 2024-05-30 PROCEDURE — 83605 ASSAY OF LACTIC ACID: CPT

## 2024-05-30 PROCEDURE — 97116 GAIT TRAINING THERAPY: CPT

## 2024-05-30 RX ORDER — ISOSORBIDE MONONITRATE 60 MG/1
30 TABLET, EXTENDED RELEASE ORAL DAILY
Refills: 0 | Status: DISCONTINUED | OUTPATIENT
Start: 2024-05-30 | End: 2024-05-30

## 2024-05-30 RX ORDER — ATORVASTATIN CALCIUM 80 MG/1
1 TABLET, FILM COATED ORAL
Qty: 0 | Refills: 0 | DISCHARGE
Start: 2024-05-30

## 2024-05-30 RX ORDER — ISOSORBIDE MONONITRATE 60 MG/1
1 TABLET, EXTENDED RELEASE ORAL
Qty: 0 | Refills: 0 | DISCHARGE
Start: 2024-05-30

## 2024-05-30 RX ORDER — TRAMADOL HYDROCHLORIDE 50 MG/1
0.5 TABLET ORAL
Qty: 2.5 | Refills: 0
Start: 2024-05-30 | End: 2024-06-03

## 2024-05-30 RX ORDER — AMLODIPINE BESYLATE 2.5 MG/1
1 TABLET ORAL
Qty: 0 | Refills: 0 | DISCHARGE
Start: 2024-05-30

## 2024-05-30 RX ORDER — ROSUVASTATIN CALCIUM 5 MG/1
1 TABLET ORAL
Qty: 0 | Refills: 0 | DISCHARGE

## 2024-05-30 RX ORDER — LANOLIN ALCOHOL/MO/W.PET/CERES
1 CREAM (GRAM) TOPICAL
Qty: 0 | Refills: 0 | DISCHARGE
Start: 2024-05-30

## 2024-05-30 RX ORDER — ASPIRIN/CALCIUM CARB/MAGNESIUM 324 MG
1 TABLET ORAL
Qty: 0 | Refills: 0 | DISCHARGE
Start: 2024-05-30

## 2024-05-30 RX ORDER — SENNA PLUS 8.6 MG/1
2 TABLET ORAL
Qty: 0 | Refills: 0 | DISCHARGE
Start: 2024-05-30

## 2024-05-30 RX ORDER — HYDRALAZINE HCL 50 MG
1 TABLET ORAL
Qty: 0 | Refills: 0 | DISCHARGE
Start: 2024-05-30

## 2024-05-30 RX ADMIN — Medication 25 MILLIGRAM(S): at 05:22

## 2024-05-30 RX ADMIN — Medication 4: at 12:41

## 2024-05-30 RX ADMIN — HEPARIN SODIUM 5000 UNIT(S): 5000 INJECTION INTRAVENOUS; SUBCUTANEOUS at 05:21

## 2024-05-30 RX ADMIN — Medication 1 APPLICATION(S): at 05:22

## 2024-05-30 RX ADMIN — Medication 1: at 08:56

## 2024-05-30 RX ADMIN — AMLODIPINE BESYLATE 10 MILLIGRAM(S): 2.5 TABLET ORAL at 05:35

## 2024-05-30 RX ADMIN — ISOSORBIDE MONONITRATE 30 MILLIGRAM(S): 60 TABLET, EXTENDED RELEASE ORAL at 11:42

## 2024-05-30 RX ADMIN — CHLORHEXIDINE GLUCONATE 1 APPLICATION(S): 213 SOLUTION TOPICAL at 11:43

## 2024-05-30 RX ADMIN — Medication 81 MILLIGRAM(S): at 11:42

## 2024-05-30 NOTE — DISCHARGE NOTE NURSING/CASE MANAGEMENT/SOCIAL WORK - CAREGIVER OBTAIN DETAILS
Pt cannot remember brother's phone number
Agree with resident note and plan.  Heather Encarnacion MD

## 2024-05-30 NOTE — PROGRESS NOTE ADULT - ASSESSMENT
Ataxia - needs PT fu, needs to be oob to chair - been ordering it threec days  LLE pain res  RHip pain res  Chest pain - none while inactive - cardiac kyle ordered - see orders, notes, results, vs, etc - Hi risk  DMII  HTN  HLD  Obesity  Anemia  CKD - fu SCr  Anxiety - calm  DDerm - cont Rx  MCI - life long   Dev delay  Stutter  Hypothy - subclin - no Rx til after Stress test cardiac kyle  Hx Gout  Spinal Stenosis  Lo D res  BPH  Hx CVA - no resid  UI - res
LLE pain - res  RLE pain - Res  Ataxia - needs PT f/u needs to ambulate  MCI - little insight - lifelong  dev delay -   stutters   Chest pain - not sincec inactive, see cardio W/U ordered - hi risk  DMII - hosp protocol  HTN - incr amlodipine   Abdirahman - avoiding BBlkr  HLD  obesity  Anemia - stable  CKD - stable  Derm - groin rash - Rx - no co  Hypothy - sub clin - no Rx till after Cardiac kyle  BPH  UI on adm - res  Hx Gout - fu Uric acid  Spinal stenosis Hx  OA?  Anxiety - intermittent  Hx Lo D  Hx CVA - no residual    
CAD - p Cath, starting nitro, on asa  angina - not when inactive  Lethargy - res  ataxic gait - needs rehab - rec hammer or [facility]  LLE, RLE pain res,  UI - manageable - fxnl - needs rehab  BPH  DM II -hosp protocol - lost glu monitor - will be monitored at rehab  HTN  HLD  obesity  Abdirahman  MCI - lifelong  Dev Delay  Stutter  Anxiety - mild - adjusted, dependent on brother  anemia - stabke - mild  CKD - Mild - been hydrated  Gout Hx  Lo D Hx  HypoNa res  Spinal stenosis - occ back pain  Hx CVA - no residual - on ASA  Derm - fungal rash groin - lotrimin  hypothy - subclin c very sl elev TSH - can hold Rx and observe     
Jt pain on adm res LLE and RLE and Hips - no pain  OA - DJD  spinal stenosis - mild back pain  Angina - intermittent chest pain at home when active >cardiac kyle - see resulkts and cardio notes > for Cath  Hi risk cardiac  DMII  Obese   HTN  HLd  Anemia - mild  CKD - mild  Hypothy, subclin - mild - no Rx yet  UI - pta  BPH  Gout Hx   Anxiety - mild controlled  - adjusted  Stutter  dev Delay  ataxic gait - will likely need rehab befor home  MCI -has some insight  Lo D - res  Hx CVA - no resid  Derm - intertriginous rash groin - lotrimin  
Decr amb on adm - needs PT fu  LLE pain res  RLE pain res  spinal stenosis  UI - res - but needs to be oob  Chest pain - stress today?  DMII - hospital protocol  HTN  HLD  Obesity  Anemia - stable  CKD - stable  Hypothy - sub clin - consider Rx after Stress test  Gout - stable  BPH - see emds - sees uro outpt  Hx CVA - no resid  Dev Delay -   MCI - life long "  stutter "  Rash groin - bL - better  Lo D - res  Insomnia- started Melatonin    
Hip pain - bi lat  spnal stenosis  Chest [ain - cardio kyle - BBLkr per cardio -?  DMII - hospital [protocoil  HTN - aml;odipine  HLD  Obesity  Abdirahman cardia  anemia stable  ckd  anxiety  MCI  dtutter  Derm - L groin  BPH  UI - res  gout - uric acid p  ?hypothyroid - fu TSH  Lo D Hx  Hx CVA - no residual  
pain L groin, Hip Leg  inablity to ambulate - needs PT fu, trial amb again today  L Hip, LLE pain  spinal stenosis  U incont  Rash L groin - lotrimin  Chest pain - L - angina? - cardio consult  Dr De La Fuente, [facility] pt has comorbidities w incr risk  DM II  HTN   HLD  Obesity  anemia  CKD  Sub clin Hypothyroid - ciara TSH  BPH  Gout Hx - chk Uric acid  anxiety  Hx CVA - no reisidual  life long MCI - development delay  stutters  dependent on brother for IADL support

## 2024-05-30 NOTE — DISCHARGE NOTE PROVIDER - CARE PROVIDER_API CALL
Dallas Sun  [address] [address]  Phone: [phone]  Fax: [phone]  Established Patient  Follow Up Time: 1-3 days

## 2024-05-30 NOTE — PROGRESS NOTE ADULT - SUBJECTIVE AND OBJECTIVE BOX
Date of Service: 05-30-24 @ 08:38           CARDIOLOGY     PROGRESS  NOTE   ________________________________________________    CHIEF COMPLAINT:Patient is a 78y old  Male who presents with a chief complaint of diffusely  in  ambulation (29 May 2024 09:16)  no complain  	  REVIEW OF SYSTEMS:  CONSTITUTIONAL: No fever, weight loss, or fatigue  EYES: No eye pain, visual disturbances, or discharge  ENT:  No difficulty hearing, tinnitus, vertigo; No sinus or throat pain  NECK: No pain or stiffness  RESPIRATORY: No cough, wheezing, chills or hemoptysis; No Shortness of Breath  CARDIOVASCULAR: No chest pain, palpitations, passing out, dizziness, or leg swelling  GASTROINTESTINAL: No abdominal or epigastric pain. No nausea, vomiting, or hematemesis; No diarrhea or constipation. No melena or hematochezia.  GENITOURINARY: No dysuria, frequency, hematuria, or incontinence  NEUROLOGICAL: No headaches, memory loss, loss of strength, numbness, or tremors  SKIN: No itching, burning, rashes, or lesions   LYMPH Nodes: No enlarged glands  ENDOCRINE: No heat or cold intolerance; No hair loss  MUSCULOSKELETAL: No joint pain or swelling; No muscle, back, or extremity pain  PSYCHIATRIC: No depression, anxiety, mood swings, or difficulty sleeping  HEME/LYMPH: No easy bruising, or bleeding gums  ALLERGY AND IMMUNOLOGIC: No hives or eczema	    [x ] All others negative	  [ ] Unable to obtain    PHYSICAL EXAM:  T(C): 36.4 (05-30-24 @ 05:10), Max: 36.5 (05-29-24 @ 11:10)  HR: 57 (05-30-24 @ 05:10) (49 - 74)  BP: 153/80 (05-30-24 @ 05:10) (131/73 - 159/76)  RR: 18 (05-30-24 @ 05:10) (18 - 18)  SpO2: 98% (05-30-24 @ 05:10) (93% - 98%)  Wt(kg): --  I&O's Summary    29 May 2024 07:01  -  30 May 2024 07:00  --------------------------------------------------------  IN: 400 mL / OUT: 700 mL / NET: -300 mL        Appearance: Normal	  HEENT:   Normal oral mucosa, PERRL, EOMI	  Lymphatic: No lymphadenopathy  Cardiovascular: Normal S1 S2, No JVD, + murmurs, No edema  Respiratory: rhonchi  Psychiatry: A & O x 3, Mood & affect appropriate  Gastrointestinal:  Soft, Non-tender, + BS	  Skin: No rashes, No ecchymoses, No cyanosis	  Neurologic: Non-focal  Extremities: Normal range of motion, No clubbing, cyanosis or edema  Vascular: Peripheral pulses palpable 2+ bilaterally    MEDICATIONS  (STANDING):  amLODIPine   Tablet 10 milliGRAM(s) Oral daily  aspirin enteric coated 81 milliGRAM(s) Oral daily  atorvastatin 80 milliGRAM(s) Oral at bedtime  chlorhexidine 2% Cloths 1 Application(s) Topical daily  clotrimazole 1% Cream 1 Application(s) Topical two times a day  dextrose 10% Bolus 125 milliLiter(s) IV Bolus once  dextrose 5%. 1000 milliLiter(s) (100 mL/Hr) IV Continuous <Continuous>  dextrose 5%. 1000 milliLiter(s) (50 mL/Hr) IV Continuous <Continuous>  dextrose 50% Injectable 25 Gram(s) IV Push once  dextrose 50% Injectable 12.5 Gram(s) IV Push once  glucagon  Injectable 1 milliGRAM(s) IntraMuscular once  heparin   Injectable 5000 Unit(s) SubCutaneous every 12 hours  hydrALAZINE 25 milliGRAM(s) Oral two times a day  insulin lispro (ADMELOG) corrective regimen sliding scale   SubCutaneous three times a day before meals  insulin lispro (ADMELOG) corrective regimen sliding scale   SubCutaneous at bedtime  isosorbide   mononitrate ER Tablet (IMDUR) 30 milliGRAM(s) Oral daily  melatonin 5 milliGRAM(s) Oral once  senna 2 Tablet(s) Oral at bedtime  tamsulosin 0.4 milliGRAM(s) Oral at bedtime      TELEMETRY: 	    ECG:  	  RADIOLOGY:  OTHER: 	  	  LABS:	 	    CARDIAC MARKERS:                                10.2   4.83  )-----------( 303      ( 30 May 2024 07:07 )             31.3     05-30    143  |  108  |  37<H>  ----------------------------<  166<H>  3.7   |  20<L>  |  1.34<H>    Ca    9.8      30 May 2024 07:06      proBNP:   Lipid Profile:   HgA1c:   TSH: Thyroid Stimulating Hormone, Serum: 5.12 uIU/mL (05-25 @ 07:10)          Assessment and plan  ---------------------------  78-year-old male past medical history anxiety, hypertension, hyperlipidemia, diabetes, BPH, CVA with no reported AC use, presents to ED complaining of atraumatic left lower extremity pain x1 week.  Denies any falls or trauma.  Reports at baseline ambulates with cane.  While in ED patient is unable to self transition from EMS stretcher to ER stretcher.  No fevers or chills.  Patient is poorly.  Lives with his brother.  pt with sig cardiac risk factor with chest pain, on and off  d dimer, negative  repeat echo noted no regional wall motion abnormality  will consider cardiac ct  adjust bp meds  asa daily  will schedule for nuclear stress test, awaiting results  will adjust bp meds, no diuretics  increase hydralazine to 25 mg bid  no ACE/ARB sec to increase renal function  awaiting stress test  echo noral EF, will adjust bp meds  stress test noted, plan for cardiac cath  cath noted with severe distal disease/LAD , medical therapy  add imdur  asa daily

## 2024-05-30 NOTE — PROGRESS NOTE ADULT - REASON FOR ADMISSION
diffusely  in  ambulation

## 2024-05-30 NOTE — DISCHARGE NOTE NURSING/CASE MANAGEMENT/SOCIAL WORK - NSDCPEFALRISK_GEN_ALL_CORE
For information on Fall & Injury Prevention, visit: [email or web address] OR  [email or web address] OR  https://www.cdc.gov/steadi/patient.html

## 2024-05-30 NOTE — DISCHARGE NOTE PROVIDER - NSDCFUADDAPPT_GEN_ALL_CORE_FT
APPTS ARE READY TO BE MADE: [x] YES    Best Family or Patient Contact (if needed):    Additional Information about above appointments (if needed):    1:   2:   3:     Other comments or requests:    APPTS ARE READY TO BE MADE: [x] YES    Best Family or Patient Contact (if needed):    Additional Information about above appointments (if needed):    1:   2:   3:     Other comments or requests:     Patient is being discharged to [facility]. Caregiver will arrange follow up.

## 2024-05-30 NOTE — DISCHARGE NOTE NURSING/CASE MANAGEMENT/SOCIAL WORK - NSDCVIVACCINE_GEN_ALL_CORE_FT
influenza, high-dose, quadrivalent; 28-Sep-2022 15:51; Tiana Sabillon (KELSEY); [employer]; [id number] (Exp. Date: 30-Jun-2023); IntraMuscular; Deltoid Left.; 0.7 milliLiter(s); VIS (VIS Published: 06-Aug-2021, VIS Presented: 28-Sep-2022);

## 2024-05-30 NOTE — PROGRESS NOTE ADULT - SUBJECTIVE AND OBJECTIVE BOX
DATE OF SERVICE: 05-30-24 @ 08:26    HPI:  78-year-old male      h/o   anxiety, hypertension, hyperlipidemia, diabetes, BPH, CVA ,  no reported AC use         presents to ED complaining of atraumatic left lower extremity pain x1 week.      Denies any falls or trauma.     at baseline ambulates with cane.      while in   er,  pt  noted,  to  be  is unable to self transition from EMS stretcher to   er  stretcher.        denies  fevers or chills. /cp.sob. abd  pain     love s with his  brother (23 May 2024 16:30)      Interval Events  cath yesterday, no official report yet. Disc c Dr De La Fuente this am, distal ds and calcification>medical treatment: rec Nitro, I ordered Imdur po  otherwise ok no co nad wants to go home but will be going to rehab first - understands - see notes, CC etc, orders, results, vs etc.  brother agrees: DARRIUS Toscano et al    MEDICATIONS  (STANDING):  amLODIPine   Tablet 10 milliGRAM(s) Oral daily  aspirin enteric coated 81 milliGRAM(s) Oral daily  atorvastatin 80 milliGRAM(s) Oral at bedtime  chlorhexidine 2% Cloths 1 Application(s) Topical daily  clotrimazole 1% Cream 1 Application(s) Topical two times a day  dextrose 10% Bolus 125 milliLiter(s) IV Bolus once  dextrose 5%. 1000 milliLiter(s) (100 mL/Hr) IV Continuous <Continuous>  dextrose 5%. 1000 milliLiter(s) (50 mL/Hr) IV Continuous <Continuous>  dextrose 50% Injectable 25 Gram(s) IV Push once  dextrose 50% Injectable 12.5 Gram(s) IV Push once  glucagon  Injectable 1 milliGRAM(s) IntraMuscular once  heparin   Injectable 5000 Unit(s) SubCutaneous every 12 hours  hydrALAZINE 25 milliGRAM(s) Oral two times a day  insulin lispro (ADMELOG) corrective regimen sliding scale   SubCutaneous at bedtime  insulin lispro (ADMELOG) corrective regimen sliding scale   SubCutaneous three times a day before meals  isosorbide   mononitrate ER Tablet (IMDUR) 30 milliGRAM(s) Oral daily  melatonin 5 milliGRAM(s) Oral once  senna 2 Tablet(s) Oral at bedtime  tamsulosin 0.4 milliGRAM(s) Oral at bedtime    MEDICATIONS  (PRN):  dextrose Oral Gel 15 Gram(s) Oral once PRN Blood Glucose LESS THAN 70 milliGRAM(s)/deciliter  traMADol 25 milliGRAM(s) Oral daily PRN Moderate Pain (4 - 6)      Patient is a 78y old  Male who presents with a chief complaint of diffusely  in  ambulation (29 May 2024 09:16)      REVIEW OF SYSTEMS    General:no co nad  Skin/Breast:rash groin/n  Ophthalmologic:n/n  ENMT:	no co no ch  Respiratory and Thorax:no cough no sp no sob  Cardiovascular:no cp no palp  Gastrointestinal:no nvcd  Genitourinary:no fd  Musculoskeletal:	no a no pain - better  Neurological:	no f co no ch  Psychiatric:no co no issue	  Hematology/Lymphatics:	  Endocrine:	no polyudd  Allergic/Immunologic:  AOSN	y      Vital Signs Last 24 Hrs  T(C): 36.4 (30 May 2024 05:10), Max: 36.5 (29 May 2024 11:10)  T(F): 97.5 (30 May 2024 05:10), Max: 97.7 (29 May 2024 11:10)  HR: 57 (30 May 2024 05:10) (49 - 74)  BP: 153/80 (30 May 2024 05:10) (131/73 - 159/76)  BP(mean): --  RR: 18 (30 May 2024 05:10) (18 - 18)  SpO2: 98% (30 May 2024 05:10) (93% - 98%)    Parameters below as of 30 May 2024 05:10  Patient On (Oxygen Delivery Method): room air        PHYSICAL EXAM:    Constitutional:vss nad O2 98, p 64 BP ok  H+N ncat  Eyes:lyudmila cwnl  ENMT:hears speeaks stutters swallows ok eats ok  Neck:no cb no tm  Breasts:  Back:  Respiratory:ctab no rrw  Cardiovascular:rrr  Gastrointestinal:soft nt  Genitourinary:  Rectal:  Extremities:no cce  Vascular:hm- vv-  Neurological:nfatmae in bed, no pain, stutters a&ox3, ataxic gait  Skin:cdi  Lymph Nodes:  Musculoskeletal:no pain from  Psychiatric:no issue    LABS  CBC Full  -  ( 30 May 2024 07:07 )  WBC Count : 4.83 K/uL  RBC Count : 3.55 M/uL  Hemoglobin : 10.2 g/dL  Hematocrit : 31.3 %  Platelet Count - Automated : 303 K/uL  Mean Cell Volume : 88.2 fl  Mean Cell Hemoglobin : 28.7 pg  Mean Cell Hemoglobin Concentration : 32.6 gm/dL  Auto Neutrophil # : x  Auto Lymphocyte # : x  Auto Monocyte # : x  Auto Eosinophil # : x  Auto Basophil # : x  Auto Neutrophil % : x  Auto Lymphocyte % : x  Auto Monocyte % : x  Auto Eosinophil % : x  Auto Basophil % : x      05-30    143  |  108  |  37<H>  ----------------------------<  166<H>  3.7   |  20<L>  |  1.34<H>    Ca    9.8      30 May 2024 07:06            Imaging:    Xray:    Echo:    CT:    MRI:    Tele:    Orders:    ALONZO Sun [phone] DATE OF SERVICE: 05-30-24 @ 08:26    HPI:  78-year-old male      h/o   anxiety, hypertension, hyperlipidemia, diabetes, BPH, CVA ,  no reported AC use         presents to ED complaining of atraumatic left lower extremity pain x1 week.      Denies any falls or trauma.     at baseline ambulates with cane.      while in   er,  pt  noted,  to  be  is unable to self transition from EMS stretcher to   er  stretcher.        denies  fevers or chills. /cp.sob. abd  pain     love s with his  brother (23 May 2024 16:30)      Interval Events  cath yesterday, no official report yet. Disc c Dr De La Fuente this am, distal ds and calcification>medical treatment: rec Nitro, I ordered Imdur po  otherwise ok no co nad wants to go home but will be going to rehab first - understands - see notes, CC etc, orders, results, vs etc., Dr ESMER Diaz no objection to dc  brother agrees: DARRIUS Toscano et al    MEDICATIONS  (STANDING):  amLODIPine   Tablet 10 milliGRAM(s) Oral daily  aspirin enteric coated 81 milliGRAM(s) Oral daily  atorvastatin 80 milliGRAM(s) Oral at bedtime  chlorhexidine 2% Cloths 1 Application(s) Topical daily  clotrimazole 1% Cream 1 Application(s) Topical two times a day  dextrose 10% Bolus 125 milliLiter(s) IV Bolus once  dextrose 5%. 1000 milliLiter(s) (100 mL/Hr) IV Continuous <Continuous>  dextrose 5%. 1000 milliLiter(s) (50 mL/Hr) IV Continuous <Continuous>  dextrose 50% Injectable 25 Gram(s) IV Push once  dextrose 50% Injectable 12.5 Gram(s) IV Push once  glucagon  Injectable 1 milliGRAM(s) IntraMuscular once  heparin   Injectable 5000 Unit(s) SubCutaneous every 12 hours  hydrALAZINE 25 milliGRAM(s) Oral two times a day  insulin lispro (ADMELOG) corrective regimen sliding scale   SubCutaneous at bedtime  insulin lispro (ADMELOG) corrective regimen sliding scale   SubCutaneous three times a day before meals  isosorbide   mononitrate ER Tablet (IMDUR) 30 milliGRAM(s) Oral daily  melatonin 5 milliGRAM(s) Oral once  senna 2 Tablet(s) Oral at bedtime  tamsulosin 0.4 milliGRAM(s) Oral at bedtime    MEDICATIONS  (PRN):  dextrose Oral Gel 15 Gram(s) Oral once PRN Blood Glucose LESS THAN 70 milliGRAM(s)/deciliter  traMADol 25 milliGRAM(s) Oral daily PRN Moderate Pain (4 - 6)      Patient is a 78y old  Male who presents with a chief complaint of diffusely  in  ambulation (29 May 2024 09:16)      REVIEW OF SYSTEMS    General:no co nad  Skin/Breast:rash groin/n  Ophthalmologic:n/n  ENMT:	no co no ch  Respiratory and Thorax:no cough no sp no sob  Cardiovascular:no cp no palp  Gastrointestinal:no nvcd  Genitourinary:no fd  Musculoskeletal:	no a no pain - better  Neurological:	no f co no ch  Psychiatric:no co no issue	  Hematology/Lymphatics:	  Endocrine:	no polyudd  Allergic/Immunologic:  AOSN	y      Vital Signs Last 24 Hrs  T(C): 36.4 (30 May 2024 05:10), Max: 36.5 (29 May 2024 11:10)  T(F): 97.5 (30 May 2024 05:10), Max: 97.7 (29 May 2024 11:10)  HR: 57 (30 May 2024 05:10) (49 - 74)  BP: 153/80 (30 May 2024 05:10) (131/73 - 159/76)  BP(mean): --  RR: 18 (30 May 2024 05:10) (18 - 18)  SpO2: 98% (30 May 2024 05:10) (93% - 98%)    Parameters below as of 30 May 2024 05:10  Patient On (Oxygen Delivery Method): room air        PHYSICAL EXAM:    Constitutional:vss nad O2 98, p 64 BP ok  H+N ncat  Eyes:lyudmila cwnl  ENMT:hears speeaks stutters swallows ok eats ok  Neck:no cb no tm  Breasts:  Back:  Respiratory:ctab no rrw  Cardiovascular:rrr  Gastrointestinal:soft nt  Genitourinary:  Rectal:  Extremities:no cce  Vascular:hm- vv-  Neurological:nfatmae in bed, no pain, stutters a&ox3, ataxic gait  Skin:cdi  Lymph Nodes:  Musculoskeletal:no pain from  Psychiatric:no issue    LABS  CBC Full  -  ( 30 May 2024 07:07 )  WBC Count : 4.83 K/uL  RBC Count : 3.55 M/uL  Hemoglobin : 10.2 g/dL  Hematocrit : 31.3 %  Platelet Count - Automated : 303 K/uL  Mean Cell Volume : 88.2 fl  Mean Cell Hemoglobin : 28.7 pg  Mean Cell Hemoglobin Concentration : 32.6 gm/dL  Auto Neutrophil # : x  Auto Lymphocyte # : x  Auto Monocyte # : x  Auto Eosinophil # : x  Auto Basophil # : x  Auto Neutrophil % : x  Auto Lymphocyte % : x  Auto Monocyte % : x  Auto Eosinophil % : x  Auto Basophil % : x      05-30    143  |  108  |  37<H>  ----------------------------<  166<H>  3.7   |  20<L>  |  1.34<H>    Ca    9.8      30 May 2024 07:06            Imaging:    Xray:    Echo:    CT:    MRI:    Tele:    Orders:    ALONZO Sun [phone]

## 2024-05-30 NOTE — DISCHARGE NOTE PROVIDER - HOSPITAL COURSE
HPI:  78-year-old male      h/o   anxiety, hypertension, hyperlipidemia, diabetes, BPH, CVA ,  no reported AC use         presents to ED complaining of atraumatic left lower extremity pain x1 week.      Denies any falls or trauma.     at baseline ambulates with cane.      while in   er,  pt  noted,  to  be  is unable to self transition from EMS stretcher to   er  stretcher.        denies  fevers or chills. /cp.sob. abd  pain     love s with his  brother (23 May 2024 16:30)    Hospital Course:  78-year-old male past medical history anxiety, hypertension, hyperlipidemia, diabetes, BPH, CVA with no reported AC use, presents to ED complaining of atraumatic left lower extremity pain x1 week.  Denies any falls or trauma.  Reports at baseline ambulates with cane.  While in ED patient is unable to self transition from EMS stretcher to ER stretcher.  No fevers or chills.  Patient is poorly.  Lives with his brother.  Patient with sig cardiac risk factor with chest pain, on and off. Repeat echo noted no regional wall motion abnormality. S/p abnormal stress test. S/p cardiac cath noted with severe distal disease/LAD. Continue medical management. Added Imdur. Continue asa daily.  Adjusted BP meds, no diuretics, increase hydralazine to 25 mg bid. No ACE/ARB sec to increase renal function.  Now medically cleared for discharge.     Important Medication Changes and Reason:  See med rec    Active or Pending Issues Requiring Follow-up:  Follow up with PCP     Advanced Directives:   [x] Full code  [ ] DNR  [ ] Hospice    Discharge Diagnoses:  chest pain   HTN         HPI:  78-year-old male      h/o   anxiety, hypertension, hyperlipidemia, diabetes, BPH, CVA ,  no reported AC use         presents to ED complaining of atraumatic left lower extremity pain x1 week.      Denies any falls or trauma.     at baseline ambulates with cane.      while in   er,  pt  noted,  to  be  is unable to self transition from EMS stretcher to   er  stretcher.        denies  fevers or chills. /cp.sob. abd  pain     love s with his  brother (23 May 2024 16:30)    Hospital Course:  78-year-old male past medical history anxiety, hypertension, hyperlipidemia, diabetes, BPH, CVA with no reported AC use, presents to ED complaining of atraumatic left lower extremity pain x1 week.  Denies any falls or trauma.  Reports at baseline ambulates with cane.  While in ED patient is unable to self transition from EMS stretcher to ER stretcher.  No fevers or chills.  Patient is poorly.  Lives with his brother.  Patient with sig cardiac risk factor with chest pain, on and off. Repeat echo noted no regional wall motion abnormality. S/p abnormal stress test. S/p cardiac cath noted with severe distal disease/LAD. Continue medical management. Added Imdur. Continue asa daily.  Adjusted BP meds, no diuretics, increase hydralazine to 25 mg bid. No ACE/ARB sec to increase renal function.  Now medically cleared for discharge.     Important Medication Changes and Reason:  See med rec    Active or Pending Issues Requiring Follow-up:  Follow up with PCP     Advanced Directives:   [x] Full code  [ ] DNR  [ ] Hospice    Discharge Diagnoses:  chest pain   HTN    I will fu after pt dc from Rehab, disc c Brother Bird =   >45' this encounter

## 2024-05-30 NOTE — DISCHARGE NOTE PROVIDER - NSDCCPCAREPLAN_GEN_ALL_CORE_FT
PRINCIPAL DISCHARGE DIAGNOSIS  Diagnosis: CAD (coronary artery disease)  Assessment and Plan of Treatment:   Repeat transthoracic echocardiogram noted no regional wall motion abnormality. S/p abnormal stress test. S/p cardiac cath noted with severe distal disease/LAD. Continue medical management. Added Imdur. Continue aspirin daily.  Adjusted BP meds, no diuretics, increase hydralazine to 25 mg twice a day. No ACE/ARB secondary to increased renal function.  Follow up with your PCP Dr Sun 1-3 days afer discharge.      SECONDARY DISCHARGE DIAGNOSES  Diagnosis: Leg pain, left  Assessment and Plan of Treatment: Ataxic gait  Recommended for rehab    Diagnosis: Hypertension  Assessment and Plan of Treatment:   Adjusted blood pressure medications- increase hydralazine to 25 mg twice a day. No ACE/ARB secondary to increase renal function.

## 2024-05-30 NOTE — DISCHARGE NOTE NURSING/CASE MANAGEMENT/SOCIAL WORK - PATIENT PORTAL LINK FT
You can access the FollowMyHealth Patient Portal offered by [facility] by registering at the following website: [email or web address]. By joining [employer]’s FollowMyHealth portal, you will also be able to view your health information using other applications (apps) compatible with our system.

## 2024-05-30 NOTE — PROGRESS NOTE ADULT - PROVIDER SPECIALTY LIST ADULT
Family Medicine
Cardiology
Family Medicine
Family Medicine
Cardiology
Family Medicine

## 2024-05-30 NOTE — DISCHARGE NOTE PROVIDER - NSDCMRMEDTOKEN_GEN_ALL_CORE_FT
amLODIPine 10 mg oral tablet: 1 tab(s) orally once a day  aspirin 81 mg oral delayed release tablet: 1 tab(s) orally once a day  atorvastatin 80 mg oral tablet: 1 tab(s) orally once a day (at bedtime)  clotrimazole 1% topical cream: 1 Apply topically to affected area 2 times a day  glimepiride 4 mg oral tablet: 2 tab(s) orally once a day filled 6/28/22 x 90 days    NOTE: [facility] ALSO FILLED 2MG 3 TABS AT BEDTIME ON 5/29/22 X 90 DAYS       hydrALAZINE 25 mg oral tablet: 1 tab(s) orally 2 times a day  isosorbide mononitrate 30 mg oral tablet, extended release: 1 tab(s) orally once a day  melatonin 5 mg oral tablet: 1 tab(s) orally once  metFORMIN 500 mg oral tablet, extended release: 4 tab(s) orally once a day with dinner  senna leaf extract oral tablet: 2 tab(s) orally once a day (at bedtime)  tamsulosin 0.4 mg oral capsule: 1 cap(s) orally once a day  traMADol 50 mg oral tablet: 0.5 tab(s) orally once a day as needed for Moderate Pain (4 - 6) MDD: 1 tab  Victoza 18 mg/3 mL subcutaneous solution: 1.8 milligram(s) subcutaneous once a day

## 2024-12-05 ENCOUNTER — APPOINTMENT (OUTPATIENT)
Dept: UROLOGY | Facility: CLINIC | Age: 79
End: 2024-12-05

## 2024-12-05 NOTE — DIETITIAN INITIAL EVALUATION ADULT - ENTER TO (G/KG)
MD Notification    Notified Person: Dr. Ramírez    Notification Date/Time: 12/5/24 9258    Notification Interaction: Vocera    Purpose of Notification: Patient returned from EP lab, please advise when to resume coumadin. Thank you. GRETCHEN Gregory    Orders Received: Resume Warfarin tonight. Discussed with pharmacist.     Comments:     1

## 2024-12-17 ENCOUNTER — APPOINTMENT (OUTPATIENT)
Dept: UROLOGY | Facility: CLINIC | Age: 79
End: 2024-12-17

## 2025-04-04 ENCOUNTER — APPOINTMENT (OUTPATIENT)
Dept: OTOLARYNGOLOGY | Facility: CLINIC | Age: 80
End: 2025-04-04
Payer: MEDICARE

## 2025-04-04 VITALS
HEART RATE: 62 BPM | TEMPERATURE: 97.3 F | DIASTOLIC BLOOD PRESSURE: 64 MMHG | HEIGHT: 68 IN | SYSTOLIC BLOOD PRESSURE: 132 MMHG | WEIGHT: 152 LBS | BODY MASS INDEX: 23.04 KG/M2

## 2025-04-04 DIAGNOSIS — H61.23 IMPACTED CERUMEN, BILATERAL: ICD-10-CM

## 2025-04-04 DIAGNOSIS — R09.81 NASAL CONGESTION: ICD-10-CM

## 2025-04-04 DIAGNOSIS — H69.91 UNSPECIFIED EUSTACHIAN TUBE DISORDER, RIGHT EAR: ICD-10-CM

## 2025-04-04 DIAGNOSIS — H65.91 UNSPECIFIED NONSUPPURATIVE OTITIS MEDIA, RIGHT EAR: ICD-10-CM

## 2025-04-04 PROCEDURE — 69433 CREATE EARDRUM OPENING: CPT | Mod: RT

## 2025-04-04 PROCEDURE — 92557 COMPREHENSIVE HEARING TEST: CPT

## 2025-04-04 PROCEDURE — 99204 OFFICE O/P NEW MOD 45 MIN: CPT | Mod: 25

## 2025-04-04 PROCEDURE — 92567 TYMPANOMETRY: CPT

## 2025-04-04 RX ORDER — OFLOXACIN OTIC 3 MG/ML
0.3 SOLUTION AURICULAR (OTIC) TWICE DAILY
Qty: 1 | Refills: 0 | Status: ACTIVE | COMMUNITY
Start: 2025-04-04 | End: 1900-01-01

## 2025-04-04 RX ORDER — FLUTICASONE PROPIONATE 50 UG/1
50 SPRAY, METERED NASAL TWICE DAILY
Qty: 1 | Refills: 1 | Status: ACTIVE | COMMUNITY
Start: 2025-04-04 | End: 1900-01-01

## 2025-05-09 ENCOUNTER — APPOINTMENT (OUTPATIENT)
Dept: OTOLARYNGOLOGY | Facility: CLINIC | Age: 80
End: 2025-05-09
Payer: MEDICARE

## 2025-05-09 VITALS — BODY MASS INDEX: 23.04 KG/M2 | WEIGHT: 152 LBS | HEIGHT: 68 IN

## 2025-05-09 DIAGNOSIS — H65.91 UNSPECIFIED NONSUPPURATIVE OTITIS MEDIA, RIGHT EAR: ICD-10-CM

## 2025-05-09 DIAGNOSIS — Z96.22 MYRINGOTOMY TUBE(S) STATUS: ICD-10-CM

## 2025-05-09 DIAGNOSIS — R09.81 NASAL CONGESTION: ICD-10-CM

## 2025-05-09 PROCEDURE — 92567 TYMPANOMETRY: CPT

## 2025-05-09 PROCEDURE — 99213 OFFICE O/P EST LOW 20 MIN: CPT | Mod: 25

## 2025-05-09 PROCEDURE — 92557 COMPREHENSIVE HEARING TEST: CPT

## 2025-05-09 PROCEDURE — 31231 NASAL ENDOSCOPY DX: CPT

## 2025-06-26 NOTE — PHYSICAL THERAPY INITIAL EVALUATION ADULT - MANUAL MUSCLE TESTING RESULTS, REHAB EVAL
[Y] : Positive pregnancy history [Menarche Age: ____] : age at menarche was [unfilled] [No] : Patient does not have concerns regarding sex [PapSmeardate] : 02/28/24 [TextBox_31] : NEG [HIVDate] : 8/31/22 [TextBox_53] : NEG [SyphilisDate] : 8/31/22 [TextBox_58] : NEG [GonorrheaDate] : 5/10/23 [TextBox_63] : NEG [ChlamydiaDate] : 5/10/23 [TextBox_68] : NEG [HPVDate] : 2/28/24 [TextBox_78] : NEG globally pt is 3+/5 [LMPDate] : 06/14/25 [PGHxTotal] : 3 [[facility]Term] : 3 [[facility] Centeriving] : 3 [FreeTextEntry1] : 06/14/25

## (undated) DEVICE — SUCTION YANKAUER NO CONTROL VENT

## (undated) DEVICE — SYR ALLIANCE II INFLATION 60ML

## (undated) DEVICE — BALLOON US ENDO

## (undated) DEVICE — TUBING SUCTION 20FT

## (undated) DEVICE — CATH IV SAFE BC 20G X 1.16" (PINK)

## (undated) DEVICE — CATH IV SAFE BC 22G X 1" (BLUE)

## (undated) DEVICE — SOL INJ NS 0.9% 500ML 2 PORT

## (undated) DEVICE — BIOPSY FORCEP RADIAL JAW 4 STANDARD WITH NEEDLE

## (undated) DEVICE — BITE BLOCK ADULT 20 X 27MM (GREEN)

## (undated) DEVICE — TUBING IV SET GRAVITY 3Y 100" MACRO

## (undated) DEVICE — PACK IV START WITH CHG

## (undated) DEVICE — FOLEY HOLDER STATLOCK 2 WAY ADULT

## (undated) DEVICE — TUBING SUCTION CONN 6FT STERILE

## (undated) DEVICE — SENSOR O2 FINGER ADULT